# Patient Record
Sex: FEMALE | Race: WHITE | Employment: OTHER | URBAN - NONMETROPOLITAN AREA
[De-identification: names, ages, dates, MRNs, and addresses within clinical notes are randomized per-mention and may not be internally consistent; named-entity substitution may affect disease eponyms.]

---

## 2017-01-16 ENCOUNTER — OFFICE VISIT (OUTPATIENT)
Dept: INTERNAL MEDICINE CLINIC | Age: 62
End: 2017-01-16

## 2017-01-16 VITALS
DIASTOLIC BLOOD PRESSURE: 68 MMHG | OXYGEN SATURATION: 97 % | TEMPERATURE: 98.6 F | SYSTOLIC BLOOD PRESSURE: 148 MMHG | BODY MASS INDEX: 44.93 KG/M2 | WEIGHT: 279.6 LBS | HEART RATE: 68 BPM | HEIGHT: 66 IN | RESPIRATION RATE: 17 BRPM

## 2017-01-16 DIAGNOSIS — K76.0 FATTY LIVER: ICD-10-CM

## 2017-01-16 DIAGNOSIS — E78.2 MIXED HYPERLIPIDEMIA: ICD-10-CM

## 2017-01-16 DIAGNOSIS — L85.3 DRY SKIN: ICD-10-CM

## 2017-01-16 DIAGNOSIS — N39.46 URINARY INCONTINENCE, MIXED: ICD-10-CM

## 2017-01-16 DIAGNOSIS — I27.20 PULMONARY HYPERTENSION (HCC): ICD-10-CM

## 2017-01-16 DIAGNOSIS — E11.29 MICROALBUMINURIA DUE TO TYPE 2 DIABETES MELLITUS (HCC): ICD-10-CM

## 2017-01-16 DIAGNOSIS — J04.0 LARYNGITIS, ACUTE: ICD-10-CM

## 2017-01-16 DIAGNOSIS — E66.01 MORBID OBESITY DUE TO EXCESS CALORIES (HCC): ICD-10-CM

## 2017-01-16 DIAGNOSIS — I10 ESSENTIAL HYPERTENSION: ICD-10-CM

## 2017-01-16 DIAGNOSIS — J44.9 MODERATE COPD (CHRONIC OBSTRUCTIVE PULMONARY DISEASE) (HCC): ICD-10-CM

## 2017-01-16 DIAGNOSIS — Z72.0 TOBACCO ABUSE DISORDER: ICD-10-CM

## 2017-01-16 DIAGNOSIS — G47.33 OBSTRUCTIVE SLEEP APNEA: ICD-10-CM

## 2017-01-16 DIAGNOSIS — R80.9 MICROALBUMINURIA DUE TO TYPE 2 DIABETES MELLITUS (HCC): ICD-10-CM

## 2017-01-16 DIAGNOSIS — J96.11 CHRONIC RESPIRATORY FAILURE WITH HYPOXIA (HCC): ICD-10-CM

## 2017-01-16 DIAGNOSIS — F51.01 PRIMARY INSOMNIA: ICD-10-CM

## 2017-01-16 DIAGNOSIS — E11.9 TYPE 2 DIABETES MELLITUS WITHOUT COMPLICATION, WITHOUT LONG-TERM CURRENT USE OF INSULIN (HCC): Primary | ICD-10-CM

## 2017-01-16 LAB — HBA1C MFR BLD: 7 %

## 2017-01-16 PROCEDURE — 83036 HEMOGLOBIN GLYCOSYLATED A1C: CPT | Performed by: INTERNAL MEDICINE

## 2017-01-16 PROCEDURE — 99214 OFFICE O/P EST MOD 30 MIN: CPT | Performed by: INTERNAL MEDICINE

## 2017-01-16 RX ORDER — ALBUTEROL SULFATE 90 UG/1
2 AEROSOL, METERED RESPIRATORY (INHALATION) EVERY 6 HOURS PRN
COMMUNITY
End: 2018-05-21 | Stop reason: SDUPTHER

## 2017-01-16 RX ORDER — AZITHROMYCIN 250 MG/1
TABLET, FILM COATED ORAL
Qty: 1 PACKET | Refills: 0 | Status: SHIPPED | OUTPATIENT
Start: 2017-01-16 | End: 2017-04-24 | Stop reason: ALTCHOICE

## 2017-01-16 ASSESSMENT — ENCOUNTER SYMPTOMS
VOMITING: 0
WHEEZING: 0
GASTROINTESTINAL NEGATIVE: 1
EYES NEGATIVE: 1
SHORTNESS OF BREATH: 1
SORE THROAT: 0
COUGH: 1
HEMOPTYSIS: 0
NAUSEA: 0

## 2017-03-08 ENCOUNTER — HOSPITAL ENCOUNTER (OUTPATIENT)
Dept: LAB | Age: 62
Discharge: HOME | End: 2017-03-08
Attending: INTERNAL MEDICINE | Admitting: INTERNAL MEDICINE

## 2017-03-08 LAB
ALBUMIN SERPL-MCNC: 4.2 GM/DL (ref 3.4–5)
ALP BLD-CCNC: 48 IU/L (ref 40–129)
ALT SERPL-CCNC: 40 U/L (ref 10–40)
ANION GAP SERPL CALCULATED.3IONS-SCNC: 12 MMOL/L (ref 4–16)
AST SERPL-CCNC: 24 IU/L (ref 15–37)
BILIRUB SERPL-MCNC: 0.3 MG/DL (ref 0–1)
BUN BLDV-MCNC: 28 MG/DL (ref 6–23)
CALCIUM SERPL-MCNC: 9.5 MG/DL (ref 8.3–10.6)
CHLORIDE BLD-SCNC: 100 MMOL/L (ref 99–110)
CHOLESTEROL: 166 MG/DL
CO2: 27 MMOL/L (ref 21–32)
CREAT SERPL-MCNC: 1 MG/DL (ref 0.6–1.1)
GFR AFRICAN AMERICAN: >60 ML/MIN/1.73M2
GFR NON-AFRICAN AMERICAN: 57 ML/MIN/1.73M2
GLUCOSE BLD-MCNC: 96 MG/DL (ref 70–140)
HDLC SERPL-MCNC: 42 MG/DL
LDL CHOLESTEROL DIRECT: 95 MG/DL
POTASSIUM SERPL-SCNC: 4.8 MMOL/L (ref 3.5–5.1)
SODIUM BLD-SCNC: 139 MMOL/L (ref 135–145)
TOTAL PROTEIN: 7.5 GM/DL (ref 6.4–8.2)
TRIGL SERPL-MCNC: 265 MG/DL
TSH HIGH SENSITIVITY: 2.04 UIU/ML (ref 0.27–4.2)

## 2017-03-27 RX ORDER — INDAPAMIDE 2.5 MG/1
TABLET, FILM COATED ORAL
Qty: 90 TABLET | Refills: 1 | Status: SHIPPED | OUTPATIENT
Start: 2017-03-27 | End: 2017-09-23 | Stop reason: SDUPTHER

## 2017-03-27 RX ORDER — ATENOLOL 25 MG/1
TABLET ORAL
Qty: 180 TABLET | Refills: 1 | Status: SHIPPED | OUTPATIENT
Start: 2017-03-27 | End: 2017-09-23 | Stop reason: SDUPTHER

## 2017-03-27 RX ORDER — SOLIFENACIN SUCCINATE 10 MG/1
TABLET, FILM COATED ORAL
Qty: 90 TABLET | Refills: 1 | Status: SHIPPED | OUTPATIENT
Start: 2017-03-27 | End: 2017-04-24 | Stop reason: ALTCHOICE

## 2017-03-27 RX ORDER — TRAZODONE HYDROCHLORIDE 100 MG/1
TABLET ORAL
Qty: 90 TABLET | Refills: 1 | Status: SHIPPED | OUTPATIENT
Start: 2017-03-27 | End: 2017-09-23 | Stop reason: SDUPTHER

## 2017-03-27 RX ORDER — LISINOPRIL 20 MG/1
TABLET ORAL
Qty: 90 TABLET | Refills: 1 | Status: SHIPPED | OUTPATIENT
Start: 2017-03-27 | End: 2017-09-23 | Stop reason: SDUPTHER

## 2017-03-27 RX ORDER — GLIPIZIDE 10 MG/1
TABLET, FILM COATED, EXTENDED RELEASE ORAL
Qty: 180 TABLET | Refills: 1 | Status: SHIPPED | OUTPATIENT
Start: 2017-03-27 | End: 2017-09-23 | Stop reason: SDUPTHER

## 2017-04-10 ENCOUNTER — HOSPITAL ENCOUNTER (OUTPATIENT)
Dept: CARDIAC REHAB | Age: 62
Discharge: HOME | End: 2017-04-10
Attending: INTERNAL MEDICINE | Admitting: INTERNAL MEDICINE

## 2017-04-13 PROBLEM — J44.9 COPD, SEVERE (HCC): Chronic | Status: ACTIVE | Noted: 2017-04-10

## 2017-04-24 ENCOUNTER — OFFICE VISIT (OUTPATIENT)
Dept: INTERNAL MEDICINE CLINIC | Age: 62
End: 2017-04-24

## 2017-04-24 VITALS
SYSTOLIC BLOOD PRESSURE: 165 MMHG | HEIGHT: 66 IN | DIASTOLIC BLOOD PRESSURE: 68 MMHG | WEIGHT: 277.2 LBS | HEART RATE: 72 BPM | OXYGEN SATURATION: 97 % | TEMPERATURE: 98.1 F | BODY MASS INDEX: 44.55 KG/M2

## 2017-04-24 DIAGNOSIS — E11.9 TYPE 2 DIABETES MELLITUS WITHOUT COMPLICATION, WITHOUT LONG-TERM CURRENT USE OF INSULIN (HCC): ICD-10-CM

## 2017-04-24 DIAGNOSIS — N39.46 URINARY INCONTINENCE, MIXED: ICD-10-CM

## 2017-04-24 DIAGNOSIS — G47.33 OBSTRUCTIVE SLEEP APNEA: ICD-10-CM

## 2017-04-24 DIAGNOSIS — Z72.0 TOBACCO ABUSE DISORDER: ICD-10-CM

## 2017-04-24 DIAGNOSIS — F51.01 PRIMARY INSOMNIA: ICD-10-CM

## 2017-04-24 DIAGNOSIS — J44.9 COPD, SEVERE (HCC): Primary | Chronic | ICD-10-CM

## 2017-04-24 DIAGNOSIS — Z12.31 VISIT FOR SCREENING MAMMOGRAM: ICD-10-CM

## 2017-04-24 DIAGNOSIS — E66.01 MORBID OBESITY DUE TO EXCESS CALORIES (HCC): ICD-10-CM

## 2017-04-24 DIAGNOSIS — J96.11 CHRONIC RESPIRATORY FAILURE WITH HYPOXIA (HCC): ICD-10-CM

## 2017-04-24 DIAGNOSIS — I27.20 PULMONARY HYPERTENSION (HCC): ICD-10-CM

## 2017-04-24 DIAGNOSIS — I10 ESSENTIAL HYPERTENSION: ICD-10-CM

## 2017-04-24 DIAGNOSIS — E78.2 MIXED HYPERLIPIDEMIA: ICD-10-CM

## 2017-04-24 DIAGNOSIS — K75.81 STEATOHEPATITIS: ICD-10-CM

## 2017-04-24 PROCEDURE — 99214 OFFICE O/P EST MOD 30 MIN: CPT | Performed by: INTERNAL MEDICINE

## 2017-04-24 RX ORDER — CIPROFLOXACIN 500 MG/1
500 TABLET, FILM COATED ORAL 2 TIMES DAILY
Qty: 10 TABLET | Refills: 0 | Status: SHIPPED | OUTPATIENT
Start: 2017-04-24 | End: 2017-05-01 | Stop reason: ALTCHOICE

## 2017-04-24 RX ORDER — CIPROFLOXACIN 500 MG/1
500 TABLET, FILM COATED ORAL 2 TIMES DAILY
Qty: 10 TABLET | Refills: 0 | Status: SHIPPED | OUTPATIENT
Start: 2017-04-24 | End: 2017-04-24 | Stop reason: CLARIF

## 2017-04-24 ASSESSMENT — ENCOUNTER SYMPTOMS
GASTROINTESTINAL NEGATIVE: 1
SPUTUM PRODUCTION: 1
COUGH: 1
EYES NEGATIVE: 1
STRIDOR: 0

## 2017-05-02 ENCOUNTER — HOSPITAL ENCOUNTER (OUTPATIENT)
Dept: MAMMOGRAPHY | Age: 62
Discharge: HOME | End: 2017-05-02
Attending: INTERNAL MEDICINE | Admitting: INTERNAL MEDICINE

## 2017-05-02 DIAGNOSIS — J44.9 OBSTRUCTIVE CHRONIC BRONCHITIS WITHOUT EXACERBATION (HCC): ICD-10-CM

## 2017-05-02 DIAGNOSIS — Z12.31 SCREENING MAMMOGRAM, ENCOUNTER FOR: ICD-10-CM

## 2017-06-09 RX ORDER — PRAVASTATIN SODIUM 20 MG
TABLET ORAL
Qty: 90 TABLET | Refills: 1 | Status: SHIPPED | OUTPATIENT
Start: 2017-06-09 | End: 2017-12-06 | Stop reason: SDUPTHER

## 2017-07-12 LAB — DIABETIC RETINOPATHY: NORMAL

## 2017-07-31 ENCOUNTER — OFFICE VISIT (OUTPATIENT)
Dept: INTERNAL MEDICINE CLINIC | Age: 62
End: 2017-07-31

## 2017-07-31 VITALS
DIASTOLIC BLOOD PRESSURE: 64 MMHG | WEIGHT: 279.6 LBS | HEART RATE: 72 BPM | OXYGEN SATURATION: 94 % | SYSTOLIC BLOOD PRESSURE: 136 MMHG | HEIGHT: 66 IN | BODY MASS INDEX: 44.93 KG/M2 | RESPIRATION RATE: 16 BRPM | TEMPERATURE: 98 F

## 2017-07-31 DIAGNOSIS — K75.81 STEATOHEPATITIS: ICD-10-CM

## 2017-07-31 DIAGNOSIS — F51.01 PRIMARY INSOMNIA: ICD-10-CM

## 2017-07-31 DIAGNOSIS — J44.9 MODERATE COPD (CHRONIC OBSTRUCTIVE PULMONARY DISEASE) (HCC): ICD-10-CM

## 2017-07-31 DIAGNOSIS — I27.20 PULMONARY HYPERTENSION (HCC): ICD-10-CM

## 2017-07-31 DIAGNOSIS — E78.2 MIXED HYPERLIPIDEMIA: ICD-10-CM

## 2017-07-31 DIAGNOSIS — E66.01 MORBID OBESITY DUE TO EXCESS CALORIES (HCC): ICD-10-CM

## 2017-07-31 DIAGNOSIS — K76.89 LIVER DYSFUNCTION: ICD-10-CM

## 2017-07-31 DIAGNOSIS — I10 ESSENTIAL HYPERTENSION: ICD-10-CM

## 2017-07-31 DIAGNOSIS — Z72.0 TOBACCO ABUSE DISORDER: ICD-10-CM

## 2017-07-31 DIAGNOSIS — E11.9 TYPE 2 DIABETES MELLITUS WITHOUT COMPLICATION, WITHOUT LONG-TERM CURRENT USE OF INSULIN (HCC): Primary | ICD-10-CM

## 2017-07-31 DIAGNOSIS — G47.33 OBSTRUCTIVE SLEEP APNEA: ICD-10-CM

## 2017-07-31 DIAGNOSIS — J44.9 COPD, SEVERE (HCC): Chronic | ICD-10-CM

## 2017-07-31 LAB — HBA1C MFR BLD: 7.1 %

## 2017-07-31 PROCEDURE — 83036 HEMOGLOBIN GLYCOSYLATED A1C: CPT | Performed by: INTERNAL MEDICINE

## 2017-07-31 PROCEDURE — 99214 OFFICE O/P EST MOD 30 MIN: CPT | Performed by: INTERNAL MEDICINE

## 2017-07-31 ASSESSMENT — ENCOUNTER SYMPTOMS
GASTROINTESTINAL NEGATIVE: 1
SHORTNESS OF BREATH: 1
ORTHOPNEA: 0
COUGH: 0
EYES NEGATIVE: 1
HEMOPTYSIS: 0

## 2017-08-29 RX ORDER — CLONIDINE HYDROCHLORIDE 0.1 MG/1
TABLET ORAL
Qty: 180 TABLET | Refills: 1 | Status: SHIPPED | OUTPATIENT
Start: 2017-08-29 | End: 2018-02-28 | Stop reason: SDUPTHER

## 2017-09-25 RX ORDER — TRAZODONE HYDROCHLORIDE 100 MG/1
TABLET ORAL
Qty: 90 TABLET | Refills: 1 | Status: SHIPPED | OUTPATIENT
Start: 2017-09-25 | End: 2018-03-24 | Stop reason: SDUPTHER

## 2017-09-25 RX ORDER — INDAPAMIDE 2.5 MG/1
TABLET, FILM COATED ORAL
Qty: 90 TABLET | Refills: 1 | Status: SHIPPED | OUTPATIENT
Start: 2017-09-25 | End: 2018-03-24 | Stop reason: SDUPTHER

## 2017-09-25 RX ORDER — GLIPIZIDE 10 MG/1
TABLET, FILM COATED, EXTENDED RELEASE ORAL
Qty: 180 TABLET | Refills: 1 | Status: SHIPPED | OUTPATIENT
Start: 2017-09-25 | End: 2018-03-24 | Stop reason: SDUPTHER

## 2017-09-25 RX ORDER — SOLIFENACIN SUCCINATE 10 MG/1
TABLET, FILM COATED ORAL
Qty: 90 TABLET | Refills: 1 | Status: SHIPPED | OUTPATIENT
Start: 2017-09-25 | End: 2018-03-24 | Stop reason: SDUPTHER

## 2017-09-25 RX ORDER — ATENOLOL 25 MG/1
TABLET ORAL
Qty: 180 TABLET | Refills: 1 | Status: SHIPPED | OUTPATIENT
Start: 2017-09-25 | End: 2018-03-24 | Stop reason: SDUPTHER

## 2017-09-25 RX ORDER — LISINOPRIL 20 MG/1
TABLET ORAL
Qty: 90 TABLET | Refills: 1 | Status: SHIPPED | OUTPATIENT
Start: 2017-09-25 | End: 2018-02-01 | Stop reason: ALTCHOICE

## 2017-10-19 ENCOUNTER — HOSPITAL ENCOUNTER (OUTPATIENT)
Dept: LAB | Age: 62
Discharge: HOME | End: 2017-10-19
Attending: INTERNAL MEDICINE | Admitting: INTERNAL MEDICINE

## 2017-10-19 LAB
ALBUMIN SERPL-MCNC: 4.3 GM/DL (ref 3.4–5)
ALP BLD-CCNC: 55 IU/L (ref 40–129)
ALT SERPL-CCNC: 28 U/L (ref 10–40)
ANION GAP SERPL CALCULATED.3IONS-SCNC: 15 MMOL/L (ref 4–16)
AST SERPL-CCNC: 22 IU/L (ref 15–37)
BILIRUB SERPL-MCNC: 0.2 MG/DL (ref 0–1)
BUN BLDV-MCNC: 19 MG/DL (ref 6–23)
CALCIUM SERPL-MCNC: 9.4 MG/DL (ref 8.3–10.6)
CHLORIDE BLD-SCNC: 99 MMOL/L (ref 99–110)
CHOLESTEROL, FASTING: 178 MG/DL
CO2: 26 MMOL/L (ref 21–32)
CREAT SERPL-MCNC: 0.9 MG/DL (ref 0.6–1.1)
CREATININE URINE: 84.8 MG/DL (ref 28–217)
GFR AFRICAN AMERICAN: >60 ML/MIN/1.73M2
GFR NON-AFRICAN AMERICAN: >60 ML/MIN/1.73M2
GLUCOSE FASTING: 194 MG/DL (ref 70–99)
HDLC SERPL-MCNC: 36 MG/DL
LDL CHOLESTEROL DIRECT: 93 MG/DL
MICROALBUMIN/CREAT 24H UR: 7.1 MG/DL
MICROALBUMIN/CREAT UR-RTO: 83.7 MG/G CREAT (ref 0–30)
POTASSIUM SERPL-SCNC: 4.5 MMOL/L (ref 3.5–5.1)
SODIUM BLD-SCNC: 140 MMOL/L (ref 135–145)
TOTAL PROTEIN: 7.2 GM/DL (ref 6.4–8.2)
TRIGLYCERIDE, FASTING: 398 MG/DL

## 2017-11-01 ENCOUNTER — OFFICE VISIT (OUTPATIENT)
Dept: INTERNAL MEDICINE CLINIC | Age: 62
End: 2017-11-01

## 2017-11-01 VITALS
WEIGHT: 276 LBS | DIASTOLIC BLOOD PRESSURE: 66 MMHG | TEMPERATURE: 98.1 F | BODY MASS INDEX: 44.36 KG/M2 | OXYGEN SATURATION: 94 % | HEIGHT: 66 IN | RESPIRATION RATE: 17 BRPM | HEART RATE: 84 BPM | SYSTOLIC BLOOD PRESSURE: 138 MMHG

## 2017-11-01 DIAGNOSIS — E78.2 MIXED HYPERLIPIDEMIA: ICD-10-CM

## 2017-11-01 DIAGNOSIS — Z72.0 TOBACCO ABUSE DISORDER: ICD-10-CM

## 2017-11-01 DIAGNOSIS — J20.8 ACUTE BRONCHITIS DUE TO OTHER SPECIFIED ORGANISMS: ICD-10-CM

## 2017-11-01 DIAGNOSIS — K75.81 STEATOHEPATITIS: ICD-10-CM

## 2017-11-01 DIAGNOSIS — I27.20 PULMONARY HYPERTENSION (HCC): ICD-10-CM

## 2017-11-01 DIAGNOSIS — I10 ESSENTIAL HYPERTENSION: ICD-10-CM

## 2017-11-01 DIAGNOSIS — E11.9 TYPE 2 DIABETES MELLITUS WITHOUT COMPLICATION, WITHOUT LONG-TERM CURRENT USE OF INSULIN (HCC): Primary | ICD-10-CM

## 2017-11-01 DIAGNOSIS — E66.01 MORBID OBESITY DUE TO EXCESS CALORIES (HCC): ICD-10-CM

## 2017-11-01 DIAGNOSIS — J44.9 COPD, SEVERE (HCC): Chronic | ICD-10-CM

## 2017-11-01 DIAGNOSIS — G47.33 OBSTRUCTIVE SLEEP APNEA: ICD-10-CM

## 2017-11-01 DIAGNOSIS — J96.11 CHRONIC RESPIRATORY FAILURE WITH HYPOXIA (HCC): ICD-10-CM

## 2017-11-01 LAB — HBA1C MFR BLD: 6.9 %

## 2017-11-01 PROCEDURE — G8482 FLU IMMUNIZE ORDER/ADMIN: HCPCS | Performed by: INTERNAL MEDICINE

## 2017-11-01 PROCEDURE — 4004F PT TOBACCO SCREEN RCVD TLK: CPT | Performed by: INTERNAL MEDICINE

## 2017-11-01 PROCEDURE — G8417 CALC BMI ABV UP PARAM F/U: HCPCS | Performed by: INTERNAL MEDICINE

## 2017-11-01 PROCEDURE — 3023F SPIROM DOC REV: CPT | Performed by: INTERNAL MEDICINE

## 2017-11-01 PROCEDURE — 3017F COLORECTAL CA SCREEN DOC REV: CPT | Performed by: INTERNAL MEDICINE

## 2017-11-01 PROCEDURE — 3044F HG A1C LEVEL LT 7.0%: CPT | Performed by: INTERNAL MEDICINE

## 2017-11-01 PROCEDURE — 99214 OFFICE O/P EST MOD 30 MIN: CPT | Performed by: INTERNAL MEDICINE

## 2017-11-01 PROCEDURE — 3014F SCREEN MAMMO DOC REV: CPT | Performed by: INTERNAL MEDICINE

## 2017-11-01 PROCEDURE — 83036 HEMOGLOBIN GLYCOSYLATED A1C: CPT | Performed by: INTERNAL MEDICINE

## 2017-11-01 PROCEDURE — G8427 DOCREV CUR MEDS BY ELIG CLIN: HCPCS | Performed by: INTERNAL MEDICINE

## 2017-11-01 PROCEDURE — G8926 SPIRO NO PERF OR DOC: HCPCS | Performed by: INTERNAL MEDICINE

## 2017-11-01 RX ORDER — DOXYCYCLINE HYCLATE 100 MG/1
100 CAPSULE ORAL 2 TIMES DAILY
Qty: 14 CAPSULE | Refills: 0 | Status: SHIPPED | OUTPATIENT
Start: 2017-11-01 | End: 2017-11-08

## 2017-11-01 ASSESSMENT — PATIENT HEALTH QUESTIONNAIRE - PHQ9
SUM OF ALL RESPONSES TO PHQ QUESTIONS 1-9: 0
SUM OF ALL RESPONSES TO PHQ9 QUESTIONS 1 & 2: 0
1. LITTLE INTEREST OR PLEASURE IN DOING THINGS: 0
2. FEELING DOWN, DEPRESSED OR HOPELESS: 0

## 2017-11-01 ASSESSMENT — ENCOUNTER SYMPTOMS
HEMOPTYSIS: 0
COUGH: 1
SPUTUM PRODUCTION: 1
EYES NEGATIVE: 1
GASTROINTESTINAL NEGATIVE: 1
SHORTNESS OF BREATH: 1

## 2017-11-01 NOTE — ASSESSMENT & PLAN NOTE
PFT 4/10/17 severe COPD  Pt sees Dr Yuko De La Rosa  She is on O2 24/7 : 2L/m  Pt is on symbicort, and albuterol inhalers  Cannot affort malcom Bobbyiden  Unable to work because of that.    Has applied for permanent disability

## 2017-11-01 NOTE — ASSESSMENT & PLAN NOTE
Patient is a smoker. Counseled to quit smoking. Various options given. Help number 1-800 QUIT NOW  given to patient.

## 2017-11-01 NOTE — PROGRESS NOTES
oriented, pleasant, in no apparent distress. Patient is on oxygen  HEENT:  Conjunctiva pink, no scleral icterus. ENT clear. NECK:  Supple. No jugular venous distention noted. No masses felt,  CARDIOVASCULAR:  Normal S1 and S2    PULMONARY:  No respiratory distress. No wheezes or rales. ABDOMEN:  Soft and non-tender,no masses  or organomegaly. EXTREMITIES:  No cyanosis, clubbing, or significant edema. SKIN: Skin is warm and dry. NEUROLOGICAL:  Cranial nerves II through XII are grossly intact. FEET : skin is dry: no ulcers. No calluses. Pulse 1 + mono-swathi test is normal.    IMPRESSION:    Encounter Diagnoses   Name Primary?  Type 2 diabetes mellitus without complication, without long-term current use of insulin (HCC) Yes    Tobacco abuse disorder     Steatohepatitis     Pulmonary hypertension     Obstructive sleep apnea     Morbid obesity due to excess calories (HCC)     Mixed hyperlipidemia     Essential hypertension     COPD, severe (HCC)     Chronic respiratory failure with hypoxia (HCC)     Acute bronchitis due to other specified organisms        ASSESSMENT/PLAN:      Type 2 diabetes mellitus without complication (HCC)  GML1X 6.9  Pt is on glipizide and metformin  Continue same      Tobacco abuse disorder  Patient is a smoker. Counseled to quit smoking. Various options given. Help number 1-800 QUIT NOW  given to patient. Steatohepatitis  Liver, core biopsies: Steatohepatitis, acute and chronic,  grade 2, stage 2. LFTs stable    Pulmonary hypertension (HCC)  Sees Dr Smith Generous    Obstructive sleep apnea  Using CPAP and that helps    Morbid obesity due to excess calories ([address]  Discussed to lose and wt and follow diet    Mixed hyperlipidemia  Lipid good except Tri  Follow diet and pravastatin    Essential hypertension  Hypertension in control. Follow low salt diet. Continue current treatment. Continue lozol, prinivil,tenormin and clonidine.     COPD, severe ([address]  PFT 4/10/17 severe COPD  Pt sees Dr Yuko De La Rosa  She is on O2 24/7 : 2L/m  Pt is on symbicort, and albuterol inhalers  Cannot affort Ryann Rashid  Unable to work because of that. Has applied for permanent disability    Chronic respiratory failure with hypoxia ([facility]  As above    Acute bronchitis due to other specified organisms  Will give doxycycline and sample of [facility] maintenance  Patient had an eye exam about 6 months ago at [facility]  Will give FIT test kit to check it at home  She had Pneumovax 23 in 2014  She had tetanus 2015  Return to office in 3 months. Orders Placed This Encounter   Procedures    POCT glycosylated hemoglobin (Hb A1C)         Mediations reviewed with the patient. Continue current medications. Appropriate prescriptions are addressed. After visit pitery provided. Follow up as directed sooner if needed. Questions answered and patient verbalizes understanding. Call for any problems, questions, or concerns.        No Known Allergies  Current Outpatient Prescriptions   Medication Sig Dispense Refill    traZODone (DESYREL) 100 MG tablet TAKE 1 TABLET NIGHTLY 90 tablet 1    VESICARE 10 MG tablet TAKE 1 TABLET DAILY 90 tablet 1    glipiZIDE (GLUCOTROL XL) 10 MG extended release tablet TAKE 1 TABLET TWICE A  tablet 1    lisinopril (PRINIVIL;ZESTRIL) 20 MG tablet TAKE 1 TABLET DAILY 90 tablet 1    indapamide (LOZOL) 2.5 MG tablet TAKE 1 TABLET DAILY 90 tablet 1    atenolol (TENORMIN) 25 MG tablet TAKE 1 TABLET TWICE A  tablet 1    cloNIDine (CATAPRES) 0.1 MG tablet TAKE 1 TABLET TWICE A  tablet 1    metFORMIN (GLUCOPHAGE) 1000 MG tablet TAKE 1 TABLET TWICE A DAY WITH MEALS 180 tablet 1    SYMBICORT 160-4.5 MCG/ACT AERO USE 2 INHALATIONS TWICE A DAY 30.6 g 3    pravastatin (PRAVACHOL) 20 MG tablet TAKE 1 TABLET DAILY 90 tablet 1    aclidinium (TUDORZA PRESSAIR) 400 MCG/ACT AEPB inhaler Inhale 1 puff into the lungs 2 times daily 1 each 5    albuterol sulfate  (90 BASE) MCG/ACT inhaler Inhale 2 puffs into the lungs every 6 hours as needed for Wheezing      OXYGEN Inhale 2 L/min into the lungs continuous.  MULTIPLE VITAMIN PO Take 1 tablet by mouth daily.  albuterol sulfate HFA (PROAIR HFA) 108 (90 BASE) MCG/ACT inhaler Inhale 2 puffs into the lungs every 6 hours as needed for Wheezing 3 Inhaler 3     No current facility-administered medications for this visit. Past Medical History:   Diagnosis Date    Chronic respiratory failure with hypoxia ([facility] 10/19/2015    COPD (chronic obstructive pulmonary disease) ([facility] 10/11/2013    follow with Dr Adrienne Collet Diabetes mellitus ([facility] 10/11/2013    \"been diabetic for 7 yrs\"    Heart murmur     Hyperlipidemia 10/11/2013    Hypertension 10/11/2013    Insomnia 10/11/2013    Liver dysfunction 12/21/2015    Liver, core biopsies: Steatohepatitis, acute and chronic, grade 2, stage 2.     Nasal congestion 1/20/2016    \"no cough and no fever associated with it\"    Obesity 10/11/2013    On home oxygen therapy     2l/nc 24 hrs per day    Sleep apnea 10/11/2013    had sleep apnea 2013- has cpap and does use it    Steatohepatitis 3/21/2016    Liver, core biopsies: Steatohepatitis, acute and chronic, grade 2, stage 2.      Systolic murmur 6/1/1528    Tobacco abuse disorder 10/11/2013    Urinary incontinence, mixed 10/11/2013     Past Surgical History:   Procedure Laterality Date    BREAST SURGERY      right breast bx 2012    CHOLECYSTECTOMY  2010    \"done with hyster    HYSTERECTOMY  2010    \"took both ovaries\"    KNEE SURGERY Left 2000    TONSILLECTOMY  as a kid     Social History   Substance Use Topics    Smoking status: Current Every Day Smoker     Packs/day: 0.50     Years: 30.00     Types: Cigarettes    Smokeless tobacco: Never Used    Alcohol use No       LAB REVIEW:  CBC:   Lab Results   Component Value Date    WBC 11.3 01/21/2016    HGB 12.5 01/21/2016    HCT 38.0 01/21/2016     01/21/2016     Lipids: Lab Results   Component Value Date    CHOL 166 03/08/2017    TRIG 265 (H) 03/08/2017    HDL 36 (L) 10/19/2017    LDLDIRECT 93 10/19/2017    TRIGLYCFAST 398 (H) 10/19/2017    CHOLESTFAST 178 10/19/2017     Renal:   Lab Results   Component Value Date    BUN 19 10/19/2017    CREATININE 0.9 10/19/2017     10/19/2017    K 4.5 10/19/2017    ALT 28 10/19/2017    AST 22 10/19/2017    GLUCOSE 96 03/08/2017     PT/INR:   Lab Results   Component Value Date    INR 0.95 01/21/2016     A1C:   Lab Results   Component Value Date    LABA1C 6.9 11/01/2017           Shantelle Borja MD, 11/1/2017 , 8:35 AM

## 2017-11-03 ENCOUNTER — NURSE ONLY (OUTPATIENT)
Dept: INTERNAL MEDICINE CLINIC | Age: 62
End: 2017-11-03

## 2017-11-03 DIAGNOSIS — Z12.12 SCREENING FOR RECTAL CANCER: Primary | ICD-10-CM

## 2017-11-03 LAB
CONTROL: NORMAL
HEMOCCULT STL QL: NEGATIVE

## 2017-11-03 PROCEDURE — 82274 ASSAY TEST FOR BLOOD FECAL: CPT | Performed by: INTERNAL MEDICINE

## 2017-12-06 RX ORDER — PRAVASTATIN SODIUM 20 MG
TABLET ORAL
Qty: 90 TABLET | Refills: 1 | Status: SHIPPED | OUTPATIENT
Start: 2017-12-06 | End: 2018-05-21 | Stop reason: SDUPTHER

## 2018-02-01 ENCOUNTER — OFFICE VISIT (OUTPATIENT)
Dept: INTERNAL MEDICINE CLINIC | Age: 63
End: 2018-02-01

## 2018-02-01 VITALS
SYSTOLIC BLOOD PRESSURE: 132 MMHG | DIASTOLIC BLOOD PRESSURE: 82 MMHG | BODY MASS INDEX: 45.45 KG/M2 | RESPIRATION RATE: 18 BRPM | OXYGEN SATURATION: 96 % | TEMPERATURE: 98 F | WEIGHT: 281.6 LBS | HEART RATE: 88 BPM

## 2018-02-01 DIAGNOSIS — G47.33 OBSTRUCTIVE SLEEP APNEA: ICD-10-CM

## 2018-02-01 DIAGNOSIS — J20.9 ACUTE BRONCHITIS, UNSPECIFIED ORGANISM: ICD-10-CM

## 2018-02-01 DIAGNOSIS — E11.9 TYPE 2 DIABETES MELLITUS WITHOUT COMPLICATION, WITHOUT LONG-TERM CURRENT USE OF INSULIN (HCC): Primary | ICD-10-CM

## 2018-02-01 DIAGNOSIS — J44.9 COPD, SEVERE (HCC): Chronic | ICD-10-CM

## 2018-02-01 DIAGNOSIS — J96.11 CHRONIC RESPIRATORY FAILURE WITH HYPOXIA (HCC): ICD-10-CM

## 2018-02-01 DIAGNOSIS — K75.81 STEATOHEPATITIS: ICD-10-CM

## 2018-02-01 DIAGNOSIS — E11.29 MICROALBUMINURIA DUE TO TYPE 2 DIABETES MELLITUS (HCC): ICD-10-CM

## 2018-02-01 DIAGNOSIS — Z72.0 TOBACCO ABUSE DISORDER: ICD-10-CM

## 2018-02-01 DIAGNOSIS — I10 ESSENTIAL HYPERTENSION: ICD-10-CM

## 2018-02-01 DIAGNOSIS — I27.20 PULMONARY HYPERTENSION (HCC): ICD-10-CM

## 2018-02-01 DIAGNOSIS — E78.2 MIXED HYPERLIPIDEMIA: ICD-10-CM

## 2018-02-01 DIAGNOSIS — E66.01 MORBID OBESITY DUE TO EXCESS CALORIES (HCC): ICD-10-CM

## 2018-02-01 DIAGNOSIS — N39.46 URINARY INCONTINENCE, MIXED: ICD-10-CM

## 2018-02-01 DIAGNOSIS — R80.9 MICROALBUMINURIA DUE TO TYPE 2 DIABETES MELLITUS (HCC): ICD-10-CM

## 2018-02-01 LAB — HBA1C MFR BLD: 6.1 %

## 2018-02-01 PROCEDURE — 83036 HEMOGLOBIN GLYCOSYLATED A1C: CPT | Performed by: INTERNAL MEDICINE

## 2018-02-01 PROCEDURE — 99214 OFFICE O/P EST MOD 30 MIN: CPT | Performed by: INTERNAL MEDICINE

## 2018-02-01 RX ORDER — CEFDINIR 300 MG/1
300 CAPSULE ORAL 2 TIMES DAILY
Qty: 20 CAPSULE | Refills: 0 | Status: SHIPPED | OUTPATIENT
Start: 2018-02-01 | End: 2018-02-11

## 2018-02-01 RX ORDER — LOSARTAN POTASSIUM 50 MG/1
50 TABLET ORAL DAILY
Qty: 90 TABLET | Refills: 1 | Status: SHIPPED | OUTPATIENT
Start: 2018-02-01 | End: 2018-05-21 | Stop reason: SDUPTHER

## 2018-02-01 ASSESSMENT — ENCOUNTER SYMPTOMS
ORTHOPNEA: 0
GASTROINTESTINAL NEGATIVE: 1
HEMOPTYSIS: 0
SPUTUM PRODUCTION: 1
EYES NEGATIVE: 1
SORE THROAT: 0
SHORTNESS OF BREATH: 0
COUGH: 1

## 2018-02-01 NOTE — PROGRESS NOTES
respiratory distress. No wheezes or rales. ABDOMEN:  Soft and non-tender,no masses  or organomegaly. EXTREMITIES:  No cyanosis, clubbing, or significant edema. SKIN: Skin is warm and dry. NEUROLOGICAL:  Cranial nerves II through XII are grossly intact. IMPRESSION:    Encounter Diagnoses   Name Primary?  Type 2 diabetes mellitus without complication, without long-term current use of insulin ([facility]) Yes    Acute bronchitis, unspecified organism     COPD, severe ([facility]     Essential hypertension     Urinary incontinence, mixed     Tobacco abuse disorder     Obstructive sleep apnea     Pulmonary hypertension     Chronic respiratory failure with hypoxia ([facility])     Microalbuminuria due to type 2 diabetes mellitus ([facility]     Steatohepatitis     Mixed hyperlipidemia     Morbid obesity due to excess calories ([facility])        ASSESSMENT/PLAN:    1. Type 2 diabetes mellitus without complication, without long-term current use of insulin ([facility]  Patient has diabetes mellitus that is well controlled now. In follow diet and continue metformin and glipizide. If any hypoglycemia patient should report. - POCT glycosylated hemoglobin (Hb A1C)    2. Acute bronchitis, unspecified organism  Patient has acute bronchitis for the cough has been going on for the last month or so. Will treat with Omnicef. Patient has some cough medication at home. Continue all inhalers  Because of off-and-on cough Will discontinue lisinopril and start patient on losartan. Will get a chest x-ray also. Advised patient to quit smoking and various options given. 3. COPD, severe ([facility]  Patient has severe COPD and is using inhalers. Patient states she cannot afford ImageProtect but is taking Symbicort and albuterol. She is on oxygen. 4. Essential hypertension  Hypertension is in control. Patient is on lisinopril that will be discontinued and was started on losartan 50 mg daily. Continue indapamide, atenolol and clonidine.     5. TONSILLECTOMY  as a kid     Social History   Substance Use Topics    Smoking status: Current Every Day Smoker     Packs/day: 0.50     Years: 30.00     Types: Cigarettes    Smokeless tobacco: Never Used    Alcohol use No       LAB REVIEW:  CBC:   Lab Results   Component Value Date    WBC 11.3 01/21/2016    HGB 12.5 01/21/2016    HCT 38.0 01/21/2016     01/21/2016     Lipids:   Lab Results   Component Value Date    CHOL 166 03/08/2017    TRIG 265 (H) 03/08/2017    HDL 36 (L) 10/19/2017    LDLDIRECT 93 10/19/2017    TRIGLYCFAST 398 (H) 10/19/2017    CHOLESTFAST 178 10/19/2017     Renal:   Lab Results   Component Value Date    BUN 19 10/19/2017    CREATININE 0.9 10/19/2017     10/19/2017    K 4.5 10/19/2017    ALT 28 10/19/2017    AST 22 10/19/2017    GLUCOSE 96 03/08/2017     PT/INR:   Lab Results   Component Value Date    INR 0.95 01/21/2016     A1C:   Lab Results   Component Value Date    LABA1C 6.1 02/01/2018           Ronda Alatorre MD, 2/1/2018 , 8:32 AM

## 2018-02-02 ENCOUNTER — HOSPITAL ENCOUNTER (OUTPATIENT)
Dept: LAB | Age: 63
Discharge: HOME | End: 2018-02-02
Attending: INTERNAL MEDICINE | Admitting: INTERNAL MEDICINE

## 2018-02-02 ENCOUNTER — TELEPHONE (OUTPATIENT)
Dept: INTERNAL MEDICINE CLINIC | Age: 63
End: 2018-02-02

## 2018-02-02 DIAGNOSIS — J20.9 ACUTE BRONCHITIS, UNSPECIFIED ORGANISM: ICD-10-CM

## 2018-02-02 LAB
ALBUMIN SERPL-MCNC: 4.1 GM/DL (ref 3.4–5)
ALP BLD-CCNC: 52 IU/L (ref 40–129)
ALT SERPL-CCNC: 24 U/L (ref 10–40)
ANION GAP SERPL CALCULATED.3IONS-SCNC: 15 MMOL/L (ref 4–16)
AST SERPL-CCNC: 21 IU/L (ref 15–37)
BASOPHILS ABSOLUTE: 0.1 K/CU MM
BASOPHILS RELATIVE PERCENT: 0.5 % (ref 0–1)
BILIRUB SERPL-MCNC: 0.2 MG/DL (ref 0–1)
BUN BLDV-MCNC: 26 MG/DL (ref 6–23)
CALCIUM SERPL-MCNC: 9.8 MG/DL (ref 8.3–10.6)
CHLORIDE BLD-SCNC: 99 MMOL/L (ref 99–110)
CHOLESTEROL, FASTING: 179 MG/DL
CO2: 26 MMOL/L (ref 21–32)
CREAT SERPL-MCNC: 1 MG/DL (ref 0.6–1.1)
DIFFERENTIAL TYPE: ABNORMAL
EOSINOPHILS ABSOLUTE: 0.1 K/CU MM
EOSINOPHILS RELATIVE PERCENT: 1.1 % (ref 0–3)
GFR AFRICAN AMERICAN: >60 ML/MIN/1.73M2
GFR NON-AFRICAN AMERICAN: 56 ML/MIN/1.73M2
GLUCOSE FASTING: 169 MG/DL (ref 70–99)
HCT VFR BLD CALC: 39.6 % (ref 37–47)
HDLC SERPL-MCNC: 35 MG/DL
HEMOGLOBIN: 12.6 GM/DL (ref 12.5–16)
IMMATURE NEUTROPHIL %: 0.4 % (ref 0–0.43)
LDL CHOLESTEROL DIRECT: 92 MG/DL
LYMPHOCYTES ABSOLUTE: 2.9 K/CU MM
LYMPHOCYTES RELATIVE PERCENT: 29.3 % (ref 24–44)
MCH RBC QN AUTO: 31 PG (ref 27–31)
MCHC RBC AUTO-ENTMCNC: 31.8 % (ref 32–36)
MCV RBC AUTO: 97.5 FL (ref 78–100)
MONOCYTES ABSOLUTE: 0.6 K/CU MM
MONOCYTES RELATIVE PERCENT: 6.3 % (ref 0–4)
PDW BLD-RTO: 13 % (ref 11.7–14.9)
PLATELET # BLD: 301 K/CU MM (ref 140–440)
PMV BLD AUTO: 8.9 FL (ref 7.5–11.1)
POTASSIUM SERPL-SCNC: 4.4 MMOL/L (ref 3.5–5.1)
RBC # BLD: 4.06 M/CU MM (ref 4.2–5.4)
SEGMENTED NEUTROPHILS ABSOLUTE COUNT: 6.2 K/CU MM
SEGMENTED NEUTROPHILS RELATIVE PERCENT: 62.4 % (ref 36–66)
SODIUM BLD-SCNC: 140 MMOL/L (ref 135–145)
TOTAL IMMATURE NEUTOROPHIL: 0.04 K/CU MM
TOTAL PROTEIN: 7.2 GM/DL (ref 6.4–8.2)
TRIGLYCERIDE, FASTING: 388 MG/DL
WBC # BLD: 10 K/CU MM (ref 4–10.5)

## 2018-02-28 RX ORDER — CLONIDINE HYDROCHLORIDE 0.1 MG/1
TABLET ORAL
Qty: 180 TABLET | Refills: 1 | Status: SHIPPED | OUTPATIENT
Start: 2018-02-28 | End: 2018-08-02 | Stop reason: SDUPTHER

## 2018-03-26 RX ORDER — TRAZODONE HYDROCHLORIDE 100 MG/1
TABLET ORAL
Qty: 90 TABLET | Refills: 1 | Status: SHIPPED | OUTPATIENT
Start: 2018-03-26 | End: 2018-08-02 | Stop reason: SDUPTHER

## 2018-03-26 RX ORDER — ATENOLOL 25 MG/1
TABLET ORAL
Qty: 180 TABLET | Refills: 1 | Status: SHIPPED | OUTPATIENT
Start: 2018-03-26 | End: 2018-08-02 | Stop reason: SDUPTHER

## 2018-03-26 RX ORDER — LISINOPRIL 20 MG/1
TABLET ORAL
Qty: 90 TABLET | Refills: 1 | Status: SHIPPED | OUTPATIENT
Start: 2018-03-26 | End: 2018-08-02 | Stop reason: CLARIF

## 2018-03-26 RX ORDER — GLIPIZIDE 10 MG/1
TABLET, FILM COATED, EXTENDED RELEASE ORAL
Qty: 180 TABLET | Refills: 1 | Status: SHIPPED | OUTPATIENT
Start: 2018-03-26 | End: 2018-08-02 | Stop reason: SDUPTHER

## 2018-03-26 RX ORDER — INDAPAMIDE 2.5 MG/1
TABLET, FILM COATED ORAL
Qty: 90 TABLET | Refills: 1 | Status: SHIPPED | OUTPATIENT
Start: 2018-03-26 | End: 2018-08-02 | Stop reason: SDUPTHER

## 2018-03-26 RX ORDER — SOLIFENACIN SUCCINATE 10 MG/1
TABLET, FILM COATED ORAL
Qty: 90 TABLET | Refills: 1 | Status: SHIPPED | OUTPATIENT
Start: 2018-03-26 | End: 2018-08-02 | Stop reason: SDUPTHER

## 2018-05-02 ENCOUNTER — OFFICE VISIT (OUTPATIENT)
Dept: INTERNAL MEDICINE CLINIC | Age: 63
End: 2018-05-02

## 2018-05-02 VITALS
HEART RATE: 90 BPM | BODY MASS INDEX: 46.19 KG/M2 | WEIGHT: 286.2 LBS | RESPIRATION RATE: 18 BRPM | OXYGEN SATURATION: 93 % | TEMPERATURE: 98 F | DIASTOLIC BLOOD PRESSURE: 68 MMHG | SYSTOLIC BLOOD PRESSURE: 130 MMHG

## 2018-05-02 DIAGNOSIS — J44.9 COPD, SEVERE (HCC): Chronic | ICD-10-CM

## 2018-05-02 DIAGNOSIS — E66.01 MORBID OBESITY DUE TO EXCESS CALORIES (HCC): ICD-10-CM

## 2018-05-02 DIAGNOSIS — K76.89 LIVER DYSFUNCTION: ICD-10-CM

## 2018-05-02 DIAGNOSIS — I10 ESSENTIAL HYPERTENSION: ICD-10-CM

## 2018-05-02 DIAGNOSIS — E78.2 MIXED HYPERLIPIDEMIA: ICD-10-CM

## 2018-05-02 DIAGNOSIS — N39.46 URINARY INCONTINENCE, MIXED: ICD-10-CM

## 2018-05-02 DIAGNOSIS — G47.33 OBSTRUCTIVE SLEEP APNEA: ICD-10-CM

## 2018-05-02 DIAGNOSIS — K75.81 STEATOHEPATITIS: ICD-10-CM

## 2018-05-02 DIAGNOSIS — Z72.0 TOBACCO ABUSE DISORDER: ICD-10-CM

## 2018-05-02 DIAGNOSIS — E11.9 TYPE 2 DIABETES MELLITUS WITHOUT COMPLICATION, WITHOUT LONG-TERM CURRENT USE OF INSULIN (HCC): ICD-10-CM

## 2018-05-02 DIAGNOSIS — J96.11 CHRONIC RESPIRATORY FAILURE WITH HYPOXIA (HCC): ICD-10-CM

## 2018-05-02 PROCEDURE — 99214 OFFICE O/P EST MOD 30 MIN: CPT | Performed by: INTERNAL MEDICINE

## 2018-05-02 RX ORDER — IPRATROPIUM BROMIDE AND ALBUTEROL SULFATE 2.5; .5 MG/3ML; MG/3ML
SOLUTION RESPIRATORY (INHALATION) 4 TIMES DAILY
COMMUNITY
Start: 2018-04-09 | End: 2018-06-29 | Stop reason: SDUPTHER

## 2018-05-21 RX ORDER — ALBUTEROL SULFATE 90 UG/1
2 AEROSOL, METERED RESPIRATORY (INHALATION) EVERY 6 HOURS PRN
Qty: 3 INHALER | Refills: 1 | Status: ON HOLD | OUTPATIENT
Start: 2018-05-21 | End: 2018-09-22 | Stop reason: HOSPADM

## 2018-05-21 RX ORDER — LOSARTAN POTASSIUM 50 MG/1
50 TABLET ORAL DAILY
Qty: 90 TABLET | Refills: 1 | Status: ON HOLD | OUTPATIENT
Start: 2018-05-21 | End: 2018-11-02 | Stop reason: SDUPTHER

## 2018-05-21 RX ORDER — PRAVASTATIN SODIUM 20 MG
TABLET ORAL
Qty: 90 TABLET | Refills: 1 | Status: ON HOLD | OUTPATIENT
Start: 2018-05-21 | End: 2018-11-02 | Stop reason: SDUPTHER

## 2018-05-30 ENCOUNTER — HOSPITAL ENCOUNTER (OUTPATIENT)
Dept: PULMONOLOGY | Age: 63
Discharge: HOME | End: 2018-05-30
Attending: PREVENTIVE MEDICINE | Admitting: PREVENTIVE MEDICINE

## 2018-07-06 ENCOUNTER — HOSPITAL ENCOUNTER (OUTPATIENT)
Dept: LAB | Age: 63
Discharge: HOME | End: 2018-07-06
Attending: INTERNAL MEDICINE | Admitting: INTERNAL MEDICINE

## 2018-07-06 LAB
ALBUMIN SERPL-MCNC: 4.2 GM/DL (ref 3.4–5)
ALP BLD-CCNC: 49 IU/L (ref 40–129)
ALT SERPL-CCNC: 17 U/L (ref 10–40)
ANION GAP SERPL CALCULATED.3IONS-SCNC: 15 MMOL/L (ref 4–16)
AST SERPL-CCNC: 18 IU/L (ref 15–37)
BILIRUB SERPL-MCNC: 0.2 MG/DL (ref 0–1)
BUN BLDV-MCNC: 17 MG/DL (ref 6–23)
CALCIUM SERPL-MCNC: 9.4 MG/DL (ref 8.3–10.6)
CHLORIDE BLD-SCNC: 96 MMOL/L (ref 99–110)
CHOLESTEROL, FASTING: 147 MG/DL
CO2: 29 MMOL/L (ref 21–32)
CREAT SERPL-MCNC: 0.9 MG/DL (ref 0.6–1.1)
GFR AFRICAN AMERICAN: >60 ML/MIN/1.73M2
GFR NON-AFRICAN AMERICAN: >60 ML/MIN/1.73M2
GLUCOSE FASTING: 172 MG/DL (ref 70–99)
HDLC SERPL-MCNC: 37 MG/DL
LDL CHOLESTEROL DIRECT: 78 MG/DL
POTASSIUM SERPL-SCNC: 4.5 MMOL/L (ref 3.5–5.1)
SODIUM BLD-SCNC: 140 MMOL/L (ref 135–145)
TOTAL PROTEIN: 7.4 GM/DL (ref 6.4–8.2)
TRIGLYCERIDE, FASTING: 321 MG/DL

## 2018-07-10 ENCOUNTER — OFFICE VISIT (OUTPATIENT)
Dept: INTERNAL MEDICINE CLINIC | Age: 63
End: 2018-07-10

## 2018-07-10 VITALS
DIASTOLIC BLOOD PRESSURE: 70 MMHG | SYSTOLIC BLOOD PRESSURE: 136 MMHG | BODY MASS INDEX: 45.16 KG/M2 | TEMPERATURE: 98.7 F | HEART RATE: 105 BPM | RESPIRATION RATE: 25 BRPM | OXYGEN SATURATION: 90 % | WEIGHT: 281 LBS | HEIGHT: 66 IN

## 2018-07-10 DIAGNOSIS — M79.89 PAIN AND SWELLING OF RIGHT LOWER LEG: Primary | ICD-10-CM

## 2018-07-10 DIAGNOSIS — M79.661 PAIN AND SWELLING OF RIGHT LOWER LEG: Primary | ICD-10-CM

## 2018-07-10 PROCEDURE — 99213 OFFICE O/P EST LOW 20 MIN: CPT | Performed by: INTERNAL MEDICINE

## 2018-07-10 NOTE — PROGRESS NOTES
Patient is in severe pain. We'll refer patient to ER for further workup. ER was notified    Mediations reviewed with the patient. Continue current medications. Appropriate prescriptions are addressed. After visit cammy provided. Follow up as directed sooner if needed. Questions answered and patient verbalizes understanding. Call for any problems, questions, or concerns. No Known Allergies  Current Outpatient Prescriptions   Medication Sig Dispense Refill    ipratropium-albuterol (DUONEB) 0.5-2.5 (3) MG/3ML SOLN nebulizer solution Take 3 mLs by nebulization 4 times daily DX Copd J44.9 1080 mL 3    albuterol sulfate  (90 Base) MCG/ACT inhaler Inhale 2 puffs into the lungs every 6 hours as needed for Wheezing 3 Inhaler 1    metFORMIN (GLUCOPHAGE) 1000 MG tablet TAKE 1 TABLET TWICE A DAY WITH MEALS 180 tablet 1    pravastatin (PRAVACHOL) 20 MG tablet TAKE 1 TABLET DAILY 90 tablet 1    losartan (COZAAR) 50 MG tablet Take 1 tablet by mouth daily 90 tablet 1    atenolol (TENORMIN) 25 MG tablet TAKE 1 TABLET TWICE A  tablet 1    VESICARE 10 MG tablet TAKE 1 TABLET DAILY 90 tablet 1    traZODone (DESYREL) 100 MG tablet TAKE 1 TABLET NIGHTLY 90 tablet 1    indapamide (LOZOL) 2.5 MG tablet TAKE 1 TABLET DAILY 90 tablet 1    glipiZIDE (GLUCOTROL XL) 10 MG extended release tablet TAKE 1 TABLET TWICE A  tablet 1    cloNIDine (CATAPRES) 0.1 MG tablet TAKE 1 TABLET TWICE A  tablet 1    SYMBICORT 160-4.5 MCG/ACT AERO USE 2 INHALATIONS TWICE A DAY 30.6 g 3    OXYGEN Inhale 2 L/min into the lungs continuous.  MULTIPLE VITAMIN PO Take 1 tablet by mouth daily.  lisinopril (PRINIVIL;ZESTRIL) 20 MG tablet TAKE 1 TABLET DAILY 90 tablet 1     No current facility-administered medications for this visit.       Past Medical History:   Diagnosis Date    Chronic respiratory failure with hypoxia ([facility] 10/19/2015    [facility] 10/11/2013 7/10/2018 , 3:35 PM

## 2018-08-02 ENCOUNTER — OFFICE VISIT (OUTPATIENT)
Dept: INTERNAL MEDICINE CLINIC | Age: 63
End: 2018-08-02

## 2018-08-02 VITALS
BODY MASS INDEX: 41.42 KG/M2 | DIASTOLIC BLOOD PRESSURE: 68 MMHG | SYSTOLIC BLOOD PRESSURE: 128 MMHG | RESPIRATION RATE: 20 BRPM | HEART RATE: 88 BPM | OXYGEN SATURATION: 96 % | TEMPERATURE: 98.7 F | WEIGHT: 256.6 LBS

## 2018-08-02 DIAGNOSIS — K76.89 LIVER DYSFUNCTION: ICD-10-CM

## 2018-08-02 DIAGNOSIS — M79.604 RIGHT LEG PAIN: Primary | ICD-10-CM

## 2018-08-02 DIAGNOSIS — E66.01 MORBID OBESITY DUE TO EXCESS CALORIES (HCC): ICD-10-CM

## 2018-08-02 DIAGNOSIS — J96.11 CHRONIC RESPIRATORY FAILURE WITH HYPOXIA (HCC): ICD-10-CM

## 2018-08-02 DIAGNOSIS — E78.2 MIXED HYPERLIPIDEMIA: ICD-10-CM

## 2018-08-02 DIAGNOSIS — J44.9 COPD, SEVERE (HCC): Chronic | ICD-10-CM

## 2018-08-02 DIAGNOSIS — N39.46 URINARY INCONTINENCE, MIXED: ICD-10-CM

## 2018-08-02 DIAGNOSIS — I10 ESSENTIAL HYPERTENSION: ICD-10-CM

## 2018-08-02 DIAGNOSIS — I27.20 PULMONARY HYPERTENSION (HCC): ICD-10-CM

## 2018-08-02 DIAGNOSIS — G47.33 OBSTRUCTIVE SLEEP APNEA: ICD-10-CM

## 2018-08-02 DIAGNOSIS — E11.9 TYPE 2 DIABETES MELLITUS WITHOUT COMPLICATION, WITHOUT LONG-TERM CURRENT USE OF INSULIN (HCC): ICD-10-CM

## 2018-08-02 DIAGNOSIS — Z72.0 TOBACCO ABUSE DISORDER: ICD-10-CM

## 2018-08-02 DIAGNOSIS — K75.81 STEATOHEPATITIS: ICD-10-CM

## 2018-08-02 LAB — HBA1C MFR BLD: 6.9 %

## 2018-08-02 PROCEDURE — 99214 OFFICE O/P EST MOD 30 MIN: CPT | Performed by: INTERNAL MEDICINE

## 2018-08-02 PROCEDURE — 83036 HEMOGLOBIN GLYCOSYLATED A1C: CPT | Performed by: INTERNAL MEDICINE

## 2018-08-02 RX ORDER — GLIPIZIDE 10 MG/1
TABLET, FILM COATED, EXTENDED RELEASE ORAL
Qty: 180 TABLET | Refills: 1 | Status: SHIPPED | OUTPATIENT
Start: 2018-08-02 | End: 2019-02-04 | Stop reason: SDUPTHER

## 2018-08-02 RX ORDER — ATENOLOL 25 MG/1
TABLET ORAL
Qty: 180 TABLET | Refills: 1 | Status: SHIPPED | OUTPATIENT
Start: 2018-08-02 | End: 2019-02-04 | Stop reason: SDUPTHER

## 2018-08-02 RX ORDER — SOLIFENACIN SUCCINATE 10 MG/1
TABLET, FILM COATED ORAL
Qty: 90 TABLET | Refills: 1 | Status: ON HOLD | OUTPATIENT
Start: 2018-08-02 | End: 2019-01-03 | Stop reason: ALTCHOICE

## 2018-08-02 RX ORDER — CLONIDINE HYDROCHLORIDE 0.1 MG/1
TABLET ORAL
Qty: 180 TABLET | Refills: 1 | Status: ON HOLD | OUTPATIENT
Start: 2018-08-02 | End: 2019-01-21 | Stop reason: HOSPADM

## 2018-08-02 RX ORDER — TRAZODONE HYDROCHLORIDE 100 MG/1
TABLET ORAL
Qty: 90 TABLET | Refills: 1 | Status: SHIPPED | OUTPATIENT
Start: 2018-08-02 | End: 2019-02-04 | Stop reason: SDUPTHER

## 2018-08-02 RX ORDER — INDAPAMIDE 2.5 MG/1
TABLET, FILM COATED ORAL
Qty: 90 TABLET | Refills: 1 | Status: ON HOLD | OUTPATIENT
Start: 2018-08-02 | End: 2018-11-08 | Stop reason: HOSPADM

## 2018-08-02 RX ORDER — CEPHALEXIN 500 MG/1
500 CAPSULE ORAL 3 TIMES DAILY
Qty: 30 CAPSULE | Refills: 0 | Status: SHIPPED | OUTPATIENT
Start: 2018-08-02 | End: 2018-09-10

## 2018-08-02 ASSESSMENT — ENCOUNTER SYMPTOMS
SPUTUM PRODUCTION: 0
EYES NEGATIVE: 1
COUGH: 1
HEMOPTYSIS: 0
GASTROINTESTINAL NEGATIVE: 1
SHORTNESS OF BREATH: 1

## 2018-08-02 NOTE — PROGRESS NOTES
Shady Josue  Patient's  is 1955  Seen in office on 2018      SUBJECTIVE:  Misty Beltran is a 65 y. o.year old female presents today   Chief Complaint   Patient presents with    Leg Pain     right calf, red, sore to touch    Diabetes     A1C=    COPD    Hyperlipidemia    Fever     x 10 days at home    Medication Refill     Patient is here for follow-up of right leg pain and for three-month checkup on diabetes COPD and hyperlipidemia. Patient had pain and swelling of the right leg and he was seen by the ER physician. Venous Doppler study showed no acute disease and no DVT. Patient was given Norco and was sent home to be followed by me. Patient did not return within 48 hours but came today for her routine check. Patient states she had fever for the last 10 days off and on and it went up to 103° few days ago. She denies any urinary symptoms. Has mild chronic cough. Has chronic shortness of breath and is on oxygen. No abdominal pain. No nausea, vomiting or diarrhea. No burning on micturition. Patient has diabetes mellitus and the blood sugars are stable at home. We'll check A1c  Patient has COPD and is using oxygen. She did complain to Dr. White Holiday that she was more short of breath and he added Incruse inhaler. Patient has hyperlipidemia and is taking medications. Triglycerides are still elevated to 321. LDL is 78 and cholesterol is 147  CMP showed blood glucose 172. Liver enzymes are all normal.  Taking medications regularly. No side effects noted. Review of Systems   Constitutional: Positive for fever. Negative for chills and weight loss. HENT: Negative. Eyes: Negative. Respiratory: Positive for cough and shortness of breath. Negative for hemoptysis and sputum production. Cardiovascular: Positive for leg swelling. Negative for chest pain and palpitations. Gastrointestinal: Negative. Genitourinary: Negative. Musculoskeletal: Negative. Negative for falls.    Skin:

## 2018-08-03 ENCOUNTER — HOSPITAL ENCOUNTER (OUTPATIENT)
Dept: ULTRASOUND IMAGING | Age: 63
Discharge: HOME | End: 2018-08-03
Attending: INTERNAL MEDICINE | Admitting: INTERNAL MEDICINE

## 2018-08-03 DIAGNOSIS — M79.604 PAIN OF RIGHT LEG: ICD-10-CM

## 2018-08-03 DIAGNOSIS — M79.604 RIGHT LEG PAIN: ICD-10-CM
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
## 2018-09-10 PROBLEM — J44.1 COPD EXACERBATION (HCC): Status: ACTIVE | Noted: 2018-09-10

## 2018-09-11 PROBLEM — E11.9 TYPE 2 DIABETES MELLITUS (HCC): Status: ACTIVE | Noted: 2018-09-10

## 2018-09-11 PROBLEM — E78.5 HYPERLIPIDEMIA: Status: ACTIVE | Noted: 2018-09-11

## 2018-09-11 PROBLEM — I10 HYPERTENSION: Status: ACTIVE | Noted: 2018-09-11

## 2018-09-11 PROBLEM — Z72.0 TOBACCO ABUSE: Status: ACTIVE | Noted: 2018-09-11

## 2018-09-11 PROBLEM — I50.9 CHF (CONGESTIVE HEART FAILURE) (HCC): Status: ACTIVE | Noted: 2018-09-11

## 2018-09-11 PROBLEM — E66.09 OBESITY DUE TO EXCESS CALORIES: Status: ACTIVE | Noted: 2018-09-11

## 2018-09-16 ENCOUNTER — HOSPITAL ENCOUNTER (INPATIENT)
Dept: INPATIENT UNIT | Age: 63
LOS: 6 days | Discharge: HOME | DRG: 948 | End: 2018-09-22
Attending: FAMILY MEDICINE | Admitting: FAMILY MEDICINE
Payer: COMMERCIAL

## 2018-09-16 DIAGNOSIS — R53.81 PHYSICAL DEBILITY: ICD-10-CM

## 2018-09-16 DIAGNOSIS — J96.11 CHRONIC RESPIRATORY FAILURE WITH HYPOXIA (HCC): Primary | ICD-10-CM

## 2018-09-16 DIAGNOSIS — J44.9 COPD, SEVERE (HCC): Chronic | ICD-10-CM
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
## 2018-09-16 LAB
GLUCOSE BLD-MCNC: 293 MG/DL (ref 70–99)
GLUCOSE BLD-MCNC: 312 MG/DL (ref 70–99)

## 2018-09-16 PROCEDURE — 94640 AIRWAY INHALATION TREATMENT: CPT

## 2018-09-16 PROCEDURE — 9990 CHARGE CONVERSION

## 2018-09-16 RX ORDER — ACETAMINOPHEN 325 MG/1
650 TABLET ORAL EVERY 4 HOURS PRN
Status: DISCONTINUED | OUTPATIENT
Start: 2018-09-16 | End: 2018-09-22 | Stop reason: HOSPADM

## 2018-09-16 RX ORDER — LOSARTAN POTASSIUM 50 MG/1
50 TABLET ORAL DAILY
Status: DISCONTINUED | OUTPATIENT
Start: 2018-09-17 | End: 2018-09-22 | Stop reason: HOSPADM

## 2018-09-16 RX ORDER — TRAZODONE HYDROCHLORIDE 50 MG/1
100 TABLET ORAL NIGHTLY
Status: DISCONTINUED | OUTPATIENT
Start: 2018-09-16 | End: 2018-09-22 | Stop reason: HOSPADM

## 2018-09-16 RX ORDER — IPRATROPIUM BROMIDE AND ALBUTEROL SULFATE 2.5; .5 MG/3ML; MG/3ML
1 SOLUTION RESPIRATORY (INHALATION) 4 TIMES DAILY
Status: DISCONTINUED | OUTPATIENT
Start: 2018-09-16 | End: 2018-09-22 | Stop reason: HOSPADM

## 2018-09-16 RX ORDER — FUROSEMIDE 40 MG/1
40 TABLET ORAL 2 TIMES DAILY
Status: DISCONTINUED | OUTPATIENT
Start: 2018-09-16 | End: 2018-09-22 | Stop reason: HOSPADM

## 2018-09-16 RX ORDER — GLIPIZIDE 10 MG/1
10 TABLET ORAL
Status: DISCONTINUED | OUTPATIENT
Start: 2018-09-16 | End: 2018-09-22 | Stop reason: HOSPADM

## 2018-09-16 RX ORDER — NICOTINE POLACRILEX 4 MG
15 LOZENGE BUCCAL PRN
Status: DISCONTINUED | OUTPATIENT
Start: 2018-09-16 | End: 2018-09-22 | Stop reason: HOSPADM

## 2018-09-16 RX ORDER — PRAVASTATIN SODIUM 10 MG
20 TABLET ORAL DAILY
Status: DISCONTINUED | OUTPATIENT
Start: 2018-09-17 | End: 2018-09-22 | Stop reason: HOSPADM

## 2018-09-16 RX ORDER — PREDNISONE 10 MG/1
10 TABLET ORAL DAILY
Status: DISCONTINUED | OUTPATIENT
Start: 2018-09-26 | End: 2018-09-22 | Stop reason: HOSPADM

## 2018-09-16 RX ORDER — NICOTINE 21 MG/24HR
1 PATCH, TRANSDERMAL 24 HOURS TRANSDERMAL DAILY
Status: DISCONTINUED | OUTPATIENT
Start: 2018-09-17 | End: 2018-09-22 | Stop reason: HOSPADM

## 2018-09-16 RX ORDER — METOLAZONE 2.5 MG/1
2.5 TABLET ORAL DAILY
Status: DISCONTINUED | OUTPATIENT
Start: 2018-09-17 | End: 2018-09-22 | Stop reason: HOSPADM

## 2018-09-16 RX ORDER — DEXTROSE MONOHYDRATE 50 MG/ML
100 INJECTION, SOLUTION INTRAVENOUS PRN
Status: DISCONTINUED | OUTPATIENT
Start: 2018-09-16 | End: 2018-09-22 | Stop reason: HOSPADM

## 2018-09-16 RX ORDER — PREDNISONE 20 MG/1
40 TABLET ORAL DAILY
Status: DISCONTINUED | OUTPATIENT
Start: 2018-09-16 | End: 2018-09-16

## 2018-09-16 RX ORDER — PREDNISONE 10 MG/1
10 TABLET ORAL DAILY
Status: DISCONTINUED | OUTPATIENT
Start: 2018-09-25 | End: 2018-09-16

## 2018-09-16 RX ORDER — DEXTROSE MONOHYDRATE 25 G/50ML
12.5 INJECTION, SOLUTION INTRAVENOUS PRN
Status: DISCONTINUED | OUTPATIENT
Start: 2018-09-16 | End: 2018-09-22 | Stop reason: HOSPADM

## 2018-09-16 RX ORDER — PREDNISONE 20 MG/1
20 TABLET ORAL DAILY
Status: DISCONTINUED | OUTPATIENT
Start: 2018-09-22 | End: 2018-09-16

## 2018-09-16 RX ORDER — PREDNISONE 20 MG/1
40 TABLET ORAL DAILY
Status: COMPLETED | OUTPATIENT
Start: 2018-09-17 | End: 2018-09-19

## 2018-09-16 RX ORDER — CLONIDINE HYDROCHLORIDE 0.1 MG/1
0.1 TABLET ORAL 2 TIMES DAILY
Status: DISCONTINUED | OUTPATIENT
Start: 2018-09-16 | End: 2018-09-22 | Stop reason: HOSPADM

## 2018-09-16 RX ORDER — TROSPIUM CHLORIDE 20 MG/1
20 TABLET, FILM COATED ORAL NIGHTLY
Status: DISCONTINUED | OUTPATIENT
Start: 2018-09-16 | End: 2018-09-22 | Stop reason: HOSPADM

## 2018-09-16 RX ORDER — ATENOLOL 25 MG/1
25 TABLET ORAL 2 TIMES DAILY
Status: DISCONTINUED | OUTPATIENT
Start: 2018-09-16 | End: 2018-09-22 | Stop reason: HOSPADM

## 2018-09-16 RX ORDER — PREDNISONE 20 MG/1
20 TABLET ORAL DAILY
Status: DISCONTINUED | OUTPATIENT
Start: 2018-09-23 | End: 2018-09-22 | Stop reason: HOSPADM

## 2018-09-16 RX ORDER — ALBUTEROL SULFATE 90 UG/1
2 AEROSOL, METERED RESPIRATORY (INHALATION) EVERY 6 HOURS PRN
Status: DISCONTINUED | OUTPATIENT
Start: 2018-09-16 | End: 2018-09-22 | Stop reason: HOSPADM

## 2018-09-16 RX ADMIN — ACETAMINOPHEN 650 MG: 325 TABLET ORAL at 21:27

## 2018-09-16 RX ADMIN — FUROSEMIDE 40 MG: 40 TABLET ORAL at 16:42

## 2018-09-16 RX ADMIN — IPRATROPIUM BROMIDE AND ALBUTEROL SULFATE 3 ML: .5; 3 SOLUTION RESPIRATORY (INHALATION) at 15:10

## 2018-09-16 RX ADMIN — TROSPIUM CHLORIDE 20 MG: 20 TABLET, FILM COATED ORAL at 21:28

## 2018-09-16 RX ADMIN — ATENOLOL 25 MG: 25 TABLET ORAL at 21:28

## 2018-09-16 RX ADMIN — INSULIN LISPRO 3 UNITS: 100 INJECTION, SOLUTION INTRAVENOUS; SUBCUTANEOUS at 16:42

## 2018-09-16 RX ADMIN — TRAZODONE HYDROCHLORIDE 100 MG: 50 TABLET ORAL at 21:28

## 2018-09-16 RX ADMIN — CLONIDINE HYDROCHLORIDE 0.1 MG: 0.1 TABLET ORAL at 21:28

## 2018-09-16 RX ADMIN — GLIPIZIDE 10 MG: 10 TABLET ORAL at 16:42

## 2018-09-16 RX ADMIN — IPRATROPIUM BROMIDE AND ALBUTEROL SULFATE 3 ML: .5; 3 SOLUTION RESPIRATORY (INHALATION) at 19:08

## 2018-09-16 ASSESSMENT — PAIN SCALES - GENERAL
PAINLEVEL_OUTOF10: 0
PAINLEVEL_OUTOF10: 2
PAINLEVEL_OUTOF10: 1
PAINLEVEL_OUTOF10: 0
PAINLEVEL_OUTOF10: 2

## 2018-09-16 ASSESSMENT — PAIN DESCRIPTION - DESCRIPTORS: DESCRIPTORS: ACHING

## 2018-09-16 ASSESSMENT — PAIN DESCRIPTION - PROGRESSION: CLINICAL_PROGRESSION: NOT CHANGED

## 2018-09-16 ASSESSMENT — PAIN DESCRIPTION - LOCATION: LOCATION: GENERALIZED

## 2018-09-16 ASSESSMENT — PAIN DESCRIPTION - FREQUENCY: FREQUENCY: CONTINUOUS

## 2018-09-16 ASSESSMENT — PAIN DESCRIPTION - ONSET: ONSET: ON-GOING

## 2018-09-16 NOTE — H&P
Admission medications    Medication Sig Start Date End Date Taking? Authorizing Provider   predniSONE (DELTASONE) 10 MG tablet Take 40mg (4 tablets) for 2 days, Then 30mg (3 tablets) for 2 days, Then 20mg (2 tablets) for 2 days, Then 10mg (1 tablet) for 2 days 9/16/18   Marilyn Escobedo MD   albuterol sulfate  (90 Base) MCG/ACT inhaler Inhale 2 puffs into the lungs every 4 hours as needed for Wheezing    Historical Provider, MD   atenolol (TENORMIN) 25 MG tablet TAKE 1 TABLET TWICE A DAY 8/2/18   Travis Lee MD   solifenacin (VESICARE) 10 MG tablet TAKE 1 TABLET DAILY 8/2/18   Travis Lee MD   traZODone (DESYREL) 100 MG tablet TAKE 1 TABLET NIGHTLY 8/2/18   Travis Lee MD   indapamide (LOZOL) 2.5 MG tablet TAKE 1 TABLET DAILY 8/2/18   Travis Lee MD   glipiZIDE (GLUCOTROL XL) 10 MG extended release tablet TAKE 1 TABLET TWICE A DAY 8/2/18   Travis Lee MD   cloNIDine (CATAPRES) 0.1 MG tablet TAKE 1 TABLET TWICE A DAY 8/2/18   Travis Lee MD   albuterol sulfate  (90 Base) MCG/ACT inhaler Inhale 2 puffs into the lungs every 6 hours as needed for Wheezing 5/21/18   Travis Lee MD   metFORMIN (GLUCOPHAGE) 1000 MG tablet TAKE 1 TABLET TWICE A DAY WITH MEALS 5/21/18   Travis Lee MD   pravastatin (PRAVACHOL) 20 MG tablet TAKE 1 TABLET DAILY 5/21/18   Travis Lee MD   losartan (COZAAR) 50 MG tablet Take 1 tablet by mouth daily 5/21/18   Travis Lee MD   SYMBICORT 160-4.5 MCG/ACT AERO USE 2 INHALATIONS TWICE A DAY 6/26/17   Alvarez Duarte MD   OXYGEN Inhale 2 L/min into the lungs continuous. Historical Provider, MD   MULTIPLE VITAMIN PO Take 1 tablet by mouth daily. Historical Provider, MD   solifenacin (VESICARE) 10 MG tablet Take 1 tablet by mouth daily. 10/11/13 2/17/14  Travis Lee MD       Allergies:    Tomato    Social History:    The patient currently lives at home. TOBACCO:   reports that she has been smoking Cigarettes. She has a 15.00 pack-year smoking history.  She has never used smokeless tobacco.  ETOH:   reports that she does not drink alcohol. Family History:  Positive as follows:    Family History   Problem Relation Age of Onset    Cancer Mother         lung ca , liver and in lymph nodes    Cancer Father         lung ca    Heart Disease Brother        REVIEW OF SYSTEMS:   Pertinent positives and negatives  as noted in the HPI and ROS. All other systems reviewed and negative. Review of Systems   Respiratory:        Breathing has improved       PHYSICAL EXAM:  There were no vitals taken for this visit. Physical Exam   Constitutional: No distress. HENT:   Head: Normocephalic and atraumatic. Eyes: Right eye exhibits no discharge. Left eye exhibits no discharge. Cardiovascular: Normal rate, regular rhythm, normal heart sounds and intact distal pulses. Exam reveals no gallop and no friction rub. No murmur heard. Pulmonary/Chest: Effort normal. No stridor. No respiratory distress. She has no wheezes. She has rales. Abdominal: Soft. She exhibits no distension. There is no tenderness. There is no rebound and no guarding. Musculoskeletal: She exhibits edema (1+). She exhibits no tenderness. Neurological: She is alert. No cranial nerve deficit. Skin: Skin is warm and dry. No rash noted. She is not diaphoretic. No erythema. No pallor. Psychiatric: She has a normal mood and affect. Her behavior is normal. Judgment and thought content normal.         Imaging:    Xr Chest Standard (2 Vw)    Result Date: 9/15/2018  EXAMINATION: TWO VIEWS OF THE CHEST 9/15/2018 10:11 am COMPARISON: September 10, 2018 HISTORY: ORDERING SYSTEM PROVIDED HISTORY: rales, shortness of breath TECHNOLOGIST PROVIDED HISTORY: Ordering Physician Provided Reason for Exam: sob Acuity: Chronic Type of Encounter: Ongoing Additional signs and symptoms: increase of sob with rales, hx of copd FINDINGS: Lungs are hyperinflated. Bilateral interstitial changes are nonspecific.  No consolidation, effusion or

## 2018-09-17 LAB
GLUCOSE BLD-MCNC: 131 MG/DL (ref 70–99)
GLUCOSE BLD-MCNC: 186 MG/DL (ref 70–99)
GLUCOSE BLD-MCNC: 322 MG/DL (ref 70–99)
GLUCOSE BLD-MCNC: 343 MG/DL (ref 70–99)

## 2018-09-17 PROCEDURE — 82962 GLUCOSE BLOOD TEST: CPT

## 2018-09-17 PROCEDURE — 9990 CHARGE CONVERSION

## 2018-09-17 PROCEDURE — 97530 THERAPEUTIC ACTIVITIES: CPT

## 2018-09-17 PROCEDURE — 97110 THERAPEUTIC EXERCISES: CPT

## 2018-09-17 PROCEDURE — 94640 AIRWAY INHALATION TREATMENT: CPT

## 2018-09-17 RX ADMIN — IPRATROPIUM BROMIDE AND ALBUTEROL SULFATE 3 ML: .5; 3 SOLUTION RESPIRATORY (INHALATION) at 19:28

## 2018-09-17 RX ADMIN — ACETAMINOPHEN 650 MG: 325 TABLET ORAL at 21:30

## 2018-09-17 RX ADMIN — FUROSEMIDE 40 MG: 40 TABLET ORAL at 17:50

## 2018-09-17 RX ADMIN — FUROSEMIDE 40 MG: 40 TABLET ORAL at 09:01

## 2018-09-17 RX ADMIN — GLIPIZIDE 10 MG: 10 TABLET ORAL at 09:01

## 2018-09-17 RX ADMIN — TRAZODONE HYDROCHLORIDE 100 MG: 50 TABLET ORAL at 21:30

## 2018-09-17 RX ADMIN — IPRATROPIUM BROMIDE AND ALBUTEROL SULFATE 3 ML: .5; 3 SOLUTION RESPIRATORY (INHALATION) at 10:10

## 2018-09-17 RX ADMIN — LOSARTAN POTASSIUM 50 MG: 50 TABLET ORAL at 09:01

## 2018-09-17 RX ADMIN — PREDNISONE 40 MG: 20 TABLET ORAL at 09:00

## 2018-09-17 RX ADMIN — IPRATROPIUM BROMIDE AND ALBUTEROL SULFATE 3 ML: .5; 3 SOLUTION RESPIRATORY (INHALATION) at 07:06

## 2018-09-17 RX ADMIN — IPRATROPIUM BROMIDE AND ALBUTEROL SULFATE 3 ML: .5; 3 SOLUTION RESPIRATORY (INHALATION) at 14:15

## 2018-09-17 RX ADMIN — TROSPIUM CHLORIDE 20 MG: 20 TABLET, FILM COATED ORAL at 21:30

## 2018-09-17 RX ADMIN — METOLAZONE 2.5 MG: 2.5 TABLET ORAL at 09:00

## 2018-09-17 RX ADMIN — CLONIDINE HYDROCHLORIDE 0.1 MG: 0.1 TABLET ORAL at 21:30

## 2018-09-17 RX ADMIN — INSULIN LISPRO 1 UNITS: 100 INJECTION, SOLUTION INTRAVENOUS; SUBCUTANEOUS at 13:25

## 2018-09-17 RX ADMIN — CLONIDINE HYDROCHLORIDE 0.1 MG: 0.1 TABLET ORAL at 09:01

## 2018-09-17 RX ADMIN — GLIPIZIDE 10 MG: 10 TABLET ORAL at 17:50

## 2018-09-17 RX ADMIN — PRAVASTATIN SODIUM 20 MG: 10 TABLET ORAL at 09:01

## 2018-09-17 RX ADMIN — INSULIN LISPRO 5 UNITS: 100 INJECTION, SOLUTION INTRAVENOUS; SUBCUTANEOUS at 17:51

## 2018-09-17 RX ADMIN — ATENOLOL 25 MG: 25 TABLET ORAL at 21:30

## 2018-09-17 RX ADMIN — ATENOLOL 25 MG: 25 TABLET ORAL at 09:01

## 2018-09-17 ASSESSMENT — PAIN SCALES - GENERAL
PAINLEVEL_OUTOF10: 2
PAINLEVEL_OUTOF10: 0
PAINLEVEL_OUTOF10: 0
PAINLEVEL_OUTOF10: 1

## 2018-09-17 ASSESSMENT — PAIN DESCRIPTION - ONSET: ONSET: ON-GOING

## 2018-09-17 ASSESSMENT — PAIN DESCRIPTION - PAIN TYPE: TYPE: CHRONIC PAIN

## 2018-09-17 ASSESSMENT — PAIN DESCRIPTION - LOCATION: LOCATION: GENERALIZED

## 2018-09-17 ASSESSMENT — PAIN DESCRIPTION - PROGRESSION: CLINICAL_PROGRESSION: NOT CHANGED

## 2018-09-17 ASSESSMENT — PAIN DESCRIPTION - FREQUENCY: FREQUENCY: CONTINUOUS

## 2018-09-17 ASSESSMENT — PAIN DESCRIPTION - DESCRIPTORS: DESCRIPTORS: ACHING

## 2018-09-17 NOTE — FLOWSHEET NOTE
Transfers Sit to stand: mod I  Stand to sit: mod I   Standing Tolerance Not performed   Amb/Locomotion: AD/Distance/Assist Pt ambulated in the gutierrez 110 feet with RW and mod I. Pt reported feeling like she was slightly dizzy and her eyes were getting fuzzy at the end of her walk. She began to cry secondary to frustration. Pt O2 sat was 95% and bp was 137/61. Pt ambulated in the gutierrez 90 feet with RW and mod I with no ill effects. Pt focused on exaggerating her L LE steps so that they would demonstrate good hip and knee flexion and heel strike. Steps (#)/Assist/Rails/AD Not performed   Ramp:AD/Assist Not performed   Curb:height AD/Assist Not performed   Uneven:type AD/Assist Not performed   W/C distance/Assist   Not performed   Strategies that improved performance: Pt did well with reminders of how much better she is doing and that it will take time. Pt required fewer rest breaks. Pt switched O2 tanks while pt on NuStep secondary to her tank getting low. Barriers to progress/participation: Pt continues to require rest breaks and cues for PLB. Alarm placed/where?   Fall Risk  [ ] left in bed  [x] left in chair [x] call light within reach  [ ] bed alarm on  [ ] personal alarm on [ ] Jazmin Rubinan  [ ] other staff present:    Discharge recommendations  Anticipated discharge date:  TBD  Destination: []home alone   [x]home alone with assist prn  []Continuous supervision  []SNF  [] Assisted living   Continued therapy: [x]C PT  []OUTPATIENT  PT   [x] Further PT  Equipment needs: see eval     Therapeutic activities/exercises completed this date: see below     Modality/intervention used:   [x] Therapeutic Exercise    [ ] Modalities:   [x] Therapeutic Activity    [ ] Ultrasound   [ ] Elec Stim   [ ] Gait Training     [ ] Cervical Traction  [ ] Lumbar Traction   [x] Neuromuscular Re-education   [ ] Cold/hotpack  [ ] Iontophoresis   [x] Instruction in HEP     [ ] Melvi Och   [ ] Manual Therapy   [ ] Aquatic Therapy Patient/Caregiver Education and Training: Pt educated to continue ambulating and exercising on her own. Pt reports she can really tell a difference in how it feels on her L side when exercising. Exercise/Equipment/Modalities this date   Seated marching: 1x10 L  LE with 2# wt   LAQ: 1x10 L LE with 3 second hold and 2# wt   NuStep x7' resistance 1. Seat and UE on 8. Treatment Plan for Next Session: Continue per pt tolerance. Perform LE exercises this afternoon.      Assessment / Impression                                                          Treatment/Activity Tolerance:   [x] Tolerated Treatment well:     [] Patient limited by fatigue/pain:       [] Patient limited by medical complications:    [] Adverse Reaction to Tx:   [] Significant change in status    Plan:   [x] Continue per plan of care [ ] Dariana Chester current plan   [ ] Plan of care initiated [ ] Hold pending MD visit [ ] Discharged    Billing:  Billed units: 2 therapeutic exercise, 1 therapeutic activity      Signed: Diann Miller DPT [id number]  9/17/2018, 10:48 AM

## 2018-09-18 LAB
GLUCOSE BLD-MCNC: 171 MG/DL (ref 70–99)
GLUCOSE BLD-MCNC: 271 MG/DL (ref 70–99)
GLUCOSE BLD-MCNC: 311 MG/DL (ref 70–99)
GLUCOSE BLD-MCNC: 98 MG/DL (ref 70–99)

## 2018-09-18 PROCEDURE — 9990 CHARGE CONVERSION

## 2018-09-18 PROCEDURE — 94640 AIRWAY INHALATION TREATMENT: CPT

## 2018-09-18 PROCEDURE — 97110 THERAPEUTIC EXERCISES: CPT

## 2018-09-18 PROCEDURE — 97530 THERAPEUTIC ACTIVITIES: CPT

## 2018-09-18 PROCEDURE — 82962 GLUCOSE BLOOD TEST: CPT

## 2018-09-18 RX ADMIN — IPRATROPIUM BROMIDE AND ALBUTEROL SULFATE 3 ML: .5; 3 SOLUTION RESPIRATORY (INHALATION) at 11:14

## 2018-09-18 RX ADMIN — PRAVASTATIN SODIUM 20 MG: 10 TABLET ORAL at 08:49

## 2018-09-18 RX ADMIN — CLONIDINE HYDROCHLORIDE 0.1 MG: 0.1 TABLET ORAL at 20:15

## 2018-09-18 RX ADMIN — ATENOLOL 25 MG: 25 TABLET ORAL at 20:15

## 2018-09-18 RX ADMIN — GLIPIZIDE 10 MG: 10 TABLET ORAL at 17:15

## 2018-09-18 RX ADMIN — INSULIN LISPRO 4 UNITS: 100 INJECTION, SOLUTION INTRAVENOUS; SUBCUTANEOUS at 17:53

## 2018-09-18 RX ADMIN — ATENOLOL 25 MG: 25 TABLET ORAL at 08:47

## 2018-09-18 RX ADMIN — ACETAMINOPHEN 650 MG: 325 TABLET ORAL at 08:49

## 2018-09-18 RX ADMIN — TROSPIUM CHLORIDE 20 MG: 20 TABLET, FILM COATED ORAL at 20:15

## 2018-09-18 RX ADMIN — IPRATROPIUM BROMIDE AND ALBUTEROL SULFATE 3 ML: .5; 3 SOLUTION RESPIRATORY (INHALATION) at 07:20

## 2018-09-18 RX ADMIN — GLIPIZIDE 10 MG: 10 TABLET ORAL at 08:48

## 2018-09-18 RX ADMIN — IPRATROPIUM BROMIDE AND ALBUTEROL SULFATE 3 ML: .5; 3 SOLUTION RESPIRATORY (INHALATION) at 14:16

## 2018-09-18 RX ADMIN — INSULIN LISPRO 1 UNITS: 100 INJECTION, SOLUTION INTRAVENOUS; SUBCUTANEOUS at 12:52

## 2018-09-18 RX ADMIN — TRAZODONE HYDROCHLORIDE 100 MG: 50 TABLET ORAL at 20:15

## 2018-09-18 RX ADMIN — IPRATROPIUM BROMIDE AND ALBUTEROL SULFATE 3 ML: .5; 3 SOLUTION RESPIRATORY (INHALATION) at 19:48

## 2018-09-18 RX ADMIN — FUROSEMIDE 40 MG: 40 TABLET ORAL at 08:48

## 2018-09-18 RX ADMIN — CLONIDINE HYDROCHLORIDE 0.1 MG: 0.1 TABLET ORAL at 08:48

## 2018-09-18 RX ADMIN — PREDNISONE 40 MG: 20 TABLET ORAL at 08:48

## 2018-09-18 RX ADMIN — LOSARTAN POTASSIUM 50 MG: 50 TABLET ORAL at 08:48

## 2018-09-18 RX ADMIN — FUROSEMIDE 40 MG: 40 TABLET ORAL at 17:15

## 2018-09-18 RX ADMIN — METOLAZONE 2.5 MG: 2.5 TABLET ORAL at 08:48

## 2018-09-18 ASSESSMENT — PAIN SCALES - GENERAL
PAINLEVEL_OUTOF10: 0
PAINLEVEL_OUTOF10: 7

## 2018-09-18 NOTE — PATIENT CARE CONFERENCE
SWING BED WEEKLY TEAM SHEET      Hector Graham   9/18/2018             WEEK# 1    PHYSICAL THERAPY       Ambulation: Distance: 110 feet      Device: RW    Assist:Mod I      Wt bearing:  FWB    W/C skills:  Propulsion: NA       W/C parts management:  NA   Stairs:  Amount/size:  NA     Assist:  NA      Device:   NA   Pain:  0/10   Transfers:   Sit to stand: mod I   Stand to sit:  Mod i               Strength/ROM: Pt is doing better, but continues to lack strength in L LE and demonstrated decreased endurance, especially in standing. Balance:     Static sitting    [] P  [] F-   [] F    [] F+    [x] G               Dynamic Sitting           [] P  [] F-   [] F    [x] F+    [] G                     Static standing            [] P  [] F-   [x] F    [] F+    [] G   [x]  With  [] Without device   Dynamic standing       [] P  [x] F-   [x] F    [] F+    [] G   [x]  With  [] Without device  (P= poor   F= fair   G= good)    Issues to be resolved before discharge improved bed mobility and standing tolerance. Goals of previous week  [] 1st team   [] met   [x] partially met    [] not met                                          Why the goals were not met  See above    Goals:   Short term goals  Time Frame for Short term goals: 1 week or until discharge:   Short term goal 1: Pt will demonstrate the ability to safely perform bed mobility mod I. - not met, continue. Short term goal 2: Pt will demonstrate the abiilty to safely transfer sit to stand and stand to sit from 3 different height surfaces with mod I and proper hand placement. - MET, discharge goal.   Short term goal 3: Pt will demonstrate the ability to safely ambulate 100 feet with a RW and mod I with no rest breaks. - MET, discharge goal.   Short term goal 4: Pt will demonstrate the ability to safely ambulate up and down 2 steps with CGA. - not met, continue.   Short term goal 5: Pt will demonstrate the ability to safely stand x10 consecutive minutes with no more than 1 hand for support and supervision. - not met, continue.       Updated Goals to be continued x1 week:   1 Continue Above  2) Pt will demonstrate the ability to safely ambulate 100 feet with a RW and mod I with no rest breaks and no reports of dizziness in 3/3 trials.   3) continue 4 Above  4) Continue 5 above       Physical Therapy Signature:  Brice Ramirez, PT  DPT [id number]

## 2018-09-18 NOTE — PLAN OF CARE
Problem: Falls - Risk of:  Goal: Will remain free from falls  Will remain free from falls   Outcome: Ongoing    Goal: Absence of physical injury  Absence of physical injury   Outcome: Ongoing      Problem:  Activity:  Goal: Ability to tolerate increased activity will improve  Ability to tolerate increased activity will improve   Outcome: Ongoing

## 2018-09-18 NOTE — FLOWSHEET NOTE
Physical Therapy DailyTreatment Note   Date: 2018 Room: [unfilled]   Name: Sanjana High : 1955   MRN: [id number]   Admission Date:2018        Rehabilitation Diagnosis: DIABILITY    Objective                                                                                    Goals:    Short term goals  Time Frame for Short term goals: 1 week or until discharge:   Short term goal 1: Pt will demonstrate the ability to safely perform bed mobility mod I. - not met, continue. Short term goal 2: Pt will demonstrate the abiilty to safely transfer sit to stand and stand to sit from 3 different height surfaces with mod I and proper hand placement. - MET, discharge goal.   Short term goal 3: Pt will demonstrate the ability to safely ambulate 100 feet with a RW and mod I with no rest breaks. - MET, discharge goal.   Short term goal 4: Pt will demonstrate the ability to safely ambulate up and down 2 steps with CGA. - not met, continue. Short term goal 5: Pt will demonstrate the ability to safely stand x10 consecutive minutes with no more than 1 hand for support and supervision. - not met, continue. Plan of Care                                                                              Times per week: 4+ days per week for a minimum of 15 minutes/day  Treatment to include      Date  2018   TIMES IN/OUT   1:20 - 2:15 pm    Restrictions/Precautions     fall risk, O2      Current Diet/Swallowing Issues DIET CARB CONTROL; Dietary Nutrition Supplements: Diabetic Oral Supplement   Communication with other providers: [x] Ok to see per nursing at morning huddle  [] Medical hold and reason  [] OT co treat   Subjective observations:  Pt presented in the doorway of her room with her sister, getting ready to ambulate. Pt reports she was getting antsy and she is pleased that she is not feeling as dizzy any more.    [x]   Gait belt used during tx session   Pain level/location: Pre-tx 0/10  Post-tx 0/10   Bed marching: 1x15  B  LE with 2# wt on L LE   LAQ: 1x12 L LE with 3 second hold and 2# wt   NuStep x10' resistance 2. Seat and UE on 9.     resisted hip abd: 1x15 red t-band   Resisted hip add: 1x12 ball squeeze     Treatment Plan for Next Session: Continue per pt tolerance, begin standing exercises next visit.      Assessment / Impression                                                          Treatment/Activity Tolerance:   [x] Tolerated Treatment well:     [] Patient limited by fatigue/pain:       [] Patient limited by medical complications:    [] Adverse Reaction to Tx:   [] Significant change in status    Plan:   [x] Continue per plan of care [ ] Katharine Wells current plan   [ ] Plan of care initiated [ ] Hold pending MD visit [ ] Discharged    Billing:  Billed units: 3 therapeutic exercise, 1 therapeutic activity      Signed: Jose Oliva DPT [id number]  9/18/2018, 1:53 PM

## 2018-09-19 PROBLEM — R53.81 PHYSICAL DEBILITY: Status: ACTIVE | Noted: 2018-09-19

## 2018-09-19 LAB
GLUCOSE BLD-MCNC: 101 MG/DL (ref 70–99)
GLUCOSE BLD-MCNC: 167 MG/DL (ref 70–99)
GLUCOSE BLD-MCNC: 240 MG/DL (ref 70–99)
GLUCOSE BLD-MCNC: 300 MG/DL (ref 70–99)

## 2018-09-19 PROCEDURE — 97112 NEUROMUSCULAR REEDUCATION: CPT

## 2018-09-19 PROCEDURE — 9990 CHARGE CONVERSION

## 2018-09-19 PROCEDURE — 94640 AIRWAY INHALATION TREATMENT: CPT

## 2018-09-19 PROCEDURE — 97530 THERAPEUTIC ACTIVITIES: CPT

## 2018-09-19 PROCEDURE — 97110 THERAPEUTIC EXERCISES: CPT

## 2018-09-19 PROCEDURE — 97116 GAIT TRAINING THERAPY: CPT

## 2018-09-19 PROCEDURE — 82962 GLUCOSE BLOOD TEST: CPT

## 2018-09-19 RX ORDER — TRAMADOL HYDROCHLORIDE 50 MG/1
50 TABLET ORAL EVERY 8 HOURS PRN
Status: DISCONTINUED | OUTPATIENT
Start: 2018-09-19 | End: 2018-09-22 | Stop reason: HOSPADM

## 2018-09-19 RX ADMIN — METOLAZONE 2.5 MG: 2.5 TABLET ORAL at 09:27

## 2018-09-19 RX ADMIN — CLONIDINE HYDROCHLORIDE 0.1 MG: 0.1 TABLET ORAL at 09:27

## 2018-09-19 RX ADMIN — INSULIN LISPRO 4 UNITS: 100 INJECTION, SOLUTION INTRAVENOUS; SUBCUTANEOUS at 17:48

## 2018-09-19 RX ADMIN — TRAMADOL HYDROCHLORIDE 50 MG: 50 TABLET ORAL at 23:11

## 2018-09-19 RX ADMIN — TRAZODONE HYDROCHLORIDE 100 MG: 50 TABLET ORAL at 20:22

## 2018-09-19 RX ADMIN — IPRATROPIUM BROMIDE AND ALBUTEROL SULFATE 3 ML: .5; 3 SOLUTION RESPIRATORY (INHALATION) at 19:17

## 2018-09-19 RX ADMIN — TROSPIUM CHLORIDE 20 MG: 20 TABLET, FILM COATED ORAL at 20:22

## 2018-09-19 RX ADMIN — ATENOLOL 25 MG: 25 TABLET ORAL at 09:27

## 2018-09-19 RX ADMIN — CLONIDINE HYDROCHLORIDE 0.1 MG: 0.1 TABLET ORAL at 20:22

## 2018-09-19 RX ADMIN — IPRATROPIUM BROMIDE AND ALBUTEROL SULFATE 3 ML: .5; 3 SOLUTION RESPIRATORY (INHALATION) at 10:55

## 2018-09-19 RX ADMIN — INSULIN LISPRO 1 UNITS: 100 INJECTION, SOLUTION INTRAVENOUS; SUBCUTANEOUS at 12:51

## 2018-09-19 RX ADMIN — TRAMADOL HYDROCHLORIDE 50 MG: 50 TABLET ORAL at 15:14

## 2018-09-19 RX ADMIN — GLIPIZIDE 10 MG: 10 TABLET ORAL at 17:49

## 2018-09-19 RX ADMIN — IPRATROPIUM BROMIDE AND ALBUTEROL SULFATE 3 ML: .5; 3 SOLUTION RESPIRATORY (INHALATION) at 15:35

## 2018-09-19 RX ADMIN — FUROSEMIDE 40 MG: 40 TABLET ORAL at 17:49

## 2018-09-19 RX ADMIN — IPRATROPIUM BROMIDE AND ALBUTEROL SULFATE 3 ML: .5; 3 SOLUTION RESPIRATORY (INHALATION) at 07:25

## 2018-09-19 RX ADMIN — FUROSEMIDE 40 MG: 40 TABLET ORAL at 09:27

## 2018-09-19 RX ADMIN — GLIPIZIDE 10 MG: 10 TABLET ORAL at 09:27

## 2018-09-19 RX ADMIN — ACETAMINOPHEN 650 MG: 325 TABLET ORAL at 09:35

## 2018-09-19 RX ADMIN — PRAVASTATIN SODIUM 20 MG: 10 TABLET ORAL at 09:27

## 2018-09-19 RX ADMIN — ATENOLOL 25 MG: 25 TABLET ORAL at 20:22

## 2018-09-19 RX ADMIN — ACETAMINOPHEN 650 MG: 325 TABLET ORAL at 13:26

## 2018-09-19 RX ADMIN — LOSARTAN POTASSIUM 50 MG: 50 TABLET ORAL at 09:27

## 2018-09-19 RX ADMIN — PREDNISONE 40 MG: 20 TABLET ORAL at 09:27

## 2018-09-19 ASSESSMENT — PAIN SCALES - GENERAL
PAINLEVEL_OUTOF10: 3
PAINLEVEL_OUTOF10: 7
PAINLEVEL_OUTOF10: 0
PAINLEVEL_OUTOF10: 8
PAINLEVEL_OUTOF10: 0
PAINLEVEL_OUTOF10: 0
PAINLEVEL_OUTOF10: 7
PAINLEVEL_OUTOF10: 0
PAINLEVEL_OUTOF10: 5
PAINLEVEL_OUTOF10: 0
PAINLEVEL_OUTOF10: 8
PAINLEVEL_OUTOF10: 8
PAINLEVEL_OUTOF10: 3

## 2018-09-19 ASSESSMENT — PAIN DESCRIPTION - LOCATION
LOCATION: RIB CAGE

## 2018-09-19 ASSESSMENT — PAIN DESCRIPTION - FREQUENCY
FREQUENCY: CONTINUOUS
FREQUENCY: CONTINUOUS
FREQUENCY: INTERMITTENT

## 2018-09-19 ASSESSMENT — PAIN DESCRIPTION - PAIN TYPE
TYPE: ACUTE PAIN

## 2018-09-19 ASSESSMENT — PAIN DESCRIPTION - ORIENTATION
ORIENTATION: LEFT

## 2018-09-19 ASSESSMENT — PAIN DESCRIPTION - DESCRIPTORS
DESCRIPTORS: ACHING

## 2018-09-19 NOTE — PLAN OF CARE
Problem: Nutrition  Goal: Optimal nutrition therapy  Outcome: Ongoing     SWING BED WEEKLY TEAM SHEET     WEEK#     1  NUTRITION   Diet:     CCD              TF:      none    TPN:   none  Appropriate/Adequate [X] yes [ ] no   Meal intakes: 26-75%    Weight: 245#   BMI:     39.6       Significant Change: yes  Recommendations:none at this time

## 2018-09-19 NOTE — FLOWSHEET NOTE
Physical Therapy Treatment Note  Name: Radha Gallardo MRN: [id number] :   1955   Date:  2018   Admission Date: 2018 Room:  [address] [place] [address]   Restrictions/Precautions:            Communication with other providers:  [id number] Catalina Major to see per nursing    Subjective:  Patient states:  I'm ready    Pain:   Pt denies pain     Objective:    Observation: Pt sitting up in recliner in seated position awake and agreeable to therapy    Treatment, including education/measures:  Pt amb ~460' with RW @ SBA with pt demonstrating good gait pattern, therapist managing O2, no LOB, no SOB, no c/o dizziness. Pt continued to stand after amb for ~10 mins with 1 UE support and no LOB. Pt performed sit<>stand from recliner @ SBA/S. Assessment / Impression:       Patient's tolerance of treatment:  Very good  Adverse Reaction: none  Significant change in status and impact:  none  Barriers to improvement: none    Plan for Next Session:    Cont with PT POC    Time in:  1740  Time out:  1820  Timed treatment minutes:  40  Total treatment time:  40    Previously filed items:             Electronically signed by:     Vick Levi PTA  2018, 6:13 PM

## 2018-09-19 NOTE — PATIENT CARE CONFERENCE
SWING BED WEEKLY TEAM SHEET     Mya Llanes   9/19/2018   WEEK # 1    Occupational Therapy    Feeding  [x] Independ [] SBA [] MIN assist  [] mod assist  [] max assist [] tot assist  Grooming [x] Independ [] SBA [] MIN assist  [] mod assist  [] max assist [] tot assist   Bathing upper body [x] Independ [] SBA [] MIN assist  [] mod assist  [] max assist              [] tot assist    Dressing upper body [x] Independ [] SBA [] MIN assist  [] mod assist  [] max assist              [] tot assist    Dressing lower body [] Independ [x] SBA [] MIN assist  [] mod assist  [] max assist              [] tot assist   Toileting [x] Independ [] SBA [] MIN assist  [] mod assist  [] max assist [] tot assist    Home Management:x    Cognitive/Perception: WFL    Upper extremity motor control: WFL    Other x  Goals of previous week:   [] 1st team   [] met   [x] partially met    [] not met             Why goals were not met has some decreased endurance     Issues to be resolved before discharge: MI transfers, I/MI ADLS, improved endurance      Occupational Therapy Signature: Calin Conway, OT

## 2018-09-20 LAB
ANION GAP SERPL CALCULATED.3IONS-SCNC: 14 MMOL/L (ref 4–16)
BUN BLDV-MCNC: 38 MG/DL (ref 6–23)
CALCIUM SERPL-MCNC: 9.6 MG/DL (ref 8.3–10.6)
CHLORIDE BLD-SCNC: 90 MMOL/L (ref 99–110)
CO2: 38 MMOL/L (ref 21–32)
CREAT SERPL-MCNC: 1 MG/DL (ref 0.6–1.1)
GFR AFRICAN AMERICAN: >60 ML/MIN/1.73M2
GFR NON-AFRICAN AMERICAN: 56 ML/MIN/1.73M2
GLUCOSE BLD-MCNC: 103 MG/DL (ref 70–99)
GLUCOSE BLD-MCNC: 252 MG/DL (ref 70–99)
GLUCOSE BLD-MCNC: 278 MG/DL (ref 70–99)
GLUCOSE BLD-MCNC: 96 MG/DL (ref 70–99)
GLUCOSE BLD-MCNC: 96 MG/DL (ref 70–99)
HCT VFR BLD CALC: 36.9 % (ref 37–47)
HEMOGLOBIN: 11.1 GM/DL (ref 12.5–16)
MCH RBC QN AUTO: 28.3 PG (ref 27–31)
MCHC RBC AUTO-ENTMCNC: 30.1 % (ref 32–36)
MCV RBC AUTO: 94.1 FL (ref 78–100)
PDW BLD-RTO: 14.8 % (ref 11.7–14.9)
PLATELET # BLD: 286 K/CU MM (ref 140–440)
PMV BLD AUTO: 8.8 FL (ref 7.5–11.1)
POTASSIUM SERPL-SCNC: 3.4 MMOL/L (ref 3.5–5.1)
RBC # BLD: 3.92 M/CU MM (ref 4.2–5.4)
SODIUM BLD-SCNC: 142 MMOL/L (ref 135–145)
WBC # BLD: 14.2 K/CU MM (ref 4–10.5)

## 2018-09-20 PROCEDURE — 80048 BASIC METABOLIC PNL TOTAL CA: CPT

## 2018-09-20 PROCEDURE — 97535 SELF CARE MNGMENT TRAINING: CPT

## 2018-09-20 PROCEDURE — 82962 GLUCOSE BLOOD TEST: CPT

## 2018-09-20 PROCEDURE — 94640 AIRWAY INHALATION TREATMENT: CPT

## 2018-09-20 PROCEDURE — 9990 CHARGE CONVERSION

## 2018-09-20 PROCEDURE — 97530 THERAPEUTIC ACTIVITIES: CPT

## 2018-09-20 PROCEDURE — 97110 THERAPEUTIC EXERCISES: CPT

## 2018-09-20 PROCEDURE — 36415 COLL VENOUS BLD VENIPUNCTURE: CPT

## 2018-09-20 PROCEDURE — 85027 COMPLETE CBC AUTOMATED: CPT

## 2018-09-20 RX ORDER — POTASSIUM CHLORIDE 20 MEQ/1
20 TABLET, EXTENDED RELEASE ORAL
Status: DISCONTINUED | OUTPATIENT
Start: 2018-09-21 | End: 2018-09-22 | Stop reason: HOSPADM

## 2018-09-20 RX ADMIN — FUROSEMIDE 40 MG: 40 TABLET ORAL at 17:20

## 2018-09-20 RX ADMIN — LINAGLIPTIN 5 MG: 5 TABLET, FILM COATED ORAL at 10:27

## 2018-09-20 RX ADMIN — TRAMADOL HYDROCHLORIDE 50 MG: 50 TABLET ORAL at 23:04

## 2018-09-20 RX ADMIN — CLONIDINE HYDROCHLORIDE 0.1 MG: 0.1 TABLET ORAL at 10:25

## 2018-09-20 RX ADMIN — METOLAZONE 2.5 MG: 2.5 TABLET ORAL at 10:28

## 2018-09-20 RX ADMIN — PRAVASTATIN SODIUM 20 MG: 10 TABLET ORAL at 10:39

## 2018-09-20 RX ADMIN — ATENOLOL 25 MG: 25 TABLET ORAL at 10:24

## 2018-09-20 RX ADMIN — FUROSEMIDE 40 MG: 40 TABLET ORAL at 10:26

## 2018-09-20 RX ADMIN — ATENOLOL 25 MG: 25 TABLET ORAL at 20:46

## 2018-09-20 RX ADMIN — GLIPIZIDE 10 MG: 10 TABLET ORAL at 17:20

## 2018-09-20 RX ADMIN — INSULIN LISPRO 10 UNITS: 100 INJECTION, SOLUTION INTRAVENOUS; SUBCUTANEOUS at 12:39

## 2018-09-20 RX ADMIN — LOSARTAN POTASSIUM 50 MG: 50 TABLET ORAL at 10:27

## 2018-09-20 RX ADMIN — IPRATROPIUM BROMIDE AND ALBUTEROL SULFATE 3 ML: .5; 3 SOLUTION RESPIRATORY (INHALATION) at 11:40

## 2018-09-20 RX ADMIN — TROSPIUM CHLORIDE 20 MG: 20 TABLET, FILM COATED ORAL at 20:46

## 2018-09-20 RX ADMIN — CLONIDINE HYDROCHLORIDE 0.1 MG: 0.1 TABLET ORAL at 20:46

## 2018-09-20 RX ADMIN — TRAZODONE HYDROCHLORIDE 100 MG: 50 TABLET ORAL at 23:04

## 2018-09-20 RX ADMIN — IPRATROPIUM BROMIDE AND ALBUTEROL SULFATE 3 ML: .5; 3 SOLUTION RESPIRATORY (INHALATION) at 19:28

## 2018-09-20 RX ADMIN — PREDNISONE 30 MG: 20 TABLET ORAL at 10:30

## 2018-09-20 RX ADMIN — INSULIN LISPRO 3 UNITS: 100 INJECTION, SOLUTION INTRAVENOUS; SUBCUTANEOUS at 17:19

## 2018-09-20 RX ADMIN — GLIPIZIDE 10 MG: 10 TABLET ORAL at 08:10

## 2018-09-20 RX ADMIN — IPRATROPIUM BROMIDE AND ALBUTEROL SULFATE 3 ML: .5; 3 SOLUTION RESPIRATORY (INHALATION) at 07:25

## 2018-09-20 RX ADMIN — IPRATROPIUM BROMIDE AND ALBUTEROL SULFATE 3 ML: .5; 3 SOLUTION RESPIRATORY (INHALATION) at 16:05

## 2018-09-20 RX ADMIN — INSULIN LISPRO 10 UNITS: 100 INJECTION, SOLUTION INTRAVENOUS; SUBCUTANEOUS at 17:18

## 2018-09-20 ASSESSMENT — PAIN DESCRIPTION - ONSET: ONSET: GRADUAL

## 2018-09-20 ASSESSMENT — PAIN DESCRIPTION - DESCRIPTORS
DESCRIPTORS: ACHING

## 2018-09-20 ASSESSMENT — PAIN SCALES - GENERAL
PAINLEVEL_OUTOF10: 0
PAINLEVEL_OUTOF10: 0
PAINLEVEL_OUTOF10: 6
PAINLEVEL_OUTOF10: 0
PAINLEVEL_OUTOF10: 5
PAINLEVEL_OUTOF10: 0
PAINLEVEL_OUTOF10: 0
PAINLEVEL_OUTOF10: 4
PAINLEVEL_OUTOF10: 4
PAINLEVEL_OUTOF10: 8
PAINLEVEL_OUTOF10: 0

## 2018-09-20 ASSESSMENT — PAIN DESCRIPTION - PAIN TYPE
TYPE: ACUTE PAIN

## 2018-09-20 ASSESSMENT — PAIN DESCRIPTION - LOCATION
LOCATION: OTHER (COMMENT)
LOCATION: RIB CAGE
LOCATION: OTHER (COMMENT)
LOCATION: OTHER (COMMENT)

## 2018-09-20 ASSESSMENT — PAIN DESCRIPTION - ORIENTATION
ORIENTATION: LEFT

## 2018-09-20 ASSESSMENT — PAIN DESCRIPTION - FREQUENCY: FREQUENCY: INTERMITTENT

## 2018-09-20 NOTE — FLOWSHEET NOTE
Rolling Walker     []   Standard Elfrieda Abreu []   Wheelchair        []   [employer]       []   4-Wheeled Elfrieda Abreu         []   Cardiac Elfrieda Abreu       []   Other:        Additional Therapeutic activities/exercises completed this date:     [x]   ADL Training   [x]   Balance/Postural training     [x]   Bed/Transfer Training    [x]   Endurance Training   []   Neuromuscular Re-ed   [x]   Nu-step:  Time: 15 min       Level: 4           []   Rebounder:    []  Seated     []  Standing        []   Supine Ther Ex (reps/sets):     []   Seated Ther Ex (reps/sets):     []   Standing Ther Ex (reps/sets):     []   Other:      Comments: No c/o SOB throughout session. Patient/Caregiver Education and Training:   []   [employer] Equipment Use  [x]   Bed Mobility/Transfer Technique/Safety  [x]   Energy Conservation Tips  []   Family training  [x]   Postural Awareness  [x]   Safety During Functional Activities  []   Reinforced Patient's Precautions   []   Progress was updated and reviewed in Rehabtracker with patient and/or family this date. Treatment Plan for Next Session: ADLs.         Treatment/Activity Tolerance:   [x] Tolerated treatment with no adverse effects    [] Patient limited by fatigue  [] Patient limited by pain   [] Patient limited by medical complications:    [] Adverse reaction to Tx:   [] Significant change in status    Safety:       []  bed alarm set    []  chair alarm set    []  Pt refused alarms                []  Telesitter activated      [x]  Gait belt used during tx session      []other:       Number of Minutes/Billable Intervention  Therapeutic Exercise 25   ADL Self-care 15   Neuro Re-Ed    Therapeutic Activity    Group    Other:    TOTAL 40         Objective                                                                                    Goals:  (Update in navigator)   :   :        Plan of Care                                                                              Times per week: 5 days per week for a minimum of

## 2018-09-20 NOTE — PROGRESS NOTES
Next clinical review due 09/20/18, fax to [place] at [facility] [phone].
Nutrition Assessment    Type and Reason for Visit: Initial (Swing Bed )    Nutrition Recommendations:   *continue with diet and ONS as ordered  Malnutrition Assessment:  · Malnutrition Status: At risk for malnutrition  · Context: Chronic illness    Nutrition Diagnosis:   · Problem: Increased energy expenditure, Unintended weight loss, Overweight/Obese, in context of chronic illness  · Etiology: related to Impaired respiratory function-inability to consume food     Signs and symptoms:  as evidenced by Weight loss greater than or equal to 7.5% in 3 months    Nutrition Assessment:  · Subjective Assessment: Prior decrease in oral intakes due to End Stage COPD with difficulty with eating and breathing. Weight loss noted of appox.  25# in less than 3 months  · Nutrition-Focused Physical Findings: David Nutrition Score 4, Pain Score 8  · Wound Type: None  · Current Nutrition Therapies:  · Oral Diet Orders: Carb Control 4 Carbs/Meal   · Oral Diet intake: 26-50%, 51-75%  · Oral Nutrition Supplement (ONS) Orders: Diabetic Oral Supplement  · ONS intake: Unable to assess  · Anthropometric Measures:  · Ht: 5' 6\" (167.6 cm)   · Current Body Wt: 245 lb (111.1 kg)  · Admission Body Wt: 253 lb (114.8 kg)  · Usual Body Wt: 278 lb (126.1 kg)  · % Weight Change: 11.8% (33#),  2 months  · Ideal Body Wt: 130 lb (59 kg), % Ideal Body 188%  · BMI Classification: BMI 35.0 - 39.9 Obese Class II (39.6)  · Comparative Standards (Estimated Nutrition Needs):  · Estimated Daily Total Kcal: 9594-2474  · Estimated Daily Protein (g): 59-71    Estimated Intake vs Estimated Needs: Intake Meets Needs    Nutrition Risk Level: Low    Nutrition Interventions:   Continue current diet, Continue current ONS  Continued Inpatient Monitoring, Education Not Indicated    Nutrition Evaluation:   · Evaluation: Goals set   · Goals: Oral intakes will meet at least 75% of her estimated nutritional needs during her LOS    · Monitoring: Meal Intake, Supplement
Occupational Therapy  Physical Rehabilitation: OCCUPATIONAL THERAPY     [x] daily progress note       [] discharge       Patient Name:  Frances Scott   :  1955 MRN: [id number]  Room:  [address] [place] [address] Date of Admission: 2018  Rehabilitation Diagnosis:   DIABILITY       Date 2018       Day of ARU Week:  3   Time IN/OUT 6440-5527   Individual Tx Minutes 30   Group Tx Minutes 0   Co-Treat Minutes 0   Concurrent Tx Minutes 0   TOTAL Tx Time Mins 30   Variance Time 0   Variance Time []   Refusal due to:     []   Medical hold/reason:    []   Illness   []   Off Unit for test/procedure  []   Extra time needed to complete task  []   Therapeutic need  []   Other (specify):   Restrictions           Communication with other providers: [x]   OK to see per nursing:     []   Spoke with team member regarding:      Subjective observations and cognitive status: Pt alert and oriented.    Pain level/location:    5/10       Location: L ribs   Discharge recommendations  Anticipated discharge date:  TBD  Destination: []home alone   []home alone w assist prn   [] home w/ family    [] Continuous supervision       []SNF    [] Assisted living     [] Other:   Continued therapy: []HHC OT  []OUTPATIENT  OT   [] No Further OT  Equipment needs: TBD       Bed Mobility:           [x]   Pt received out of bed       Transfers:    Sit--> Stand:  SBA  Stand --> Sit:   SBA  Stand-Pivot:   SBA  Other:    Assistive device required for transfer:   RW      Functional Mobility:    Assistance:  SBA x 2  Attempts Device:   [x]   Rolling Walker     []   Standard Walker []   Wheelchair        []   [address] [facility]       []   4-Wheeled Domitila          []   Cardiac Walker       []   Other:          Additional Therapeutic activities/exercises completed this date:     []   ADL Training   [x]   Balance/Postural training     [x]   Bed/Transfer Training   [x]   Endurance Training   []   Neuromuscular Re-ed   []   Nu-step:  Time:        Level:         #Steps:       []
Occupational Therapy  Physical Rehabilitation: OCCUPATIONAL THERAPY     [x] daily progress note       [] discharge       Patient Name:  Melissa Mario   :  1955 MRN: [id number]  Room:  [address] [place] [address] Date of Admission: 2018  Rehabilitation Diagnosis:   DIABILITY       Date 2018       Day of ARU Week:  4   Time IN/OUT 1140/1215  1400/1415   50    Group Tx Minutes 0   Co-Treat Minutes 0   Concurrent Tx Minutes 0   TOTAL Tx Time Mins 50   Variance Time 0   Variance Time []   Refusal due to:     []   Medical hold/reason:    []   Illness   []   Off Unit for test/procedure  []   Extra time needed to complete task  []   Therapeutic need  []   Other (specify):   Restrictions           Communication with other providers: [x]   OK to see per nursing:     []   Spoke with team member regarding:      Subjective observations and cognitive status: Pt alert and oriented. Pain level/location:    5/10       Location: L ribs   Discharge recommendations  Anticipated discharge date:  TBD  Destination: []home alone   []home alone w assist prn   [] home w/ family    [] Continuous supervision       []SNF    [] Assisted living     [] Other:   Continued therapy: []HHC OT  []OUTPATIENT  OT   [] No Further OT  Equipment needs: TBD       Bed Mobility:           [x]   Pt received out of bed       Transfers:    Sit--> Stand:  SBA/MI  Stand --> Sit:   SBA/MI  Stand-Pivot:   SBA  Other:    Assistive device required for transfer:   RW      Functional Mobility:    Assistance:  SBA x 2  Attempts Device:   [x]   Rolling Walker     []   Standard Walker []   Wheelchair        []   [employer]       []   4-Wheeled Warren Javon         []   Cardiac Walker       []   Other:       Toilet transfer:  SBA/MI; hygiene I    Additional Therapeutic activities/exercises completed this date:     []   ADL Training   [x]   Balance/Postural training     [x]   Bed/Transfer Training   [x]   Endurance Training   []   Neuromuscular Re-ed   [x]   Nu-step:  Time: 10 min
Patient ambulated in gutierrez with front wheeled walker and oxygen. Patient ambulated to inpatient waiting room and back to room. Patient stated no increased pain, but complained of increased shortness of breath during ambulation.
SWING BED WEEKLY TEAM SHEET     WEEK#     1  NUTRITION   Diet:       CCD            TF:        no  TPN:   no  Appropriate/Adequate [X] yes [ ] no   Meal intakes: 26-75%    Weight: 245#   BMI:     39.6       Significant Change: no  Recommendations:none at this time
blood transfusion     Hyperlipidemia 10/11/2013    Hypertension 10/11/2013    Insomnia 10/11/2013    Liver dysfunction 2015    Liver, core biopsies: Steatohepatitis, acute and chronic, grade 2, stage 2.     Nasal congestion 2016    \"no cough and no fever associated with it\"    Obesity 10/11/2013    On home oxygen therapy     2l/nc 24 hrs per day    Sleep apnea 10/11/2013    had sleep apnea - has cpap and does use it    Steatohepatitis 3/21/2016    Liver, core biopsies: Steatohepatitis, acute and chronic, grade 2, stage 2.  Systolic murmur 8322    Tobacco abuse disorder 10/11/2013    Urinary incontinence, mixed 10/11/2013         Vitals    TEMPERATURE:  Current - Temp: 96.8 °F (36 °C);  Max - Temp  Av.4 °F (33 °C)  Min: 75.2 °F (24 °C)  Max: 96.8 °F (36 °C)  RESPIRATIONS RANGE: Resp  Av  Min: 16  Max: 16  PULSE RANGE: Pulse  Av.3  Min: 63  Max: 71  BLOOD PRESSURE RANGE:  Systolic (02RBE), OKH:582 , Min:142 , ZHK:870   ; Diastolic (54PZY), TJ, Min:65, Max:77    PULSE OXIMETRY RANGE: SpO2  Av.3 %  Min: 92 %  Max: 99 %  24HR INTAKE/OUTPUT:    Intake/Output Summary (Last 24 hours) at 18 1011  Last data filed at 18 0901   Gross per 24 hour   Intake              960 ml   Output             2340 ml   Net            -1380 ml       OBJECTIVE:    General appearance: Conscious, current, cooperative in no acute distress  Head: atraumatic & normocephalic  Oral cavity: moist mucous membranes with normal dentition  Neck: supple with no lymphadenopathy, JVD down, trachea central  CVS: normal S1S2 with no additional heart sounds, no lower extremity edema  Respi: good air entry in both lung fields with no other adventious breath sounds  Abdo: soft & non tender, with +ve bowel sounds, no guarding ,rigidity or rebound tenderness  CNS: no focal weakness or sensory deficits peripherally, DTR's equal bilaterally, CN2-12 normal  Skin: no rash, purpurea or eruptions  MS: no
minutes/day plus group as appropriate for 60 minutes.   Treatment to include      Electronically signed by   Chaya Lundberg,  9/17/2018, 6:19 PM

## 2018-09-21 LAB
ANION GAP SERPL CALCULATED.3IONS-SCNC: 13 MMOL/L (ref 4–16)
BUN BLDV-MCNC: 44 MG/DL (ref 6–23)
CALCIUM SERPL-MCNC: 9 MG/DL (ref 8.3–10.6)
CHLORIDE BLD-SCNC: 90 MMOL/L (ref 99–110)
CO2: 34 MMOL/L (ref 21–32)
CREAT SERPL-MCNC: 1.1 MG/DL (ref 0.6–1.1)
GFR AFRICAN AMERICAN: >60 ML/MIN/1.73M2
GFR NON-AFRICAN AMERICAN: 50 ML/MIN/1.73M2
GLUCOSE BLD-MCNC: 117 MG/DL (ref 70–99)
GLUCOSE BLD-MCNC: 156 MG/DL (ref 70–99)
GLUCOSE BLD-MCNC: 207 MG/DL (ref 70–99)
GLUCOSE BLD-MCNC: 269 MG/DL (ref 70–99)
GLUCOSE BLD-MCNC: 95 MG/DL (ref 70–99)
POTASSIUM SERPL-SCNC: 3.6 MMOL/L (ref 3.5–5.1)
SODIUM BLD-SCNC: 137 MMOL/L (ref 135–145)

## 2018-09-21 PROCEDURE — 2500000003 HC RX 250 WO HCPCS

## 2018-09-21 PROCEDURE — 88302 TISSUE EXAM BY PATHOLOGIST: CPT

## 2018-09-21 PROCEDURE — 82962 GLUCOSE BLOOD TEST: CPT

## 2018-09-21 PROCEDURE — 6360000002 HC RX W HCPCS

## 2018-09-21 PROCEDURE — 94640 AIRWAY INHALATION TREATMENT: CPT

## 2018-09-21 PROCEDURE — 36415 COLL VENOUS BLD VENIPUNCTURE: CPT

## 2018-09-21 PROCEDURE — 97110 THERAPEUTIC EXERCISES: CPT

## 2018-09-21 PROCEDURE — 80048 BASIC METABOLIC PNL TOTAL CA: CPT

## 2018-09-21 PROCEDURE — 97530 THERAPEUTIC ACTIVITIES: CPT

## 2018-09-21 PROCEDURE — 9990 CHARGE CONVERSION

## 2018-09-21 RX ADMIN — LINAGLIPTIN 5 MG: 5 TABLET, FILM COATED ORAL at 09:46

## 2018-09-21 RX ADMIN — PRAVASTATIN SODIUM 20 MG: 10 TABLET ORAL at 09:46

## 2018-09-21 RX ADMIN — INSULIN LISPRO 10 UNITS: 100 INJECTION, SOLUTION INTRAVENOUS; SUBCUTANEOUS at 17:31

## 2018-09-21 RX ADMIN — ATENOLOL 25 MG: 25 TABLET ORAL at 21:26

## 2018-09-21 RX ADMIN — ATENOLOL 25 MG: 25 TABLET ORAL at 09:52

## 2018-09-21 RX ADMIN — METOLAZONE 2.5 MG: 2.5 TABLET ORAL at 09:47

## 2018-09-21 RX ADMIN — INSULIN LISPRO 10 UNITS: 100 INJECTION, SOLUTION INTRAVENOUS; SUBCUTANEOUS at 08:35

## 2018-09-21 RX ADMIN — IPRATROPIUM BROMIDE AND ALBUTEROL SULFATE 3 ML: .5; 3 SOLUTION RESPIRATORY (INHALATION) at 22:24

## 2018-09-21 RX ADMIN — CLONIDINE HYDROCHLORIDE 0.1 MG: 0.1 TABLET ORAL at 21:26

## 2018-09-21 RX ADMIN — TROSPIUM CHLORIDE 20 MG: 20 TABLET, FILM COATED ORAL at 21:25

## 2018-09-21 RX ADMIN — FUROSEMIDE 40 MG: 40 TABLET ORAL at 09:47

## 2018-09-21 RX ADMIN — POTASSIUM CHLORIDE 20 MEQ: 20 TABLET, EXTENDED RELEASE ORAL at 09:47

## 2018-09-21 RX ADMIN — INSULIN LISPRO 3 UNITS: 100 INJECTION, SOLUTION INTRAVENOUS; SUBCUTANEOUS at 17:30

## 2018-09-21 RX ADMIN — TRAZODONE HYDROCHLORIDE 100 MG: 50 TABLET ORAL at 21:26

## 2018-09-21 RX ADMIN — IPRATROPIUM BROMIDE AND ALBUTEROL SULFATE 3 ML: .5; 3 SOLUTION RESPIRATORY (INHALATION) at 17:05

## 2018-09-21 RX ADMIN — INSULIN LISPRO 10 UNITS: 100 INJECTION, SOLUTION INTRAVENOUS; SUBCUTANEOUS at 12:11

## 2018-09-21 RX ADMIN — GLIPIZIDE 10 MG: 10 TABLET ORAL at 08:33

## 2018-09-21 RX ADMIN — ACETAMINOPHEN 650 MG: 325 TABLET ORAL at 09:52

## 2018-09-21 RX ADMIN — IPRATROPIUM BROMIDE AND ALBUTEROL SULFATE 3 ML: .5; 3 SOLUTION RESPIRATORY (INHALATION) at 07:15

## 2018-09-21 RX ADMIN — IPRATROPIUM BROMIDE AND ALBUTEROL SULFATE 3 ML: .5; 3 SOLUTION RESPIRATORY (INHALATION) at 10:55

## 2018-09-21 RX ADMIN — LOSARTAN POTASSIUM 50 MG: 50 TABLET ORAL at 09:47

## 2018-09-21 RX ADMIN — CLONIDINE HYDROCHLORIDE 0.1 MG: 0.1 TABLET ORAL at 09:52

## 2018-09-21 RX ADMIN — FUROSEMIDE 40 MG: 40 TABLET ORAL at 17:28

## 2018-09-21 RX ADMIN — PREDNISONE 30 MG: 20 TABLET ORAL at 09:51

## 2018-09-21 RX ADMIN — GLIPIZIDE 10 MG: 10 TABLET ORAL at 17:27

## 2018-09-21 ASSESSMENT — PAIN SCALES - GENERAL
PAINLEVEL_OUTOF10: 0
PAINLEVEL_OUTOF10: 5
PAINLEVEL_OUTOF10: 0
PAINLEVEL_OUTOF10: 4
PAINLEVEL_OUTOF10: 0

## 2018-09-21 ASSESSMENT — PAIN DESCRIPTION - FREQUENCY
FREQUENCY: INTERMITTENT
FREQUENCY: INTERMITTENT

## 2018-09-21 ASSESSMENT — PAIN DESCRIPTION - PROGRESSION: CLINICAL_PROGRESSION: GRADUALLY WORSENING

## 2018-09-21 ASSESSMENT — PAIN DESCRIPTION - ORIENTATION
ORIENTATION: LEFT
ORIENTATION: LEFT

## 2018-09-21 ASSESSMENT — PAIN DESCRIPTION - DESCRIPTORS: DESCRIPTORS: ACHING

## 2018-09-21 ASSESSMENT — PAIN DESCRIPTION - PAIN TYPE
TYPE: ACUTE PAIN
TYPE: ACUTE PAIN

## 2018-09-21 ASSESSMENT — PAIN DESCRIPTION - LOCATION
LOCATION: OTHER (COMMENT)
LOCATION: RIB CAGE

## 2018-09-21 ASSESSMENT — PAIN DESCRIPTION - ONSET: ONSET: GRADUAL

## 2018-09-21 NOTE — PLAN OF CARE
Problem: Falls - Risk of:  Goal: Will remain free from falls  Will remain free from falls   Outcome: Ongoing    Goal: Absence of physical injury  Absence of physical injury   Outcome: Ongoing      Problem:  Activity:  Goal: Ability to tolerate increased activity will improve  Ability to tolerate increased activity will improve   Outcome: Ongoing      Problem: Nutrition  Goal: Understanding of nutritional guidelines  Outcome: Ongoing

## 2018-09-21 NOTE — CARE COORDINATION
CM met with patient for discharge planning, patient sitting in recliner. CM explained that her insurance has informed us that they have denied her stay in the swing bed program past Saturday 9/22/18. Patient states that she is fine with going home Saturday. Patient states she feels she has gotten much stronger and feels comfortable returning home. Patient at one point had decided to stay with her son after discharge but now feel she has improved to the point she feels comfortable returning to her home. Patient lives in 1 story home with 1 step into the home from the outside, grab bars in the bathroom (with ADA height commode and shower chair for her shower), and has a walker. Patient states that she has a son that lives just 2 miles from her and a brother that just lives a block away. Patient does not feel that she needs [facility] at this time due to strong family support. Patient states that she will contact her family today and she will arrange her own transportation home tomorrow. CM available if needs arise.

## 2018-09-21 NOTE — FLOWSHEET NOTE
medical complications:    [] Adverse Reaction to Tx:   [] Significant change in status    Plan:   [x] Continue per plan of care [ ] Dariana Chester current plan   [ ] Plan of care initiated [ ] Hold pending MD visit [ ] Discharged    Billing:  Billed units: 1 therapeutic exercise, 2 therapeutic activity      Signed: Diann Miller DPT [id number]  9/21/2018, 6:02 PM

## 2018-09-21 NOTE — FLOWSHEET NOTE
Physical Rehabilitation: OCCUPATIONAL THERAPY     [x] daily progress note       [] discharge       Patient Name:  Emilie Ludwig   :  1955 MRN: [id number]  Room:  [address] [place] [address] Date of Admission: 2018  Rehabilitation Diagnosis:   Physical debility [R53.81]       Date 2018       Day of ARU Week:  6   Time IN//1000   Individual Tx Minutes 30   Group Tx Minutes    Co-Treat Minutes    Concurrent Tx Minutes    TOTAL Tx Time Mins 30   Variance Time    Variance Time []   Refusal due to:     []   Medical hold/reason:    []   Illness   []   Off Unit for test/procedure  []   Extra time needed to complete task  []   Therapeutic need  []   Other (specify):   Restrictions  Pt on 2L O2. Communication with other providers: [x]   OK to see per nursing:     []   Spoke with team member regarding:      Subjective observations and cognitive status: Pt presented in bedside chair agreeable to therapy at this time. Pain level/location:  4  /10       Location: L abdomen, RN notified. Discharge recommendations  Anticipated discharge date:  TBD  Destination: []home alone   []home alone w assist prn   [] home w/ family    [] Continuous supervision       []SNF    [] Assisted living     [] Other:   Continued therapy: []HHC OT  []OUTPATIENT  OT   [] No Further OT  Equipment needs: TBD     Toileting: x      Toilet Transfers:  x  Device Used:    [x]   Standard Toilet         []   Grab Bars           []  Bedside Commode       []   Elevated Toilet          []   Other:        Bed Mobility:           [x]   Pt received out of bed       Transfers:    Sit--> Stand:  Supervision/MI  Stand --> Sit:   Supervision/MI  Assistive device required for transfer:   Stands up to RW.       Functional Mobility:     Assistance:  Pt ambulated to gutierrez doorway and back to therapy room MI  Device:   [x]   Rolling Walker     []   Standard Mamta Art []   Wheelchair        []   [place] Kiss       []   4-Wheeled Mamta Art         []   Cardiac Mamta Art plus group as appropriate for 60 minutes.   Treatment to include      Electronically signed by   MARIO Dominguez  9/21/2018, 10:47 AM

## 2018-09-22 VITALS
SYSTOLIC BLOOD PRESSURE: 137 MMHG | HEIGHT: 66 IN | HEART RATE: 63 BPM | OXYGEN SATURATION: 96 % | DIASTOLIC BLOOD PRESSURE: 64 MMHG | RESPIRATION RATE: 18 BRPM | TEMPERATURE: 97.1 F | BODY MASS INDEX: 39.39 KG/M2 | WEIGHT: 245.1 LBS

## 2018-09-22 LAB
GLUCOSE BLD-MCNC: 100 MG/DL (ref 70–99)
GLUCOSE BLD-MCNC: 190 MG/DL (ref 70–99)

## 2018-09-22 PROCEDURE — 6370000000 HC RX 637 (ALT 250 FOR IP): Performed by: HOSPITALIST

## 2018-09-22 PROCEDURE — 94640 AIRWAY INHALATION TREATMENT: CPT

## 2018-09-22 PROCEDURE — 6370000000 HC RX 637 (ALT 250 FOR IP): Performed by: FAMILY MEDICINE

## 2018-09-22 PROCEDURE — 2700000000 HC OXYGEN THERAPY PER DAY

## 2018-09-22 PROCEDURE — 97110 THERAPEUTIC EXERCISES: CPT

## 2018-09-22 RX ORDER — PREDNISONE 10 MG/1
10 TABLET ORAL DAILY
Qty: 3 TABLET | Refills: 0 | Status: SHIPPED | OUTPATIENT
Start: 2018-09-26 | End: 2018-09-26 | Stop reason: ALTCHOICE

## 2018-09-22 RX ORDER — IPRATROPIUM BROMIDE AND ALBUTEROL SULFATE 2.5; .5 MG/3ML; MG/3ML
1 SOLUTION RESPIRATORY (INHALATION) 4 TIMES DAILY
Qty: 360 ML | Refills: 0 | Status: SHIPPED | OUTPATIENT
Start: 2018-09-22 | End: 2019-06-29 | Stop reason: SDUPTHER

## 2018-09-22 RX ORDER — POTASSIUM CHLORIDE 20 MEQ/1
20 TABLET, EXTENDED RELEASE ORAL DAILY
Qty: 3 TABLET | Refills: 0 | Status: SHIPPED | OUTPATIENT
Start: 2018-09-22 | End: 2018-09-26 | Stop reason: DRUGHIGH

## 2018-09-22 RX ORDER — FUROSEMIDE 40 MG/1
40 TABLET ORAL 2 TIMES DAILY
Qty: 60 TABLET | Refills: 0 | Status: SHIPPED | OUTPATIENT
Start: 2018-09-22 | End: 2018-09-26 | Stop reason: DRUGHIGH

## 2018-09-22 RX ORDER — TRAMADOL HYDROCHLORIDE 50 MG/1
50 TABLET ORAL EVERY 8 HOURS PRN
Qty: 12 TABLET | Refills: 0 | Status: SHIPPED | OUTPATIENT
Start: 2018-09-22 | End: 2018-09-26 | Stop reason: ALTCHOICE

## 2018-09-22 RX ORDER — PREDNISONE 20 MG/1
20 TABLET ORAL DAILY
Qty: 3 TABLET | Refills: 0 | Status: SHIPPED | OUTPATIENT
Start: 2018-09-23 | End: 2018-09-26 | Stop reason: ALTCHOICE

## 2018-09-22 RX ADMIN — PRAVASTATIN SODIUM 20 MG: 10 TABLET ORAL at 08:28

## 2018-09-22 RX ADMIN — CLONIDINE HYDROCHLORIDE 0.1 MG: 0.1 TABLET ORAL at 08:28

## 2018-09-22 RX ADMIN — METOLAZONE 2.5 MG: 2.5 TABLET ORAL at 07:56

## 2018-09-22 RX ADMIN — LOSARTAN POTASSIUM 50 MG: 50 TABLET ORAL at 08:29

## 2018-09-22 RX ADMIN — ATENOLOL 25 MG: 25 TABLET ORAL at 08:28

## 2018-09-22 RX ADMIN — POTASSIUM CHLORIDE 20 MEQ: 20 TABLET, EXTENDED RELEASE ORAL at 08:28

## 2018-09-22 RX ADMIN — GLIPIZIDE 10 MG: 10 TABLET ORAL at 07:56

## 2018-09-22 RX ADMIN — IPRATROPIUM BROMIDE AND ALBUTEROL SULFATE 3 ML: .5; 3 SOLUTION RESPIRATORY (INHALATION) at 07:41

## 2018-09-22 RX ADMIN — FUROSEMIDE 40 MG: 40 TABLET ORAL at 08:29

## 2018-09-22 RX ADMIN — LINAGLIPTIN 5 MG: 5 TABLET, FILM COATED ORAL at 08:29

## 2018-09-22 RX ADMIN — INSULIN LISPRO 10 UNITS: 100 INJECTION, SOLUTION INTRAVENOUS; SUBCUTANEOUS at 08:27

## 2018-09-22 RX ADMIN — INSULIN LISPRO 1 UNITS: 100 INJECTION, SOLUTION INTRAVENOUS; SUBCUTANEOUS at 13:11

## 2018-09-22 RX ADMIN — PREDNISONE 30 MG: 20 TABLET ORAL at 08:28

## 2018-09-22 RX ADMIN — IPRATROPIUM BROMIDE AND ALBUTEROL SULFATE 3 ML: .5; 3 SOLUTION RESPIRATORY (INHALATION) at 11:31

## 2018-09-22 RX ADMIN — INSULIN LISPRO 10 UNITS: 100 INJECTION, SOLUTION INTRAVENOUS; SUBCUTANEOUS at 13:10

## 2018-09-22 ASSESSMENT — PAIN SCALES - GENERAL: PAINLEVEL_OUTOF10: 0

## 2018-09-22 NOTE — DISCHARGE SUMMARY
[facility] 1955 [id number]  PCP:  Mickie Wong MD    Admit date: 9/16/2018  Admitting Physician: Donald Gimenez MD    Discharge date: 9/22/2018 Discharge Physician: Donald Gimenez MD      Reason for admission: No chief complaint on file. Present on Admission:   Physical debility   Type 2 diabetes mellitus without complication ([facility] [address]   Essential hypertension   Pulmonary hypertension (HCC)   Chronic respiratory failure with hypoxia ([facility] [address] [facility]   Morbid obesity due to excess calories [facility])       Discharge Diagnoses Include:  1. Morbid obesity  2. End-stage COPD with chronic respiratory failure [facility] pulmonary hypertension  3. CHF  4. Hypertension   5. Type 2 diabetes    Hospital Course[de-identified] Patient was admitted to the hospital with an episode of shortness of breath and fall and was found to be in CHF and COPD exacerbation, patient was treated for the same and record from the same but was a found to be in significant amount of physical debility because of which she was transitioned to swing bed status, during her swing bed status. During his stay as a swing bed status patient worked with physical therapy and did well and at the time of discharge was ambulating well with the help of a walker, patient's medications were changed and she was sent home with a prescription for the same     /64   Pulse 63   Temp 97.1 °F (36.2 °C) (Temporal)   Resp 16   Ht 5' 6\" (1.676 m)   Wt 245 lb 1.6 oz (111.2 kg)   SpO2 96%   Breastfeeding?  No   BMI 39.56 kg/m²         Head: atraumatic & normocephalic  Ears: TM's bialterall normal with normal canals  Eyes: without pallor or icterus  Oral cavity: moist mucous membranes with normal dentition  Neck: supple with no lymphadenopathy, JVD down, trachea central  CVS: normal S1S2 with no additional heart sounds, +1 pitting edema noted in bilateral lower eczema disease  Respi: good air entry in both lung fields with no other adventious breath

## 2018-09-22 NOTE — FLOWSHEET NOTE
Physical Therapy DailyTreatment Note   Date: 2018 Room: [unfilled]   Name: Ryann Jett : 1955   MRN: [id number]   Admission Date:2018        Rehabilitation Diagnosis: Physical debility [R53.81]    Objective                                                                                    Goals:    Short term goals  Time Frame for Short term goals: 1 week or until discharge:   Short term goal 1: Pt will demonstrate the ability to safely perform bed mobility mod I. - not met, continue. Short term goal 2: Pt will demonstrate the ability to safely ambulate 100 feet with a RW and mod I with no rest breaks and no reports of dizziness in 3/3 trials. - MET, discharge goal.   Short term goal 3:  Pt will demonstrate the ability to safely ambulate up and down 2 steps with CGA. - MET, discharge goal  Short term goal 4: Pt will demonstrate the ability to safely stand x10 consecutive minutes with no more than 1 hand for support and supervision. - MET discharge goal.      Plan of Care                                                                              Times per week: 4+ days per week for a minimum of 15 minutes/day  Treatment to include      Date  2018   TIMES IN/OUT   10:25 -11:05 am   Restrictions/Precautions      fall risk, O2      Current Diet/Swallowing Issues Dietary Nutrition Supplements: Diabetic Oral Supplement  DIET CARB CONTROL; Communication with other providers: [x] Ok to see per nursing at morning huddle  [] Medical hold and reason  [] OT co treat   Subjective observations:  Pt presented in the chair and reports she is ready for therapy. She reports she is a little nervous about returning home today. [x]   Gait belt used during tx session   Pain level/location: Pre-tx  0/10   Post-tx 10/10   Bed Mobility   Not performed     Transfers Sit to stand: mod I  Stand to sit: mod I   Standing Tolerance Pt stood in the // bars while performing the exercises below.     Amb/Locomotion: AD/Distance/Assist Pt ambulated 150 feet with RW and mod I     Pt ambulated 30 feet with RW and mod I   Steps (#)/Assist/Rails/AD Not performed   Ramp:AD/Assist Pt ambulated into and out of the // bars with supervision. Curb:height AD/Assist Not performed   Uneven:type AD/Assist Not performed   W/C distance/Assist   Not performed   Strategies that improved performance: Pt did very well and expressed understanding. Barriers to progress/participation: none   Alarm placed/where? Fall Risk  [ ] left in bed  [x] left in chair [x] call light within reach  [ ] bed alarm on  [ ] personal alarm on [ ] [de-identified]  [ ] other staff present:    Discharge recommendations  Anticipated discharge date:  9/22/18  Destination: []home alone   [x]home alone with assist prn  []Continuous supervision  []SNF  [] Assisted living   Continued therapy: [x]HHC PT  [x]OUTPATIENT  PT   [x] Further PT  Equipment needs: see eval     Therapeutic activities/exercises completed this date: see below. PT required 2 short rest breaks today. Modality/intervention used:   [x] Therapeutic Exercise    [ ] Modalities:   [x] Therapeutic Activity    [ ] Ultrasound   [ ] Elec Stim   [ ] Gait Training     [ ] Cervical Traction  [ ] Lumbar Traction   [x] Neuromuscular Re-education   [ ] Cold/hotpack  [ ] Iontophoresis   [x] Instruction in HEP     [ ] Vasopneumatic   [ ] Manual Therapy   [ ] Aquatic Therapy     Patient/Caregiver Education and Training: Pt and PT discussed HEP, and how to progress the exercises as well as frequency in the future (beyond one month). Pt expressed understanding and asked appropriate questions. A handout was provided.      Exercise/Equipment/Modalities this date   Sit to stands: 1x10 mod I using B UE.   HS curls: 1x10 B LE with 3 second hold 2 HHA in // bars   Standing marches: 1x10 B LE with 2 HHA in // bars   Hip extension: 1x10 B LE with 2 HHA in // bars   Hip adduction: 1x10 B LE with 2 HHA in // bars   Hip abduction: 1x10 B LE

## 2018-09-24 ENCOUNTER — TELEPHONE (OUTPATIENT)
Dept: INTERNAL MEDICINE CLINIC | Age: 63
End: 2018-09-24

## 2018-09-26 ENCOUNTER — OFFICE VISIT (OUTPATIENT)
Dept: INTERNAL MEDICINE CLINIC | Age: 63
End: 2018-09-26
Payer: COMMERCIAL

## 2018-09-26 VITALS
TEMPERATURE: 98 F | RESPIRATION RATE: 10 BRPM | DIASTOLIC BLOOD PRESSURE: 52 MMHG | OXYGEN SATURATION: 98 % | BODY MASS INDEX: 39.71 KG/M2 | WEIGHT: 246 LBS | SYSTOLIC BLOOD PRESSURE: 132 MMHG | HEART RATE: 82 BPM

## 2018-09-26 DIAGNOSIS — K75.81 STEATOHEPATITIS: ICD-10-CM

## 2018-09-26 DIAGNOSIS — D64.9 ANEMIA, UNSPECIFIED TYPE: ICD-10-CM

## 2018-09-26 DIAGNOSIS — N39.46 URINARY INCONTINENCE, MIXED: ICD-10-CM

## 2018-09-26 DIAGNOSIS — J44.9 MODERATE COPD (CHRONIC OBSTRUCTIVE PULMONARY DISEASE) (HCC): ICD-10-CM

## 2018-09-26 DIAGNOSIS — I10 ESSENTIAL HYPERTENSION: ICD-10-CM

## 2018-09-26 DIAGNOSIS — J44.9 COPD, SEVERE (HCC): Chronic | ICD-10-CM

## 2018-09-26 DIAGNOSIS — G47.33 OBSTRUCTIVE SLEEP APNEA: ICD-10-CM

## 2018-09-26 DIAGNOSIS — J96.11 CHRONIC RESPIRATORY FAILURE WITH HYPOXIA (HCC): ICD-10-CM
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
## 2018-09-26 DIAGNOSIS — Z72.0 TOBACCO ABUSE DISORDER: ICD-10-CM

## 2018-09-26 DIAGNOSIS — R60.0 PEDAL EDEMA: ICD-10-CM

## 2018-09-26 DIAGNOSIS — E11.9 TYPE 2 DIABETES MELLITUS WITHOUT COMPLICATION, WITHOUT LONG-TERM CURRENT USE OF INSULIN (HCC): ICD-10-CM

## 2018-09-26 DIAGNOSIS — R53.1 LEFT-SIDED WEAKNESS: Primary | ICD-10-CM
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
## 2018-09-26 PROCEDURE — 1111F DSCHRG MED/CURRENT MED MERGE: CPT | Performed by: INTERNAL MEDICINE

## 2018-09-26 PROCEDURE — 99495 TRANSJ CARE MGMT MOD F2F 14D: CPT | Performed by: INTERNAL MEDICINE

## 2018-09-26 RX ORDER — BUDESONIDE AND FORMOTEROL FUMARATE DIHYDRATE 160; 4.5 UG/1; UG/1
2 AEROSOL RESPIRATORY (INHALATION) 2 TIMES DAILY
Qty: 30.6 G | Refills: 3 | Status: ON HOLD
Start: 2018-09-26 | End: 2019-01-03 | Stop reason: CLARIF

## 2018-09-26 RX ORDER — FUROSEMIDE 40 MG/1
40 TABLET ORAL DAILY
Qty: 90 TABLET | Refills: 0 | Status: SHIPPED | OUTPATIENT
Start: 2018-09-26 | End: 2018-10-08 | Stop reason: SDUPTHER

## 2018-09-26 RX ORDER — POTASSIUM CHLORIDE 750 MG/1
10 TABLET, FILM COATED, EXTENDED RELEASE ORAL DAILY
Qty: 30 TABLET | Refills: 1 | Status: SHIPPED | OUTPATIENT
Start: 2018-09-26 | End: 2018-10-08 | Stop reason: SDUPTHER

## 2018-09-26 ASSESSMENT — PATIENT HEALTH QUESTIONNAIRE - PHQ9
1. LITTLE INTEREST OR PLEASURE IN DOING THINGS: 2
2. FEELING DOWN, DEPRESSED OR HOPELESS: 3
SUM OF ALL RESPONSES TO PHQ QUESTIONS 1-9: 5
SUM OF ALL RESPONSES TO PHQ QUESTIONS 1-9: 5
SUM OF ALL RESPONSES TO PHQ9 QUESTIONS 1 & 2: 5

## 2018-09-26 NOTE — PROGRESS NOTES
glipiZIDE (GLUCOTROL XL) 10 MG extended release tablet  TAKE 1 TABLET TWICE A DAY             indapamide (LOZOL) 2.5 MG tablet  TAKE 1 TABLET DAILY             ipratropium-albuterol (DUONEB) 0.5-2.5 (3) MG/3ML SOLN nebulizer solution  Take 3 mLs by nebulization 4 times daily             linagliptin (TRADJENTA) 5 MG tablet  Take 1 tablet by mouth daily             losartan (COZAAR) 50 MG tablet  Take 1 tablet by mouth daily             metFORMIN (GLUCOPHAGE) 1000 MG tablet  TAKE 1 TABLET TWICE A DAY WITH MEALS             MULTIPLE VITAMIN PO  Take 1 tablet by mouth daily. OXYGEN  Inhale 2 L/min into the lungs continuous. potassium chloride (KLOR-CON M) 20 MEQ extended release tablet  Take 1 tablet by mouth daily for 3 days             pravastatin (PRAVACHOL) 20 MG tablet  TAKE 1 TABLET DAILY             predniSONE (DELTASONE) 10 MG tablet  Take 1 tablet by mouth daily for 3 days             predniSONE (DELTASONE) 20 MG tablet  Take 1 tablet by mouth daily for 3 days             solifenacin (VESICARE) 10 MG tablet  TAKE 1 TABLET DAILY             traMADol (ULTRAM) 50 MG tablet  Take 1 tablet by mouth every 8 hours as needed for Pain for up to 4 days. .             traZODone (DESYREL) 100 MG tablet  TAKE 1 TABLET NIGHTLY                   Medications marked \"taking\" at this time  Outpatient Prescriptions Marked as Taking for the 9/26/18 encounter (Office Visit) with Mickie Wong MD   Medication Sig Dispense Refill    traMADol (ULTRAM) 50 MG tablet Take 1 tablet by mouth every 8 hours as needed for Pain for up to 4 days. . 12 tablet 0    ipratropium-albuterol (DUONEB) 0.5-2.5 (3) MG/3ML SOLN nebulizer solution Take 3 mLs by nebulization 4 times daily 360 mL 0    linagliptin (TRADJENTA) 5 MG tablet Take 1 tablet by mouth daily 30 tablet 0    predniSONE (DELTASONE) 20 MG tablet Take 1 tablet by mouth daily for 3 days 3 tablet 0    predniSONE (DELTASONE) 10 MG tablet Take 1 tablet by mouth mixed  Patient urinary incontinence stable    9. Obstructive sleep apnea  Patient to take the CPAP    10. Steatohepatitis  Liver functions is a stable    11. Fall in home, sequela  Patient to follow diet home and is doing good. 12. Anemia, unspecified type  Will check CBC again. Hemoccult was given ×3  - CBC Auto Differential; Future  - Comprehensive Metabolic Panel; Future    13. Moderate COPD (chronic obstructive pulmonary disease) (HCC)  As above  - budesonide-formoterol (SYMBICORT) 160-4.5 MCG/ACT AERO;  Inhale 2 puffs into the lungs 2 times daily  Dispense: 30.6 g; Refill: 3        Medical Decision Making: moderate complexity

## 2018-09-27 RX ORDER — HYDROXYZINE PAMOATE 25 MG/1
25 CAPSULE ORAL 3 TIMES DAILY PRN
COMMUNITY
End: 2018-09-27 | Stop reason: SDUPTHER

## 2018-09-27 RX ORDER — HYDROXYZINE PAMOATE 25 MG/1
CAPSULE ORAL
Qty: 1 CAPSULE | Refills: 0 | Status: SHIPPED | OUTPATIENT
Start: 2018-09-27 | End: 2018-10-02 | Stop reason: ALTCHOICE

## 2018-09-27 NOTE — TELEPHONE ENCOUNTER
Patient is claustrophobic and is asking for some medication to help her relax for her MRI. Patient aware that one pill will be sent to [facility].

## 2018-09-28 ENCOUNTER — HOSPITAL ENCOUNTER (OUTPATIENT)
Age: 63
Discharge: HOME | End: 2018-09-28
Payer: COMMERCIAL

## 2018-09-28 DIAGNOSIS — D64.9 ANEMIA, UNSPECIFIED TYPE: ICD-10-CM

## 2018-09-28 LAB
ALBUMIN SERPL-MCNC: 3.9 GM/DL (ref 3.4–5)
ALP BLD-CCNC: 54 IU/L (ref 40–129)
ALT SERPL-CCNC: 20 U/L (ref 10–40)
ANION GAP SERPL CALCULATED.3IONS-SCNC: 10 MMOL/L (ref 4–16)
AST SERPL-CCNC: 14 IU/L (ref 15–37)
BASOPHILS ABSOLUTE: 0 K/CU MM
BASOPHILS RELATIVE PERCENT: 0.3 % (ref 0–1)
BILIRUB SERPL-MCNC: 0.2 MG/DL (ref 0–1)
BUN BLDV-MCNC: 42 MG/DL (ref 6–23)
CALCIUM SERPL-MCNC: 9.7 MG/DL (ref 8.3–10.6)
CHLORIDE BLD-SCNC: 96 MMOL/L (ref 99–110)
CO2: 36 MMOL/L (ref 21–32)
CREAT SERPL-MCNC: 1 MG/DL (ref 0.6–1.1)
DIFFERENTIAL TYPE: ABNORMAL
EOSINOPHILS ABSOLUTE: 0.2 K/CU MM
EOSINOPHILS RELATIVE PERCENT: 1.7 % (ref 0–3)
GFR AFRICAN AMERICAN: >60 ML/MIN/1.73M2
GFR NON-AFRICAN AMERICAN: 56 ML/MIN/1.73M2
GLUCOSE BLD-MCNC: 97 MG/DL (ref 70–99)
HCT VFR BLD CALC: 33.5 % (ref 37–47)
HEMOGLOBIN: 9.9 GM/DL (ref 12.5–16)
IMMATURE NEUTROPHIL %: 0.3 % (ref 0–0.43)
LYMPHOCYTES ABSOLUTE: 2.2 K/CU MM
LYMPHOCYTES RELATIVE PERCENT: 22.1 % (ref 24–44)
MCH RBC QN AUTO: 28.2 PG (ref 27–31)
MCHC RBC AUTO-ENTMCNC: 29.6 % (ref 32–36)
MCV RBC AUTO: 95.4 FL (ref 78–100)
MONOCYTES ABSOLUTE: 0.6 K/CU MM
MONOCYTES RELATIVE PERCENT: 5.9 % (ref 0–4)
PDW BLD-RTO: 14.6 % (ref 11.7–14.9)
PLATELET # BLD: 220 K/CU MM (ref 140–440)
PMV BLD AUTO: 8.8 FL (ref 7.5–11.1)
POTASSIUM SERPL-SCNC: 3.8 MMOL/L (ref 3.5–5.1)
RBC # BLD: 3.51 M/CU MM (ref 4.2–5.4)
SEGMENTED NEUTROPHILS ABSOLUTE COUNT: 6.9 K/CU MM
SEGMENTED NEUTROPHILS RELATIVE PERCENT: 69.7 % (ref 36–66)
SODIUM BLD-SCNC: 142 MMOL/L (ref 135–145)
TOTAL IMMATURE NEUTOROPHIL: 0.03 K/CU MM
TOTAL PROTEIN: 6.9 GM/DL (ref 6.4–8.2)
WBC # BLD: 9.9 K/CU MM (ref 4–10.5)

## 2018-09-28 PROCEDURE — 36415 COLL VENOUS BLD VENIPUNCTURE: CPT

## 2018-09-28 PROCEDURE — 85025 COMPLETE CBC W/AUTO DIFF WBC: CPT

## 2018-09-28 PROCEDURE — 80053 COMPREHEN METABOLIC PANEL: CPT

## 2018-10-01 ENCOUNTER — HOSPITAL ENCOUNTER (OUTPATIENT)
Dept: MRI IMAGING | Age: 63
Discharge: HOME | End: 2018-10-01
Payer: COMMERCIAL

## 2018-10-01 DIAGNOSIS — R53.1 LEFT-SIDED WEAKNESS: ICD-10-CM

## 2018-10-01 PROCEDURE — 70551 MRI BRAIN STEM W/O DYE: CPT

## 2018-10-02 ENCOUNTER — OFFICE VISIT (OUTPATIENT)
Dept: INTERNAL MEDICINE CLINIC | Age: 63
End: 2018-10-02
Payer: COMMERCIAL

## 2018-10-02 VITALS
HEART RATE: 88 BPM | TEMPERATURE: 98.2 F | OXYGEN SATURATION: 98 % | RESPIRATION RATE: 16 BRPM | DIASTOLIC BLOOD PRESSURE: 62 MMHG | SYSTOLIC BLOOD PRESSURE: 136 MMHG

## 2018-10-02 DIAGNOSIS — I63.9 CEREBROVASCULAR ACCIDENT (CVA), UNSPECIFIED MECHANISM (HCC): Primary | ICD-10-CM

## 2018-10-02 DIAGNOSIS — J44.9 COPD, SEVERE (HCC): Chronic | ICD-10-CM

## 2018-10-02 DIAGNOSIS — D64.9 ANEMIA, UNSPECIFIED TYPE: ICD-10-CM

## 2018-10-02 DIAGNOSIS — Z12.11 SCREENING FOR COLON CANCER: ICD-10-CM

## 2018-10-02 LAB
HEMOCCULT STL QL: NORMAL

## 2018-10-02 PROCEDURE — 99213 OFFICE O/P EST LOW 20 MIN: CPT | Performed by: INTERNAL MEDICINE

## 2018-10-02 PROCEDURE — 82270 OCCULT BLOOD FECES: CPT | Performed by: INTERNAL MEDICINE

## 2018-10-03 ENCOUNTER — HOSPITAL ENCOUNTER (OUTPATIENT)
Dept: ULTRASOUND IMAGING | Age: 63
Discharge: HOME | End: 2018-10-03
Payer: COMMERCIAL

## 2018-10-03 DIAGNOSIS — I63.9 CEREBROVASCULAR ACCIDENT (CVA), UNSPECIFIED MECHANISM (HCC): ICD-10-CM

## 2018-10-03 PROBLEM — I65.23 BILATERAL CAROTID ARTERY STENOSIS: Status: ACTIVE | Noted: 2018-10-03

## 2018-10-03 PROCEDURE — 93880 EXTRACRANIAL BILAT STUDY: CPT

## 2018-10-04 DIAGNOSIS — I65.23 BILATERAL CAROTID ARTERY STENOSIS: Primary | ICD-10-CM

## 2018-10-08 ENCOUNTER — HOSPITAL ENCOUNTER (OUTPATIENT)
Dept: CARDIAC REHAB | Age: 63
Discharge: HOME | End: 2018-10-08
Payer: COMMERCIAL

## 2018-10-08 DIAGNOSIS — I63.9 CEREBROVASCULAR ACCIDENT (CVA), UNSPECIFIED MECHANISM (HCC): ICD-10-CM

## 2018-10-08 PROCEDURE — 93225 XTRNL ECG REC<48 HRS REC: CPT

## 2018-10-08 PROCEDURE — 93226 XTRNL ECG REC<48 HR SCAN A/R: CPT

## 2018-10-08 RX ORDER — POTASSIUM CHLORIDE 750 MG/1
10 TABLET, FILM COATED, EXTENDED RELEASE ORAL DAILY
Qty: 90 TABLET | Refills: 1 | Status: ON HOLD | OUTPATIENT
Start: 2018-10-08 | End: 2018-11-19 | Stop reason: HOSPADM

## 2018-10-08 RX ORDER — FUROSEMIDE 40 MG/1
40 TABLET ORAL DAILY
Qty: 90 TABLET | Refills: 1 | Status: ON HOLD | OUTPATIENT
Start: 2018-10-08 | End: 2018-11-08 | Stop reason: HOSPADM

## 2018-10-10 ENCOUNTER — TELEPHONE (OUTPATIENT)
Dept: INTERNAL MEDICINE CLINIC | Age: 63
End: 2018-10-10

## 2018-10-10 PROBLEM — I63.9 CEREBROVASCULAR ACCIDENT (CVA) (HCC): Status: ACTIVE | Noted: 2018-10-10

## 2018-10-15 ENCOUNTER — TELEPHONE (OUTPATIENT)
Dept: INTERNAL MEDICINE CLINIC | Age: 63
End: 2018-10-15

## 2018-10-15 ENCOUNTER — OFFICE VISIT (OUTPATIENT)
Dept: INTERNAL MEDICINE CLINIC | Age: 63
End: 2018-10-15
Payer: COMMERCIAL

## 2018-10-15 ENCOUNTER — APPOINTMENT (OUTPATIENT)
Dept: GENERAL RADIOLOGY | Age: 63
DRG: 291 | End: 2018-10-15
Payer: COMMERCIAL

## 2018-10-15 ENCOUNTER — HOSPITAL ENCOUNTER (INPATIENT)
Age: 63
LOS: 3 days | Discharge: OTHER INSTITUTION | DRG: 291 | End: 2018-10-18
Attending: EMERGENCY MEDICINE | Admitting: HOSPITALIST
Payer: COMMERCIAL

## 2018-10-15 VITALS
OXYGEN SATURATION: 91 % | TEMPERATURE: 100.8 F | RESPIRATION RATE: 17 BRPM | SYSTOLIC BLOOD PRESSURE: 110 MMHG | HEART RATE: 95 BPM | WEIGHT: 255.8 LBS | DIASTOLIC BLOOD PRESSURE: 58 MMHG | BODY MASS INDEX: 41.29 KG/M2

## 2018-10-15 DIAGNOSIS — D64.9 ANEMIA, UNSPECIFIED TYPE: ICD-10-CM

## 2018-10-15 DIAGNOSIS — J96.11 CHRONIC RESPIRATORY FAILURE WITH HYPOXIA (HCC): ICD-10-CM

## 2018-10-15 DIAGNOSIS — E11.9 TYPE 2 DIABETES MELLITUS WITHOUT COMPLICATION, WITHOUT LONG-TERM CURRENT USE OF INSULIN (HCC): ICD-10-CM

## 2018-10-15 DIAGNOSIS — J44.9 COPD, SEVERE (HCC): Chronic | ICD-10-CM

## 2018-10-15 DIAGNOSIS — Z79.4 TYPE 2 DIABETES MELLITUS WITH COMPLICATION, WITH LONG-TERM CURRENT USE OF INSULIN (HCC): ICD-10-CM

## 2018-10-15 DIAGNOSIS — J20.9 ACUTE BRONCHITIS, UNSPECIFIED ORGANISM: Primary | ICD-10-CM

## 2018-10-15 DIAGNOSIS — J44.1 COPD EXACERBATION (HCC): Primary | ICD-10-CM

## 2018-10-15 DIAGNOSIS — E11.8 TYPE 2 DIABETES MELLITUS WITH COMPLICATION, WITH LONG-TERM CURRENT USE OF INSULIN (HCC): ICD-10-CM

## 2018-10-15 PROBLEM — Z72.0 TOBACCO ABUSE: Status: ACTIVE | Noted: 2018-10-15

## 2018-10-15 PROBLEM — E78.5 HYPERLIPIDEMIA: Status: ACTIVE | Noted: 2018-10-15

## 2018-10-15 PROBLEM — I10 HYPERTENSION: Status: ACTIVE | Noted: 2018-10-15

## 2018-10-15 LAB
ALBUMIN SERPL-MCNC: 3.5 GM/DL (ref 3.4–5)
ALP BLD-CCNC: 54 IU/L (ref 40–129)
ALT SERPL-CCNC: 15 U/L (ref 10–40)
ANION GAP SERPL CALCULATED.3IONS-SCNC: 18 MMOL/L (ref 4–16)
AST SERPL-CCNC: 13 IU/L (ref 15–37)
BASE EXCESS MIXED: 0.5 (ref 0–2.3)
BASE EXCESS: ABNORMAL (ref 0–2.4)
BASOPHILS ABSOLUTE: 0 K/CU MM
BASOPHILS RELATIVE PERCENT: 0.1 % (ref 0–1)
BILIRUB SERPL-MCNC: 0.3 MG/DL (ref 0–1)
BUN BLDV-MCNC: 36 MG/DL (ref 6–23)
CALCIUM SERPL-MCNC: 9 MG/DL (ref 8.3–10.6)
CHLORIDE BLD-SCNC: 99 MMOL/L (ref 99–110)
CO2 CONTENT: 26.3 MMOL/L (ref 19–24)
CO2: 22 MMOL/L (ref 21–32)
CREAT SERPL-MCNC: 1.1 MG/DL (ref 0.6–1.1)
DIFFERENTIAL TYPE: ABNORMAL
EOSINOPHILS ABSOLUTE: 0 K/CU MM
EOSINOPHILS RELATIVE PERCENT: 0.5 % (ref 0–3)
GFR AFRICAN AMERICAN: >60 ML/MIN/1.73M2
GFR NON-AFRICAN AMERICAN: 50 ML/MIN/1.73M2
GLUCOSE BLD-MCNC: 160 MG/DL (ref 70–99)
GLUCOSE BLD-MCNC: 86 MG/DL (ref 70–99)
HCO3 VENOUS: 25.1 MMOL/L (ref 19–25)
HCT VFR BLD CALC: 22.3 % (ref 37–47)
HEMOGLOBIN: ABNORMAL GM/DL (ref 12.5–16)
IMMATURE NEUTROPHIL %: 0.7 % (ref 0–0.43)
LACTIC ACID, SEPSIS: 1.3 MMOL/L (ref 0.5–1.9)
LYMPHOCYTES ABSOLUTE: 1.7 K/CU MM
LYMPHOCYTES RELATIVE PERCENT: 20.1 % (ref 24–44)
MCH RBC QN AUTO: 28.3 PG (ref 27–31)
MCHC RBC AUTO-ENTMCNC: 30 % (ref 32–36)
MCV RBC AUTO: 94.1 FL (ref 78–100)
MONOCYTES ABSOLUTE: 0.3 K/CU MM
MONOCYTES RELATIVE PERCENT: 3.7 % (ref 0–4)
O2 SAT, VEN: 63.1 % (ref 50–70)
PCO2, VEN: 39 MMHG (ref 38–52)
PDW BLD-RTO: 15.5 % (ref 11.7–14.9)
PH VENOUS: 7.42 (ref 7.32–7.42)
PLATELET # BLD: 280 K/CU MM (ref 140–440)
PMV BLD AUTO: 9 FL (ref 7.5–11.1)
PO2, VEN: 32.2 MMHG (ref 28–48)
POTASSIUM SERPL-SCNC: 3.8 MMOL/L (ref 3.5–5.1)
RAPID INFLUENZA  B AGN: NEGATIVE
RAPID INFLUENZA A AGN: NEGATIVE
RBC # BLD: 2.37 M/CU MM (ref 4.2–5.4)
SEGMENTED NEUTROPHILS ABSOLUTE COUNT: 6.4 K/CU MM
SEGMENTED NEUTROPHILS RELATIVE PERCENT: 74.9 % (ref 36–66)
SODIUM BLD-SCNC: 139 MMOL/L (ref 135–145)
SOURCE, BLOOD GAS: ABNORMAL
TOTAL IMMATURE NEUTOROPHIL: 0.06 K/CU MM
TOTAL PROTEIN: 7 GM/DL (ref 6.4–8.2)
WBC # BLD: 8.6 K/CU MM (ref 4–10.5)

## 2018-10-15 PROCEDURE — 82805 BLOOD GASES W/O2 SATURATION: CPT

## 2018-10-15 PROCEDURE — P9016 RBC LEUKOCYTES REDUCED: HCPCS

## 2018-10-15 PROCEDURE — 2700000000 HC OXYGEN THERAPY PER DAY

## 2018-10-15 PROCEDURE — 87040 BLOOD CULTURE FOR BACTERIA: CPT

## 2018-10-15 PROCEDURE — 87798 DETECT AGENT NOS DNA AMP: CPT

## 2018-10-15 PROCEDURE — 82962 GLUCOSE BLOOD TEST: CPT

## 2018-10-15 PROCEDURE — 83605 ASSAY OF LACTIC ACID: CPT

## 2018-10-15 PROCEDURE — 2140000000 HC CCU INTERMEDIATE R&B

## 2018-10-15 PROCEDURE — 36415 COLL VENOUS BLD VENIPUNCTURE: CPT

## 2018-10-15 PROCEDURE — 6370000000 HC RX 637 (ALT 250 FOR IP): Performed by: NURSE PRACTITIONER

## 2018-10-15 PROCEDURE — 71046 X-RAY EXAM CHEST 2 VIEWS: CPT

## 2018-10-15 PROCEDURE — 2580000003 HC RX 258: Performed by: EMERGENCY MEDICINE

## 2018-10-15 PROCEDURE — 6360000002 HC RX W HCPCS: Performed by: EMERGENCY MEDICINE

## 2018-10-15 PROCEDURE — 6370000000 HC RX 637 (ALT 250 FOR IP): Performed by: EMERGENCY MEDICINE

## 2018-10-15 PROCEDURE — 93005 ELECTROCARDIOGRAM TRACING: CPT | Performed by: EMERGENCY MEDICINE

## 2018-10-15 PROCEDURE — 85025 COMPLETE CBC W/AUTO DIFF WBC: CPT

## 2018-10-15 PROCEDURE — 86922 COMPATIBILITY TEST ANTIGLOB: CPT

## 2018-10-15 PROCEDURE — 99213 OFFICE O/P EST LOW 20 MIN: CPT | Performed by: INTERNAL MEDICINE

## 2018-10-15 PROCEDURE — 94640 AIRWAY INHALATION TREATMENT: CPT

## 2018-10-15 PROCEDURE — 86901 BLOOD TYPING SEROLOGIC RH(D): CPT

## 2018-10-15 PROCEDURE — 87186 SC STD MICRODIL/AGAR DIL: CPT

## 2018-10-15 PROCEDURE — G8987 SELF CARE CURRENT STATUS: HCPCS

## 2018-10-15 PROCEDURE — 87081 CULTURE SCREEN ONLY: CPT

## 2018-10-15 PROCEDURE — 96375 TX/PRO/DX INJ NEW DRUG ADDON: CPT

## 2018-10-15 PROCEDURE — 80053 COMPREHEN METABOLIC PANEL: CPT

## 2018-10-15 PROCEDURE — 99291 CRITICAL CARE FIRST HOUR: CPT

## 2018-10-15 PROCEDURE — 96365 THER/PROPH/DIAG IV INF INIT: CPT

## 2018-10-15 PROCEDURE — 96368 THER/DIAG CONCURRENT INF: CPT

## 2018-10-15 PROCEDURE — 86900 BLOOD TYPING SEROLOGIC ABO: CPT

## 2018-10-15 PROCEDURE — 96366 THER/PROPH/DIAG IV INF ADDON: CPT

## 2018-10-15 PROCEDURE — 2580000003 HC RX 258: Performed by: NURSE PRACTITIONER

## 2018-10-15 PROCEDURE — 87804 INFLUENZA ASSAY W/OPTIC: CPT

## 2018-10-15 PROCEDURE — 86850 RBC ANTIBODY SCREEN: CPT

## 2018-10-15 RX ORDER — POTASSIUM CHLORIDE 750 MG/1
10 TABLET, FILM COATED, EXTENDED RELEASE ORAL DAILY
Status: DISCONTINUED | OUTPATIENT
Start: 2018-10-16 | End: 2018-10-16 | Stop reason: SDUPTHER

## 2018-10-15 RX ORDER — FUROSEMIDE 40 MG/1
40 TABLET ORAL DAILY
Status: DISCONTINUED | OUTPATIENT
Start: 2018-10-15 | End: 2018-10-16

## 2018-10-15 RX ORDER — ALBUTEROL SULFATE 90 UG/1
2 AEROSOL, METERED RESPIRATORY (INHALATION) EVERY 4 HOURS PRN
Status: DISCONTINUED | OUTPATIENT
Start: 2018-10-15 | End: 2018-10-18 | Stop reason: HOSPADM

## 2018-10-15 RX ORDER — 0.9 % SODIUM CHLORIDE 0.9 %
250 INTRAVENOUS SOLUTION INTRAVENOUS ONCE
Status: COMPLETED | OUTPATIENT
Start: 2018-10-15 | End: 2018-10-16

## 2018-10-15 RX ORDER — NICOTINE 21 MG/24HR
1 PATCH, TRANSDERMAL 24 HOURS TRANSDERMAL DAILY
Status: DISCONTINUED | OUTPATIENT
Start: 2018-10-15 | End: 2018-10-18 | Stop reason: HOSPADM

## 2018-10-15 RX ORDER — NICOTINE POLACRILEX 4 MG
15 LOZENGE BUCCAL PRN
Status: DISCONTINUED | OUTPATIENT
Start: 2018-10-15 | End: 2018-10-18 | Stop reason: HOSPADM

## 2018-10-15 RX ORDER — TROSPIUM CHLORIDE 20 MG/1
20 TABLET, FILM COATED ORAL NIGHTLY
Status: DISCONTINUED | OUTPATIENT
Start: 2018-10-15 | End: 2018-10-18 | Stop reason: HOSPADM

## 2018-10-15 RX ORDER — ALBUTEROL SULFATE 2.5 MG/3ML
2.5 SOLUTION RESPIRATORY (INHALATION)
Status: DISCONTINUED | OUTPATIENT
Start: 2018-10-15 | End: 2018-10-18 | Stop reason: HOSPADM

## 2018-10-15 RX ORDER — SODIUM CHLORIDE 9 MG/ML
INJECTION, SOLUTION INTRAVENOUS CONTINUOUS
Status: DISCONTINUED | OUTPATIENT
Start: 2018-10-15 | End: 2018-10-17

## 2018-10-15 RX ORDER — TRAZODONE HYDROCHLORIDE 50 MG/1
100 TABLET ORAL NIGHTLY PRN
Status: DISCONTINUED | OUTPATIENT
Start: 2018-10-15 | End: 2018-10-18 | Stop reason: HOSPADM

## 2018-10-15 RX ORDER — ATENOLOL 25 MG/1
25 TABLET ORAL 2 TIMES DAILY
Status: DISCONTINUED | OUTPATIENT
Start: 2018-10-15 | End: 2018-10-18 | Stop reason: HOSPADM

## 2018-10-15 RX ORDER — 0.9 % SODIUM CHLORIDE 0.9 %
1000 INTRAVENOUS SOLUTION INTRAVENOUS ONCE
Status: COMPLETED | OUTPATIENT
Start: 2018-10-15 | End: 2018-10-15

## 2018-10-15 RX ORDER — PRAVASTATIN SODIUM 10 MG
20 TABLET ORAL DAILY
Status: DISCONTINUED | OUTPATIENT
Start: 2018-10-15 | End: 2018-10-18 | Stop reason: HOSPADM

## 2018-10-15 RX ORDER — GLIPIZIDE 10 MG/1
10 TABLET ORAL
Status: DISCONTINUED | OUTPATIENT
Start: 2018-10-16 | End: 2018-10-16

## 2018-10-15 RX ORDER — IPRATROPIUM BROMIDE AND ALBUTEROL SULFATE 2.5; .5 MG/3ML; MG/3ML
1 SOLUTION RESPIRATORY (INHALATION) 4 TIMES DAILY
Status: DISCONTINUED | OUTPATIENT
Start: 2018-10-15 | End: 2018-10-18 | Stop reason: HOSPADM

## 2018-10-15 RX ORDER — ASCORBIC ACID 500 MG
500 TABLET ORAL DAILY
Status: ON HOLD | COMMUNITY
End: 2019-01-21 | Stop reason: HOSPADM

## 2018-10-15 RX ORDER — DEXTROSE MONOHYDRATE 50 MG/ML
100 INJECTION, SOLUTION INTRAVENOUS PRN
Status: DISCONTINUED | OUTPATIENT
Start: 2018-10-15 | End: 2018-10-18 | Stop reason: HOSPADM

## 2018-10-15 RX ORDER — HYDROCHLOROTHIAZIDE 25 MG/1
50 TABLET ORAL DAILY
Status: DISCONTINUED | OUTPATIENT
Start: 2018-10-15 | End: 2018-10-18 | Stop reason: HOSPADM

## 2018-10-15 RX ORDER — ASCORBIC ACID 500 MG
500 TABLET ORAL DAILY
Status: DISCONTINUED | OUTPATIENT
Start: 2018-10-16 | End: 2018-10-18 | Stop reason: HOSPADM

## 2018-10-15 RX ORDER — DEXTROSE MONOHYDRATE 25 G/50ML
12.5 INJECTION, SOLUTION INTRAVENOUS PRN
Status: DISCONTINUED | OUTPATIENT
Start: 2018-10-15 | End: 2018-10-18 | Stop reason: HOSPADM

## 2018-10-15 RX ORDER — IPRATROPIUM BROMIDE AND ALBUTEROL SULFATE 2.5; .5 MG/3ML; MG/3ML
1 SOLUTION RESPIRATORY (INHALATION) ONCE
Status: COMPLETED | OUTPATIENT
Start: 2018-10-15 | End: 2018-10-15

## 2018-10-15 RX ORDER — SODIUM CHLORIDE 0.9 % (FLUSH) 0.9 %
10 SYRINGE (ML) INJECTION PRN
Status: DISCONTINUED | OUTPATIENT
Start: 2018-10-15 | End: 2018-10-18 | Stop reason: HOSPADM

## 2018-10-15 RX ORDER — ONDANSETRON 2 MG/ML
4 INJECTION INTRAMUSCULAR; INTRAVENOUS EVERY 6 HOURS PRN
Status: DISCONTINUED | OUTPATIENT
Start: 2018-10-15 | End: 2018-10-18 | Stop reason: HOSPADM

## 2018-10-15 RX ORDER — ACETAMINOPHEN 500 MG
1000 TABLET ORAL ONCE
Status: COMPLETED | OUTPATIENT
Start: 2018-10-15 | End: 2018-10-15

## 2018-10-15 RX ORDER — LEVOFLOXACIN 5 MG/ML
500 INJECTION, SOLUTION INTRAVENOUS EVERY 24 HOURS
Status: DISCONTINUED | OUTPATIENT
Start: 2018-10-15 | End: 2018-10-16

## 2018-10-15 RX ORDER — SODIUM CHLORIDE 0.9 % (FLUSH) 0.9 %
10 SYRINGE (ML) INJECTION EVERY 12 HOURS SCHEDULED
Status: DISCONTINUED | OUTPATIENT
Start: 2018-10-15 | End: 2018-10-16

## 2018-10-15 RX ORDER — METHYLPREDNISOLONE SODIUM SUCCINATE 125 MG/2ML
80 INJECTION, POWDER, LYOPHILIZED, FOR SOLUTION INTRAMUSCULAR; INTRAVENOUS DAILY
Status: DISCONTINUED | OUTPATIENT
Start: 2018-10-16 | End: 2018-10-17

## 2018-10-15 RX ORDER — SODIUM CHLORIDE 0.9 % (FLUSH) 0.9 %
10 SYRINGE (ML) INJECTION EVERY 12 HOURS SCHEDULED
Status: DISCONTINUED | OUTPATIENT
Start: 2018-10-15 | End: 2018-10-18 | Stop reason: HOSPADM

## 2018-10-15 RX ORDER — SODIUM CHLORIDE 0.9 % (FLUSH) 0.9 %
10 SYRINGE (ML) INJECTION PRN
Status: DISCONTINUED | OUTPATIENT
Start: 2018-10-15 | End: 2018-10-16

## 2018-10-15 RX ORDER — CLONIDINE HYDROCHLORIDE 0.1 MG/1
0.1 TABLET ORAL 2 TIMES DAILY
Status: DISCONTINUED | OUTPATIENT
Start: 2018-10-15 | End: 2018-10-18 | Stop reason: HOSPADM

## 2018-10-15 RX ORDER — LOSARTAN POTASSIUM 50 MG/1
50 TABLET ORAL DAILY
Status: DISCONTINUED | OUTPATIENT
Start: 2018-10-16 | End: 2018-10-18 | Stop reason: HOSPADM

## 2018-10-15 RX ORDER — ACETAMINOPHEN 500 MG
1000 TABLET ORAL EVERY 6 HOURS PRN
COMMUNITY

## 2018-10-15 RX ORDER — ACETAMINOPHEN 500 MG
1000 TABLET ORAL EVERY 6 HOURS PRN
Status: DISCONTINUED | OUTPATIENT
Start: 2018-10-15 | End: 2018-10-18 | Stop reason: HOSPADM

## 2018-10-15 RX ORDER — METHYLPREDNISOLONE SODIUM SUCCINATE 125 MG/2ML
125 INJECTION, POWDER, LYOPHILIZED, FOR SOLUTION INTRAMUSCULAR; INTRAVENOUS ONCE
Status: COMPLETED | OUTPATIENT
Start: 2018-10-15 | End: 2018-10-15

## 2018-10-15 RX ADMIN — SODIUM CHLORIDE, PRESERVATIVE FREE 10 ML: 5 INJECTION INTRAVENOUS at 22:12

## 2018-10-15 RX ADMIN — SODIUM CHLORIDE, PRESERVATIVE FREE 10 ML: 5 INJECTION INTRAVENOUS at 22:14

## 2018-10-15 RX ADMIN — CEFEPIME HYDROCHLORIDE 2 G: 2 INJECTION, POWDER, FOR SOLUTION INTRAVENOUS at 17:45

## 2018-10-15 RX ADMIN — SODIUM CHLORIDE: 9 INJECTION, SOLUTION INTRAVENOUS at 19:53

## 2018-10-15 RX ADMIN — MOMETASONE FUROATE AND FORMOTEROL FUMARATE DIHYDRATE 2 PUFF: 200; 5 AEROSOL RESPIRATORY (INHALATION) at 22:08

## 2018-10-15 RX ADMIN — TRAZODONE HYDROCHLORIDE 100 MG: 50 TABLET ORAL at 22:08

## 2018-10-15 RX ADMIN — ACETAMINOPHEN 1000 MG: 500 TABLET, FILM COATED ORAL at 17:47

## 2018-10-15 RX ADMIN — SODIUM CHLORIDE 1000 ML: 9 INJECTION, SOLUTION INTRAVENOUS at 17:46

## 2018-10-15 RX ADMIN — IPRATROPIUM BROMIDE AND ALBUTEROL SULFATE 1 AMPULE: .5; 3 SOLUTION RESPIRATORY (INHALATION) at 16:58

## 2018-10-15 RX ADMIN — IPRATROPIUM BROMIDE AND ALBUTEROL SULFATE 1 AMPULE: .5; 3 SOLUTION RESPIRATORY (INHALATION) at 20:47

## 2018-10-15 RX ADMIN — PRAVASTATIN SODIUM 20 MG: 10 TABLET ORAL at 22:08

## 2018-10-15 RX ADMIN — ATENOLOL 25 MG: 25 TABLET ORAL at 22:08

## 2018-10-15 RX ADMIN — TROSPIUM CHLORIDE 20 MG: 20 TABLET ORAL at 22:08

## 2018-10-15 RX ADMIN — CLONIDINE HYDROCHLORIDE 0.1 MG: 0.1 TABLET ORAL at 22:08

## 2018-10-15 RX ADMIN — METHYLPREDNISOLONE SODIUM SUCCINATE 125 MG: 125 INJECTION, POWDER, FOR SOLUTION INTRAMUSCULAR; INTRAVENOUS at 17:46

## 2018-10-15 RX ADMIN — IPRATROPIUM BROMIDE AND ALBUTEROL SULFATE 3 ML: .5; 3 SOLUTION RESPIRATORY (INHALATION) at 20:45

## 2018-10-15 RX ADMIN — SODIUM CHLORIDE, PRESERVATIVE FREE 10 ML: 5 INJECTION INTRAVENOUS at 17:48

## 2018-10-15 RX ADMIN — LEVOFLOXACIN 500 MG: 500 INJECTION, SOLUTION INTRAVENOUS at 17:47

## 2018-10-15 ASSESSMENT — ENCOUNTER SYMPTOMS
SHORTNESS OF BREATH: 1
WHEEZING: 1
COUGH: 1

## 2018-10-15 ASSESSMENT — PAIN SCALES - GENERAL
PAINLEVEL_OUTOF10: 0
PAINLEVEL_OUTOF10: 1

## 2018-10-15 NOTE — PROGRESS NOTES
TWICE A  tablet 1    solifenacin (VESICARE) 10 MG tablet TAKE 1 TABLET DAILY 90 tablet 1    traZODone (DESYREL) 100 MG tablet TAKE 1 TABLET NIGHTLY 90 tablet 1    indapamide (LOZOL) 2.5 MG tablet TAKE 1 TABLET DAILY 90 tablet 1    glipiZIDE (GLUCOTROL XL) 10 MG extended release tablet TAKE 1 TABLET TWICE A  tablet 1    cloNIDine (CATAPRES) 0.1 MG tablet TAKE 1 TABLET TWICE A  tablet 1    metFORMIN (GLUCOPHAGE) 1000 MG tablet TAKE 1 TABLET TWICE A DAY WITH MEALS 180 tablet 1    pravastatin (PRAVACHOL) 20 MG tablet TAKE 1 TABLET DAILY 90 tablet 1    losartan (COZAAR) 50 MG tablet Take 1 tablet by mouth daily 90 tablet 1    OXYGEN Inhale 2 L/min into the lungs continuous.  MULTIPLE VITAMIN PO Take 1 tablet by mouth daily. No current facility-administered medications for this visit. Past Medical History:   Diagnosis Date    Chronic respiratory failure with hypoxia ([facility] 10/19/2015    COPD (chronic obstructive pulmonary disease) ([facility] 10/11/2013    follow with Dr Brooke Bell    Diabetes mellitus ([facility] 10/11/2013    \"been diabetic for 7 yrs\"    Heart murmur     History of blood transfusion     Hyperlipidemia 10/11/2013    Hypertension 10/11/2013    Insomnia 10/11/2013    Liver dysfunction 12/21/2015    Liver, core biopsies: Steatohepatitis, acute and chronic, grade 2, stage 2.     Nasal congestion 1/20/2016    \"no cough and no fever associated with it\"    Obesity 10/11/2013    On home oxygen therapy     2l/nc 24 hrs per day    Sleep apnea 10/11/2013    had sleep apnea 2013- has cpap and does use it    Steatohepatitis 3/21/2016    Liver, core biopsies: Steatohepatitis, acute and chronic, grade 2, stage 2.      Systolic murmur 0/1/1576    Tobacco abuse disorder 10/11/2013    Urinary incontinence, mixed 10/11/2013     Past Surgical History:   Procedure Laterality Date    BREAST SURGERY      right breast bx 2012    CHOLECYSTECTOMY  2010    \"done with hyster   [facility]

## 2018-10-15 NOTE — H&P
Rfl:     atenolol (TENORMIN) 25 MG tablet, TAKE 1 TABLET TWICE A DAY, Disp: 180 tablet, Rfl: 1    solifenacin (VESICARE) 10 MG tablet, TAKE 1 TABLET DAILY, Disp: 90 tablet, Rfl: 1    traZODone (DESYREL) 100 MG tablet, TAKE 1 TABLET NIGHTLY, Disp: 90 tablet, Rfl: 1    indapamide (LOZOL) 2.5 MG tablet, TAKE 1 TABLET DAILY, Disp: 90 tablet, Rfl: 1    glipiZIDE (GLUCOTROL XL) 10 MG extended release tablet, TAKE 1 TABLET TWICE A DAY, Disp: 180 tablet, Rfl: 1    cloNIDine (CATAPRES) 0.1 MG tablet, TAKE 1 TABLET TWICE A DAY, Disp: 180 tablet, Rfl: 1    metFORMIN (GLUCOPHAGE) 1000 MG tablet, TAKE 1 TABLET TWICE A DAY WITH MEALS, Disp: 180 tablet, Rfl: 1    pravastatin (PRAVACHOL) 20 MG tablet, TAKE 1 TABLET DAILY, Disp: 90 tablet, Rfl: 1    losartan (COZAAR) 50 MG tablet, Take 1 tablet by mouth daily, Disp: 90 tablet, Rfl: 1    OXYGEN, Inhale 2 L/min into the lungs continuous. , Disp: , Rfl:     MULTIPLE VITAMIN PO, Take 1 tablet by mouth daily. , Disp: , Rfl:     History of present illness     Chief Complaint: Shortness of Breath (onset approx 3 days. Fever. 101-102. Sent from Dr. Ta Simno office. )      Rey Villalta is a 65 y.o.  female  With medical history that include diabetes, COPD on home oxygen, hypertension and hyperlipidemia. She  presents with .shortness of breath, cough and fever. She admits to receive an influenza vaccine during her last hospitalization. Denies any pain pressure tightness in her chest.  Her exercise tolerance has been poor . She is on chronic inhaled corticosteroids. No recent oral steroids      Review of Systems        GENERAL: Endorsed  fever, chills, denies  night sweats, or changes in weight. EYES:  Denies recent visual changes. ENT:  Denies ear pain, hearing loss or tinnitus  RESP:  Positive for  cough, dyspnea, and  wheezing. CV:  Denies any chest pain with exertion or at rest, palpitations, syncope, or edema.   GI:  Denies any dysphagia, nausea, vomiting, abdominal

## 2018-10-16 LAB
ADENOVIRUS DETECTION BY PCR: NOT DETECTED
ANION GAP SERPL CALCULATED.3IONS-SCNC: 21 MMOL/L (ref 4–16)
BASOPHILS ABSOLUTE: 0 K/CU MM
BASOPHILS RELATIVE PERCENT: 0.1 % (ref 0–1)
BORDETELLA PERTUSSIS PCR: NOT DETECTED
BUN BLDV-MCNC: 40 MG/DL (ref 6–23)
CALCIUM SERPL-MCNC: 8.6 MG/DL (ref 8.3–10.6)
CHLAMYDOPHILA PNEUMONIA PCR: NOT DETECTED
CHLORIDE BLD-SCNC: 99 MMOL/L (ref 99–110)
CO2: 18 MMOL/L (ref 21–32)
CORONAVIRUS 229E PCR: NOT DETECTED
CORONAVIRUS HKU1 PCR: NOT DETECTED
CORONAVIRUS NL63 PCR: NOT DETECTED
CORONAVIRUS OC43 PCR: NOT DETECTED
CREAT SERPL-MCNC: 1 MG/DL (ref 0.6–1.1)
DIFFERENTIAL TYPE: ABNORMAL
EOSINOPHILS ABSOLUTE: 0 K/CU MM
EOSINOPHILS RELATIVE PERCENT: 0 % (ref 0–3)
GFR AFRICAN AMERICAN: >60 ML/MIN/1.73M2
GFR NON-AFRICAN AMERICAN: 56 ML/MIN/1.73M2
GLUCOSE BLD-MCNC: 227 MG/DL (ref 70–99)
GLUCOSE BLD-MCNC: 241 MG/DL (ref 70–99)
GLUCOSE BLD-MCNC: 257 MG/DL (ref 70–99)
GLUCOSE BLD-MCNC: 257 MG/DL (ref 70–99)
GLUCOSE BLD-MCNC: 278 MG/DL (ref 70–99)
HCT VFR BLD CALC: 26.5 % (ref 37–47)
HCT VFR BLD CALC: 27.9 % (ref 37–47)
HEMOGLOBIN: 8.5 GM/DL (ref 12.5–16)
HEMOGLOBIN: 8.5 GM/DL (ref 12.5–16)
HUMAN METAPNEUMOVIRUS PCR: NOT DETECTED
IMMATURE NEUTROPHIL %: 0.8 % (ref 0–0.43)
INFLUENZA A BY PCR: NOT DETECTED
INFLUENZA A H1 (2009) PCR: NOT DETECTED
INFLUENZA A H1 PANDEMIC PCR: NOT DETECTED
INFLUENZA A H3 PCR: NOT DETECTED
INFLUENZA B BY PCR: NOT DETECTED
LYMPHOCYTES ABSOLUTE: 0.8 K/CU MM
LYMPHOCYTES RELATIVE PERCENT: 8.1 % (ref 24–44)
MCH RBC QN AUTO: 28.2 PG (ref 27–31)
MCHC RBC AUTO-ENTMCNC: 30.5 % (ref 32–36)
MCV RBC AUTO: 92.7 FL (ref 78–100)
MONOCYTES ABSOLUTE: 0.1 K/CU MM
MONOCYTES RELATIVE PERCENT: 0.7 % (ref 0–4)
MYCOPLASMA PNEUMONIAE PCR: NOT DETECTED
PARAINFLUENZA 1 PCR: NOT DETECTED
PARAINFLUENZA 2 PCR: NOT DETECTED
PARAINFLUENZA 3 PCR: NOT DETECTED
PARAINFLUENZA 4 PCR: NOT DETECTED
PDW BLD-RTO: 15 % (ref 11.7–14.9)
PLATELET # BLD: 232 K/CU MM (ref 140–440)
PMV BLD AUTO: 8.8 FL (ref 7.5–11.1)
POTASSIUM SERPL-SCNC: 4.5 MMOL/L (ref 3.5–5.1)
PRO-BNP: 2756 PG/ML
RBC # BLD: 3.01 M/CU MM (ref 4.2–5.4)
RHINOVIRUS ENTEROVIRUS PCR: NOT DETECTED
RSV PCR: NOT DETECTED
SEGMENTED NEUTROPHILS ABSOLUTE COUNT: 8.3 K/CU MM
SEGMENTED NEUTROPHILS RELATIVE PERCENT: 90.3 % (ref 36–66)
SODIUM BLD-SCNC: 138 MMOL/L (ref 135–145)
TOTAL IMMATURE NEUTOROPHIL: 0.07 K/CU MM
TROPONIN T: <0.01 NG/ML
TROPONIN T: <0.01 NG/ML
WBC # BLD: 9.2 K/CU MM (ref 4–10.5)

## 2018-10-16 PROCEDURE — 87205 SMEAR GRAM STAIN: CPT

## 2018-10-16 PROCEDURE — 85014 HEMATOCRIT: CPT

## 2018-10-16 PROCEDURE — 2140000000 HC CCU INTERMEDIATE R&B

## 2018-10-16 PROCEDURE — 6360000002 HC RX W HCPCS: Performed by: NURSE PRACTITIONER

## 2018-10-16 PROCEDURE — 82962 GLUCOSE BLOOD TEST: CPT

## 2018-10-16 PROCEDURE — 87070 CULTURE OTHR SPECIMN AEROBIC: CPT

## 2018-10-16 PROCEDURE — 2580000003 HC RX 258: Performed by: EMERGENCY MEDICINE

## 2018-10-16 PROCEDURE — 85018 HEMOGLOBIN: CPT

## 2018-10-16 PROCEDURE — 2580000003 HC RX 258: Performed by: NURSE PRACTITIONER

## 2018-10-16 PROCEDURE — 85025 COMPLETE CBC W/AUTO DIFF WBC: CPT

## 2018-10-16 PROCEDURE — 2700000000 HC OXYGEN THERAPY PER DAY

## 2018-10-16 PROCEDURE — 94640 AIRWAY INHALATION TREATMENT: CPT

## 2018-10-16 PROCEDURE — 6370000000 HC RX 637 (ALT 250 FOR IP): Performed by: HOSPITALIST

## 2018-10-16 PROCEDURE — 6370000000 HC RX 637 (ALT 250 FOR IP): Performed by: NURSE PRACTITIONER

## 2018-10-16 PROCEDURE — 36430 TRANSFUSION BLD/BLD COMPNT: CPT

## 2018-10-16 PROCEDURE — 6360000002 HC RX W HCPCS: Performed by: HOSPITALIST

## 2018-10-16 PROCEDURE — 83880 ASSAY OF NATRIURETIC PEPTIDE: CPT

## 2018-10-16 PROCEDURE — 84484 ASSAY OF TROPONIN QUANT: CPT

## 2018-10-16 PROCEDURE — 87798 DETECT AGENT NOS DNA AMP: CPT

## 2018-10-16 PROCEDURE — 80048 BASIC METABOLIC PNL TOTAL CA: CPT

## 2018-10-16 PROCEDURE — 36415 COLL VENOUS BLD VENIPUNCTURE: CPT

## 2018-10-16 PROCEDURE — 93010 ELECTROCARDIOGRAM REPORT: CPT | Performed by: INTERNAL MEDICINE

## 2018-10-16 PROCEDURE — 6360000002 HC RX W HCPCS: Performed by: EMERGENCY MEDICINE

## 2018-10-16 RX ORDER — GLIPIZIDE 10 MG/1
10 TABLET ORAL
Status: DISCONTINUED | OUTPATIENT
Start: 2018-10-16 | End: 2018-10-18 | Stop reason: HOSPADM

## 2018-10-16 RX ORDER — FUROSEMIDE 10 MG/ML
40 INJECTION INTRAMUSCULAR; INTRAVENOUS ONCE
Status: COMPLETED | OUTPATIENT
Start: 2018-10-16 | End: 2018-10-16

## 2018-10-16 RX ORDER — FUROSEMIDE 10 MG/ML
40 INJECTION INTRAMUSCULAR; INTRAVENOUS 2 TIMES DAILY
Status: DISCONTINUED | OUTPATIENT
Start: 2018-10-16 | End: 2018-10-18

## 2018-10-16 RX ORDER — POTASSIUM CHLORIDE 750 MG/1
10 TABLET, EXTENDED RELEASE ORAL DAILY
Status: DISCONTINUED | OUTPATIENT
Start: 2018-10-16 | End: 2018-10-18 | Stop reason: HOSPADM

## 2018-10-16 RX ORDER — VANCOMYCIN HYDROCHLORIDE 1 G/200ML
1000 INJECTION, SOLUTION INTRAVENOUS ONCE
Status: COMPLETED | OUTPATIENT
Start: 2018-10-16 | End: 2018-10-17

## 2018-10-16 RX ORDER — VANCOMYCIN HYDROCHLORIDE 1 G/200ML
1000 INJECTION, SOLUTION INTRAVENOUS EVERY 12 HOURS
Status: DISCONTINUED | OUTPATIENT
Start: 2018-10-17 | End: 2018-10-18

## 2018-10-16 RX ADMIN — SODIUM CHLORIDE: 9 INJECTION, SOLUTION INTRAVENOUS at 13:33

## 2018-10-16 RX ADMIN — GLIPIZIDE 10 MG: 10 TABLET ORAL at 18:15

## 2018-10-16 RX ADMIN — INSULIN LISPRO 4 UNITS: 100 INJECTION, SOLUTION INTRAVENOUS; SUBCUTANEOUS at 18:19

## 2018-10-16 RX ADMIN — CEFTRIAXONE SODIUM 1 G: 1 INJECTION, POWDER, FOR SOLUTION INTRAMUSCULAR; INTRAVENOUS at 06:35

## 2018-10-16 RX ADMIN — TROSPIUM CHLORIDE 20 MG: 20 TABLET ORAL at 21:21

## 2018-10-16 RX ADMIN — MOMETASONE FUROATE AND FORMOTEROL FUMARATE DIHYDRATE 2 PUFF: 200; 5 AEROSOL RESPIRATORY (INHALATION) at 09:24

## 2018-10-16 RX ADMIN — ATENOLOL 25 MG: 25 TABLET ORAL at 09:27

## 2018-10-16 RX ADMIN — SODIUM CHLORIDE, PRESERVATIVE FREE 10 ML: 5 INJECTION INTRAVENOUS at 09:27

## 2018-10-16 RX ADMIN — FUROSEMIDE 40 MG: 10 INJECTION, SOLUTION INTRAVENOUS at 09:24

## 2018-10-16 RX ADMIN — METHYLPREDNISOLONE SODIUM SUCCINATE 80 MG: 125 INJECTION, POWDER, FOR SOLUTION INTRAMUSCULAR; INTRAVENOUS at 09:24

## 2018-10-16 RX ADMIN — HYDROCHLOROTHIAZIDE 50 MG: 25 TABLET ORAL at 09:26

## 2018-10-16 RX ADMIN — IPRATROPIUM BROMIDE AND ALBUTEROL SULFATE 3 ML: .5; 3 SOLUTION RESPIRATORY (INHALATION) at 20:20

## 2018-10-16 RX ADMIN — METFORMIN HYDROCHLORIDE 1000 MG: 500 TABLET, FILM COATED ORAL at 18:15

## 2018-10-16 RX ADMIN — IPRATROPIUM BROMIDE AND ALBUTEROL SULFATE 3 ML: .5; 3 SOLUTION RESPIRATORY (INHALATION) at 14:30

## 2018-10-16 RX ADMIN — IPRATROPIUM BROMIDE AND ALBUTEROL SULFATE 3 ML: .5; 3 SOLUTION RESPIRATORY (INHALATION) at 07:09

## 2018-10-16 RX ADMIN — METFORMIN HYDROCHLORIDE 1000 MG: 500 TABLET, FILM COATED ORAL at 09:26

## 2018-10-16 RX ADMIN — VANCOMYCIN HYDROCHLORIDE 1000 MG: 1 INJECTION, SOLUTION INTRAVENOUS at 23:25

## 2018-10-16 RX ADMIN — POTASSIUM CHLORIDE 10 MEQ: 10 TABLET, EXTENDED RELEASE ORAL at 09:26

## 2018-10-16 RX ADMIN — GLIPIZIDE 10 MG: 10 TABLET ORAL at 09:27

## 2018-10-16 RX ADMIN — MOMETASONE FUROATE AND FORMOTEROL FUMARATE DIHYDRATE 2 PUFF: 200; 5 AEROSOL RESPIRATORY (INHALATION) at 21:22

## 2018-10-16 RX ADMIN — VANCOMYCIN HYDROCHLORIDE 750 MG: 750 INJECTION, SOLUTION INTRAVENOUS at 21:21

## 2018-10-16 RX ADMIN — ATENOLOL 25 MG: 25 TABLET ORAL at 21:21

## 2018-10-16 RX ADMIN — LEVOFLOXACIN 500 MG: 500 INJECTION, SOLUTION INTRAVENOUS at 18:15

## 2018-10-16 RX ADMIN — INSULIN LISPRO 4 UNITS: 100 INJECTION, SOLUTION INTRAVENOUS; SUBCUTANEOUS at 09:25

## 2018-10-16 RX ADMIN — OXYCODONE HYDROCHLORIDE AND ACETAMINOPHEN 500 MG: 500 TABLET ORAL at 09:27

## 2018-10-16 RX ADMIN — TRAZODONE HYDROCHLORIDE 100 MG: 50 TABLET ORAL at 23:26

## 2018-10-16 RX ADMIN — SODIUM CHLORIDE 250 ML: 9 INJECTION, SOLUTION INTRAVENOUS at 03:00

## 2018-10-16 RX ADMIN — SODIUM CHLORIDE, PRESERVATIVE FREE 10 ML: 5 INJECTION INTRAVENOUS at 03:27

## 2018-10-16 RX ADMIN — LOSARTAN POTASSIUM 50 MG: 50 TABLET ORAL at 09:27

## 2018-10-16 RX ADMIN — PRAVASTATIN SODIUM 20 MG: 10 TABLET ORAL at 21:21

## 2018-10-16 RX ADMIN — FUROSEMIDE 40 MG: 10 INJECTION, SOLUTION INTRAVENOUS at 18:15

## 2018-10-16 RX ADMIN — IPRATROPIUM BROMIDE AND ALBUTEROL SULFATE 3 ML: .5; 3 SOLUTION RESPIRATORY (INHALATION) at 10:45

## 2018-10-16 RX ADMIN — SODIUM CHLORIDE, PRESERVATIVE FREE 10 ML: 5 INJECTION INTRAVENOUS at 21:27

## 2018-10-16 RX ADMIN — FUROSEMIDE 40 MG: 10 INJECTION, SOLUTION INTRAVENOUS at 03:26

## 2018-10-16 RX ADMIN — CLONIDINE HYDROCHLORIDE 0.1 MG: 0.1 TABLET ORAL at 21:21

## 2018-10-16 RX ADMIN — INSULIN LISPRO 6 UNITS: 100 INJECTION, SOLUTION INTRAVENOUS; SUBCUTANEOUS at 13:33

## 2018-10-16 RX ADMIN — CLONIDINE HYDROCHLORIDE 0.1 MG: 0.1 TABLET ORAL at 09:27

## 2018-10-16 RX ADMIN — INSULIN LISPRO 3 UNITS: 100 INJECTION, SOLUTION INTRAVENOUS; SUBCUTANEOUS at 21:28

## 2018-10-16 RX ADMIN — LINAGLIPTIN 5 MG: 5 TABLET, FILM COATED ORAL at 09:27

## 2018-10-16 ASSESSMENT — PAIN SCALES - GENERAL
PAINLEVEL_OUTOF10: 0

## 2018-10-16 NOTE — CARE COORDINATION
CM met with patient to initiate discharge planning. Patient's daughter Kenna Duenas was at bedside. In catching up from last admittance, patient has become more compliant and proactive in her health care and is trying to make necessary changes. Patient is from home alone, has Rx coverage, and a PCP. Patient was independent in ADLs and denies need for [address] at discharge. Patient currently uses a CPAP, walker, cane, shower chair, nebulizer, and home O2. No additional DME needed at this time. No needs identified by patient or daughter. CM will follow.

## 2018-10-17 ENCOUNTER — APPOINTMENT (OUTPATIENT)
Dept: GENERAL RADIOLOGY | Age: 63
DRG: 291 | End: 2018-10-17
Payer: COMMERCIAL

## 2018-10-17 PROBLEM — J18.9 PNEUMONIA: Status: ACTIVE | Noted: 2018-10-17

## 2018-10-17 LAB
ANION GAP SERPL CALCULATED.3IONS-SCNC: 14 MMOL/L (ref 4–16)
BASOPHILS ABSOLUTE: 0 K/CU MM
BASOPHILS RELATIVE PERCENT: 0.1 % (ref 0–1)
BUN BLDV-MCNC: 45 MG/DL (ref 6–23)
CALCIUM SERPL-MCNC: 8.4 MG/DL (ref 8.3–10.6)
CHLORIDE BLD-SCNC: 101 MMOL/L (ref 99–110)
CO2: 25 MMOL/L (ref 21–32)
CREAT SERPL-MCNC: 1.1 MG/DL (ref 0.6–1.1)
CULTURE: NORMAL
DIFFERENTIAL TYPE: ABNORMAL
EOSINOPHILS ABSOLUTE: 0 K/CU MM
EOSINOPHILS RELATIVE PERCENT: 0 % (ref 0–3)
GFR AFRICAN AMERICAN: >60 ML/MIN/1.73M2
GFR NON-AFRICAN AMERICAN: 50 ML/MIN/1.73M2
GLUCOSE BLD-MCNC: 108 MG/DL (ref 70–99)
GLUCOSE BLD-MCNC: 120 MG/DL (ref 70–99)
GLUCOSE BLD-MCNC: 129 MG/DL (ref 70–99)
GLUCOSE BLD-MCNC: 147 MG/DL (ref 70–99)
GLUCOSE BLD-MCNC: 173 MG/DL (ref 70–99)
HCT VFR BLD CALC: 25.2 % (ref 37–47)
HCT VFR BLD CALC: 26 % (ref 37–47)
HCT VFR BLD CALC: 26.4 % (ref 37–47)
HCT VFR BLD CALC: 27.4 % (ref 37–47)
HEMOGLOBIN: 7.9 GM/DL (ref 12.5–16)
HEMOGLOBIN: 8 GM/DL (ref 12.5–16)
HEMOGLOBIN: 8 GM/DL (ref 12.5–16)
HEMOGLOBIN: 8.4 GM/DL (ref 12.5–16)
IMMATURE NEUTROPHIL %: 0.9 % (ref 0–0.43)
LYMPHOCYTES ABSOLUTE: 1.5 K/CU MM
LYMPHOCYTES RELATIVE PERCENT: 13.1 % (ref 24–44)
Lab: NORMAL
MCH RBC QN AUTO: 29 PG (ref 27–31)
MCHC RBC AUTO-ENTMCNC: 31.3 % (ref 32–36)
MCV RBC AUTO: 92.6 FL (ref 78–100)
MONOCYTES ABSOLUTE: 0.4 K/CU MM
MONOCYTES RELATIVE PERCENT: 3.5 % (ref 0–4)
PDW BLD-RTO: 15.4 % (ref 11.7–14.9)
PLATELET # BLD: 241 K/CU MM (ref 140–440)
PMV BLD AUTO: 8.9 FL (ref 7.5–11.1)
POTASSIUM SERPL-SCNC: 4.1 MMOL/L (ref 3.5–5.1)
PRO-BNP: 3381 PG/ML
RBC # BLD: 2.72 M/CU MM (ref 4.2–5.4)
REPORT STATUS: NORMAL
SEGMENTED NEUTROPHILS ABSOLUTE COUNT: 9.7 K/CU MM
SEGMENTED NEUTROPHILS RELATIVE PERCENT: 82.4 % (ref 36–66)
SODIUM BLD-SCNC: 140 MMOL/L (ref 135–145)
SPECIMEN: NORMAL
TOTAL IMMATURE NEUTOROPHIL: 0.11 K/CU MM
WBC # BLD: 11.7 K/CU MM (ref 4–10.5)

## 2018-10-17 PROCEDURE — 36415 COLL VENOUS BLD VENIPUNCTURE: CPT

## 2018-10-17 PROCEDURE — 2140000000 HC CCU INTERMEDIATE R&B

## 2018-10-17 PROCEDURE — 85014 HEMATOCRIT: CPT

## 2018-10-17 PROCEDURE — 85018 HEMOGLOBIN: CPT

## 2018-10-17 PROCEDURE — 80048 BASIC METABOLIC PNL TOTAL CA: CPT

## 2018-10-17 PROCEDURE — 6370000000 HC RX 637 (ALT 250 FOR IP): Performed by: NURSE PRACTITIONER

## 2018-10-17 PROCEDURE — 85025 COMPLETE CBC W/AUTO DIFF WBC: CPT

## 2018-10-17 PROCEDURE — 6370000000 HC RX 637 (ALT 250 FOR IP): Performed by: HOSPITALIST

## 2018-10-17 PROCEDURE — 6360000002 HC RX W HCPCS: Performed by: HOSPITALIST

## 2018-10-17 PROCEDURE — 83880 ASSAY OF NATRIURETIC PEPTIDE: CPT

## 2018-10-17 PROCEDURE — 2700000000 HC OXYGEN THERAPY PER DAY

## 2018-10-17 PROCEDURE — 6360000002 HC RX W HCPCS: Performed by: NURSE PRACTITIONER

## 2018-10-17 PROCEDURE — 2580000003 HC RX 258: Performed by: NURSE PRACTITIONER

## 2018-10-17 PROCEDURE — 71046 X-RAY EXAM CHEST 2 VIEWS: CPT

## 2018-10-17 PROCEDURE — 94640 AIRWAY INHALATION TREATMENT: CPT

## 2018-10-17 PROCEDURE — 82962 GLUCOSE BLOOD TEST: CPT

## 2018-10-17 RX ADMIN — FUROSEMIDE 40 MG: 10 INJECTION, SOLUTION INTRAVENOUS at 17:25

## 2018-10-17 RX ADMIN — INSULIN LISPRO 2 UNITS: 100 INJECTION, SOLUTION INTRAVENOUS; SUBCUTANEOUS at 09:06

## 2018-10-17 RX ADMIN — CLONIDINE HYDROCHLORIDE 0.1 MG: 0.1 TABLET ORAL at 20:52

## 2018-10-17 RX ADMIN — VANCOMYCIN HYDROCHLORIDE 1000 MG: 1 INJECTION, SOLUTION INTRAVENOUS at 22:07

## 2018-10-17 RX ADMIN — ATENOLOL 25 MG: 25 TABLET ORAL at 09:05

## 2018-10-17 RX ADMIN — METFORMIN HYDROCHLORIDE 1000 MG: 500 TABLET, FILM COATED ORAL at 09:05

## 2018-10-17 RX ADMIN — CLONIDINE HYDROCHLORIDE 0.1 MG: 0.1 TABLET ORAL at 09:05

## 2018-10-17 RX ADMIN — POTASSIUM CHLORIDE 10 MEQ: 10 TABLET, EXTENDED RELEASE ORAL at 09:05

## 2018-10-17 RX ADMIN — IPRATROPIUM BROMIDE AND ALBUTEROL SULFATE 3 ML: .5; 3 SOLUTION RESPIRATORY (INHALATION) at 07:10

## 2018-10-17 RX ADMIN — TROSPIUM CHLORIDE 20 MG: 20 TABLET ORAL at 20:52

## 2018-10-17 RX ADMIN — METFORMIN HYDROCHLORIDE 1000 MG: 500 TABLET, FILM COATED ORAL at 17:25

## 2018-10-17 RX ADMIN — SODIUM CHLORIDE, PRESERVATIVE FREE 10 ML: 5 INJECTION INTRAVENOUS at 22:07

## 2018-10-17 RX ADMIN — CEFTRIAXONE SODIUM 1 G: 1 INJECTION, POWDER, FOR SOLUTION INTRAMUSCULAR; INTRAVENOUS at 06:10

## 2018-10-17 RX ADMIN — TRAZODONE HYDROCHLORIDE 100 MG: 50 TABLET ORAL at 23:13

## 2018-10-17 RX ADMIN — FUROSEMIDE 40 MG: 10 INJECTION, SOLUTION INTRAVENOUS at 09:06

## 2018-10-17 RX ADMIN — IPRATROPIUM BROMIDE AND ALBUTEROL SULFATE 3 ML: .5; 3 SOLUTION RESPIRATORY (INHALATION) at 19:21

## 2018-10-17 RX ADMIN — LOSARTAN POTASSIUM 50 MG: 50 TABLET ORAL at 09:05

## 2018-10-17 RX ADMIN — MOMETASONE FUROATE AND FORMOTEROL FUMARATE DIHYDRATE 2 PUFF: 200; 5 AEROSOL RESPIRATORY (INHALATION) at 20:51

## 2018-10-17 RX ADMIN — PRAVASTATIN SODIUM 20 MG: 10 TABLET ORAL at 20:51

## 2018-10-17 RX ADMIN — VANCOMYCIN HYDROCHLORIDE 1000 MG: 1 INJECTION, SOLUTION INTRAVENOUS at 12:48

## 2018-10-17 RX ADMIN — OXYCODONE HYDROCHLORIDE AND ACETAMINOPHEN 500 MG: 500 TABLET ORAL at 09:05

## 2018-10-17 RX ADMIN — LINAGLIPTIN 5 MG: 5 TABLET, FILM COATED ORAL at 09:05

## 2018-10-17 RX ADMIN — VANCOMYCIN HYDROCHLORIDE 750 MG: 750 INJECTION, SOLUTION INTRAVENOUS at 11:30

## 2018-10-17 RX ADMIN — VANCOMYCIN HYDROCHLORIDE 750 MG: 750 INJECTION, SOLUTION INTRAVENOUS at 23:13

## 2018-10-17 RX ADMIN — GLIPIZIDE 10 MG: 10 TABLET ORAL at 17:25

## 2018-10-17 RX ADMIN — GLIPIZIDE 10 MG: 10 TABLET ORAL at 09:05

## 2018-10-17 RX ADMIN — ATENOLOL 25 MG: 25 TABLET ORAL at 20:52

## 2018-10-17 RX ADMIN — IPRATROPIUM BROMIDE AND ALBUTEROL SULFATE 3 ML: .5; 3 SOLUTION RESPIRATORY (INHALATION) at 10:24

## 2018-10-17 RX ADMIN — MOMETASONE FUROATE AND FORMOTEROL FUMARATE DIHYDRATE 2 PUFF: 200; 5 AEROSOL RESPIRATORY (INHALATION) at 09:06

## 2018-10-17 RX ADMIN — HYDROCHLOROTHIAZIDE 50 MG: 25 TABLET ORAL at 09:05

## 2018-10-17 RX ADMIN — IPRATROPIUM BROMIDE AND ALBUTEROL SULFATE 3 ML: .5; 3 SOLUTION RESPIRATORY (INHALATION) at 14:38

## 2018-10-17 RX ADMIN — SODIUM CHLORIDE, PRESERVATIVE FREE 10 ML: 5 INJECTION INTRAVENOUS at 20:56

## 2018-10-17 ASSESSMENT — PAIN SCALES - GENERAL
PAINLEVEL_OUTOF10: 0
PAINLEVEL_OUTOF10: 0

## 2018-10-18 ENCOUNTER — HOSPITAL ENCOUNTER (INPATIENT)
Age: 63
LOS: 22 days | Discharge: NURSING, REHAB OR LONG-TERM CARE | DRG: 853 | End: 2018-11-09
Attending: HOSPITALIST | Admitting: HOSPITALIST
Payer: COMMERCIAL

## 2018-10-18 ENCOUNTER — TELEPHONE (OUTPATIENT)
Dept: INTERNAL MEDICINE CLINIC | Age: 63
End: 2018-10-18

## 2018-10-18 VITALS
HEIGHT: 66 IN | BODY MASS INDEX: 42.86 KG/M2 | WEIGHT: 266.7 LBS | SYSTOLIC BLOOD PRESSURE: 139 MMHG | DIASTOLIC BLOOD PRESSURE: 63 MMHG | RESPIRATION RATE: 16 BRPM | HEART RATE: 86 BPM | TEMPERATURE: 97.6 F | OXYGEN SATURATION: 98 %

## 2018-10-18 DIAGNOSIS — I33.0 SUBACUTE BACTERIAL ENDOCARDITIS: ICD-10-CM

## 2018-10-18 DIAGNOSIS — R53.81 PHYSICAL DEBILITY: Primary | ICD-10-CM

## 2018-10-18 PROBLEM — R78.81 BACTEREMIA: Status: ACTIVE | Noted: 2018-10-18

## 2018-10-18 PROBLEM — B95.5 STREPTOCOCCAL BACTEREMIA: Status: ACTIVE | Noted: 2018-10-18

## 2018-10-18 PROBLEM — R78.81 STREPTOCOCCAL BACTEREMIA: Status: ACTIVE | Noted: 2018-10-18

## 2018-10-18 LAB
ANION GAP SERPL CALCULATED.3IONS-SCNC: 18 MMOL/L (ref 4–16)
BASOPHILS ABSOLUTE: 0 K/CU MM
BASOPHILS RELATIVE PERCENT: 0.2 % (ref 0–1)
BUN BLDV-MCNC: 54 MG/DL (ref 6–23)
CALCIUM SERPL-MCNC: 8.6 MG/DL (ref 8.3–10.6)
CHLORIDE BLD-SCNC: 103 MMOL/L (ref 99–110)
CO2: 20 MMOL/L (ref 21–32)
CREAT SERPL-MCNC: 1.5 MG/DL (ref 0.6–1.1)
CULTURE: NORMAL
DIFFERENTIAL TYPE: ABNORMAL
EOSINOPHILS ABSOLUTE: 0.1 K/CU MM
EOSINOPHILS RELATIVE PERCENT: 0.8 % (ref 0–3)
FOLATE: 18.5 NG/ML (ref 3.1–17.5)
GFR AFRICAN AMERICAN: 43 ML/MIN/1.73M2
GFR NON-AFRICAN AMERICAN: 35 ML/MIN/1.73M2
GLUCOSE BLD-MCNC: 122 MG/DL (ref 70–99)
GLUCOSE BLD-MCNC: 149 MG/DL (ref 70–99)
GLUCOSE BLD-MCNC: 281 MG/DL (ref 70–99)
GLUCOSE BLD-MCNC: 92 MG/DL (ref 70–99)
HCT VFR BLD CALC: 26.1 % (ref 37–47)
HCT VFR BLD CALC: 26.3 % (ref 37–47)
HEMOGLOBIN: 7.6 GM/DL (ref 12.5–16)
HEMOGLOBIN: 8 GM/DL (ref 12.5–16)
IMMATURE NEUTROPHIL %: 0.8 % (ref 0–0.43)
LYMPHOCYTES ABSOLUTE: 2.4 K/CU MM
LYMPHOCYTES RELATIVE PERCENT: 22.9 % (ref 24–44)
Lab: NORMAL
Lab: NORMAL
MAGNESIUM: 1.7 MG/DL (ref 1.8–2.4)
MCH RBC QN AUTO: 27.8 PG (ref 27–31)
MCHC RBC AUTO-ENTMCNC: 29.1 % (ref 32–36)
MCV RBC AUTO: 95.6 FL (ref 78–100)
MONOCYTES ABSOLUTE: 0.5 K/CU MM
MONOCYTES RELATIVE PERCENT: 4.6 % (ref 0–4)
ORGANISM: NORMAL
PDW BLD-RTO: 16 % (ref 11.7–14.9)
PLATELET # BLD: 234 K/CU MM (ref 140–440)
PMV BLD AUTO: 9 FL (ref 7.5–11.1)
POTASSIUM SERPL-SCNC: 4.1 MMOL/L (ref 3.5–5.1)
RBC # BLD: 2.73 M/CU MM (ref 4.2–5.4)
REPORT STATUS: NORMAL
REPORT STATUS: NORMAL
SEGMENTED NEUTROPHILS ABSOLUTE COUNT: 7.4 K/CU MM
SEGMENTED NEUTROPHILS RELATIVE PERCENT: 70.7 % (ref 36–66)
SODIUM BLD-SCNC: 141 MMOL/L (ref 135–145)
SPECIMEN: NORMAL
SPECIMEN: NORMAL
T4 FREE: 1.49 NG/DL (ref 0.9–1.8)
TOTAL IMMATURE NEUTOROPHIL: 0.08 K/CU MM
TSH HIGH SENSITIVITY: 0.84 UIU/ML (ref 0.27–4.2)
VITAMIN B-12: 465 PG/ML (ref 211–911)
WBC # BLD: 10.4 K/CU MM (ref 4–10.5)

## 2018-10-18 PROCEDURE — 2700000000 HC OXYGEN THERAPY PER DAY

## 2018-10-18 PROCEDURE — 6360000002 HC RX W HCPCS: Performed by: NURSE PRACTITIONER

## 2018-10-18 PROCEDURE — 97530 THERAPEUTIC ACTIVITIES: CPT

## 2018-10-18 PROCEDURE — 2580000003 HC RX 258: Performed by: INTERNAL MEDICINE

## 2018-10-18 PROCEDURE — 84443 ASSAY THYROID STIM HORMONE: CPT

## 2018-10-18 PROCEDURE — 6370000000 HC RX 637 (ALT 250 FOR IP): Performed by: HOSPITALIST

## 2018-10-18 PROCEDURE — 2580000003 HC RX 258: Performed by: HOSPITALIST

## 2018-10-18 PROCEDURE — 97166 OT EVAL MOD COMPLEX 45 MIN: CPT

## 2018-10-18 PROCEDURE — 36415 COLL VENOUS BLD VENIPUNCTURE: CPT

## 2018-10-18 PROCEDURE — 87040 BLOOD CULTURE FOR BACTERIA: CPT

## 2018-10-18 PROCEDURE — 82962 GLUCOSE BLOOD TEST: CPT

## 2018-10-18 PROCEDURE — 85018 HEMOGLOBIN: CPT

## 2018-10-18 PROCEDURE — 6370000000 HC RX 637 (ALT 250 FOR IP): Performed by: NURSE PRACTITIONER

## 2018-10-18 PROCEDURE — 94640 AIRWAY INHALATION TREATMENT: CPT

## 2018-10-18 PROCEDURE — G8988 SELF CARE GOAL STATUS: HCPCS

## 2018-10-18 PROCEDURE — 82607 VITAMIN B-12: CPT

## 2018-10-18 PROCEDURE — 80048 BASIC METABOLIC PNL TOTAL CA: CPT

## 2018-10-18 PROCEDURE — 82746 ASSAY OF FOLIC ACID SERUM: CPT

## 2018-10-18 PROCEDURE — G8987 SELF CARE CURRENT STATUS: HCPCS

## 2018-10-18 PROCEDURE — 6370000000 HC RX 637 (ALT 250 FOR IP): Performed by: FAMILY MEDICINE

## 2018-10-18 PROCEDURE — 2060000000 HC ICU INTERMEDIATE R&B

## 2018-10-18 PROCEDURE — 84439 ASSAY OF FREE THYROXINE: CPT

## 2018-10-18 PROCEDURE — 85025 COMPLETE CBC W/AUTO DIFF WBC: CPT

## 2018-10-18 PROCEDURE — G8979 MOBILITY GOAL STATUS: HCPCS

## 2018-10-18 PROCEDURE — 97162 PT EVAL MOD COMPLEX 30 MIN: CPT

## 2018-10-18 PROCEDURE — G8978 MOBILITY CURRENT STATUS: HCPCS

## 2018-10-18 PROCEDURE — 2580000003 HC RX 258: Performed by: NURSE PRACTITIONER

## 2018-10-18 PROCEDURE — 85014 HEMATOCRIT: CPT

## 2018-10-18 PROCEDURE — 83735 ASSAY OF MAGNESIUM: CPT

## 2018-10-18 RX ORDER — LOSARTAN POTASSIUM 50 MG/1
50 TABLET ORAL DAILY
Status: DISCONTINUED | OUTPATIENT
Start: 2018-10-19 | End: 2018-10-18 | Stop reason: SDUPTHER

## 2018-10-18 RX ORDER — AZITHROMYCIN 250 MG/1
250 TABLET, FILM COATED ORAL DAILY
Status: CANCELLED | OUTPATIENT
Start: 2018-10-19 | End: 2018-10-23

## 2018-10-18 RX ORDER — DEXTROSE MONOHYDRATE 50 MG/ML
100 INJECTION, SOLUTION INTRAVENOUS PRN
Status: CANCELLED | OUTPATIENT
Start: 2018-10-18

## 2018-10-18 RX ORDER — ONDANSETRON 2 MG/ML
4 INJECTION INTRAMUSCULAR; INTRAVENOUS EVERY 6 HOURS PRN
Status: CANCELLED | OUTPATIENT
Start: 2018-10-18

## 2018-10-18 RX ORDER — LOSARTAN POTASSIUM 50 MG/1
50 TABLET ORAL DAILY
Status: DISCONTINUED | OUTPATIENT
Start: 2018-10-19 | End: 2018-10-18

## 2018-10-18 RX ORDER — FLUTICASONE PROPIONATE 50 MCG
1 SPRAY, SUSPENSION (ML) NASAL DAILY
Status: CANCELLED | OUTPATIENT
Start: 2018-10-19

## 2018-10-18 RX ORDER — SODIUM CHLORIDE 0.9 % (FLUSH) 0.9 %
10 SYRINGE (ML) INJECTION EVERY 12 HOURS SCHEDULED
Status: CANCELLED | OUTPATIENT
Start: 2018-10-18

## 2018-10-18 RX ORDER — PRAVASTATIN SODIUM 10 MG
20 TABLET ORAL DAILY
Status: DISCONTINUED | OUTPATIENT
Start: 2018-10-18 | End: 2018-10-18 | Stop reason: SDUPTHER

## 2018-10-18 RX ORDER — SODIUM CHLORIDE 0.9 % (FLUSH) 0.9 %
10 SYRINGE (ML) INJECTION EVERY 12 HOURS SCHEDULED
Status: DISCONTINUED | OUTPATIENT
Start: 2018-10-18 | End: 2018-11-09 | Stop reason: HOSPADM

## 2018-10-18 RX ORDER — ATENOLOL 25 MG/1
25 TABLET ORAL 2 TIMES DAILY
Status: CANCELLED | OUTPATIENT
Start: 2018-10-18

## 2018-10-18 RX ORDER — FUROSEMIDE 40 MG/1
40 TABLET ORAL 2 TIMES DAILY
Status: CANCELLED | OUTPATIENT
Start: 2018-10-18

## 2018-10-18 RX ORDER — SODIUM CHLORIDE 0.9 % (FLUSH) 0.9 %
10 SYRINGE (ML) INJECTION PRN
Status: DISCONTINUED | OUTPATIENT
Start: 2018-10-18 | End: 2018-11-09 | Stop reason: HOSPADM

## 2018-10-18 RX ORDER — IPRATROPIUM BROMIDE AND ALBUTEROL SULFATE 2.5; .5 MG/3ML; MG/3ML
1 SOLUTION RESPIRATORY (INHALATION) 4 TIMES DAILY
Status: DISCONTINUED | OUTPATIENT
Start: 2018-10-18 | End: 2018-11-09 | Stop reason: HOSPADM

## 2018-10-18 RX ORDER — DEXTROSE MONOHYDRATE 50 MG/ML
100 INJECTION, SOLUTION INTRAVENOUS PRN
Status: DISCONTINUED | OUTPATIENT
Start: 2018-10-18 | End: 2018-11-09 | Stop reason: HOSPADM

## 2018-10-18 RX ORDER — FLUTICASONE PROPIONATE 50 MCG
1 SPRAY, SUSPENSION (ML) NASAL DAILY
Status: DISCONTINUED | OUTPATIENT
Start: 2018-10-18 | End: 2018-10-18 | Stop reason: HOSPADM

## 2018-10-18 RX ORDER — TROSPIUM CHLORIDE 20 MG/1
20 TABLET, FILM COATED ORAL NIGHTLY
Status: CANCELLED | OUTPATIENT
Start: 2018-10-18

## 2018-10-18 RX ORDER — AZITHROMYCIN 250 MG/1
250 TABLET, FILM COATED ORAL DAILY
Status: DISCONTINUED | OUTPATIENT
Start: 2018-10-18 | End: 2018-10-18 | Stop reason: HOSPADM

## 2018-10-18 RX ORDER — ALBUTEROL SULFATE 90 UG/1
2 AEROSOL, METERED RESPIRATORY (INHALATION) EVERY 4 HOURS PRN
Status: DISCONTINUED | OUTPATIENT
Start: 2018-10-18 | End: 2018-10-18 | Stop reason: SDUPTHER

## 2018-10-18 RX ORDER — FAMOTIDINE 20 MG/1
20 TABLET, FILM COATED ORAL 2 TIMES DAILY
Status: CANCELLED | OUTPATIENT
Start: 2018-10-18

## 2018-10-18 RX ORDER — PREDNISONE 20 MG/1
20 TABLET ORAL DAILY
Status: DISCONTINUED | OUTPATIENT
Start: 2018-10-19 | End: 2018-10-18

## 2018-10-18 RX ORDER — PREDNISONE 20 MG/1
20 TABLET ORAL DAILY
Status: DISCONTINUED | OUTPATIENT
Start: 2018-10-18 | End: 2018-10-18 | Stop reason: HOSPADM

## 2018-10-18 RX ORDER — CLONIDINE HYDROCHLORIDE 0.1 MG/1
0.1 TABLET ORAL 2 TIMES DAILY
Status: CANCELLED | OUTPATIENT
Start: 2018-10-18

## 2018-10-18 RX ORDER — ALBUTEROL SULFATE 90 UG/1
2 AEROSOL, METERED RESPIRATORY (INHALATION) EVERY 4 HOURS PRN
Status: DISCONTINUED | OUTPATIENT
Start: 2018-10-18 | End: 2018-11-09 | Stop reason: HOSPADM

## 2018-10-18 RX ORDER — PREDNISONE 20 MG/1
20 TABLET ORAL DAILY
Status: CANCELLED | OUTPATIENT
Start: 2018-10-19 | End: 2018-10-23

## 2018-10-18 RX ORDER — TRAZODONE HYDROCHLORIDE 50 MG/1
100 TABLET ORAL NIGHTLY PRN
Status: DISCONTINUED | OUTPATIENT
Start: 2018-10-18 | End: 2018-11-09 | Stop reason: HOSPADM

## 2018-10-18 RX ORDER — CLONIDINE HYDROCHLORIDE 0.1 MG/1
0.1 TABLET ORAL 2 TIMES DAILY
Status: DISCONTINUED | OUTPATIENT
Start: 2018-10-18 | End: 2018-10-21

## 2018-10-18 RX ORDER — SODIUM CHLORIDE 0.9 % (FLUSH) 0.9 %
10 SYRINGE (ML) INJECTION EVERY 12 HOURS SCHEDULED
Status: DISCONTINUED | OUTPATIENT
Start: 2018-10-18 | End: 2018-10-18

## 2018-10-18 RX ORDER — SODIUM CHLORIDE 0.9 % (FLUSH) 0.9 %
10 SYRINGE (ML) INJECTION PRN
Status: DISCONTINUED | OUTPATIENT
Start: 2018-10-18 | End: 2018-10-18

## 2018-10-18 RX ORDER — ACETAMINOPHEN 500 MG
1000 TABLET ORAL EVERY 6 HOURS PRN
Status: DISCONTINUED | OUTPATIENT
Start: 2018-10-18 | End: 2018-10-18 | Stop reason: SDUPTHER

## 2018-10-18 RX ORDER — TROSPIUM CHLORIDE 20 MG/1
20 TABLET, FILM COATED ORAL NIGHTLY
Status: DISCONTINUED | OUTPATIENT
Start: 2018-10-18 | End: 2018-11-09 | Stop reason: HOSPADM

## 2018-10-18 RX ORDER — FUROSEMIDE 40 MG/1
40 TABLET ORAL DAILY
Status: DISCONTINUED | OUTPATIENT
Start: 2018-10-18 | End: 2018-10-18 | Stop reason: SDUPTHER

## 2018-10-18 RX ORDER — SODIUM CHLORIDE 0.9 % (FLUSH) 0.9 %
10 SYRINGE (ML) INJECTION PRN
Status: CANCELLED | OUTPATIENT
Start: 2018-10-18

## 2018-10-18 RX ORDER — AZITHROMYCIN 250 MG/1
250 TABLET, FILM COATED ORAL DAILY
Status: DISCONTINUED | OUTPATIENT
Start: 2018-10-19 | End: 2018-10-18

## 2018-10-18 RX ORDER — ALBUTEROL SULFATE 90 UG/1
2 AEROSOL, METERED RESPIRATORY (INHALATION) EVERY 4 HOURS PRN
Status: CANCELLED | OUTPATIENT
Start: 2018-10-18

## 2018-10-18 RX ORDER — IPRATROPIUM BROMIDE AND ALBUTEROL SULFATE 2.5; .5 MG/3ML; MG/3ML
1 SOLUTION RESPIRATORY (INHALATION) 4 TIMES DAILY
Status: CANCELLED | OUTPATIENT
Start: 2018-10-18

## 2018-10-18 RX ORDER — HYDROCHLOROTHIAZIDE 25 MG/1
50 TABLET ORAL DAILY
Status: CANCELLED | OUTPATIENT
Start: 2018-10-19

## 2018-10-18 RX ORDER — TRAZODONE HYDROCHLORIDE 50 MG/1
100 TABLET ORAL NIGHTLY PRN
Status: CANCELLED | OUTPATIENT
Start: 2018-10-18

## 2018-10-18 RX ORDER — SODIUM CHLORIDE 9 MG/ML
INJECTION, SOLUTION INTRAVENOUS CONTINUOUS
Status: DISCONTINUED | OUTPATIENT
Start: 2018-10-18 | End: 2018-10-19

## 2018-10-18 RX ORDER — PRAVASTATIN SODIUM 20 MG
20 TABLET ORAL NIGHTLY
Status: DISCONTINUED | OUTPATIENT
Start: 2018-10-18 | End: 2018-11-09 | Stop reason: HOSPADM

## 2018-10-18 RX ORDER — ASCORBIC ACID 500 MG
500 TABLET ORAL DAILY
Status: DISCONTINUED | OUTPATIENT
Start: 2018-10-19 | End: 2018-11-09 | Stop reason: HOSPADM

## 2018-10-18 RX ORDER — ATENOLOL 50 MG/1
50 TABLET ORAL DAILY
Status: DISCONTINUED | OUTPATIENT
Start: 2018-10-18 | End: 2018-10-18 | Stop reason: SDUPTHER

## 2018-10-18 RX ORDER — FUROSEMIDE 40 MG/1
40 TABLET ORAL 2 TIMES DAILY
Status: DISCONTINUED | OUTPATIENT
Start: 2018-10-18 | End: 2018-10-18

## 2018-10-18 RX ORDER — ALBUTEROL SULFATE 2.5 MG/3ML
2.5 SOLUTION RESPIRATORY (INHALATION)
Status: DISCONTINUED | OUTPATIENT
Start: 2018-10-18 | End: 2018-11-09 | Stop reason: HOSPADM

## 2018-10-18 RX ORDER — ALBUTEROL SULFATE 2.5 MG/3ML
2.5 SOLUTION RESPIRATORY (INHALATION)
Status: CANCELLED | OUTPATIENT
Start: 2018-10-18

## 2018-10-18 RX ORDER — HYDROCHLOROTHIAZIDE 25 MG/1
50 TABLET ORAL DAILY
Status: DISCONTINUED | OUTPATIENT
Start: 2018-10-19 | End: 2018-10-18

## 2018-10-18 RX ORDER — ASCORBIC ACID 500 MG
500 TABLET ORAL DAILY
Status: CANCELLED | OUTPATIENT
Start: 2018-10-19

## 2018-10-18 RX ORDER — ONDANSETRON 2 MG/ML
4 INJECTION INTRAMUSCULAR; INTRAVENOUS EVERY 6 HOURS PRN
Status: DISCONTINUED | OUTPATIENT
Start: 2018-10-18 | End: 2018-10-18

## 2018-10-18 RX ORDER — NICOTINE 21 MG/24HR
1 PATCH, TRANSDERMAL 24 HOURS TRANSDERMAL DAILY
Status: CANCELLED | OUTPATIENT
Start: 2018-10-19

## 2018-10-18 RX ORDER — LOSARTAN POTASSIUM 50 MG/1
50 TABLET ORAL DAILY
Status: CANCELLED | OUTPATIENT
Start: 2018-10-19

## 2018-10-18 RX ORDER — FAMOTIDINE 20 MG/1
20 TABLET, FILM COATED ORAL 2 TIMES DAILY
Status: DISCONTINUED | OUTPATIENT
Start: 2018-10-18 | End: 2018-10-18 | Stop reason: HOSPADM

## 2018-10-18 RX ORDER — FAMOTIDINE 20 MG/1
20 TABLET, FILM COATED ORAL 2 TIMES DAILY
Status: DISCONTINUED | OUTPATIENT
Start: 2018-10-18 | End: 2018-10-31

## 2018-10-18 RX ORDER — NICOTINE POLACRILEX 4 MG
15 LOZENGE BUCCAL PRN
Status: CANCELLED | OUTPATIENT
Start: 2018-10-18

## 2018-10-18 RX ORDER — PRAVASTATIN SODIUM 10 MG
20 TABLET ORAL DAILY
Status: CANCELLED | OUTPATIENT
Start: 2018-10-18

## 2018-10-18 RX ORDER — TRAZODONE HYDROCHLORIDE 50 MG/1
100 TABLET ORAL NIGHTLY PRN
Status: DISCONTINUED | OUTPATIENT
Start: 2018-10-18 | End: 2018-10-18 | Stop reason: SDUPTHER

## 2018-10-18 RX ORDER — GLIPIZIDE 10 MG/1
10 TABLET ORAL
Status: DISCONTINUED | OUTPATIENT
Start: 2018-10-19 | End: 2018-11-02

## 2018-10-18 RX ORDER — POTASSIUM CHLORIDE 750 MG/1
10 TABLET, FILM COATED, EXTENDED RELEASE ORAL DAILY
Status: DISCONTINUED | OUTPATIENT
Start: 2018-10-19 | End: 2018-10-21

## 2018-10-18 RX ORDER — FUROSEMIDE 40 MG/1
40 TABLET ORAL 2 TIMES DAILY
Status: DISCONTINUED | OUTPATIENT
Start: 2018-10-18 | End: 2018-10-18 | Stop reason: HOSPADM

## 2018-10-18 RX ORDER — SODIUM CHLORIDE 0.9 % (FLUSH) 0.9 %
10 SYRINGE (ML) INJECTION PRN
Status: DISCONTINUED | OUTPATIENT
Start: 2018-10-18 | End: 2018-10-18 | Stop reason: SDUPTHER

## 2018-10-18 RX ORDER — NICOTINE POLACRILEX 4 MG
15 LOZENGE BUCCAL PRN
Status: DISCONTINUED | OUTPATIENT
Start: 2018-10-18 | End: 2018-11-09 | Stop reason: HOSPADM

## 2018-10-18 RX ORDER — ATENOLOL 25 MG/1
25 TABLET ORAL 2 TIMES DAILY
Status: DISCONTINUED | OUTPATIENT
Start: 2018-10-18 | End: 2018-11-09 | Stop reason: HOSPADM

## 2018-10-18 RX ORDER — FLUTICASONE PROPIONATE 50 MCG
1 SPRAY, SUSPENSION (ML) NASAL DAILY
Status: DISCONTINUED | OUTPATIENT
Start: 2018-10-19 | End: 2018-11-09 | Stop reason: HOSPADM

## 2018-10-18 RX ORDER — CLONIDINE HYDROCHLORIDE 0.1 MG/1
0.1 TABLET ORAL 2 TIMES DAILY
Status: DISCONTINUED | OUTPATIENT
Start: 2018-10-18 | End: 2018-10-18 | Stop reason: SDUPTHER

## 2018-10-18 RX ORDER — POTASSIUM CHLORIDE 750 MG/1
10 TABLET, EXTENDED RELEASE ORAL DAILY
Status: CANCELLED | OUTPATIENT
Start: 2018-10-19

## 2018-10-18 RX ORDER — SODIUM CHLORIDE 9 MG/ML
INJECTION, SOLUTION INTRAVENOUS CONTINUOUS
Status: DISCONTINUED | OUTPATIENT
Start: 2018-10-18 | End: 2018-10-18

## 2018-10-18 RX ORDER — TROSPIUM CHLORIDE 20 MG/1
20 TABLET, FILM COATED ORAL
Status: DISCONTINUED | OUTPATIENT
Start: 2018-10-18 | End: 2018-10-18 | Stop reason: SDUPTHER

## 2018-10-18 RX ORDER — SODIUM CHLORIDE 0.9 % (FLUSH) 0.9 %
10 SYRINGE (ML) INJECTION EVERY 12 HOURS SCHEDULED
Status: DISCONTINUED | OUTPATIENT
Start: 2018-10-18 | End: 2018-10-18 | Stop reason: SDUPTHER

## 2018-10-18 RX ORDER — ACETAMINOPHEN 500 MG
1000 TABLET ORAL EVERY 6 HOURS PRN
Status: DISCONTINUED | OUTPATIENT
Start: 2018-10-18 | End: 2018-11-09 | Stop reason: HOSPADM

## 2018-10-18 RX ORDER — ONDANSETRON 2 MG/ML
4 INJECTION INTRAMUSCULAR; INTRAVENOUS EVERY 6 HOURS PRN
Status: DISCONTINUED | OUTPATIENT
Start: 2018-10-18 | End: 2018-11-09 | Stop reason: HOSPADM

## 2018-10-18 RX ORDER — NICOTINE 21 MG/24HR
1 PATCH, TRANSDERMAL 24 HOURS TRANSDERMAL DAILY
Status: DISCONTINUED | OUTPATIENT
Start: 2018-10-19 | End: 2018-11-09 | Stop reason: HOSPADM

## 2018-10-18 RX ORDER — GLIPIZIDE 10 MG/1
10 TABLET ORAL
Status: CANCELLED | OUTPATIENT
Start: 2018-10-18

## 2018-10-18 RX ORDER — ONDANSETRON 2 MG/ML
4 INJECTION INTRAMUSCULAR; INTRAVENOUS EVERY 6 HOURS PRN
Status: DISCONTINUED | OUTPATIENT
Start: 2018-10-18 | End: 2018-10-18 | Stop reason: SDUPTHER

## 2018-10-18 RX ORDER — ACETAMINOPHEN 500 MG
1000 TABLET ORAL EVERY 6 HOURS PRN
Status: CANCELLED | OUTPATIENT
Start: 2018-10-18

## 2018-10-18 RX ORDER — DEXTROSE MONOHYDRATE 25 G/50ML
12.5 INJECTION, SOLUTION INTRAVENOUS PRN
Status: CANCELLED | OUTPATIENT
Start: 2018-10-18

## 2018-10-18 RX ORDER — DEXTROSE MONOHYDRATE 25 G/50ML
12.5 INJECTION, SOLUTION INTRAVENOUS PRN
Status: DISCONTINUED | OUTPATIENT
Start: 2018-10-18 | End: 2018-11-09 | Stop reason: HOSPADM

## 2018-10-18 RX ORDER — ACETAMINOPHEN 325 MG/1
650 TABLET ORAL EVERY 4 HOURS PRN
Status: DISCONTINUED | OUTPATIENT
Start: 2018-10-18 | End: 2018-10-18

## 2018-10-18 RX ADMIN — PRAVASTATIN SODIUM 20 MG: 20 TABLET ORAL at 20:33

## 2018-10-18 RX ADMIN — SODIUM CHLORIDE, PRESERVATIVE FREE 10 ML: 5 INJECTION INTRAVENOUS at 08:31

## 2018-10-18 RX ADMIN — CLONIDINE HYDROCHLORIDE 0.1 MG: 0.1 TABLET ORAL at 08:29

## 2018-10-18 RX ADMIN — IPRATROPIUM BROMIDE AND ALBUTEROL SULFATE 3 ML: .5; 3 SOLUTION RESPIRATORY (INHALATION) at 20:37

## 2018-10-18 RX ADMIN — LOSARTAN POTASSIUM 50 MG: 50 TABLET ORAL at 08:29

## 2018-10-18 RX ADMIN — GLIPIZIDE 10 MG: 10 TABLET ORAL at 08:29

## 2018-10-18 RX ADMIN — HYDROCHLOROTHIAZIDE 50 MG: 25 TABLET ORAL at 08:30

## 2018-10-18 RX ADMIN — INSULIN LISPRO 3 UNITS: 100 INJECTION, SOLUTION INTRAVENOUS; SUBCUTANEOUS at 20:34

## 2018-10-18 RX ADMIN — IPRATROPIUM BROMIDE AND ALBUTEROL SULFATE 3 ML: .5; 3 SOLUTION RESPIRATORY (INHALATION) at 07:10

## 2018-10-18 RX ADMIN — FAMOTIDINE 20 MG: 20 TABLET ORAL at 10:41

## 2018-10-18 RX ADMIN — TROSPIUM CHLORIDE 20 MG: 20 TABLET ORAL at 20:33

## 2018-10-18 RX ADMIN — CEFTRIAXONE SODIUM 1 G: 1 INJECTION, POWDER, FOR SOLUTION INTRAMUSCULAR; INTRAVENOUS at 05:39

## 2018-10-18 RX ADMIN — MOMETASONE FUROATE AND FORMOTEROL FUMARATE DIHYDRATE 2 PUFF: 200; 5 AEROSOL RESPIRATORY (INHALATION) at 08:30

## 2018-10-18 RX ADMIN — LINAGLIPTIN 5 MG: 5 TABLET, FILM COATED ORAL at 08:30

## 2018-10-18 RX ADMIN — INSULIN LISPRO 2 UNITS: 100 INJECTION, SOLUTION INTRAVENOUS; SUBCUTANEOUS at 12:09

## 2018-10-18 RX ADMIN — CLONIDINE HYDROCHLORIDE 0.1 MG: 0.1 TABLET ORAL at 22:02

## 2018-10-18 RX ADMIN — SODIUM CHLORIDE, PRESERVATIVE FREE 10 ML: 5 INJECTION INTRAVENOUS at 20:33

## 2018-10-18 RX ADMIN — IPRATROPIUM BROMIDE AND ALBUTEROL SULFATE 3 ML: .5; 3 SOLUTION RESPIRATORY (INHALATION) at 10:31

## 2018-10-18 RX ADMIN — SODIUM CHLORIDE: 9 INJECTION, SOLUTION INTRAVENOUS at 22:50

## 2018-10-18 RX ADMIN — Medication 2 PUFF: at 20:37

## 2018-10-18 RX ADMIN — TRAZODONE HYDROCHLORIDE 100 MG: 50 TABLET ORAL at 22:04

## 2018-10-18 RX ADMIN — FAMOTIDINE 20 MG: 20 TABLET ORAL at 20:33

## 2018-10-18 RX ADMIN — IPRATROPIUM BROMIDE AND ALBUTEROL SULFATE 3 ML: .5; 3 SOLUTION RESPIRATORY (INHALATION) at 15:35

## 2018-10-18 RX ADMIN — ATENOLOL 25 MG: 25 TABLET ORAL at 22:01

## 2018-10-18 RX ADMIN — ATENOLOL 25 MG: 25 TABLET ORAL at 08:30

## 2018-10-18 RX ADMIN — FUROSEMIDE 40 MG: 40 TABLET ORAL at 20:07

## 2018-10-18 RX ADMIN — FLUTICASONE PROPIONATE 1 SPRAY: 50 SPRAY, METERED NASAL at 10:41

## 2018-10-18 RX ADMIN — PREDNISONE 20 MG: 20 TABLET ORAL at 10:41

## 2018-10-18 RX ADMIN — AZITHROMYCIN MONOHYDRATE 250 MG: 250 TABLET ORAL at 08:30

## 2018-10-18 RX ADMIN — FUROSEMIDE 40 MG: 40 TABLET ORAL at 08:30

## 2018-10-18 RX ADMIN — OXYCODONE HYDROCHLORIDE AND ACETAMINOPHEN 500 MG: 500 TABLET ORAL at 08:30

## 2018-10-18 RX ADMIN — POTASSIUM CHLORIDE 10 MEQ: 10 TABLET, EXTENDED RELEASE ORAL at 08:30

## 2018-10-18 ASSESSMENT — PAIN SCALES - GENERAL
PAINLEVEL_OUTOF10: 0

## 2018-10-18 NOTE — H&P
STORM on CKD  Cr up to 1.5 from 1.0 yesterday. Hold cozaar for now. Recheck tomorrow. #. Anemia, normocytic  S/p transfusion on 10/15. No apparent bleeding source. Check iron panel and hemolysis. #. DVT prophylaxis - heparin sc  #. PPI for ulcer prophylaxis. The above assessments and plans were explained to the patient and family in lay language, who indicated understanding.       PHYSICIAN CERTIFICATION  I certify that Stanford Russ is expected to be hospitalized for greater than 2 midnights based on the above assessment and plan:      MD Christiano Lainez Hospitalist at [facility]  Office Phone: [phone]

## 2018-10-18 NOTE — TELEPHONE ENCOUNTER
Called patient's daughter Pamela Moreno at [phone]. Patient is admitted to the hospital for fever and the blood cultures are positive. She says patient will be transferred to [facility]. She was concerned about anemia and EGD and colonoscopy  I discussed with Dr. Stas Arciniega of compromised respiratory status of the patient for anesthesia. And to discuss with Dr. Ariana Cruz. Patient has an appointment with Dr. Ariana Cruz after the discharge to talk about that. EGD and colonoscopy that was scheduled for 10/25/2018 will be canceled  As patient is going to be admitted to [facility] last patient daughter to let the doctor now to consult Dr. Ariana Cruz and Dr. Stas Arciniega.

## 2018-10-19 ENCOUNTER — APPOINTMENT (OUTPATIENT)
Dept: ULTRASOUND IMAGING | Age: 63
DRG: 853 | End: 2018-10-19
Attending: HOSPITALIST
Payer: COMMERCIAL

## 2018-10-19 ENCOUNTER — ANESTHESIA EVENT (OUTPATIENT)
Dept: INPATIENT UNIT | Age: 63
DRG: 853 | End: 2018-10-19
Payer: COMMERCIAL

## 2018-10-19 ENCOUNTER — APPOINTMENT (OUTPATIENT)
Dept: CT IMAGING | Age: 63
DRG: 853 | End: 2018-10-19
Attending: HOSPITALIST
Payer: COMMERCIAL

## 2018-10-19 ENCOUNTER — ANESTHESIA (OUTPATIENT)
Dept: INPATIENT UNIT | Age: 63
DRG: 853 | End: 2018-10-19
Payer: COMMERCIAL

## 2018-10-19 VITALS
RESPIRATION RATE: 28 BRPM | OXYGEN SATURATION: 96 % | DIASTOLIC BLOOD PRESSURE: 55 MMHG | SYSTOLIC BLOOD PRESSURE: 129 MMHG | TEMPERATURE: 99.5 F

## 2018-10-19 LAB
ALBUMIN SERPL-MCNC: 3.4 GM/DL (ref 3.4–5)
ALP BLD-CCNC: 52 IU/L (ref 40–128)
ALT SERPL-CCNC: 19 U/L (ref 10–40)
ANION GAP SERPL CALCULATED.3IONS-SCNC: 13 MMOL/L (ref 4–16)
AST SERPL-CCNC: 14 IU/L (ref 15–37)
BACTERIA: NEGATIVE /HPF
BASOPHILS ABSOLUTE: 0 K/CU MM
BASOPHILS RELATIVE PERCENT: 0.1 % (ref 0–1)
BILIRUB SERPL-MCNC: 0.2 MG/DL (ref 0–1)
BILIRUBIN URINE: NEGATIVE MG/DL
BLOOD, URINE: NEGATIVE
BUN BLDV-MCNC: 46 MG/DL (ref 6–23)
CALCIUM SERPL-MCNC: 8.5 MG/DL (ref 8.3–10.6)
CHLORIDE BLD-SCNC: 104 MMOL/L (ref 99–110)
CLARITY: CLEAR
CO2: 25 MMOL/L (ref 21–32)
COLOR: ABNORMAL
CREAT SERPL-MCNC: 1.3 MG/DL (ref 0.6–1.1)
CREATININE URINE: 50.6 MG/DL (ref 28–217)
CULTURE: NORMAL
CULTURE: NORMAL
DIFFERENTIAL TYPE: ABNORMAL
EOSINOPHILS ABSOLUTE: 0 K/CU MM
EOSINOPHILS RELATIVE PERCENT: 0.5 % (ref 0–3)
GFR AFRICAN AMERICAN: 50 ML/MIN/1.73M2
GFR NON-AFRICAN AMERICAN: 42 ML/MIN/1.73M2
GLUCOSE BLD-MCNC: 108 MG/DL (ref 70–99)
GLUCOSE BLD-MCNC: 132 MG/DL (ref 70–99)
GLUCOSE BLD-MCNC: 177 MG/DL (ref 70–99)
GLUCOSE BLD-MCNC: 178 MG/DL (ref 70–99)
GLUCOSE, URINE: NEGATIVE MG/DL
GRAM SMEAR: NORMAL
HCT VFR BLD CALC: 26.4 % (ref 37–47)
HEMOGLOBIN: 8.3 GM/DL (ref 12.5–16)
IMMATURE NEUTROPHIL %: 0.9 % (ref 0–0.43)
KETONES, URINE: NEGATIVE MG/DL
LEUKOCYTE ESTERASE, URINE: NEGATIVE
LV EF: 53 %
LVEF MODALITY: NORMAL
LYMPHOCYTES ABSOLUTE: 1.4 K/CU MM
LYMPHOCYTES RELATIVE PERCENT: 16.2 % (ref 24–44)
Lab: NORMAL
MCH RBC QN AUTO: 30 PG (ref 27–31)
MCHC RBC AUTO-ENTMCNC: 31.4 % (ref 32–36)
MCV RBC AUTO: 95.3 FL (ref 78–100)
MONOCYTES ABSOLUTE: 0.5 K/CU MM
MONOCYTES RELATIVE PERCENT: 5.6 % (ref 0–4)
MUCUS: ABNORMAL HPF
NITRITE URINE, QUANTITATIVE: NEGATIVE
NUCLEATED RBC %: 0 %
PDW BLD-RTO: 15.3 % (ref 11.7–14.9)
PH, URINE: 5 (ref 5–8)
PLATELET # BLD: 237 K/CU MM (ref 140–440)
PMV BLD AUTO: 9.1 FL (ref 7.5–11.1)
POTASSIUM SERPL-SCNC: 4.4 MMOL/L (ref 3.5–5.1)
PROT/CREAT RATIO, UR: 0.1
PROTEIN UA: NEGATIVE MG/DL
RBC # BLD: 2.77 M/CU MM (ref 4.2–5.4)
RBC URINE: <1 /HPF (ref 0–6)
REPORT STATUS: NORMAL
SEGMENTED NEUTROPHILS ABSOLUTE COUNT: 6.7 K/CU MM
SEGMENTED NEUTROPHILS RELATIVE PERCENT: 76.7 % (ref 36–66)
SODIUM BLD-SCNC: 142 MMOL/L (ref 135–145)
SODIUM URINE: 98 MMOL/L (ref 35–167)
SPECIFIC GRAVITY UA: 1.01 (ref 1–1.03)
SPECIMEN: NORMAL
SQUAMOUS EPITHELIAL: <1 /HPF
TOTAL IMMATURE NEUTOROPHIL: 0.08 K/CU MM
TOTAL NUCLEATED RBC: 0 K/CU MM
TOTAL PROTEIN: 5.8 GM/DL (ref 6.4–8.2)
TRICHOMONAS: ABNORMAL /HPF
URINE TOTAL PROTEIN: 5.1 MG/DL
UROBILINOGEN, URINE: NORMAL MG/DL (ref 0.2–1)
WBC # BLD: 8.8 K/CU MM (ref 4–10.5)
WBC UA: <1 /HPF (ref 0–5)

## 2018-10-19 PROCEDURE — 3700000001 HC ADD 15 MINUTES (ANESTHESIA)

## 2018-10-19 PROCEDURE — 6360000002 HC RX W HCPCS: Performed by: HOSPITALIST

## 2018-10-19 PROCEDURE — 74177 CT ABD & PELVIS W/CONTRAST: CPT

## 2018-10-19 PROCEDURE — 6370000000 HC RX 637 (ALT 250 FOR IP): Performed by: HOSPITALIST

## 2018-10-19 PROCEDURE — 7100000001 HC PACU RECOVERY - ADDTL 15 MIN

## 2018-10-19 PROCEDURE — 2000000000 HC ICU R&B

## 2018-10-19 PROCEDURE — 2580000003 HC RX 258: Performed by: INTERNAL MEDICINE

## 2018-10-19 PROCEDURE — 84156 ASSAY OF PROTEIN URINE: CPT

## 2018-10-19 PROCEDURE — 6360000002 HC RX W HCPCS: Performed by: INTERNAL MEDICINE

## 2018-10-19 PROCEDURE — 99254 IP/OBS CNSLTJ NEW/EST MOD 60: CPT | Performed by: INTERNAL MEDICINE

## 2018-10-19 PROCEDURE — 93325 DOPPLER ECHO COLOR FLOW MAPG: CPT | Performed by: INTERNAL MEDICINE

## 2018-10-19 PROCEDURE — 6360000002 HC RX W HCPCS

## 2018-10-19 PROCEDURE — 80053 COMPREHEN METABOLIC PANEL: CPT

## 2018-10-19 PROCEDURE — 94640 AIRWAY INHALATION TREATMENT: CPT

## 2018-10-19 PROCEDURE — 94761 N-INVAS EAR/PLS OXIMETRY MLT: CPT

## 2018-10-19 PROCEDURE — 6370000000 HC RX 637 (ALT 250 FOR IP): Performed by: FAMILY MEDICINE

## 2018-10-19 PROCEDURE — 2580000003 HC RX 258: Performed by: HOSPITALIST

## 2018-10-19 PROCEDURE — 81001 URINALYSIS AUTO W/SCOPE: CPT

## 2018-10-19 PROCEDURE — 82570 ASSAY OF URINE CREATININE: CPT

## 2018-10-19 PROCEDURE — 76775 US EXAM ABDO BACK WALL LIM: CPT

## 2018-10-19 PROCEDURE — 93312 ECHO TRANSESOPHAGEAL: CPT | Performed by: INTERNAL MEDICINE

## 2018-10-19 PROCEDURE — 99253 IP/OBS CNSLTJ NEW/EST LOW 45: CPT | Performed by: INTERNAL MEDICINE

## 2018-10-19 PROCEDURE — 2500000003 HC RX 250 WO HCPCS: Performed by: NURSE ANESTHETIST, CERTIFIED REGISTERED

## 2018-10-19 PROCEDURE — 85025 COMPLETE CBC W/AUTO DIFF WBC: CPT

## 2018-10-19 PROCEDURE — 2580000003 HC RX 258: Performed by: NURSE ANESTHETIST, CERTIFIED REGISTERED

## 2018-10-19 PROCEDURE — 3700000000 HC ANESTHESIA ATTENDED CARE

## 2018-10-19 PROCEDURE — 2700000000 HC OXYGEN THERAPY PER DAY

## 2018-10-19 PROCEDURE — 93308 TTE F-UP OR LMTD: CPT

## 2018-10-19 PROCEDURE — 7100000000 HC PACU RECOVERY - FIRST 15 MIN

## 2018-10-19 PROCEDURE — 82962 GLUCOSE BLOOD TEST: CPT

## 2018-10-19 PROCEDURE — 6360000004 HC RX CONTRAST MEDICATION: Performed by: INTERNAL MEDICINE

## 2018-10-19 PROCEDURE — 84300 ASSAY OF URINE SODIUM: CPT

## 2018-10-19 PROCEDURE — 93312 ECHO TRANSESOPHAGEAL: CPT

## 2018-10-19 PROCEDURE — 76937 US GUIDE VASCULAR ACCESS: CPT

## 2018-10-19 PROCEDURE — 36415 COLL VENOUS BLD VENIPUNCTURE: CPT

## 2018-10-19 PROCEDURE — 6360000002 HC RX W HCPCS: Performed by: NURSE ANESTHETIST, CERTIFIED REGISTERED

## 2018-10-19 PROCEDURE — 94660 CPAP INITIATION&MGMT: CPT

## 2018-10-19 RX ORDER — PROPOFOL 10 MG/ML
INJECTION, EMULSION INTRAVENOUS PRN
Status: DISCONTINUED | OUTPATIENT
Start: 2018-10-19 | End: 2018-10-19 | Stop reason: SDUPTHER

## 2018-10-19 RX ORDER — LIDOCAINE HYDROCHLORIDE 20 MG/ML
INJECTION, SOLUTION EPIDURAL; INFILTRATION; INTRACAUDAL; PERINEURAL PRN
Status: DISCONTINUED | OUTPATIENT
Start: 2018-10-19 | End: 2018-10-19 | Stop reason: SDUPTHER

## 2018-10-19 RX ORDER — SODIUM CHLORIDE 0.9 % (FLUSH) 0.9 %
10 SYRINGE (ML) INJECTION 2 TIMES DAILY
Status: DISCONTINUED | OUTPATIENT
Start: 2018-10-19 | End: 2018-10-20 | Stop reason: SDUPTHER

## 2018-10-19 RX ORDER — MAGNESIUM SULFATE IN WATER 40 MG/ML
2 INJECTION, SOLUTION INTRAVENOUS ONCE
Status: COMPLETED | OUTPATIENT
Start: 2018-10-19 | End: 2018-10-19

## 2018-10-19 RX ORDER — SODIUM CHLORIDE 9 MG/ML
INJECTION, SOLUTION INTRAVENOUS CONTINUOUS PRN
Status: DISCONTINUED | OUTPATIENT
Start: 2018-10-19 | End: 2018-10-19 | Stop reason: SDUPTHER

## 2018-10-19 RX ORDER — HEPARIN SODIUM 5000 [USP'U]/ML
5000 INJECTION, SOLUTION INTRAVENOUS; SUBCUTANEOUS 2 TIMES DAILY
Status: DISCONTINUED | OUTPATIENT
Start: 2018-10-19 | End: 2018-10-28

## 2018-10-19 RX ADMIN — SODIUM CHLORIDE, PRESERVATIVE FREE 10 ML: 5 INJECTION INTRAVENOUS at 15:04

## 2018-10-19 RX ADMIN — DEXTROSE MONOHYDRATE 18 MILLION UNITS: 50 INJECTION, SOLUTION INTRAVENOUS at 17:10

## 2018-10-19 RX ADMIN — FLUTICASONE PROPIONATE 1 SPRAY: 50 SPRAY, METERED NASAL at 15:03

## 2018-10-19 RX ADMIN — PROPOFOL 60 MG: 10 INJECTION, EMULSION INTRAVENOUS at 10:55

## 2018-10-19 RX ADMIN — PROPOFOL 40 MG: 10 INJECTION, EMULSION INTRAVENOUS at 10:53

## 2018-10-19 RX ADMIN — FAMOTIDINE 20 MG: 20 TABLET ORAL at 22:12

## 2018-10-19 RX ADMIN — TRAZODONE HYDROCHLORIDE 100 MG: 50 TABLET ORAL at 22:12

## 2018-10-19 RX ADMIN — CEFTRIAXONE SODIUM 2 G: 2 INJECTION, POWDER, FOR SOLUTION INTRAMUSCULAR; INTRAVENOUS at 06:06

## 2018-10-19 RX ADMIN — OXYCODONE HYDROCHLORIDE AND ACETAMINOPHEN 500 MG: 500 TABLET ORAL at 14:59

## 2018-10-19 RX ADMIN — ATENOLOL 25 MG: 25 TABLET ORAL at 22:12

## 2018-10-19 RX ADMIN — IPRATROPIUM BROMIDE AND ALBUTEROL SULFATE 3 ML: .5; 3 SOLUTION RESPIRATORY (INHALATION) at 14:03

## 2018-10-19 RX ADMIN — IPRATROPIUM BROMIDE AND ALBUTEROL SULFATE 3 ML: .5; 3 SOLUTION RESPIRATORY (INHALATION) at 08:12

## 2018-10-19 RX ADMIN — Medication 2 PUFF: at 21:00

## 2018-10-19 RX ADMIN — HEPARIN SODIUM 5000 UNITS: 5000 INJECTION INTRAVENOUS; SUBCUTANEOUS at 22:16

## 2018-10-19 RX ADMIN — INSULIN LISPRO 2 UNITS: 100 INJECTION, SOLUTION INTRAVENOUS; SUBCUTANEOUS at 17:11

## 2018-10-19 RX ADMIN — PRAVASTATIN SODIUM 20 MG: 20 TABLET ORAL at 22:13

## 2018-10-19 RX ADMIN — FAMOTIDINE 20 MG: 20 TABLET ORAL at 14:59

## 2018-10-19 RX ADMIN — GLIPIZIDE 10 MG: 10 TABLET ORAL at 15:00

## 2018-10-19 RX ADMIN — SODIUM CHLORIDE, PRESERVATIVE FREE 10 ML: 5 INJECTION INTRAVENOUS at 15:43

## 2018-10-19 RX ADMIN — ATENOLOL 25 MG: 25 TABLET ORAL at 15:11

## 2018-10-19 RX ADMIN — IOPAMIDOL 80 ML: 755 INJECTION, SOLUTION INTRAVENOUS at 15:42

## 2018-10-19 RX ADMIN — MAGNESIUM SULFATE HEPTAHYDRATE 2 G: 40 INJECTION, SOLUTION INTRAVENOUS at 15:04

## 2018-10-19 RX ADMIN — LIDOCAINE HYDROCHLORIDE 100 MG: 20 INJECTION, SOLUTION EPIDURAL; INFILTRATION; INTRACAUDAL; PERINEURAL at 10:53

## 2018-10-19 RX ADMIN — SODIUM CHLORIDE: 9 INJECTION, SOLUTION INTRAVENOUS at 10:51

## 2018-10-19 RX ADMIN — HEPARIN SODIUM 5000 UNITS: 5000 INJECTION INTRAVENOUS; SUBCUTANEOUS at 15:04

## 2018-10-19 RX ADMIN — IPRATROPIUM BROMIDE AND ALBUTEROL SULFATE 3 ML: .5; 3 SOLUTION RESPIRATORY (INHALATION) at 21:14

## 2018-10-19 RX ADMIN — SODIUM CHLORIDE, PRESERVATIVE FREE 10 ML: 5 INJECTION INTRAVENOUS at 20:06

## 2018-10-19 RX ADMIN — LINAGLIPTIN 5 MG: 5 TABLET, FILM COATED ORAL at 15:00

## 2018-10-19 RX ADMIN — TROSPIUM CHLORIDE 20 MG: 20 TABLET ORAL at 22:12

## 2018-10-19 RX ADMIN — Medication 2 PUFF: at 08:18

## 2018-10-19 ASSESSMENT — PAIN SCALES - GENERAL
PAINLEVEL_OUTOF10: 0

## 2018-10-19 NOTE — PROGRESS NOTES
Patient slept well last night. No events over night. Continues on [facility]. Up to bathroom x3 with stand by assist. Patient has been NPO since midnight. Fluids running. Daughter at bedside at this time. Patient dangling at side of bed. SCD's in place. Call button within reach. Continues to refuse bed alarm. Patient is alert, oriented and uses call light for assistance.

## 2018-10-19 NOTE — CONSULTS
inhaler 2 puff BID   cloNIDine (CATAPRES) tablet 0.1 mg BID   linagliptin (TRADJENTA) tablet 5 mg Daily   pravastatin (PRAVACHOL) tablet 20 mg Nightly   traZODone (DESYREL) tablet 100 mg Nightly PRN   sodium chloride flush 0.9 % injection 10 mL 2 times per day   sodium chloride flush 0.9 % injection 10 mL PRN   magnesium hydroxide (MILK OF MAGNESIA) 400 MG/5ML suspension 30 mL Daily PRN   ondansetron (ZOFRAN) injection 4 mg Q6H PRN   albuterol (PROVENTIL) nebulizer solution 2.5 mg Q2H PRN   atenolol (TENORMIN) tablet 25 mg BID   dextrose 5 % solution PRN   dextrose 50 % solution 12.5 g PRN   famotidine (PEPCID) tablet 20 mg BID   fluticasone (FLONASE) 50 MCG/ACT nasal spray 1 spray Daily   glipiZIDE (GLUCOTROL) tablet 10 mg BID AC   glucagon (rDNA) injection 1 mg PRN   glucose (GLUTOSE) 40 % oral gel 15 g PRN   insulin lispro (HUMALOG) injection vial 0-12 Units TID WC   insulin lispro (HUMALOG) injection vial 0-6 Units Nightly   ipratropium-albuterol (DUONEB) nebulizer solution 3 mL 4x daily   nicotine (NICODERM CQ) 14 MG/24HR 1 patch Daily   potassium chloride (KLOR-CON) extended release tablet 10 mEq Daily   trospium (SANCTURA) tablet 20 mg Nightly   vitamin C (ASCORBIC ACID) tablet 500 mg Daily   cefTRIAXone (ROCEPHIN) 2 g in dextrose 5 % 50 mL IVPB Q24H   0.9 % sodium chloride infusion Continuous     Current Facility-Administered Medications   Medication Dose Route Frequency Provider Last Rate Last Dose    acetaminophen (TYLENOL) tablet 1,000 mg  1,000 mg Oral Q6H PRN Bennie Toth MD        albuterol sulfate  (90 Base) MCG/ACT inhaler 2 puff  2 puff Inhalation Q4H PRN Bennie Toth MD        mometasone-formoterol (DULERA) 200-5 MCG/ACT inhaler 2 puff  2 puff Inhalation BID Bennie Toth MD   2 puff at 10/18/18 2037    cloNIDine (CATAPRES) tablet 0.1 mg  0.1 mg Oral BID Bennie Toth MD   0.1 mg at 10/18/18 2202    linagliptin (TRADJENTA) tablet 5 mg  5 mg Oral Daily Kirkwood Bonine, MD Lindajo Bence questions.        Barbara Casarez MD, 10/19/2018 7:23 AM

## 2018-10-19 NOTE — CONSULTS
received a JYOTI that demonstrated aortic valve and mitral valve vegetation. There was also thickening of the pulmonic valve. The patient volunteers a history of fever over the last 3 months. States since she had been treated for cellulitis of her right leg. There was some difficulties controlling the fever. She also suffered a right sided CVA with left-sided weakness in September 2018. She was actually evaluated by the cardiothoracic surgeons for prior to actually stenosis, but was deemed a poor surgical candidate. She states that she has had a long-standing history of constipation moving bowel once a week. She endorses a weight loss of 40 pounds during the time she was in the hospital, but has not lost weight since then. ?  Infectious diseases service was consulted to evaluate the pt, and recommend further investigative and therapeutic measures. ROS: Other systems reviewed Including eyes, ENT, respiratory, cardiovascular, GI, , dermatologic, neurologic, psych, hem/lymphatic, musculoskeletal and endocrine were negative other than what is mentioned above.      Patient Active Problem List    Diagnosis Date Noted    Bacteremia 10/18/2018    Streptococcal bacteremia 10/18/2018    Pneumonia 10/17/2018    COPD exacerbation ([address] 10/15/2018    Type 2 diabetes mellitus ([address] 10/15/2018    Hypertension 10/15/2018    Tobacco abuse 10/15/2018    Hyperlipidemia 10/15/2018    COPD (chronic obstructive pulmonary disease) ([address] 10/15/2018    Cerebrovascular accident (CVA) ([address] 10/10/2018    Bilateral carotid artery stenosis 10/03/2018    Physical debility 09/19/2018    Hypertension 09/11/2018    Hyperlipidemia 09/11/2018    Tobacco abuse 09/11/2018    Obesity due to excess calories 09/11/2018    CHF (congestive heart failure) ([address] 09/11/2018    COPD exacerbation ([address] 09/10/2018    Fall at home 09/10/2018    Type 2 diabetes mellitus ([address] 09/10/2018    COPD, severe ([address] 04/10/2017   

## 2018-10-19 NOTE — CONSULTS
Inpatient consult to Nephrology  Consult performed by: Tiffany Ames  Consult ordered by: Nelida Croft  Assessment/Recommendations: Pt seen ,examined,interviewed and chart reviewed.  Please see the dictated consult for details     Imp :   STORM/CKD stage 3 - unknown UOP_ US pending- available data   suggest sec to sepsis/ CRS type 1  But if she has endocarditis- has risk for IC mediated GN  2. sepsis with strep bovis- getting JYOTI  To R/O endocarditis  And  potential source - alannah colon-   ADHF with pLVEF and underlying pul HTN  Anemia- partly from sepsis/ acute illness   DM/ COPD o2 dependent   recebnt CVA with ASCVD   fally liver/ obesity    Plan:  Stop IVF   Po food/ fluid after JYOTI  Start with UA and urinary indices  Use kidney friendly  med's   source of infection and TX   Monitor renal Fx   Anemia w/u etc       Thanks for the consult    #[id number]

## 2018-10-19 NOTE — ANESTHESIA POSTPROCEDURE EVALUATION
Department of Anesthesiology  Postprocedure Note    Patient: Valdemar Perrin  MRN: [id number]  YOB: 1955  Date of evaluation: 10/19/2018  Time:  11:32 AM     Procedure Summary     Date:  10/19/18 Room / Location:      Anesthesia Start:  4041 Anesthesia Stop:  9262    Procedure:  JYOTI under anesthesia Diagnosis:      Scheduled Providers:   Responsible Provider:  JOSÉ MANUEL Santana CRNA    Anesthesia Type:  MAC ASA Status:  3          Anesthesia Type: No value filed. Nii Phase I:  9    Nii Phase II:  9    Last vitals: Reviewed and per EMR flowsheets.        Anesthesia Post Evaluation    Patient location during evaluation: bedside  Patient participation: complete - patient participated  Level of consciousness: awake and alert  Pain score: 0  Airway patency: patent  Nausea & Vomiting: no vomiting and no nausea  Complications: no  Cardiovascular status: blood pressure returned to baseline  Respiratory status: acceptable and nasal cannula (pt RR in high 20's r/t illness)  Hydration status: stable

## 2018-10-19 NOTE — ANESTHESIA PRE PROCEDURE
Department of Anesthesiology  Preprocedure Note       Name:  Jennifer Estimable   Age:  65 y.o.  :  1955                                          MRN:  [id number]         Date:  10/19/2018      Surgeon: * No surgeons listed *    Procedure: JYOTI under anesthesia    Medications prior to admission:   Prior to Admission medications    Medication Sig Start Date End Date Taking?  Authorizing Provider   vitamin C (ASCORBIC ACID) 500 MG tablet Take 500 mg by mouth daily    Historical Provider, MD   CPAP Machine MISC by Does not apply route    Historical Provider, MD   acetaminophen (TYLENOL) 500 MG tablet Take 1,000 mg by mouth every 6 hours as needed for Pain    Historical Provider, MD   linagliptin (TRADJENTA) 5 MG tablet Take 1 tablet by mouth daily 10/8/18   Dmitriy Johnson MD   potassium chloride (K-TAB) 10 MEQ extended release tablet Take 1 tablet by mouth daily 10/8/18   Dmitriy Johnson MD   furosemide (LASIX) 40 MG tablet Take 1 tablet by mouth daily 10/8/18   Dmitriy Johnson MD   budesonide-formoterol (SYMBICORT) 160-4.5 MCG/ACT AERO Inhale 2 puffs into the lungs 2 times daily 18   Dmitriy Johnson MD   ipratropium-albuterol (DUONEB) 0.5-2.5 (3) MG/3ML SOLN nebulizer solution Take 3 mLs by nebulization 4 times daily 18   Clifford Meyer MD   albuterol sulfate  (90 Base) MCG/ACT inhaler Inhale 2 puffs into the lungs every 4 hours as needed for Wheezing    Historical Provider, MD   atenolol (TENORMIN) 25 MG tablet TAKE 1 TABLET TWICE A DAY 18   Dmitriy Johnson MD   solifenacin (VESICARE) 10 MG tablet TAKE 1 TABLET DAILY 18   Dmitriy Johnson MD   traZODone (DESYREL) 100 MG tablet TAKE 1 TABLET NIGHTLY 18   Dmitriy Johnson MD   indapamide (LOZOL) 2.5 MG tablet TAKE 1 TABLET DAILY 18   Dmitriy Johnson MD   glipiZIDE (GLUCOTROL XL) 10 MG extended release tablet TAKE 1 TABLET TWICE A DAY 18   Dmitriy Johnson MD   cloNIDine (CATAPRES) 0.1 MG tablet TAKE 1 TABLET TWICE A DAY 18   Dmitriy Johnson MD

## 2018-10-20 LAB
ANION GAP SERPL CALCULATED.3IONS-SCNC: 12 MMOL/L (ref 4–16)
BASOPHILS ABSOLUTE: 0 K/CU MM
BASOPHILS RELATIVE PERCENT: 0.2 % (ref 0–1)
BUN BLDV-MCNC: 35 MG/DL (ref 6–23)
CALCIUM SERPL-MCNC: 8.6 MG/DL (ref 8.3–10.6)
CHLORIDE BLD-SCNC: 103 MMOL/L (ref 99–110)
CO2: 27 MMOL/L (ref 21–32)
CREAT SERPL-MCNC: 1.2 MG/DL (ref 0.6–1.1)
DIFFERENTIAL TYPE: ABNORMAL
EOSINOPHILS ABSOLUTE: 0.1 K/CU MM
EOSINOPHILS RELATIVE PERCENT: 1 % (ref 0–3)
GFR AFRICAN AMERICAN: 55 ML/MIN/1.73M2
GFR NON-AFRICAN AMERICAN: 46 ML/MIN/1.73M2
GLUCOSE BLD-MCNC: 102 MG/DL (ref 70–99)
GLUCOSE BLD-MCNC: 122 MG/DL (ref 70–99)
GLUCOSE BLD-MCNC: 144 MG/DL (ref 70–99)
GLUCOSE BLD-MCNC: 152 MG/DL (ref 70–99)
GLUCOSE BLD-MCNC: 214 MG/DL (ref 70–99)
HCT VFR BLD CALC: 27.6 % (ref 37–47)
HEMOGLOBIN: 8.5 GM/DL (ref 12.5–16)
HEPATITIS C ANTIBODY: NON REACTIVE
IMMATURE NEUTROPHIL %: 0.8 % (ref 0–0.43)
LYMPHOCYTES ABSOLUTE: 1.5 K/CU MM
LYMPHOCYTES RELATIVE PERCENT: 14.3 % (ref 24–44)
MCH RBC QN AUTO: 29.8 PG (ref 27–31)
MCHC RBC AUTO-ENTMCNC: 30.8 % (ref 32–36)
MCV RBC AUTO: 96.8 FL (ref 78–100)
MONOCYTES ABSOLUTE: 0.4 K/CU MM
MONOCYTES RELATIVE PERCENT: 4.2 % (ref 0–4)
NUCLEATED RBC %: 0 %
PDW BLD-RTO: 15.6 % (ref 11.7–14.9)
PHOSPHORUS: 3.5 MG/DL (ref 2.5–4.9)
PLATELET # BLD: 271 K/CU MM (ref 140–440)
PMV BLD AUTO: 9.3 FL (ref 7.5–11.1)
POTASSIUM SERPL-SCNC: 4.5 MMOL/L (ref 3.5–5.1)
RBC # BLD: 2.85 M/CU MM (ref 4.2–5.4)
SEGMENTED NEUTROPHILS ABSOLUTE COUNT: 8.4 K/CU MM
SEGMENTED NEUTROPHILS RELATIVE PERCENT: 79.5 % (ref 36–66)
SODIUM BLD-SCNC: 142 MMOL/L (ref 135–145)
TOTAL IMMATURE NEUTOROPHIL: 0.08 K/CU MM
TOTAL NUCLEATED RBC: 0 K/CU MM
WBC # BLD: 10.6 K/CU MM (ref 4–10.5)

## 2018-10-20 PROCEDURE — 80048 BASIC METABOLIC PNL TOTAL CA: CPT

## 2018-10-20 PROCEDURE — 6370000000 HC RX 637 (ALT 250 FOR IP): Performed by: FAMILY MEDICINE

## 2018-10-20 PROCEDURE — 6360000002 HC RX W HCPCS: Performed by: HOSPITALIST

## 2018-10-20 PROCEDURE — 1200000000 HC SEMI PRIVATE

## 2018-10-20 PROCEDURE — 2580000003 HC RX 258: Performed by: INTERNAL MEDICINE

## 2018-10-20 PROCEDURE — 6370000000 HC RX 637 (ALT 250 FOR IP): Performed by: INTERNAL MEDICINE

## 2018-10-20 PROCEDURE — 99232 SBSQ HOSP IP/OBS MODERATE 35: CPT | Performed by: INTERNAL MEDICINE

## 2018-10-20 PROCEDURE — 84100 ASSAY OF PHOSPHORUS: CPT

## 2018-10-20 PROCEDURE — 2700000000 HC OXYGEN THERAPY PER DAY

## 2018-10-20 PROCEDURE — 6360000002 HC RX W HCPCS: Performed by: INTERNAL MEDICINE

## 2018-10-20 PROCEDURE — 82962 GLUCOSE BLOOD TEST: CPT

## 2018-10-20 PROCEDURE — 2580000003 HC RX 258: Performed by: HOSPITALIST

## 2018-10-20 PROCEDURE — 6370000000 HC RX 637 (ALT 250 FOR IP): Performed by: HOSPITALIST

## 2018-10-20 PROCEDURE — 86803 HEPATITIS C AB TEST: CPT

## 2018-10-20 PROCEDURE — 94761 N-INVAS EAR/PLS OXIMETRY MLT: CPT

## 2018-10-20 PROCEDURE — 94640 AIRWAY INHALATION TREATMENT: CPT

## 2018-10-20 PROCEDURE — 85025 COMPLETE CBC W/AUTO DIFF WBC: CPT

## 2018-10-20 PROCEDURE — 87389 HIV-1 AG W/HIV-1&-2 AB AG IA: CPT

## 2018-10-20 RX ORDER — SODIUM CHLORIDE 0.9 % (FLUSH) 0.9 %
10 SYRINGE (ML) INJECTION EVERY 12 HOURS SCHEDULED
Status: DISCONTINUED | OUTPATIENT
Start: 2018-10-20 | End: 2018-10-20 | Stop reason: SDUPTHER

## 2018-10-20 RX ORDER — LIDOCAINE HYDROCHLORIDE 10 MG/ML
5 INJECTION, SOLUTION EPIDURAL; INFILTRATION; INTRACAUDAL; PERINEURAL ONCE
Status: COMPLETED | OUTPATIENT
Start: 2018-10-20 | End: 2018-10-21

## 2018-10-20 RX ORDER — SODIUM CHLORIDE 0.9 % (FLUSH) 0.9 %
10 SYRINGE (ML) INJECTION PRN
Status: DISCONTINUED | OUTPATIENT
Start: 2018-10-20 | End: 2018-10-20 | Stop reason: SDUPTHER

## 2018-10-20 RX ORDER — FUROSEMIDE 10 MG/ML
20 INJECTION INTRAMUSCULAR; INTRAVENOUS 2 TIMES DAILY
Status: COMPLETED | OUTPATIENT
Start: 2018-10-20 | End: 2018-10-20

## 2018-10-20 RX ADMIN — FUROSEMIDE 20 MG: 10 INJECTION, SOLUTION INTRAVENOUS at 13:36

## 2018-10-20 RX ADMIN — HEPARIN SODIUM 5000 UNITS: 5000 INJECTION INTRAVENOUS; SUBCUTANEOUS at 08:55

## 2018-10-20 RX ADMIN — SODIUM CHLORIDE, PRESERVATIVE FREE 10 ML: 5 INJECTION INTRAVENOUS at 20:21

## 2018-10-20 RX ADMIN — IPRATROPIUM BROMIDE AND ALBUTEROL SULFATE 3 ML: .5; 3 SOLUTION RESPIRATORY (INHALATION) at 22:14

## 2018-10-20 RX ADMIN — ATENOLOL 25 MG: 25 TABLET ORAL at 08:55

## 2018-10-20 RX ADMIN — GLIPIZIDE 10 MG: 10 TABLET ORAL at 15:18

## 2018-10-20 RX ADMIN — PRAVASTATIN SODIUM 20 MG: 20 TABLET ORAL at 20:55

## 2018-10-20 RX ADMIN — POTASSIUM CHLORIDE 10 MEQ: 750 TABLET, FILM COATED, EXTENDED RELEASE ORAL at 08:56

## 2018-10-20 RX ADMIN — IPRATROPIUM BROMIDE AND ALBUTEROL SULFATE 3 ML: .5; 3 SOLUTION RESPIRATORY (INHALATION) at 12:36

## 2018-10-20 RX ADMIN — FAMOTIDINE 20 MG: 20 TABLET ORAL at 08:56

## 2018-10-20 RX ADMIN — OXYCODONE HYDROCHLORIDE AND ACETAMINOPHEN 500 MG: 500 TABLET ORAL at 08:56

## 2018-10-20 RX ADMIN — INSULIN LISPRO 2 UNITS: 100 INJECTION, SOLUTION INTRAVENOUS; SUBCUTANEOUS at 17:54

## 2018-10-20 RX ADMIN — SODIUM CHLORIDE, PRESERVATIVE FREE 10 ML: 5 INJECTION INTRAVENOUS at 08:56

## 2018-10-20 RX ADMIN — GLIPIZIDE 10 MG: 10 TABLET ORAL at 08:56

## 2018-10-20 RX ADMIN — IPRATROPIUM BROMIDE AND ALBUTEROL SULFATE 3 ML: .5; 3 SOLUTION RESPIRATORY (INHALATION) at 08:01

## 2018-10-20 RX ADMIN — IPRATROPIUM BROMIDE AND ALBUTEROL SULFATE 3 ML: .5; 3 SOLUTION RESPIRATORY (INHALATION) at 17:07

## 2018-10-20 RX ADMIN — FLUTICASONE PROPIONATE 1 SPRAY: 50 SPRAY, METERED NASAL at 13:34

## 2018-10-20 RX ADMIN — CLONIDINE HYDROCHLORIDE 0.1 MG: 0.1 TABLET ORAL at 20:20

## 2018-10-20 RX ADMIN — Medication 2 PUFF: at 08:01

## 2018-10-20 RX ADMIN — FUROSEMIDE 20 MG: 10 INJECTION, SOLUTION INTRAVENOUS at 17:49

## 2018-10-20 RX ADMIN — CLONIDINE HYDROCHLORIDE 0.1 MG: 0.1 TABLET ORAL at 08:56

## 2018-10-20 RX ADMIN — LINAGLIPTIN 5 MG: 5 TABLET, FILM COATED ORAL at 08:55

## 2018-10-20 RX ADMIN — DEXTROSE MONOHYDRATE: 50 INJECTION, SOLUTION INTRAVENOUS at 15:17

## 2018-10-20 RX ADMIN — Medication 2 PUFF: at 22:18

## 2018-10-20 RX ADMIN — FAMOTIDINE 20 MG: 20 TABLET ORAL at 20:20

## 2018-10-20 RX ADMIN — INSULIN LISPRO 1 UNITS: 100 INJECTION, SOLUTION INTRAVENOUS; SUBCUTANEOUS at 20:21

## 2018-10-20 RX ADMIN — HEPARIN SODIUM 5000 UNITS: 5000 INJECTION INTRAVENOUS; SUBCUTANEOUS at 20:20

## 2018-10-20 RX ADMIN — TROSPIUM CHLORIDE 20 MG: 20 TABLET ORAL at 20:20

## 2018-10-20 RX ADMIN — TRAZODONE HYDROCHLORIDE 100 MG: 50 TABLET ORAL at 22:21

## 2018-10-20 RX ADMIN — ATENOLOL 25 MG: 25 TABLET ORAL at 20:20

## 2018-10-20 RX ADMIN — INSULIN LISPRO 4 UNITS: 100 INJECTION, SOLUTION INTRAVENOUS; SUBCUTANEOUS at 08:54

## 2018-10-20 ASSESSMENT — PAIN SCALES - GENERAL
PAINLEVEL_OUTOF10: 0

## 2018-10-20 NOTE — PROGRESS NOTES
I noticed on my MAR that I had a stop time for her PCN antibiotic at 1259, and then to hang another bag at 1300. .. but nothing was running through her IV when I came onto shift. Looked more closely at the [facility], and it was suppose to be a continuous infusion @ 4.2 cc/hr. The pump had not shut down completely, and I looked at the channel that had the PCN, and it was programmed for 100 cc/hr for a 50 cc bag. It was scanned @ 1710 on 10/19/18 hung by Fuad Moody RN. Was going to contact the ID , but he is not on call and doesn't have anyone on call for him over the weekend. I then called Dr. Roberto Almanza, and let him know of the situation and to see if wanted the PCN to be given at 1300 since the dose was given so fast yesterday. He said to contact the pharmacy to see what their recommendations are. Talked with Cheryle, both pharmacist and they said they would look into it more closely and then page dr. Roberto Almanza and get new orders. Will send the ID doctor a perfect serve of the situation and he will look at it on Monday.

## 2018-10-20 NOTE — PROGRESS NOTES
3. 5   BUN  54*  46*  35*   CREATININE  1.5*  1.3*  1.2*     LIVER PROFILE:   Recent Labs      10/19/18   0320   AST  14*   ALT  19   BILITOT  0.2   ALKPHOS  52     PT/INR: No results for input(s): PROTIME, INR in the last 72 hours. APTT: No results for input(s): APTT in the last 72 hours. BNP:  No results for input(s): BNP in the last 72 hours.   TROPONIN: @TROPONINI:3@  Labs, consult, tests reviewed    Assessment/ PLAN:    As above                  Nicho Vaughn MD 10/20/2018 10:30 AM

## 2018-10-21 LAB
ANION GAP SERPL CALCULATED.3IONS-SCNC: 13 MMOL/L (ref 4–16)
BASOPHILS ABSOLUTE: 0 K/CU MM
BASOPHILS RELATIVE PERCENT: 0.3 % (ref 0–1)
BUN BLDV-MCNC: 29 MG/DL (ref 6–23)
CALCIUM SERPL-MCNC: 9 MG/DL (ref 8.3–10.6)
CHLORIDE BLD-SCNC: 98 MMOL/L (ref 99–110)
CO2: 26 MMOL/L (ref 21–32)
CREAT SERPL-MCNC: 1.2 MG/DL (ref 0.6–1.1)
DIFFERENTIAL TYPE: ABNORMAL
EOSINOPHILS ABSOLUTE: 0.1 K/CU MM
EOSINOPHILS RELATIVE PERCENT: 0.5 % (ref 0–3)
GFR AFRICAN AMERICAN: 55 ML/MIN/1.73M2
GFR NON-AFRICAN AMERICAN: 46 ML/MIN/1.73M2
GLUCOSE BLD-MCNC: 121 MG/DL (ref 70–99)
GLUCOSE BLD-MCNC: 129 MG/DL (ref 70–99)
GLUCOSE BLD-MCNC: 150 MG/DL (ref 70–99)
GLUCOSE BLD-MCNC: 172 MG/DL (ref 70–99)
GLUCOSE BLD-MCNC: 172 MG/DL (ref 70–99)
GLUCOSE BLD-MCNC: 192 MG/DL (ref 70–99)
HCT VFR BLD CALC: 29 % (ref 37–47)
HEMOGLOBIN: 9.1 GM/DL (ref 12.5–16)
HIV SCREEN: NON REACTIVE
IMMATURE NEUTROPHIL %: 0.9 % (ref 0–0.43)
LYMPHOCYTES ABSOLUTE: 1.7 K/CU MM
LYMPHOCYTES RELATIVE PERCENT: 13.3 % (ref 24–44)
MCH RBC QN AUTO: 29.7 PG (ref 27–31)
MCHC RBC AUTO-ENTMCNC: 31.4 % (ref 32–36)
MCV RBC AUTO: 94.8 FL (ref 78–100)
MONOCYTES ABSOLUTE: 0.5 K/CU MM
MONOCYTES RELATIVE PERCENT: 4.1 % (ref 0–4)
NUCLEATED RBC %: 0 %
PDW BLD-RTO: 15.7 % (ref 11.7–14.9)
PLATELET # BLD: 285 K/CU MM (ref 140–440)
PMV BLD AUTO: 8.8 FL (ref 7.5–11.1)
POTASSIUM SERPL-SCNC: 4.3 MMOL/L (ref 3.5–5.1)
RBC # BLD: 3.06 M/CU MM (ref 4.2–5.4)
SEGMENTED NEUTROPHILS ABSOLUTE COUNT: 10.3 K/CU MM
SEGMENTED NEUTROPHILS RELATIVE PERCENT: 80.9 % (ref 36–66)
SODIUM BLD-SCNC: 137 MMOL/L (ref 135–145)
TOTAL IMMATURE NEUTOROPHIL: 0.11 K/CU MM
TOTAL NUCLEATED RBC: 0 K/CU MM
WBC # BLD: 12.8 K/CU MM (ref 4–10.5)

## 2018-10-21 PROCEDURE — 82962 GLUCOSE BLOOD TEST: CPT

## 2018-10-21 PROCEDURE — C1751 CATH, INF, PER/CENT/MIDLINE: HCPCS

## 2018-10-21 PROCEDURE — 76937 US GUIDE VASCULAR ACCESS: CPT

## 2018-10-21 PROCEDURE — 2700000000 HC OXYGEN THERAPY PER DAY

## 2018-10-21 PROCEDURE — 94761 N-INVAS EAR/PLS OXIMETRY MLT: CPT

## 2018-10-21 PROCEDURE — 99232 SBSQ HOSP IP/OBS MODERATE 35: CPT | Performed by: INTERNAL MEDICINE

## 2018-10-21 PROCEDURE — 02HV33Z INSERTION OF INFUSION DEVICE INTO SUPERIOR VENA CAVA, PERCUTANEOUS APPROACH: ICD-10-PCS | Performed by: HOSPITALIST

## 2018-10-21 PROCEDURE — 94640 AIRWAY INHALATION TREATMENT: CPT

## 2018-10-21 PROCEDURE — 6370000000 HC RX 637 (ALT 250 FOR IP): Performed by: HOSPITALIST

## 2018-10-21 PROCEDURE — 2500000003 HC RX 250 WO HCPCS: Performed by: INTERNAL MEDICINE

## 2018-10-21 PROCEDURE — 6370000000 HC RX 637 (ALT 250 FOR IP): Performed by: FAMILY MEDICINE

## 2018-10-21 PROCEDURE — 6370000000 HC RX 637 (ALT 250 FOR IP): Performed by: INTERNAL MEDICINE

## 2018-10-21 PROCEDURE — 36415 COLL VENOUS BLD VENIPUNCTURE: CPT

## 2018-10-21 PROCEDURE — 6360000002 HC RX W HCPCS: Performed by: INTERNAL MEDICINE

## 2018-10-21 PROCEDURE — 80048 BASIC METABOLIC PNL TOTAL CA: CPT

## 2018-10-21 PROCEDURE — 6360000002 HC RX W HCPCS: Performed by: HOSPITALIST

## 2018-10-21 PROCEDURE — 1200000000 HC SEMI PRIVATE

## 2018-10-21 PROCEDURE — 36569 INSJ PICC 5 YR+ W/O IMAGING: CPT

## 2018-10-21 PROCEDURE — 85025 COMPLETE CBC W/AUTO DIFF WBC: CPT

## 2018-10-21 PROCEDURE — 2580000003 HC RX 258: Performed by: INTERNAL MEDICINE

## 2018-10-21 PROCEDURE — 2580000003 HC RX 258: Performed by: HOSPITALIST

## 2018-10-21 RX ORDER — TORSEMIDE 20 MG/1
20 TABLET ORAL 2 TIMES DAILY
Status: DISCONTINUED | OUTPATIENT
Start: 2018-10-21 | End: 2018-10-23

## 2018-10-21 RX ADMIN — GLIPIZIDE 10 MG: 10 TABLET ORAL at 17:01

## 2018-10-21 RX ADMIN — TORSEMIDE 20 MG: 20 TABLET ORAL at 20:34

## 2018-10-21 RX ADMIN — TROSPIUM CHLORIDE 20 MG: 20 TABLET ORAL at 20:34

## 2018-10-21 RX ADMIN — FAMOTIDINE 20 MG: 20 TABLET ORAL at 09:10

## 2018-10-21 RX ADMIN — OXYCODONE HYDROCHLORIDE AND ACETAMINOPHEN 500 MG: 500 TABLET ORAL at 09:10

## 2018-10-21 RX ADMIN — INSULIN LISPRO 2 UNITS: 100 INJECTION, SOLUTION INTRAVENOUS; SUBCUTANEOUS at 09:14

## 2018-10-21 RX ADMIN — ATENOLOL 25 MG: 25 TABLET ORAL at 09:10

## 2018-10-21 RX ADMIN — IPRATROPIUM BROMIDE AND ALBUTEROL SULFATE 3 ML: .5; 3 SOLUTION RESPIRATORY (INHALATION) at 16:13

## 2018-10-21 RX ADMIN — Medication 2 PUFF: at 20:03

## 2018-10-21 RX ADMIN — SODIUM CHLORIDE, PRESERVATIVE FREE 10 ML: 5 INJECTION INTRAVENOUS at 09:13

## 2018-10-21 RX ADMIN — TRAZODONE HYDROCHLORIDE 100 MG: 50 TABLET ORAL at 22:12

## 2018-10-21 RX ADMIN — PRAVASTATIN SODIUM 20 MG: 20 TABLET ORAL at 21:32

## 2018-10-21 RX ADMIN — LINAGLIPTIN 5 MG: 5 TABLET, FILM COATED ORAL at 09:10

## 2018-10-21 RX ADMIN — FLUTICASONE PROPIONATE 1 SPRAY: 50 SPRAY, METERED NASAL at 09:11

## 2018-10-21 RX ADMIN — CLONIDINE HYDROCHLORIDE 0.1 MG: 0.1 TABLET ORAL at 09:10

## 2018-10-21 RX ADMIN — ATENOLOL 25 MG: 25 TABLET ORAL at 20:34

## 2018-10-21 RX ADMIN — INSULIN LISPRO 2 UNITS: 100 INJECTION, SOLUTION INTRAVENOUS; SUBCUTANEOUS at 13:42

## 2018-10-21 RX ADMIN — DEXTROSE MONOHYDRATE: 50 INJECTION, SOLUTION INTRAVENOUS at 12:29

## 2018-10-21 RX ADMIN — Medication 2 PUFF: at 08:06

## 2018-10-21 RX ADMIN — LIDOCAINE HYDROCHLORIDE 5 ML: 10 INJECTION, SOLUTION EPIDURAL; INFILTRATION; INTRACAUDAL; PERINEURAL at 08:29

## 2018-10-21 RX ADMIN — IPRATROPIUM BROMIDE AND ALBUTEROL SULFATE 3 ML: .5; 3 SOLUTION RESPIRATORY (INHALATION) at 08:04

## 2018-10-21 RX ADMIN — HEPARIN SODIUM 5000 UNITS: 5000 INJECTION INTRAVENOUS; SUBCUTANEOUS at 20:35

## 2018-10-21 RX ADMIN — HEPARIN SODIUM 5000 UNITS: 5000 INJECTION INTRAVENOUS; SUBCUTANEOUS at 09:16

## 2018-10-21 RX ADMIN — FAMOTIDINE 20 MG: 20 TABLET ORAL at 20:34

## 2018-10-21 RX ADMIN — GLIPIZIDE 10 MG: 10 TABLET ORAL at 09:10

## 2018-10-21 RX ADMIN — IPRATROPIUM BROMIDE AND ALBUTEROL SULFATE 3 ML: .5; 3 SOLUTION RESPIRATORY (INHALATION) at 19:55

## 2018-10-21 RX ADMIN — SODIUM CHLORIDE, PRESERVATIVE FREE 10 ML: 5 INJECTION INTRAVENOUS at 20:35

## 2018-10-21 RX ADMIN — IPRATROPIUM BROMIDE AND ALBUTEROL SULFATE 3 ML: .5; 3 SOLUTION RESPIRATORY (INHALATION) at 11:46

## 2018-10-21 RX ADMIN — POTASSIUM CHLORIDE 10 MEQ: 750 TABLET, FILM COATED, EXTENDED RELEASE ORAL at 09:10

## 2018-10-21 ASSESSMENT — PAIN SCALES - GENERAL
PAINLEVEL_OUTOF10: 0

## 2018-10-21 NOTE — PROGRESS NOTES
Nephrology Progress Note  10/21/2018 3:15 PM        Subjective:   Admit Date: 10/18/2018  PCP: Maria Guadalupe Benjamin MD    Interval History: slow improvement     Diet: some    ROS:  edema , mainly CHASE, wt stable - does not feel had higher UOP with loop     Data:     Current med's:    heparin (porcine)  5,000 Units Subcutaneous BID    mometasone-formoterol  2 puff Inhalation BID    cloNIDine  0.1 mg Oral BID    linagliptin  5 mg Oral Daily    pravastatin  20 mg Oral Nightly    sodium chloride flush  10 mL Intravenous 2 times per day    atenolol  25 mg Oral BID    famotidine  20 mg Oral BID    fluticasone  1 spray Each Nare Daily    glipiZIDE  10 mg Oral BID AC    insulin lispro  0-12 Units Subcutaneous TID WC    insulin lispro  0-6 Units Subcutaneous Nightly    ipratropium-albuterol  1 vial Nebulization 4x daily    nicotine  1 patch Transdermal Daily    potassium chloride  10 mEq Oral Daily    trospium  20 mg Oral Nightly    vitamin C  500 mg Oral Daily      IV infusion builder 22.5 mL/hr at 10/21/18 1229    dextrose           I/O last 3 completed shifts:   In: 787.6 [P.O.:480; I.V.:307.6]  Out: -     CBC:   Recent Labs      10/19/18   0320  10/20/18   0600  10/21/18   0925   WBC  8.8  10.6*  12.8*   HGB  8.3*  8.5*  9.1*   PLT  237  271  285          Recent Labs      10/19/18   0320  10/20/18   0600  10/21/18   0925   NA  142  142  137   K  4.4  4.5  4.3   CL  104  103  98*   CO2  25  27  26   BUN  46*  35*  29*   CREATININE  1.3*  1.2*  1.2*   GLUCOSE  132*  102*  192*       Lab Results   Component Value Date    CALCIUM 9.0 10/21/2018    PHOS 3.5 10/20/2018       Objective:     Vitals: BP (!) 120/56   Pulse 94   Temp 98.6 °F (37 °C) (Oral)   Resp 18   Ht 5' 6\" (1.676 m)   Wt 261 lb 1.6 oz (118.4 kg)   SpO2 93%   BMI 42.14 kg/m²     General appearance:  No distress  HEENT:  =pallor  Neck:  supple  Lungs:  + adv Bs  Heart:  RRR with DEJON  Abdomen: soft  Extremities:  ++ edema LE       Problem List : Impression :     1. STORM/CKD stage 3- stable has risk alannah with valvular dz/ infection / abs etc   2. Fluid overload ,predomianalt RHF   3. MV and AV endocarditis with regurg     Recommendation/Plan  :     1. torsemide 20 bid for now see how she does  2. Low salt  3. Daily weigh  4. D/C KCL  5. In the future wean off clonidine and try cardio- lilian protective med's- alannah arb/acei etc unless contraindicated   6.  Labs in am      Rocky Gonzalezt MD

## 2018-10-21 NOTE — CONSULTS
Consult completed. Procedure/rationale explained to pt including benefits vs potential risks/complications; questions answered & consent obtained. #4fr PowerPICC SOLO initiated to RUE Cephalic vein without difficulty/complications using sterile, UltraSound-guided technique. Sterile dressing with BioPatch, StatLock securing device, SwabCap, & Limb Precautions band applied. Education provided r/t home care. Pt verbalized back appropriate understanding and denies other c/o or needs. Primary RN notified.

## 2018-10-21 NOTE — PROGRESS NOTES
PATIENT NAME: Ayo Watkins    TODAY'S DATE: 10/21/18    SUBJECTIVE:    Pt is feeling slightly better. She still has bilateral lower extremity edema. OBJECTIVE:   VITALS:    Vitals:    10/21/18 0505   BP: (!) 104/49   Pulse: 80   Resp: 17   Temp: 98 °F (36.7 °C)   SpO2: 94%     INTAKE/OUTPUT:    Date 10/21/18 0000 - 10/21/18 2359   Shift 5237-7188 1376-8329 4025-2785 24 Hour Total   I  N  T  A  K  E   I.V.  (mL/kg/hr) 247.6  (0.3)   247.6    Shift Total  (mL/kg) 247.6  (2.1)   247.6  (2.1)   O  U  T  P  U  T   Shift Total  (mL/kg)       Weight (kg) 118.4 118.4 118.4 118.4        EXAM:  Blood pressure (!) 104/49, pulse 80, temperature 98 °F (36.7 °C), temperature source Oral, resp. rate 17, height 5' 6\" (1.676 m), weight 261 lb 1.6 oz (118.4 kg), SpO2 94 %, not currently breastfeeding. General appearance: No apparent distress, appears stated age and cooperative. Skin: unremarkable  HEENT Normocephalic, atraumatic without obvious deformity. Neck: Supple, Trachea midline   Lungs: Good respiratory effort.  Clear to auscultation, bilaterally  Heart: Regular rate/ rhythm   Abdomen: Soft, non-tender or non-distended   Extremities: 2+ bilateral edema warm well perfused  Neurologic: Alert, grossly intact  Mental status: normal affect      Data:  CBC:   Recent Labs      10/19/18   0320  10/20/18   0600  10/21/18   0925   WBC  8.8  10.6*  12.8*   HGB  8.3*  8.5*  9.1*   HCT  26.4*  27.6*  29.0*   PLT  237  271  285     BMP:  Recent Labs      10/19/18   0320  10/20/18   0600  10/21/18   0925   NA  142  142  137   K  4.4  4.5  4.3   CL  104  103  98*   CO2  25  27  26   BUN  46*  35*  29*   CREATININE  1.3*  1.2*  1.2*   GLUCOSE  132*  102*  192*     Hepatic: Recent Labs      10/19/18   0320   AST  14*   ALT  19   BILITOT  0.2   ALKPHOS  52     Mag:      Recent Labs      10/18/18   2050   MG  1.7*      Phos:     Recent Labs      10/20/18   0600   PHOS  3.5      INR: No results for input(s): INR in the last 72

## 2018-10-22 LAB
ANION GAP SERPL CALCULATED.3IONS-SCNC: 12 MMOL/L (ref 4–16)
BASOPHILS ABSOLUTE: 0 K/CU MM
BASOPHILS RELATIVE PERCENT: 0.2 % (ref 0–1)
BUN BLDV-MCNC: 29 MG/DL (ref 6–23)
CALCIUM SERPL-MCNC: 8.6 MG/DL (ref 8.3–10.6)
CHLORIDE BLD-SCNC: 103 MMOL/L (ref 99–110)
CO2: 27 MMOL/L (ref 21–32)
CREAT SERPL-MCNC: 1.2 MG/DL (ref 0.6–1.1)
DIFFERENTIAL TYPE: ABNORMAL
EKG ATRIAL RATE: 100 BPM
EKG DIAGNOSIS: NORMAL
EKG P AXIS: 40 DEGREES
EKG P-R INTERVAL: 136 MS
EKG Q-T INTERVAL: 364 MS
EKG QRS DURATION: 86 MS
EKG QTC CALCULATION (BAZETT): 469 MS
EKG R AXIS: 22 DEGREES
EKG T AXIS: 73 DEGREES
EKG VENTRICULAR RATE: 100 BPM
EOSINOPHILS ABSOLUTE: 0.1 K/CU MM
EOSINOPHILS RELATIVE PERCENT: 0.6 % (ref 0–3)
GFR AFRICAN AMERICAN: 55 ML/MIN/1.73M2
GFR NON-AFRICAN AMERICAN: 46 ML/MIN/1.73M2
GLUCOSE BLD-MCNC: 123 MG/DL (ref 70–99)
GLUCOSE BLD-MCNC: 125 MG/DL (ref 70–99)
GLUCOSE BLD-MCNC: 126 MG/DL (ref 70–99)
GLUCOSE BLD-MCNC: 133 MG/DL (ref 70–99)
GLUCOSE BLD-MCNC: 179 MG/DL (ref 70–99)
GLUCOSE BLD-MCNC: 180 MG/DL (ref 70–99)
HCT VFR BLD CALC: 26 % (ref 37–47)
HEMOGLOBIN: 7.7 GM/DL (ref 12.5–16)
IMMATURE NEUTROPHIL %: 0.9 % (ref 0–0.43)
LYMPHOCYTES ABSOLUTE: 1.6 K/CU MM
LYMPHOCYTES RELATIVE PERCENT: 15.9 % (ref 24–44)
MCH RBC QN AUTO: 28.8 PG (ref 27–31)
MCHC RBC AUTO-ENTMCNC: 29.6 % (ref 32–36)
MCV RBC AUTO: 97.4 FL (ref 78–100)
MONOCYTES ABSOLUTE: 0.5 K/CU MM
MONOCYTES RELATIVE PERCENT: 4.7 % (ref 0–4)
NUCLEATED RBC %: 0 %
PDW BLD-RTO: 15.6 % (ref 11.7–14.9)
PLATELET # BLD: 227 K/CU MM (ref 140–440)
PMV BLD AUTO: 9.1 FL (ref 7.5–11.1)
POTASSIUM SERPL-SCNC: 4 MMOL/L (ref 3.5–5.1)
RBC # BLD: 2.67 M/CU MM (ref 4.2–5.4)
SEGMENTED NEUTROPHILS ABSOLUTE COUNT: 7.9 K/CU MM
SEGMENTED NEUTROPHILS RELATIVE PERCENT: 77.7 % (ref 36–66)
SODIUM BLD-SCNC: 142 MMOL/L (ref 135–145)
TOTAL IMMATURE NEUTOROPHIL: 0.09 K/CU MM
TOTAL NUCLEATED RBC: 0 K/CU MM
WBC # BLD: 10.1 K/CU MM (ref 4–10.5)

## 2018-10-22 PROCEDURE — G8979 MOBILITY GOAL STATUS: HCPCS

## 2018-10-22 PROCEDURE — 99232 SBSQ HOSP IP/OBS MODERATE 35: CPT | Performed by: INTERNAL MEDICINE

## 2018-10-22 PROCEDURE — 6370000000 HC RX 637 (ALT 250 FOR IP): Performed by: INTERNAL MEDICINE

## 2018-10-22 PROCEDURE — 1200000000 HC SEMI PRIVATE

## 2018-10-22 PROCEDURE — 2580000003 HC RX 258: Performed by: INTERNAL MEDICINE

## 2018-10-22 PROCEDURE — 97166 OT EVAL MOD COMPLEX 45 MIN: CPT

## 2018-10-22 PROCEDURE — G8988 SELF CARE GOAL STATUS: HCPCS

## 2018-10-22 PROCEDURE — 6370000000 HC RX 637 (ALT 250 FOR IP): Performed by: HOSPITALIST

## 2018-10-22 PROCEDURE — 6360000002 HC RX W HCPCS: Performed by: HOSPITALIST

## 2018-10-22 PROCEDURE — 94640 AIRWAY INHALATION TREATMENT: CPT

## 2018-10-22 PROCEDURE — 6360000002 HC RX W HCPCS: Performed by: INTERNAL MEDICINE

## 2018-10-22 PROCEDURE — 97535 SELF CARE MNGMENT TRAINING: CPT

## 2018-10-22 PROCEDURE — 2700000000 HC OXYGEN THERAPY PER DAY

## 2018-10-22 PROCEDURE — 82962 GLUCOSE BLOOD TEST: CPT

## 2018-10-22 PROCEDURE — 80048 BASIC METABOLIC PNL TOTAL CA: CPT

## 2018-10-22 PROCEDURE — 94761 N-INVAS EAR/PLS OXIMETRY MLT: CPT

## 2018-10-22 PROCEDURE — 97162 PT EVAL MOD COMPLEX 30 MIN: CPT

## 2018-10-22 PROCEDURE — 97116 GAIT TRAINING THERAPY: CPT

## 2018-10-22 PROCEDURE — 6370000000 HC RX 637 (ALT 250 FOR IP): Performed by: FAMILY MEDICINE

## 2018-10-22 PROCEDURE — G8978 MOBILITY CURRENT STATUS: HCPCS

## 2018-10-22 PROCEDURE — G8987 SELF CARE CURRENT STATUS: HCPCS

## 2018-10-22 PROCEDURE — 85025 COMPLETE CBC W/AUTO DIFF WBC: CPT

## 2018-10-22 PROCEDURE — 2580000003 HC RX 258: Performed by: HOSPITALIST

## 2018-10-22 PROCEDURE — 97530 THERAPEUTIC ACTIVITIES: CPT

## 2018-10-22 RX ADMIN — GLIPIZIDE 10 MG: 10 TABLET ORAL at 08:16

## 2018-10-22 RX ADMIN — IPRATROPIUM BROMIDE AND ALBUTEROL SULFATE 3 ML: .5; 3 SOLUTION RESPIRATORY (INHALATION) at 15:26

## 2018-10-22 RX ADMIN — FAMOTIDINE 20 MG: 20 TABLET ORAL at 08:16

## 2018-10-22 RX ADMIN — FAMOTIDINE 20 MG: 20 TABLET ORAL at 20:23

## 2018-10-22 RX ADMIN — HEPARIN SODIUM 5000 UNITS: 5000 INJECTION INTRAVENOUS; SUBCUTANEOUS at 20:23

## 2018-10-22 RX ADMIN — GLIPIZIDE 10 MG: 10 TABLET ORAL at 17:30

## 2018-10-22 RX ADMIN — Medication 2 PUFF: at 20:13

## 2018-10-22 RX ADMIN — PRAVASTATIN SODIUM 20 MG: 20 TABLET ORAL at 20:22

## 2018-10-22 RX ADMIN — INSULIN LISPRO 2 UNITS: 100 INJECTION, SOLUTION INTRAVENOUS; SUBCUTANEOUS at 08:17

## 2018-10-22 RX ADMIN — IPRATROPIUM BROMIDE AND ALBUTEROL SULFATE 3 ML: .5; 3 SOLUTION RESPIRATORY (INHALATION) at 11:30

## 2018-10-22 RX ADMIN — IPRATROPIUM BROMIDE AND ALBUTEROL SULFATE 3 ML: .5; 3 SOLUTION RESPIRATORY (INHALATION) at 07:46

## 2018-10-22 RX ADMIN — OXYCODONE HYDROCHLORIDE AND ACETAMINOPHEN 500 MG: 500 TABLET ORAL at 08:16

## 2018-10-22 RX ADMIN — DEXTROSE MONOHYDRATE: 50 INJECTION, SOLUTION INTRAVENOUS at 13:56

## 2018-10-22 RX ADMIN — LINAGLIPTIN 5 MG: 5 TABLET, FILM COATED ORAL at 08:17

## 2018-10-22 RX ADMIN — HEPARIN SODIUM 5000 UNITS: 5000 INJECTION INTRAVENOUS; SUBCUTANEOUS at 08:17

## 2018-10-22 RX ADMIN — SODIUM CHLORIDE, PRESERVATIVE FREE 10 ML: 5 INJECTION INTRAVENOUS at 08:17

## 2018-10-22 RX ADMIN — ATENOLOL 25 MG: 25 TABLET ORAL at 20:23

## 2018-10-22 RX ADMIN — TORSEMIDE 20 MG: 20 TABLET ORAL at 20:23

## 2018-10-22 RX ADMIN — IPRATROPIUM BROMIDE AND ALBUTEROL SULFATE 3 ML: .5; 3 SOLUTION RESPIRATORY (INHALATION) at 20:09

## 2018-10-22 RX ADMIN — ATENOLOL 25 MG: 25 TABLET ORAL at 08:17

## 2018-10-22 RX ADMIN — FLUTICASONE PROPIONATE 1 SPRAY: 50 SPRAY, METERED NASAL at 08:22

## 2018-10-22 RX ADMIN — TRAZODONE HYDROCHLORIDE 100 MG: 50 TABLET ORAL at 22:14

## 2018-10-22 RX ADMIN — TROSPIUM CHLORIDE 20 MG: 20 TABLET ORAL at 20:23

## 2018-10-22 RX ADMIN — Medication 2 PUFF: at 07:46

## 2018-10-22 RX ADMIN — TORSEMIDE 20 MG: 20 TABLET ORAL at 08:16

## 2018-10-22 RX ADMIN — INSULIN LISPRO 1 UNITS: 100 INJECTION, SOLUTION INTRAVENOUS; SUBCUTANEOUS at 20:23

## 2018-10-22 ASSESSMENT — PAIN SCALES - GENERAL
PAINLEVEL_OUTOF10: 0

## 2018-10-22 NOTE — PROGRESS NOTES
Nephrology Progress Note  10/22/2018 8:36 AM        Subjective:   Admit Date: 10/18/2018  PCP: Lauren Angel MD    Interval History: no event     Diet: some    ROS:  SANTOYO, unable to finish a sentence and uses accessory muscles    Data:     Current med's:    torsemide  20 mg Oral BID    heparin (porcine)  5,000 Units Subcutaneous BID    mometasone-formoterol  2 puff Inhalation BID    linagliptin  5 mg Oral Daily    pravastatin  20 mg Oral Nightly    sodium chloride flush  10 mL Intravenous 2 times per day    atenolol  25 mg Oral BID    famotidine  20 mg Oral BID    fluticasone  1 spray Each Nare Daily    glipiZIDE  10 mg Oral BID AC    insulin lispro  0-12 Units Subcutaneous TID WC    insulin lispro  0-6 Units Subcutaneous Nightly    ipratropium-albuterol  1 vial Nebulization 4x daily    nicotine  1 patch Transdermal Daily    trospium  20 mg Oral Nightly    vitamin C  500 mg Oral Daily      IV infusion builder 22.5 mL/hr at 10/21/18 1229    dextrose           I/O last 3 completed shifts:   In: 1002.3 [P.O.:480; I.V.:522.3]  Out: 225 [Urine:225]    CBC:   Recent Labs      10/20/18   0600  10/21/18   0925  10/22/18   0440   WBC  10.6*  12.8*  10.1   HGB  8.5*  9.1*  7.7*   PLT  271  285  227          Recent Labs      10/20/18   0600  10/21/18   0925  10/22/18   0440   NA  142  137  142   K  4.5  4.3  4.0   CL  103  98*  103   CO2  27  26  27   BUN  35*  29*  29*   CREATININE  1.2*  1.2*  1.2*   GLUCOSE  102*  192*  133*       Lab Results   Component Value Date    CALCIUM 8.6 10/22/2018    PHOS 3.5 10/20/2018       Objective:     Vitals: BP (!) 142/59   Pulse 104   Temp 98.2 °F (36.8 °C) (Oral)   Resp 18   Ht 5' 6\" (1.676 m)   Wt 261 lb 1.6 oz (118.4 kg)   SpO2 91%   BMI 42.14 kg/m²     General appearance:  No acute distress  HEENT:  +pallor  Neck:  supple  Lungs:  + adv BS  Heart:  Seem  Irregular with DEJON   Abdomen: soft/obese  Extremities:  ++ edema LE       Problem List :         Impression : 1. Yoly/CKD stage 3- stable   2. Fluid overload- she has likely cor pulmonale/ and biventricular dys Fx complicated by valvular dz   3. Endocarditis -   4. Recommendation/Plan  :     1. Keep po loop \  2. Watch daily- wt and low salt  3. Good daily physical exam  and sx   4. I left message for Dr. Yonathan Block for potential C-scope  5. Follow clinically  6.  She will remain in risk for YOLY      Sobeida Bennett MD

## 2018-10-22 NOTE — PROGRESS NOTES
Value Ref Range    POC Glucose 121 (H) 70 - 99 MG/DL   POCT Glucose    Collection Time: 10/21/18  8:33 PM   Result Value Ref Range    POC Glucose 129 (H) 70 - 99 MG/DL   POCT Glucose    Collection Time: 10/22/18  2:48 AM   Result Value Ref Range    POC Glucose 123 (H) 70 - 99 MG/DL   Basic Metabolic Panel w/ Reflex to MG    Collection Time: 10/22/18  4:40 AM   Result Value Ref Range    Sodium 142 135 - 145 MMOL/L    Potassium 4.0 3.5 - 5.1 MMOL/L    Chloride 103 99 - 110 mMol/L    CO2 27 21 - 32 MMOL/L    Anion Gap 12 4 - 16    BUN 29 (H) 6 - 23 MG/DL    CREATININE 1.2 (H) 0.6 - 1.1 MG/DL    Glucose 133 (H) 70 - 99 MG/DL    Calcium 8.6 8.3 - 10.6 MG/DL    GFR Non- 46 (L) >60 mL/min/1.73m2    GFR  55 (L) >60 mL/min/1.73m2   CBC auto differential    Collection Time: 10/22/18  4:40 AM   Result Value Ref Range    WBC 10.1 4.0 - 10.5 K/CU MM    RBC 2.67 (L) 4.2 - 5.4 M/CU MM    Hemoglobin 7.7 (L) 12.5 - 16.0 GM/DL    Hematocrit 26.0 (L) 37 - 47 %    MCV 97.4 78 - 100 FL    MCH 28.8 27 - 31 PG    MCHC 29.6 (L) 32.0 - 36.0 %    RDW 15.6 (H) 11.7 - 14.9 %    Platelets 199 164 - 124 K/CU MM    MPV 9.1 7.5 - 11.1 FL    Differential Type AUTOMATED DIFFERENTIAL     Segs Relative 77.7 (H) 36 - 66 %    Lymphocytes % 15.9 (L) 24 - 44 %    Monocytes % 4.7 (H) 0 - 4 %    Eosinophils % 0.6 0 - 3 %    Basophils % 0.2 0 - 1 %    Segs Absolute 7.9 K/CU MM    Lymphocytes # 1.6 K/CU MM    Monocytes # 0.5 K/CU MM    Eosinophils # 0.1 K/CU MM    Basophils # 0.0 K/CU MM    Nucleated RBC % 0.0 %    Total Nucleated RBC 0.0 K/CU MM    Total Immature Neutrophil 0.09 K/CU MM    Immature Neutrophil % 0.9 (H) 0 - 0.43 %   POCT Glucose    Collection Time: 10/22/18  8:02 AM   Result Value Ref Range    POC Glucose 179 (H) 70 - 99 MG/DL   POCT Glucose    Collection Time: 10/22/18 12:57 PM   Result Value Ref Range    POC Glucose 126 (H) 70 - 99 MG/DL     CULTURE results: Invalid input(s): BLOOD CULTURE,  URINE CULTURE,

## 2018-10-22 NOTE — CONSULTS
oriented and  pleasant to talk with. VITAL SIGNS:  Stable. HEENT:  Shows conjunctivae to be pale. NECK:  Supple. CHEST:  Shows diminished breath sounds. HEART:  S1 and S2 are normal.  ABDOMEN:  Soft, obese, nontender. Liver and spleen are not palpable. Bowel sounds are present. RECTAL:  Deferred. CNS:  Shows the patient to be awake, alert, and oriented. There are  no focal sensorimotor signs. MUSCULOSKELETAL SYSTEM:  Shows evidence of degenerative joint disease  changes. LABORATORY DATA:  As above mentioned. IMPRESSION:  3  A 61-year-old female with multiple comorbidities, admitted with  shortness of breath with chronic respiratory failure and was diagnosed  to have aortic/mitral valve endocarditis with strep bovis infection  and needs colonoscopy to rule out colorectal neoplasm. 2.  History of anemia, rule out GI bleeding. RECOMMENDATIONS:  1. Agree with present management. 2.  We will proceed with EGD and colonoscopy for workup of the  patient's anemia and also to rule out colorectal neoplasm. 3.  Case and plan has been discussed in detail with the patient and  was also discussed with Nephrology consultant, Dr. Josh Melchor earlier today  as well.         Luis Alberto Connell MD    D: 10/22/2018 10:50:52       T: 10/22/2018 13:13:23     [id number]  Job#: [id number]     Doc#: [id number]    CC:  Eddie Hayes MD

## 2018-10-22 NOTE — CARE COORDINATION
Received referral for ARU. Reviewed patients clinicals and PT/OT notes. Patient is too high level for ARU. Notified Mariposa Marquez CM of above information.  Thank you for the referral.

## 2018-10-22 NOTE — PROGRESS NOTES
hours. Radiology Review:        ASSESSMENT AND PLAN:  Mitral valve and aortic valve endocarditis    * No surgery date entered *  Patient Active Problem List   Diagnosis    Type 2 diabetes mellitus without complication ([address]    Essential hypertension    Urinary incontinence, mixed    Tobacco abuse disorder    Obstructive sleep apnea    Pulmonary hypertension (HCC)    Systolic murmur    Chronic respiratory failure with hypoxia (HCC)    Microalbuminuria due to type 2 diabetes mellitus ([address]    Liver dysfunction    Steatohepatitis    Mixed hyperlipidemia    Morbid obesity due to excess calories ([address]    COPD, severe ([address]    COPD exacerbation ([address]    Fall at home    Type 2 diabetes mellitus ([address]    Hypertension    Hyperlipidemia    Tobacco abuse    Obesity due to excess calories    CHF (congestive heart failure) (HCC)    Physical debility    Bilateral carotid artery stenosis    Cerebrovascular accident (CVA) ([address]    COPD exacerbation (HCC)    Type 2 diabetes mellitus ([address]    Hypertension    Tobacco abuse    Hyperlipidemia    COPD (chronic obstructive pulmonary disease) (HCC)    Pneumonia    Bacteremia    Streptococcal bacteremia    Subacute bacterial endocarditis       I have discussed the plan with the patient and her daughter. Hemoglobin drop today with no signs of GI bleed. I agree with colonoscopy while patient is in the hospital to help define the source of her endocarditis and possible GI bleed. Continue antibiotic therapy for now. Avoid surgical intervention at this point due to high risk and need for sterilization of blood. We'll continue to follow.     Electronically signed by Ge Cartwright MD on 10/22/2018 at 12:37 PM

## 2018-10-23 LAB
ALBUMIN SERPL-MCNC: 3.3 GM/DL (ref 3.4–5)
ALP BLD-CCNC: 62 IU/L (ref 40–128)
ALT SERPL-CCNC: 20 U/L (ref 10–40)
AMYLASE: 38 U/L (ref 25–115)
ANION GAP SERPL CALCULATED.3IONS-SCNC: 12 MMOL/L (ref 4–16)
APTT: 32.6 SECONDS (ref 21.2–33)
AST SERPL-CCNC: 10 IU/L (ref 15–37)
BASOPHILS ABSOLUTE: 0 K/CU MM
BASOPHILS RELATIVE PERCENT: 0.3 % (ref 0–1)
BILIRUB SERPL-MCNC: 0.3 MG/DL (ref 0–1)
BUN BLDV-MCNC: 27 MG/DL (ref 6–23)
CALCIUM SERPL-MCNC: 8.2 MG/DL (ref 8.3–10.6)
CHLORIDE BLD-SCNC: 100 MMOL/L (ref 99–110)
CO2: 28 MMOL/L (ref 21–32)
CREAT SERPL-MCNC: 1.3 MG/DL (ref 0.6–1.1)
CULTURE: NORMAL
CULTURE: NORMAL
DIFFERENTIAL TYPE: ABNORMAL
EOSINOPHILS ABSOLUTE: 0.1 K/CU MM
EOSINOPHILS RELATIVE PERCENT: 0.6 % (ref 0–3)
GFR AFRICAN AMERICAN: 50 ML/MIN/1.73M2
GFR NON-AFRICAN AMERICAN: 42 ML/MIN/1.73M2
GLUCOSE BLD-MCNC: 123 MG/DL (ref 70–99)
GLUCOSE BLD-MCNC: 147 MG/DL (ref 70–99)
GLUCOSE BLD-MCNC: 186 MG/DL (ref 70–99)
GLUCOSE BLD-MCNC: 80 MG/DL (ref 70–99)
GLUCOSE BLD-MCNC: 83 MG/DL (ref 70–99)
HCT VFR BLD CALC: 25.4 % (ref 37–47)
HEMOGLOBIN: 7.8 GM/DL (ref 12.5–16)
IMMATURE NEUTROPHIL %: 0.8 % (ref 0–0.43)
INR BLD: 1.2 INDEX
LIPASE: 39 IU/L (ref 13–60)
LYMPHOCYTES ABSOLUTE: 1.8 K/CU MM
LYMPHOCYTES RELATIVE PERCENT: 17.9 % (ref 24–44)
Lab: NORMAL
Lab: NORMAL
MCH RBC QN AUTO: 29.4 PG (ref 27–31)
MCHC RBC AUTO-ENTMCNC: 30.7 % (ref 32–36)
MCV RBC AUTO: 95.8 FL (ref 78–100)
MONOCYTES ABSOLUTE: 0.6 K/CU MM
MONOCYTES RELATIVE PERCENT: 6.3 % (ref 0–4)
NUCLEATED RBC %: 0 %
PDW BLD-RTO: 15.8 % (ref 11.7–14.9)
PLATELET # BLD: 237 K/CU MM (ref 140–440)
PMV BLD AUTO: 9.1 FL (ref 7.5–11.1)
POTASSIUM SERPL-SCNC: 3.8 MMOL/L (ref 3.5–5.1)
PROTHROMBIN TIME: 13.7 SECONDS (ref 9.12–12.5)
RBC # BLD: 2.65 M/CU MM (ref 4.2–5.4)
REPORT STATUS: NORMAL
REPORT STATUS: NORMAL
SEGMENTED NEUTROPHILS ABSOLUTE COUNT: 7.3 K/CU MM
SEGMENTED NEUTROPHILS RELATIVE PERCENT: 74.1 % (ref 36–66)
SODIUM BLD-SCNC: 140 MMOL/L (ref 135–145)
SPECIMEN: NORMAL
SPECIMEN: NORMAL
TOTAL IMMATURE NEUTOROPHIL: 0.08 K/CU MM
TOTAL NUCLEATED RBC: 0 K/CU MM
TOTAL PROTEIN: 5.6 GM/DL (ref 6.4–8.2)
WBC # BLD: 9.9 K/CU MM (ref 4–10.5)

## 2018-10-23 PROCEDURE — 94640 AIRWAY INHALATION TREATMENT: CPT

## 2018-10-23 PROCEDURE — 2580000003 HC RX 258: Performed by: INTERNAL MEDICINE

## 2018-10-23 PROCEDURE — 80053 COMPREHEN METABOLIC PANEL: CPT

## 2018-10-23 PROCEDURE — 6360000002 HC RX W HCPCS: Performed by: HOSPITALIST

## 2018-10-23 PROCEDURE — 94761 N-INVAS EAR/PLS OXIMETRY MLT: CPT

## 2018-10-23 PROCEDURE — 85610 PROTHROMBIN TIME: CPT

## 2018-10-23 PROCEDURE — 99232 SBSQ HOSP IP/OBS MODERATE 35: CPT | Performed by: INTERNAL MEDICINE

## 2018-10-23 PROCEDURE — 6370000000 HC RX 637 (ALT 250 FOR IP): Performed by: SPECIALIST

## 2018-10-23 PROCEDURE — 85730 THROMBOPLASTIN TIME PARTIAL: CPT

## 2018-10-23 PROCEDURE — 82150 ASSAY OF AMYLASE: CPT

## 2018-10-23 PROCEDURE — 6360000002 HC RX W HCPCS: Performed by: INTERNAL MEDICINE

## 2018-10-23 PROCEDURE — 6370000000 HC RX 637 (ALT 250 FOR IP): Performed by: FAMILY MEDICINE

## 2018-10-23 PROCEDURE — 2580000003 HC RX 258: Performed by: HOSPITALIST

## 2018-10-23 PROCEDURE — 83690 ASSAY OF LIPASE: CPT

## 2018-10-23 PROCEDURE — 1200000000 HC SEMI PRIVATE

## 2018-10-23 PROCEDURE — 6370000000 HC RX 637 (ALT 250 FOR IP): Performed by: INTERNAL MEDICINE

## 2018-10-23 PROCEDURE — 6370000000 HC RX 637 (ALT 250 FOR IP): Performed by: HOSPITALIST

## 2018-10-23 PROCEDURE — 36592 COLLECT BLOOD FROM PICC: CPT

## 2018-10-23 PROCEDURE — 85025 COMPLETE CBC W/AUTO DIFF WBC: CPT

## 2018-10-23 PROCEDURE — 82962 GLUCOSE BLOOD TEST: CPT

## 2018-10-23 PROCEDURE — 2700000000 HC OXYGEN THERAPY PER DAY

## 2018-10-23 RX ORDER — TORSEMIDE 20 MG/1
20 TABLET ORAL 2 TIMES DAILY
Status: COMPLETED | OUTPATIENT
Start: 2018-10-23 | End: 2018-10-23

## 2018-10-23 RX ADMIN — FAMOTIDINE 20 MG: 20 TABLET ORAL at 23:32

## 2018-10-23 RX ADMIN — INSULIN LISPRO 2 UNITS: 100 INJECTION, SOLUTION INTRAVENOUS; SUBCUTANEOUS at 10:16

## 2018-10-23 RX ADMIN — ATENOLOL 25 MG: 25 TABLET ORAL at 10:14

## 2018-10-23 RX ADMIN — TRAZODONE HYDROCHLORIDE 100 MG: 50 TABLET ORAL at 23:31

## 2018-10-23 RX ADMIN — IPRATROPIUM BROMIDE AND ALBUTEROL SULFATE 3 ML: .5; 3 SOLUTION RESPIRATORY (INHALATION) at 19:50

## 2018-10-23 RX ADMIN — FLUTICASONE PROPIONATE 1 SPRAY: 50 SPRAY, METERED NASAL at 11:07

## 2018-10-23 RX ADMIN — GLIPIZIDE 10 MG: 10 TABLET ORAL at 17:30

## 2018-10-23 RX ADMIN — IPRATROPIUM BROMIDE AND ALBUTEROL SULFATE 3 ML: .5; 3 SOLUTION RESPIRATORY (INHALATION) at 11:36

## 2018-10-23 RX ADMIN — OXYCODONE HYDROCHLORIDE AND ACETAMINOPHEN 500 MG: 500 TABLET ORAL at 10:14

## 2018-10-23 RX ADMIN — ATENOLOL 25 MG: 25 TABLET ORAL at 23:31

## 2018-10-23 RX ADMIN — POLYETHYLENE GLYCOL 3350, SODIUM SULFATE ANHYDROUS, SODIUM BICARBONATE, SODIUM CHLORIDE, POTASSIUM CHLORIDE 4000 ML: 236; 22.74; 6.74; 5.86; 2.97 POWDER, FOR SOLUTION ORAL at 17:30

## 2018-10-23 RX ADMIN — DEXTROSE MONOHYDRATE: 50 INJECTION, SOLUTION INTRAVENOUS at 14:53

## 2018-10-23 RX ADMIN — HEPARIN SODIUM 5000 UNITS: 5000 INJECTION INTRAVENOUS; SUBCUTANEOUS at 23:30

## 2018-10-23 RX ADMIN — HEPARIN SODIUM 5000 UNITS: 5000 INJECTION INTRAVENOUS; SUBCUTANEOUS at 10:17

## 2018-10-23 RX ADMIN — PRAVASTATIN SODIUM 20 MG: 20 TABLET ORAL at 23:31

## 2018-10-23 RX ADMIN — TORSEMIDE 20 MG: 20 TABLET ORAL at 10:15

## 2018-10-23 RX ADMIN — Medication 2 PUFF: at 19:58

## 2018-10-23 RX ADMIN — TORSEMIDE 20 MG: 20 TABLET ORAL at 23:31

## 2018-10-23 RX ADMIN — IPRATROPIUM BROMIDE AND ALBUTEROL SULFATE 3 ML: .5; 3 SOLUTION RESPIRATORY (INHALATION) at 07:42

## 2018-10-23 RX ADMIN — GLIPIZIDE 10 MG: 10 TABLET ORAL at 10:14

## 2018-10-23 RX ADMIN — TROSPIUM CHLORIDE 20 MG: 20 TABLET ORAL at 23:31

## 2018-10-23 RX ADMIN — SODIUM CHLORIDE, PRESERVATIVE FREE 10 ML: 5 INJECTION INTRAVENOUS at 10:14

## 2018-10-23 RX ADMIN — LINAGLIPTIN 5 MG: 5 TABLET, FILM COATED ORAL at 10:15

## 2018-10-23 RX ADMIN — IPRATROPIUM BROMIDE AND ALBUTEROL SULFATE 3 ML: .5; 3 SOLUTION RESPIRATORY (INHALATION) at 15:27

## 2018-10-23 RX ADMIN — Medication 2 PUFF: at 07:42

## 2018-10-23 RX ADMIN — FAMOTIDINE 20 MG: 20 TABLET ORAL at 10:14

## 2018-10-23 ASSESSMENT — PAIN DESCRIPTION - PROGRESSION
CLINICAL_PROGRESSION: GRADUALLY WORSENING

## 2018-10-23 NOTE — CARE COORDINATION
Noted pt is approved for Swing Bed at [facility]. Dr. Fred Avila asked that benefits be checked for continuous pump for IV Penicillin for after her stay at [facility]. Message sent to [address] Liaison to check benefits.  Electronically signed by Sallie Ferreira RN on 10/23/2018 at 10:16 AM

## 2018-10-23 NOTE — PROGRESS NOTES
Recent Results (from the past 24 hour(s))   POCT Glucose    Collection Time: 10/22/18 12:57 PM   Result Value Ref Range    POC Glucose 126 (H) 70 - 99 MG/DL   POCT Glucose    Collection Time: 10/22/18  5:13 PM   Result Value Ref Range    POC Glucose 125 (H) 70 - 99 MG/DL   POCT Glucose    Collection Time: 10/22/18  8:21 PM   Result Value Ref Range    POC Glucose 180 (H) 70 - 99 MG/DL   CBC auto differential    Collection Time: 10/23/18  4:00 AM   Result Value Ref Range    WBC 9.9 4.0 - 10.5 K/CU MM    RBC 2.65 (L) 4.2 - 5.4 M/CU MM    Hemoglobin 7.8 (L) 12.5 - 16.0 GM/DL    Hematocrit 25.4 (L) 37 - 47 %    MCV 95.8 78 - 100 FL    MCH 29.4 27 - 31 PG    MCHC 30.7 (L) 32.0 - 36.0 %    RDW 15.8 (H) 11.7 - 14.9 %    Platelets 709 198 - 689 K/CU MM    MPV 9.1 7.5 - 11.1 FL    Differential Type AUTOMATED DIFFERENTIAL     Segs Relative 74.1 (H) 36 - 66 %    Lymphocytes % 17.9 (L) 24 - 44 %    Monocytes % 6.3 (H) 0 - 4 %    Eosinophils % 0.6 0 - 3 %    Basophils % 0.3 0 - 1 %    Segs Absolute 7.3 K/CU MM    Lymphocytes # 1.8 K/CU MM    Monocytes # 0.6 K/CU MM    Eosinophils # 0.1 K/CU MM    Basophils # 0.0 K/CU MM    Nucleated RBC % 0.0 %    Total Nucleated RBC 0.0 K/CU MM    Total Immature Neutrophil 0.08 K/CU MM    Immature Neutrophil % 0.8 (H) 0 - 0.43 %   Comprehensive Metabolic Panel    Collection Time: 10/23/18  4:00 AM   Result Value Ref Range    Sodium 140 135 - 145 MMOL/L    Potassium 3.8 3.5 - 5.1 MMOL/L    Chloride 100 99 - 110 mMol/L    CO2 28 21 - 32 MMOL/L    BUN 27 (H) 6 - 23 MG/DL    CREATININE 1.3 (H) 0.6 - 1.1 MG/DL    Glucose 147 (H) 70 - 99 MG/DL    Calcium 8.2 (L) 8.3 - 10.6 MG/DL    Alb 3.3 (L) 3.4 - 5.0 GM/DL    Total Protein 5.6 (L) 6.4 - 8.2 GM/DL    Total Bilirubin 0.3 0.0 - 1.0 MG/DL    ALT 20 10 - 40 U/L    AST 10 (L) 15 - 37 IU/L    Alkaline Phosphatase 62 40 - 128 IU/L    GFR Non- 42 (L) >60 mL/min/1.73m2    GFR  50 (L) >60 mL/min/1.73m2    Anion Gap 12 4 - 16

## 2018-10-23 NOTE — CARE COORDINATION
Sammy called with auth# [id number] 10-22- 10-26. M for CM at [facility] to set up transfer and discharge.

## 2018-10-23 NOTE — PROGRESS NOTES
sodium chloride flush 10 mL PRN   magnesium hydroxide 30 mL Daily PRN   ondansetron 4 mg Q6H PRN   albuterol 2.5 mg Q2H PRN   dextrose 100 mL/hr PRN   dextrose 12.5 g PRN   glucagon (rDNA) 1 mg PRN   glucose 15 g PRN         Electronically signed by Will Alvarez MD on 10/23/2018 at 4:36 PM

## 2018-10-24 ENCOUNTER — ANESTHESIA EVENT (OUTPATIENT)
Dept: OPERATING ROOM | Age: 63
DRG: 853 | End: 2018-10-24
Payer: COMMERCIAL

## 2018-10-24 ENCOUNTER — ANESTHESIA (OUTPATIENT)
Dept: ENDOSCOPY | Age: 63
DRG: 853 | End: 2018-10-24
Payer: COMMERCIAL

## 2018-10-24 ENCOUNTER — ANESTHESIA EVENT (OUTPATIENT)
Dept: ENDOSCOPY | Age: 63
DRG: 853 | End: 2018-10-24
Payer: COMMERCIAL

## 2018-10-24 VITALS — OXYGEN SATURATION: 92 % | SYSTOLIC BLOOD PRESSURE: 103 MMHG | DIASTOLIC BLOOD PRESSURE: 52 MMHG

## 2018-10-24 LAB
ANION GAP SERPL CALCULATED.3IONS-SCNC: 11 MMOL/L (ref 4–16)
ANION GAP SERPL CALCULATED.3IONS-SCNC: 14 MMOL/L (ref 4–16)
BASOPHILS ABSOLUTE: 0.1 K/CU MM
BASOPHILS ABSOLUTE: 0.1 K/CU MM
BASOPHILS RELATIVE PERCENT: 0.4 % (ref 0–1)
BASOPHILS RELATIVE PERCENT: 0.5 % (ref 0–1)
BUN BLDV-MCNC: 17 MG/DL (ref 6–23)
BUN BLDV-MCNC: 18 MG/DL (ref 6–23)
CALCIUM SERPL-MCNC: 7.9 MG/DL (ref 8.3–10.6)
CALCIUM SERPL-MCNC: 8.1 MG/DL (ref 8.3–10.6)
CHLORIDE BLD-SCNC: 98 MMOL/L (ref 99–110)
CHLORIDE BLD-SCNC: 98 MMOL/L (ref 99–110)
CO2: 28 MMOL/L (ref 21–32)
CO2: 30 MMOL/L (ref 21–32)
CREAT SERPL-MCNC: 1.1 MG/DL (ref 0.6–1.1)
CREAT SERPL-MCNC: 1.2 MG/DL (ref 0.6–1.1)
DIFFERENTIAL TYPE: ABNORMAL
DIFFERENTIAL TYPE: ABNORMAL
EOSINOPHILS ABSOLUTE: 0.1 K/CU MM
EOSINOPHILS ABSOLUTE: 0.1 K/CU MM
EOSINOPHILS RELATIVE PERCENT: 0.8 % (ref 0–3)
EOSINOPHILS RELATIVE PERCENT: 0.8 % (ref 0–3)
GFR AFRICAN AMERICAN: 55 ML/MIN/1.73M2
GFR AFRICAN AMERICAN: >60 ML/MIN/1.73M2
GFR NON-AFRICAN AMERICAN: 46 ML/MIN/1.73M2
GFR NON-AFRICAN AMERICAN: 50 ML/MIN/1.73M2
GLUCOSE BLD-MCNC: 116 MG/DL (ref 70–99)
GLUCOSE BLD-MCNC: 118 MG/DL (ref 70–99)
GLUCOSE BLD-MCNC: 125 MG/DL (ref 70–99)
GLUCOSE BLD-MCNC: 126 MG/DL (ref 70–99)
GLUCOSE BLD-MCNC: 128 MG/DL (ref 70–99)
GLUCOSE BLD-MCNC: 145 MG/DL (ref 70–99)
GLUCOSE BLD-MCNC: 99 MG/DL (ref 70–99)
HCT VFR BLD CALC: 24.4 % (ref 37–47)
HCT VFR BLD CALC: 25.8 % (ref 37–47)
HEMOGLOBIN: 7.7 GM/DL (ref 12.5–16)
HEMOGLOBIN: 8.1 GM/DL (ref 12.5–16)
IMMATURE NEUTROPHIL %: 0.8 % (ref 0–0.43)
IMMATURE NEUTROPHIL %: 0.9 % (ref 0–0.43)
LYMPHOCYTES ABSOLUTE: 1.9 K/CU MM
LYMPHOCYTES ABSOLUTE: 2.2 K/CU MM
LYMPHOCYTES RELATIVE PERCENT: 19.5 % (ref 24–44)
LYMPHOCYTES RELATIVE PERCENT: 19.6 % (ref 24–44)
MAGNESIUM: 1 MG/DL (ref 1.8–2.4)
MCH RBC QN AUTO: 29.9 PG (ref 27–31)
MCH RBC QN AUTO: 30.1 PG (ref 27–31)
MCHC RBC AUTO-ENTMCNC: 31.4 % (ref 32–36)
MCHC RBC AUTO-ENTMCNC: 31.6 % (ref 32–36)
MCV RBC AUTO: 95.2 FL (ref 78–100)
MCV RBC AUTO: 95.3 FL (ref 78–100)
MONOCYTES ABSOLUTE: 0.6 K/CU MM
MONOCYTES ABSOLUTE: 0.6 K/CU MM
MONOCYTES RELATIVE PERCENT: 4.9 % (ref 0–4)
MONOCYTES RELATIVE PERCENT: 6.4 % (ref 0–4)
NUCLEATED RBC %: 0 %
NUCLEATED RBC %: 0 %
PDW BLD-RTO: 15.5 % (ref 11.7–14.9)
PDW BLD-RTO: 15.7 % (ref 11.7–14.9)
PLATELET # BLD: 222 K/CU MM (ref 140–440)
PLATELET # BLD: 253 K/CU MM (ref 140–440)
PMV BLD AUTO: 8.9 FL (ref 7.5–11.1)
PMV BLD AUTO: 8.9 FL (ref 7.5–11.1)
POTASSIUM SERPL-SCNC: 3.2 MMOL/L (ref 3.5–5.1)
POTASSIUM SERPL-SCNC: 4.3 MMOL/L (ref 3.5–5.1)
RBC # BLD: 2.56 M/CU MM (ref 4.2–5.4)
RBC # BLD: 2.71 M/CU MM (ref 4.2–5.4)
SEGMENTED NEUTROPHILS ABSOLUTE COUNT: 7.2 K/CU MM
SEGMENTED NEUTROPHILS ABSOLUTE COUNT: 8.4 K/CU MM
SEGMENTED NEUTROPHILS RELATIVE PERCENT: 71.9 % (ref 36–66)
SEGMENTED NEUTROPHILS RELATIVE PERCENT: 73.5 % (ref 36–66)
SODIUM BLD-SCNC: 139 MMOL/L (ref 135–145)
SODIUM BLD-SCNC: 140 MMOL/L (ref 135–145)
TOTAL IMMATURE NEUTOROPHIL: 0.09 K/CU MM
TOTAL IMMATURE NEUTOROPHIL: 0.09 K/CU MM
TOTAL NUCLEATED RBC: 0 K/CU MM
TOTAL NUCLEATED RBC: 0 K/CU MM
WBC # BLD: 10 K/CU MM (ref 4–10.5)
WBC # BLD: 11.4 K/CU MM (ref 4–10.5)

## 2018-10-24 PROCEDURE — 6370000000 HC RX 637 (ALT 250 FOR IP): Performed by: HOSPITALIST

## 2018-10-24 PROCEDURE — 2580000003 HC RX 258: Performed by: INTERNAL MEDICINE

## 2018-10-24 PROCEDURE — 6370000000 HC RX 637 (ALT 250 FOR IP): Performed by: INTERNAL MEDICINE

## 2018-10-24 PROCEDURE — 6360000002 HC RX W HCPCS: Performed by: HOSPITALIST

## 2018-10-24 PROCEDURE — 83735 ASSAY OF MAGNESIUM: CPT

## 2018-10-24 PROCEDURE — 2709999900 HC NON-CHARGEABLE SUPPLY: Performed by: SPECIALIST

## 2018-10-24 PROCEDURE — 2500000003 HC RX 250 WO HCPCS: Performed by: NURSE ANESTHETIST, CERTIFIED REGISTERED

## 2018-10-24 PROCEDURE — 6370000000 HC RX 637 (ALT 250 FOR IP): Performed by: FAMILY MEDICINE

## 2018-10-24 PROCEDURE — 6360000002 HC RX W HCPCS: Performed by: INTERNAL MEDICINE

## 2018-10-24 PROCEDURE — 2580000003 HC RX 258: Performed by: NURSE ANESTHETIST, CERTIFIED REGISTERED

## 2018-10-24 PROCEDURE — 2700000000 HC OXYGEN THERAPY PER DAY

## 2018-10-24 PROCEDURE — 99254 IP/OBS CNSLTJ NEW/EST MOD 60: CPT | Performed by: SURGERY

## 2018-10-24 PROCEDURE — 3700000001 HC ADD 15 MINUTES (ANESTHESIA): Performed by: SPECIALIST

## 2018-10-24 PROCEDURE — 0DBH8ZX EXCISION OF CECUM, VIA NATURAL OR ARTIFICIAL OPENING ENDOSCOPIC, DIAGNOSTIC: ICD-10-PCS | Performed by: SPECIALIST

## 2018-10-24 PROCEDURE — 6360000002 HC RX W HCPCS: Performed by: NURSE ANESTHETIST, CERTIFIED REGISTERED

## 2018-10-24 PROCEDURE — 82962 GLUCOSE BLOOD TEST: CPT

## 2018-10-24 PROCEDURE — 1200000000 HC SEMI PRIVATE

## 2018-10-24 PROCEDURE — 85025 COMPLETE CBC W/AUTO DIFF WBC: CPT

## 2018-10-24 PROCEDURE — 88305 TISSUE EXAM BY PATHOLOGIST: CPT

## 2018-10-24 PROCEDURE — 99232 SBSQ HOSP IP/OBS MODERATE 35: CPT | Performed by: INTERNAL MEDICINE

## 2018-10-24 PROCEDURE — 94640 AIRWAY INHALATION TREATMENT: CPT

## 2018-10-24 PROCEDURE — 94761 N-INVAS EAR/PLS OXIMETRY MLT: CPT

## 2018-10-24 PROCEDURE — 80048 BASIC METABOLIC PNL TOTAL CA: CPT

## 2018-10-24 PROCEDURE — 3609010200 HC COLONOSCOPY ABLATION TUMOR POLYP/OTHER LES: Performed by: SPECIALIST

## 2018-10-24 PROCEDURE — 3700000000 HC ANESTHESIA ATTENDED CARE: Performed by: SPECIALIST

## 2018-10-24 RX ORDER — LIDOCAINE HYDROCHLORIDE 20 MG/ML
INJECTION, SOLUTION INFILTRATION; PERINEURAL PRN
Status: DISCONTINUED | OUTPATIENT
Start: 2018-10-24 | End: 2018-10-24 | Stop reason: SDUPTHER

## 2018-10-24 RX ORDER — PROPOFOL 10 MG/ML
INJECTION, EMULSION INTRAVENOUS PRN
Status: DISCONTINUED | OUTPATIENT
Start: 2018-10-24 | End: 2018-10-24 | Stop reason: SDUPTHER

## 2018-10-24 RX ORDER — POTASSIUM CHLORIDE 20 MEQ/1
40 TABLET, EXTENDED RELEASE ORAL EVERY 4 HOURS
Status: COMPLETED | OUTPATIENT
Start: 2018-10-24 | End: 2018-10-24

## 2018-10-24 RX ORDER — SODIUM CHLORIDE 9 MG/ML
INJECTION, SOLUTION INTRAVENOUS CONTINUOUS PRN
Status: DISCONTINUED | OUTPATIENT
Start: 2018-10-24 | End: 2018-10-24 | Stop reason: SDUPTHER

## 2018-10-24 RX ORDER — MAGNESIUM SULFATE 4 G/50ML
4 INJECTION INTRAVENOUS ONCE
Status: COMPLETED | OUTPATIENT
Start: 2018-10-24 | End: 2018-10-24

## 2018-10-24 RX ORDER — POTASSIUM CHLORIDE 29.8 MG/ML
40 INJECTION INTRAVENOUS ONCE
Status: COMPLETED | OUTPATIENT
Start: 2018-10-24 | End: 2018-10-24

## 2018-10-24 RX ADMIN — POTASSIUM CHLORIDE 40 MEQ: 400 INJECTION, SOLUTION INTRAVENOUS at 12:06

## 2018-10-24 RX ADMIN — TRAZODONE HYDROCHLORIDE 100 MG: 50 TABLET ORAL at 22:19

## 2018-10-24 RX ADMIN — DEXTROSE MONOHYDRATE: 50 INJECTION, SOLUTION INTRAVENOUS at 15:46

## 2018-10-24 RX ADMIN — INSULIN LISPRO 1 UNITS: 100 INJECTION, SOLUTION INTRAVENOUS; SUBCUTANEOUS at 20:05

## 2018-10-24 RX ADMIN — Medication 2 PUFF: at 20:45

## 2018-10-24 RX ADMIN — FLUTICASONE PROPIONATE 1 SPRAY: 50 SPRAY, METERED NASAL at 09:48

## 2018-10-24 RX ADMIN — GLIPIZIDE 10 MG: 10 TABLET ORAL at 14:28

## 2018-10-24 RX ADMIN — IPRATROPIUM BROMIDE AND ALBUTEROL SULFATE 3 ML: .5; 3 SOLUTION RESPIRATORY (INHALATION) at 13:35

## 2018-10-24 RX ADMIN — OXYCODONE HYDROCHLORIDE AND ACETAMINOPHEN 500 MG: 500 TABLET ORAL at 09:46

## 2018-10-24 RX ADMIN — SODIUM CHLORIDE: 9 INJECTION, SOLUTION INTRAVENOUS at 11:05

## 2018-10-24 RX ADMIN — MAGNESIUM SULFATE HEPTAHYDRATE 4 G: 80 INJECTION, SOLUTION INTRAVENOUS at 14:29

## 2018-10-24 RX ADMIN — IPRATROPIUM BROMIDE AND ALBUTEROL SULFATE 3 ML: .5; 3 SOLUTION RESPIRATORY (INHALATION) at 16:50

## 2018-10-24 RX ADMIN — PROPOFOL 50 MG: 10 INJECTION, EMULSION INTRAVENOUS at 11:09

## 2018-10-24 RX ADMIN — PHENYLEPHRINE HYDROCHLORIDE 100 MCG: 10 INJECTION INTRAVENOUS at 11:20

## 2018-10-24 RX ADMIN — Medication 2 PUFF: at 08:11

## 2018-10-24 RX ADMIN — TROSPIUM CHLORIDE 20 MG: 20 TABLET ORAL at 20:04

## 2018-10-24 RX ADMIN — FAMOTIDINE 20 MG: 20 TABLET ORAL at 09:47

## 2018-10-24 RX ADMIN — LIDOCAINE HYDROCHLORIDE 50 MG: 20 INJECTION, SOLUTION INFILTRATION; PERINEURAL at 11:10

## 2018-10-24 RX ADMIN — PROPOFOL 50 MG: 10 INJECTION, EMULSION INTRAVENOUS at 11:10

## 2018-10-24 RX ADMIN — ACETAMINOPHEN 1000 MG: 500 TABLET, FILM COATED ORAL at 12:19

## 2018-10-24 RX ADMIN — PRAVASTATIN SODIUM 20 MG: 20 TABLET ORAL at 22:19

## 2018-10-24 RX ADMIN — HEPARIN SODIUM 5000 UNITS: 5000 INJECTION INTRAVENOUS; SUBCUTANEOUS at 20:04

## 2018-10-24 RX ADMIN — ATENOLOL 25 MG: 25 TABLET ORAL at 20:04

## 2018-10-24 RX ADMIN — ATENOLOL 25 MG: 25 TABLET ORAL at 09:46

## 2018-10-24 RX ADMIN — PROPOFOL 25 MG: 10 INJECTION, EMULSION INTRAVENOUS at 11:05

## 2018-10-24 RX ADMIN — PROPOFOL 50 MG: 10 INJECTION, EMULSION INTRAVENOUS at 11:25

## 2018-10-24 RX ADMIN — PROPOFOL 50 MG: 10 INJECTION, EMULSION INTRAVENOUS at 11:12

## 2018-10-24 RX ADMIN — PROPOFOL 25 MG: 10 INJECTION, EMULSION INTRAVENOUS at 11:07

## 2018-10-24 RX ADMIN — LIDOCAINE HYDROCHLORIDE 50 MG: 20 INJECTION, SOLUTION INFILTRATION; PERINEURAL at 11:05

## 2018-10-24 RX ADMIN — POTASSIUM CHLORIDE 40 MEQ: 20 TABLET, EXTENDED RELEASE ORAL at 12:16

## 2018-10-24 RX ADMIN — FAMOTIDINE 20 MG: 20 TABLET ORAL at 20:04

## 2018-10-24 RX ADMIN — PROPOFOL 20 MG: 10 INJECTION, EMULSION INTRAVENOUS at 11:15

## 2018-10-24 RX ADMIN — POTASSIUM CHLORIDE 40 MEQ: 20 TABLET, EXTENDED RELEASE ORAL at 15:47

## 2018-10-24 RX ADMIN — IPRATROPIUM BROMIDE AND ALBUTEROL SULFATE 3 ML: .5; 3 SOLUTION RESPIRATORY (INHALATION) at 08:10

## 2018-10-24 RX ADMIN — IPRATROPIUM BROMIDE AND ALBUTEROL SULFATE 3 ML: .5; 3 SOLUTION RESPIRATORY (INHALATION) at 20:42

## 2018-10-24 ASSESSMENT — PAIN DESCRIPTION - PROGRESSION
CLINICAL_PROGRESSION: GRADUALLY WORSENING

## 2018-10-24 ASSESSMENT — ENCOUNTER SYMPTOMS
SHORTNESS OF BREATH: 1
BACK PAIN: 0
EYE ITCHING: 0
STRIDOR: 0
EYE REDNESS: 0
COLOR CHANGE: 0
ANAL BLEEDING: 0
SORE THROAT: 0
RECTAL PAIN: 0
APNEA: 0
PHOTOPHOBIA: 0
CHOKING: 0
CONSTIPATION: 0

## 2018-10-24 ASSESSMENT — LIFESTYLE VARIABLES: SMOKING_STATUS: 1

## 2018-10-24 ASSESSMENT — PAIN SCALES - GENERAL
PAINLEVEL_OUTOF10: 8
PAINLEVEL_OUTOF10: 0
PAINLEVEL_OUTOF10: 0

## 2018-10-24 ASSESSMENT — COPD QUESTIONNAIRES: CAT_SEVERITY: SEVERE

## 2018-10-24 NOTE — ANESTHESIA PRE PROCEDURE
Department of Anesthesiology  Preprocedure Note       Name:  Jennifer Estimable   Age:  65 y.o.  :  1955                                          MRN:  [id number]         Date:  10/24/2018      Surgeon: Quynh Dailey):  Cris Hall MD    Procedure: COLONOSCOPY (N/A )    Medications prior to admission:   Prior to Admission medications    Medication Sig Start Date End Date Taking?  Authorizing Provider   vitamin C (ASCORBIC ACID) 500 MG tablet Take 500 mg by mouth daily    Historical Provider, MD   CPAP Machine MISC by Does not apply route    Historical Provider, MD   acetaminophen (TYLENOL) 500 MG tablet Take 1,000 mg by mouth every 6 hours as needed for Pain    Historical Provider, MD   linagliptin (TRADJENTA) 5 MG tablet Take 1 tablet by mouth daily 10/8/18   Dmitriy Johnson MD   potassium chloride (K-TAB) 10 MEQ extended release tablet Take 1 tablet by mouth daily 10/8/18   Dmitriy Johnson MD   furosemide (LASIX) 40 MG tablet Take 1 tablet by mouth daily 10/8/18   Dmitriy Johnson MD   budesonide-formoterol (SYMBICORT) 160-4.5 MCG/ACT AERO Inhale 2 puffs into the lungs 2 times daily 18   Dmitriy Johnson MD   ipratropium-albuterol (DUONEB) 0.5-2.5 (3) MG/3ML SOLN nebulizer solution Take 3 mLs by nebulization 4 times daily 18   Clifford Meyer MD   albuterol sulfate  (90 Base) MCG/ACT inhaler Inhale 2 puffs into the lungs every 4 hours as needed for Wheezing    Historical Provider, MD   atenolol (TENORMIN) 25 MG tablet TAKE 1 TABLET TWICE A DAY 18   Dmitriy Johnson MD   solifenacin (VESICARE) 10 MG tablet TAKE 1 TABLET DAILY 18   Dmitriy Johnson MD   traZODone (DESYREL) 100 MG tablet TAKE 1 TABLET NIGHTLY 18   Dmitriy Johnson MD   indapamide (LOZOL) 2.5 MG tablet TAKE 1 TABLET DAILY 18   Dmitriy Johnson MD   glipiZIDE (GLUCOTROL XL) 10 MG extended release tablet TAKE 1 TABLET TWICE A DAY 18   Dmitriy Johnson MD   cloNIDine (CATAPRES) 0.1 MG tablet TAKE 1 TABLET TWICE A DAY 18   Dmitriy Johnson MD metFORMIN (GLUCOPHAGE) 1000 MG tablet TAKE 1 TABLET TWICE A DAY WITH MEALS 5/21/18   Willie Burns MD   pravastatin (PRAVACHOL) 20 MG tablet TAKE 1 TABLET DAILY 5/21/18   Willie Burns MD   losartan (COZAAR) 50 MG tablet Take 1 tablet by mouth daily 5/21/18   Willie Burns MD   OXYGEN Inhale 2 L/min into the lungs continuous. Historical Provider, MD   MULTIPLE VITAMIN PO Take 1 tablet by mouth daily. Historical Provider, MD   solifenacin (VESICARE) 10 MG tablet Take 1 tablet by mouth daily.  10/11/13 2/17/14  Willie Burns MD       Current medications:    Current Facility-Administered Medications   Medication Dose Route Frequency Provider Last Rate Last Dose    penicillin G potassium 18 Million Units in dextrose 5 % 500 mL infusion   Intravenous Continuous Raquel Huerta MD 22.5 mL/hr at 10/23/18 1453      heparin (porcine) injection 5,000 Units  5,000 Units Subcutaneous BID Sami Park MD   5,000 Units at 10/23/18 2330    acetaminophen (TYLENOL) tablet 1,000 mg  1,000 mg Oral Q6H PRN Sami Park MD        albuterol sulfate  (90 Base) MCG/ACT inhaler 2 puff  2 puff Inhalation Q4H PRN Sami Park MD        mometasone-formoterol (DULERA) 200-5 MCG/ACT inhaler 2 puff  2 puff Inhalation BID Sami Park MD   2 puff at 10/23/18 1958    linagliptin (TRADJENTA) tablet 5 mg  5 mg Oral Daily Sami Park MD   5 mg at 10/23/18 1015    pravastatin (PRAVACHOL) tablet 20 mg  20 mg Oral Nightly Sami Park MD   20 mg at 10/23/18 2331    traZODone (DESYREL) tablet 100 mg  100 mg Oral Nightly PRN Sami Park MD   100 mg at 10/23/18 2331    sodium chloride flush 0.9 % injection 10 mL  10 mL Intravenous 2 times per day Sami Park MD   10 mL at 10/23/18 1014    sodium chloride flush 0.9 % injection 10 mL  10 mL Intravenous PRN Sami Park MD   10 mL at 10/19/18 1543    magnesium hydroxide (MILK OF MAGNESIA) 400 MG/5ML suspension 30 mL  30 mL Oral Daily PRN MD Chelsea Bauer ondansetron (ZOFRAN) injection 4 mg  4 mg Intravenous Q6H PRN Bennie Toth MD        albuterol (PROVENTIL) nebulizer solution 2.5 mg  2.5 mg Nebulization Q2H PRN Thelma Obregon MD        atenolol (TENORMIN) tablet 25 mg  25 mg Oral BID Thelma Obregon MD   25 mg at 10/23/18 2331    dextrose 5 % solution  100 mL/hr Intravenous PRN Thelma Obregon MD        dextrose 50 % solution 12.5 g  12.5 g Intravenous PRN Thelma Obregon MD        famotidine (PEPCID) tablet 20 mg  20 mg Oral BID Thelma Obregon MD   20 mg at 10/23/18 2332    fluticasone (FLONASE) 50 MCG/ACT nasal spray 1 spray  1 spray Each Nare Daily Thelma Obregon MD   1 spray at 10/23/18 1107    glipiZIDE (GLUCOTROL) tablet 10 mg  10 mg Oral BID AC Thelma Obregon MD   10 mg at 10/23/18 1730    glucagon (rDNA) injection 1 mg  1 mg Intramuscular PRN Thelma Obregon MD        glucose (GLUTOSE) 40 % oral gel 15 g  15 g Oral PRN Thelma Obregon MD        insulin lispro (HUMALOG) injection vial 0-12 Units  0-12 Units Subcutaneous TID WC Thelma Obregon MD   2 Units at 10/23/18 1016    insulin lispro (HUMALOG) injection vial 0-6 Units  0-6 Units Subcutaneous Nightly Thelma Obregon MD   1 Units at 10/22/18 2023    ipratropium-albuterol (DUONEB) nebulizer solution 3 mL  1 vial Nebulization 4x daily Thelma bOregon MD   3 mL at 10/23/18 1950    nicotine (NICODERM CQ) 14 MG/24HR 1 patch  1 patch Transdermal Daily Thelma Obregon MD   1 patch at 10/23/18 1023    trospium (SANCTURA) tablet 20 mg  20 mg Oral Nightly Thelma Obregon MD   20 mg at 10/23/18 2331    vitamin C (ASCORBIC ACID) tablet 500 mg  500 mg Oral Daily Thelma Obregon MD   500 mg at 10/23/18 1014       Allergies:     Allergies   Allergen Reactions    Tomato        Problem List:    Patient Active Problem List   Diagnosis Code    Type 2 diabetes mellitus without complication ([facility] F84.1   

## 2018-10-24 NOTE — BRIEF OP NOTE
Brief Postoperative Note      Aileen Posada is a 65 y.o. female     Pre-operative Diagnosis:  ANEMIA    Post-operative Diagnosis: 3 CM CECAL LESION  -BX  -- 1CM POLYP AT 25 CM POLYPECTOMY DONE  /  AT LEAST 5 SMALL POLYPS IN THE RIGHT COLON 5MM-10 MM POYLPECTOMY NOT DONE / D .COLI  / HDS    Procedure:  COLONOSCOPY WITH POLYPECTOMY    Anesthesia: MAC    Surgeons/Assistants:Mk Olivarez     Estimated Blood Loss:  NONE    Complications: None    Specimens: were obtained    REC  SURGICAL CONSULT   /   F/U COLONOSCOPY IN 6 MONTHS  Mk Olivarez   10/24/2018   11:40 AM

## 2018-10-24 NOTE — PROGRESS NOTES
List :         Impression :     1. Yoly/CKD stage 3  2. Low K/ mg from loop  3. Fluid overload better with valvular dz   4. endocarditis    Recommendation/Plan  :     1. She is NPO and getting scope  2. Hold loop  3. replete K/Mg  4. resume lop in am and also dd aldactone for K and cardioprotection   5. Follow clinically  6.  Keep K . 3.8 GODFREY alannah with arrhythmia       Andrey Henderson MD

## 2018-10-24 NOTE — PROGRESS NOTES
Today's plan: CONTINEU ANTIBIOITCS FOR MITRAL VALVE VEGETATION, will sign off      Admit Date:  10/18/2018    Subjective:NO CHANGE      Chief complaints on admission: ENDOCARDITIS        History of present illness:Kathleen is a 65 y. o.year old who  presents with had no chief complaint listed for this encounter. Past medical history:    has a past medical history of Chronic respiratory failure with hypoxia ([address] COPD (chronic obstructive pulmonary disease) ([address] Diabetes mellitus ([address] Enlarged heart; Heart murmur; History of blood transfusion; Hyperlipidemia; Hypertension; Insomnia; Liver dysfunction; Nasal congestion; Obesity; On home oxygen therapy; Sleep apnea; Steatohepatitis; Systolic murmur; Tobacco abuse disorder; Type 2 diabetes mellitus ([address] and Urinary incontinence, mixed. Past surgical history:   has a past surgical history that includes Hysterectomy (2010); Cholecystectomy (2010); Breast surgery; Tonsillectomy (as a kid); knee surgery (Left, 2000); and Colonoscopy (N/A, 10/24/2018). Social History:   reports that she quit smoking about 6 weeks ago. Her smoking use included Cigarettes. She has a 15.00 pack-year smoking history. She has never used smokeless tobacco. She reports that she does not drink alcohol or use drugs. Family history:  family history includes Alcohol Abuse in her brother; Arthritis in her father, maternal uncle, and mother; Cancer in her father and mother; Depression in her brother; Diabetes in her brother; Early Death in her father; Heart Attack in her maternal grandfather; Heart Disease in her brother; High Blood Pressure in her brother, father, maternal grandfather, maternal grandmother, mother, and sister; High Cholesterol in her brother; Obesity in her brother and mother; Osteoporosis in her maternal uncle and mother; Prostate Cancer in her maternal grandfather; Substance Abuse in her brother.     Allergies   Allergen Reactions    Tomato          Objective:   BP 124/63   Pulse 89   Temp 98.5 °F (36.9 °C) (Oral)   Resp 17   Ht 5' 6\" (1.676 m)   Wt 261 lb 1.6 oz (118.4 kg)   SpO2 93%   BMI 42.14 kg/m²       Intake/Output Summary (Last 24 hours) at 10/24/18 8322  Last data filed at 10/24/18 1139   Gross per 24 hour   Intake             1535 ml   Output                0 ml   Net             1535 ml       TELEMETRY: Sinus     Physical Exam:  Constitutional:  Well developed, Well nourished, No acute distress, Non-toxic appearance. HENT:  Normocephalic, Atraumatic, Bilateral external ears normal, Oropharynx moist, No oral exudates, Nose normal. Neck- Normal range of motion, No tenderness, Supple, No stridor. Eyes:  PERRL, EOMI, Conjunctiva normal, No discharge. Respiratory:  Normal breath sounds, No respiratory distress, No wheezing, No chest tenderness. ,no use of accessory muscles, diaphragm movement is normal  Cardiovascular: (PMI) apex non displaced,no lifts no thrills, no s3,no s4, Normal heart rate, Normal rhythm, No murmurs, No rubs, No gallops. Carotid arteries pulse and amplitude are normal no bruit, no abdominal bruit noted ( normal abdominal aorta ausculation), femoral arteries pulse and amplitude are normal no bruit, pedal pulses are normal  GI:  Bowel sounds normal, Soft, No tenderness, No masses, No pulsatile masses. : External genitalia appear normal, No masses or lesions. No discharge. No CVA tenderness. Musculoskeletal:  Intact distal pulses, No edema, No tenderness, No cyanosis, No clubbing. Good range of motion in all major joints. No tenderness to palpation or major deformities noted. Back- No tenderness. Integument:  Warm, Dry, No erythema, No rash. Lymphatic:  No lymphadenopathy noted. Neurologic:  Alert & oriented x 3, Normal motor function, Normal sensory function, No focal deficits noted.    Psychiatric:  Affect normal, Judgment normal, Mood normal.     Medications:    magnesium sulfate  4 g Intravenous Once    heparin (porcine)

## 2018-10-25 ENCOUNTER — APPOINTMENT (OUTPATIENT)
Dept: GENERAL RADIOLOGY | Age: 63
DRG: 853 | End: 2018-10-25
Attending: HOSPITALIST
Payer: COMMERCIAL

## 2018-10-25 ENCOUNTER — ANESTHESIA (OUTPATIENT)
Dept: OPERATING ROOM | Age: 63
DRG: 853 | End: 2018-10-25
Payer: COMMERCIAL

## 2018-10-25 VITALS
DIASTOLIC BLOOD PRESSURE: 58 MMHG | TEMPERATURE: 99 F | RESPIRATION RATE: 12 BRPM | SYSTOLIC BLOOD PRESSURE: 119 MMHG | OXYGEN SATURATION: 98 %

## 2018-10-25 LAB
BASE EXCESS MIXED: 1.2 (ref 0–2.3)
BASE EXCESS: 0 (ref 0–2.4)
BASE EXCESS: ABNORMAL (ref 0–2.4)
CARBON MONOXIDE, BLOOD: 2.9 % (ref 0–5)
CO2 CONTENT: 27.3 MMOL/L (ref 19–24)
CO2: 27 MMOL/L (ref 21–32)
GLUCOSE BLD-MCNC: 155 MG/DL (ref 70–99)
GLUCOSE BLD-MCNC: 164 MG/DL (ref 70–99)
GLUCOSE BLD-MCNC: 164 MG/DL (ref 70–99)
GLUCOSE BLD-MCNC: 202 MG/DL (ref 70–99)
GLUCOSE BLD-MCNC: 204 MG/DL (ref 70–99)
HCO3 ARTERIAL: 25 MMOL/L (ref 18–23)
HCO3 ARTERIAL: 25.9 MMOL/L (ref 18–23)
HCT VFR BLD CALC: 24 % (ref 37–47)
HEMOGLOBIN: 8.1 GM/DL (ref 12.5–16)
METHEMOGLOBIN ARTERIAL: 1.3 %
O2 SATURATION: 90.4 % (ref 96–97)
O2 SATURATION: 91.1 % (ref 96–97)
PCO2 ARTERIAL: 47 MMHG (ref 32–45)
PCO2 ARTERIAL: 48.8 MMHG (ref 32–45)
PH BLOOD: 7.32 (ref 7.34–7.45)
PH BLOOD: 7.35 (ref 7.34–7.45)
PO2 ARTERIAL: 62 MMHG (ref 75–100)
PO2 ARTERIAL: 67.1 MMHG (ref 75–100)
POC CALCIUM: 1.08 MMOL/L (ref 1.12–1.32)
POC CHLORIDE: 107 MMOL/L (ref 98–109)
POC CREATININE: 1 MG/DL (ref 0.6–1.1)
POTASSIUM SERPL-SCNC: 4.5 MMOL/L (ref 3.5–4.5)
SODIUM BLD-SCNC: 141 MMOL/L (ref 138–146)
SOURCE, BLOOD GAS: ABNORMAL

## 2018-10-25 PROCEDURE — 8E0W4CZ ROBOTIC ASSISTED PROCEDURE OF TRUNK REGION, PERCUTANEOUS ENDOSCOPIC APPROACH: ICD-10-PCS | Performed by: SURGERY

## 2018-10-25 PROCEDURE — 86850 RBC ANTIBODY SCREEN: CPT

## 2018-10-25 PROCEDURE — 7100000001 HC PACU RECOVERY - ADDTL 15 MIN: Performed by: SURGERY

## 2018-10-25 PROCEDURE — 2700000000 HC OXYGEN THERAPY PER DAY

## 2018-10-25 PROCEDURE — 6360000002 HC RX W HCPCS: Performed by: FAMILY MEDICINE

## 2018-10-25 PROCEDURE — 82962 GLUCOSE BLOOD TEST: CPT

## 2018-10-25 PROCEDURE — 2500000003 HC RX 250 WO HCPCS: Performed by: NURSE ANESTHETIST, CERTIFIED REGISTERED

## 2018-10-25 PROCEDURE — P9045 ALBUMIN (HUMAN), 5%, 250 ML: HCPCS | Performed by: NURSE ANESTHETIST, CERTIFIED REGISTERED

## 2018-10-25 PROCEDURE — P9016 RBC LEUKOCYTES REDUCED: HCPCS

## 2018-10-25 PROCEDURE — 6370000000 HC RX 637 (ALT 250 FOR IP): Performed by: NURSE ANESTHETIST, CERTIFIED REGISTERED

## 2018-10-25 PROCEDURE — 31720 CLEARANCE OF AIRWAYS: CPT

## 2018-10-25 PROCEDURE — 3600000009 HC SURGERY ROBOT BASE: Performed by: SURGERY

## 2018-10-25 PROCEDURE — 86922 COMPATIBILITY TEST ANTIGLOB: CPT

## 2018-10-25 PROCEDURE — 6360000002 HC RX W HCPCS: Performed by: NURSE ANESTHETIST, CERTIFIED REGISTERED

## 2018-10-25 PROCEDURE — 2709999900 HC NON-CHARGEABLE SUPPLY: Performed by: SURGERY

## 2018-10-25 PROCEDURE — 3700000000 HC ANESTHESIA ATTENDED CARE: Performed by: SURGERY

## 2018-10-25 PROCEDURE — 6370000000 HC RX 637 (ALT 250 FOR IP): Performed by: HOSPITALIST

## 2018-10-25 PROCEDURE — 6360000002 HC RX W HCPCS: Performed by: INTERNAL MEDICINE

## 2018-10-25 PROCEDURE — 2580000003 HC RX 258: Performed by: NURSE ANESTHETIST, CERTIFIED REGISTERED

## 2018-10-25 PROCEDURE — 2580000003 HC RX 258: Performed by: INTERNAL MEDICINE

## 2018-10-25 PROCEDURE — 0DTF4ZZ RESECTION OF RIGHT LARGE INTESTINE, PERCUTANEOUS ENDOSCOPIC APPROACH: ICD-10-PCS | Performed by: SURGERY

## 2018-10-25 PROCEDURE — 7100000000 HC PACU RECOVERY - FIRST 15 MIN: Performed by: SURGERY

## 2018-10-25 PROCEDURE — 71045 X-RAY EXAM CHEST 1 VIEW: CPT

## 2018-10-25 PROCEDURE — 94761 N-INVAS EAR/PLS OXIMETRY MLT: CPT

## 2018-10-25 PROCEDURE — 86900 BLOOD TYPING SEROLOGIC ABO: CPT

## 2018-10-25 PROCEDURE — 88309 TISSUE EXAM BY PATHOLOGIST: CPT

## 2018-10-25 PROCEDURE — 99232 SBSQ HOSP IP/OBS MODERATE 35: CPT | Performed by: SURGERY

## 2018-10-25 PROCEDURE — C1751 CATH, INF, PER/CENT/MIDLINE: HCPCS | Performed by: SURGERY

## 2018-10-25 PROCEDURE — 2580000003 HC RX 258: Performed by: HOSPITALIST

## 2018-10-25 PROCEDURE — 99233 SBSQ HOSP IP/OBS HIGH 50: CPT | Performed by: INTERNAL MEDICINE

## 2018-10-25 PROCEDURE — 2720000010 HC SURG SUPPLY STERILE: Performed by: SURGERY

## 2018-10-25 PROCEDURE — 2500000003 HC RX 250 WO HCPCS: Performed by: SURGERY

## 2018-10-25 PROCEDURE — 6370000000 HC RX 637 (ALT 250 FOR IP): Performed by: FAMILY MEDICINE

## 2018-10-25 PROCEDURE — 6360000002 HC RX W HCPCS: Performed by: SURGERY

## 2018-10-25 PROCEDURE — 44204 LAPARO PARTIAL COLECTOMY: CPT | Performed by: SURGERY

## 2018-10-25 PROCEDURE — 82803 BLOOD GASES ANY COMBINATION: CPT

## 2018-10-25 PROCEDURE — 94640 AIRWAY INHALATION TREATMENT: CPT

## 2018-10-25 PROCEDURE — 2580000003 HC RX 258: Performed by: ANESTHESIOLOGY

## 2018-10-25 PROCEDURE — S2900 ROBOTIC SURGICAL SYSTEM: HCPCS | Performed by: SURGERY

## 2018-10-25 PROCEDURE — 3600000019 HC SURGERY ROBOT ADDTL 15MIN: Performed by: SURGERY

## 2018-10-25 PROCEDURE — 3700000001 HC ADD 15 MINUTES (ANESTHESIA): Performed by: SURGERY

## 2018-10-25 PROCEDURE — 86901 BLOOD TYPING SEROLOGIC RH(D): CPT

## 2018-10-25 PROCEDURE — 94002 VENT MGMT INPAT INIT DAY: CPT

## 2018-10-25 PROCEDURE — 2780000010 HC IMPLANT OTHER: Performed by: SURGERY

## 2018-10-25 PROCEDURE — 36556 INSERT NON-TUNNEL CV CATH: CPT

## 2018-10-25 PROCEDURE — 2000000000 HC ICU R&B

## 2018-10-25 RX ORDER — ONDANSETRON 2 MG/ML
INJECTION INTRAMUSCULAR; INTRAVENOUS PRN
Status: DISCONTINUED | OUTPATIENT
Start: 2018-10-25 | End: 2018-10-25 | Stop reason: SDUPTHER

## 2018-10-25 RX ORDER — SUCCINYLCHOLINE CHLORIDE 20 MG/ML
INJECTION INTRAMUSCULAR; INTRAVENOUS PRN
Status: DISCONTINUED | OUTPATIENT
Start: 2018-10-25 | End: 2018-10-25 | Stop reason: SDUPTHER

## 2018-10-25 RX ORDER — PROPOFOL 10 MG/ML
INJECTION, EMULSION INTRAVENOUS PRN
Status: DISCONTINUED | OUTPATIENT
Start: 2018-10-25 | End: 2018-10-25 | Stop reason: SDUPTHER

## 2018-10-25 RX ORDER — HYDROMORPHONE HCL 110MG/55ML
PATIENT CONTROLLED ANALGESIA SYRINGE INTRAVENOUS PRN
Status: DISCONTINUED | OUTPATIENT
Start: 2018-10-25 | End: 2018-10-25 | Stop reason: SDUPTHER

## 2018-10-25 RX ORDER — BUPIVACAINE HYDROCHLORIDE 5 MG/ML
INJECTION, SOLUTION EPIDURAL; INTRACAUDAL
Status: COMPLETED | OUTPATIENT
Start: 2018-10-25 | End: 2018-10-25

## 2018-10-25 RX ORDER — CEFAZOLIN SODIUM 1 G/3ML
INJECTION, POWDER, FOR SOLUTION INTRAMUSCULAR; INTRAVENOUS PRN
Status: DISCONTINUED | OUTPATIENT
Start: 2018-10-25 | End: 2018-10-25 | Stop reason: SDUPTHER

## 2018-10-25 RX ORDER — SODIUM CHLORIDE 9 MG/ML
INJECTION, SOLUTION INTRAVENOUS CONTINUOUS PRN
Status: DISCONTINUED | OUTPATIENT
Start: 2018-10-25 | End: 2018-10-25 | Stop reason: SDUPTHER

## 2018-10-25 RX ORDER — HYDROMORPHONE HCL 110MG/55ML
1 PATIENT CONTROLLED ANALGESIA SYRINGE INTRAVENOUS
Status: DISCONTINUED | OUTPATIENT
Start: 2018-10-25 | End: 2018-10-30

## 2018-10-25 RX ORDER — LIDOCAINE HYDROCHLORIDE 20 MG/ML
INJECTION, SOLUTION EPIDURAL; INFILTRATION; INTRACAUDAL; PERINEURAL PRN
Status: DISCONTINUED | OUTPATIENT
Start: 2018-10-25 | End: 2018-10-25 | Stop reason: SDUPTHER

## 2018-10-25 RX ORDER — ALBUMIN, HUMAN INJ 5% 5 %
SOLUTION INTRAVENOUS PRN
Status: DISCONTINUED | OUTPATIENT
Start: 2018-10-25 | End: 2018-10-25 | Stop reason: SDUPTHER

## 2018-10-25 RX ORDER — DEXAMETHASONE SODIUM PHOSPHATE 4 MG/ML
INJECTION, SOLUTION INTRA-ARTICULAR; INTRALESIONAL; INTRAMUSCULAR; INTRAVENOUS; SOFT TISSUE PRN
Status: DISCONTINUED | OUTPATIENT
Start: 2018-10-25 | End: 2018-10-25 | Stop reason: SDUPTHER

## 2018-10-25 RX ORDER — ALBUTEROL SULFATE 90 UG/1
AEROSOL, METERED RESPIRATORY (INHALATION) PRN
Status: DISCONTINUED | OUTPATIENT
Start: 2018-10-25 | End: 2018-10-25 | Stop reason: SDUPTHER

## 2018-10-25 RX ORDER — ACETAMINOPHEN 10 MG/ML
1000 INJECTION, SOLUTION INTRAVENOUS ONCE
Status: DISCONTINUED | OUTPATIENT
Start: 2018-10-25 | End: 2018-10-31

## 2018-10-25 RX ORDER — SODIUM CHLORIDE 9 MG/ML
INJECTION, SOLUTION INTRAVENOUS CONTINUOUS
Status: DISCONTINUED | OUTPATIENT
Start: 2018-10-25 | End: 2018-10-26

## 2018-10-25 RX ADMIN — DEXTROSE MONOHYDRATE: 50 INJECTION, SOLUTION INTRAVENOUS at 11:40

## 2018-10-25 RX ADMIN — PHENYLEPHRINE HYDROCHLORIDE 100 MCG: 10 INJECTION INTRAVENOUS at 12:55

## 2018-10-25 RX ADMIN — PHENYLEPHRINE HYDROCHLORIDE 100 MCG: 10 INJECTION INTRAVENOUS at 12:35

## 2018-10-25 RX ADMIN — ALBUTEROL SULFATE 8 PUFF: 90 AEROSOL, METERED RESPIRATORY (INHALATION) at 12:15

## 2018-10-25 RX ADMIN — DEXAMETHASONE SODIUM PHOSPHATE 8 MG: 4 INJECTION, SOLUTION INTRAMUSCULAR; INTRAVENOUS at 12:15

## 2018-10-25 RX ADMIN — PROPOFOL 60 MG: 10 INJECTION, EMULSION INTRAVENOUS at 11:53

## 2018-10-25 RX ADMIN — INSULIN LISPRO 2 UNITS: 100 INJECTION, SOLUTION INTRAVENOUS; SUBCUTANEOUS at 22:07

## 2018-10-25 RX ADMIN — SODIUM CHLORIDE, PRESERVATIVE FREE 10 ML: 5 INJECTION INTRAVENOUS at 20:17

## 2018-10-25 RX ADMIN — IPRATROPIUM BROMIDE AND ALBUTEROL SULFATE 3 ML: .5; 3 SOLUTION RESPIRATORY (INHALATION) at 20:00

## 2018-10-25 RX ADMIN — PHENYLEPHRINE HYDROCHLORIDE 50 MCG: 10 INJECTION INTRAVENOUS at 11:58

## 2018-10-25 RX ADMIN — SODIUM CHLORIDE: 900 INJECTION INTRAVENOUS at 19:22

## 2018-10-25 RX ADMIN — SODIUM CHLORIDE: 9 INJECTION, SOLUTION INTRAVENOUS at 14:02

## 2018-10-25 RX ADMIN — PHENYLEPHRINE HYDROCHLORIDE 100 MCG: 10 INJECTION INTRAVENOUS at 14:03

## 2018-10-25 RX ADMIN — Medication 2 PUFF: at 07:42

## 2018-10-25 RX ADMIN — PHENYLEPHRINE HYDROCHLORIDE 50 MCG: 10 INJECTION INTRAVENOUS at 12:01

## 2018-10-25 RX ADMIN — ALBUTEROL SULFATE 8 PUFF: 90 AEROSOL, METERED RESPIRATORY (INHALATION) at 12:20

## 2018-10-25 RX ADMIN — HYDROMORPHONE HYDROCHLORIDE 0.4 MG: 2 INJECTION INTRAMUSCULAR; INTRAVENOUS; SUBCUTANEOUS at 13:10

## 2018-10-25 RX ADMIN — SODIUM CHLORIDE: 9 INJECTION, SOLUTION INTRAVENOUS at 11:50

## 2018-10-25 RX ADMIN — HYDROMORPHONE HYDROCHLORIDE 1 MG: 2 INJECTION INTRAMUSCULAR; INTRAVENOUS; SUBCUTANEOUS at 22:02

## 2018-10-25 RX ADMIN — HYDROMORPHONE HYDROCHLORIDE 0.6 MG: 2 INJECTION INTRAMUSCULAR; INTRAVENOUS; SUBCUTANEOUS at 12:00

## 2018-10-25 RX ADMIN — DEXMEDETOMIDINE 0.2 MCG/KG/HR: 100 INJECTION, SOLUTION, CONCENTRATE INTRAVENOUS at 16:57

## 2018-10-25 RX ADMIN — ATENOLOL 25 MG: 25 TABLET ORAL at 21:09

## 2018-10-25 RX ADMIN — PHENYLEPHRINE HYDROCHLORIDE 100 MCG: 10 INJECTION INTRAVENOUS at 13:14

## 2018-10-25 RX ADMIN — IPRATROPIUM BROMIDE AND ALBUTEROL SULFATE 3 ML: .5; 3 SOLUTION RESPIRATORY (INHALATION) at 07:41

## 2018-10-25 RX ADMIN — ALBUTEROL SULFATE 2.5 MG: 2.5 SOLUTION RESPIRATORY (INHALATION) at 10:26

## 2018-10-25 RX ADMIN — ALBUMIN (HUMAN) 250 ML: 0.05 SOLUTION INTRAVENOUS at 13:03

## 2018-10-25 RX ADMIN — PHENYLEPHRINE HYDROCHLORIDE 100 MCG: 10 INJECTION INTRAVENOUS at 13:00

## 2018-10-25 RX ADMIN — LIDOCAINE HYDROCHLORIDE 100 MG: 20 INJECTION, SOLUTION EPIDURAL; INFILTRATION; INTRACAUDAL; PERINEURAL at 11:53

## 2018-10-25 RX ADMIN — CEFAZOLIN 3000 MG: 1 INJECTION, POWDER, FOR SOLUTION INTRAMUSCULAR; INTRAVENOUS; PARENTERAL at 12:10

## 2018-10-25 RX ADMIN — TROSPIUM CHLORIDE 20 MG: 20 TABLET ORAL at 21:09

## 2018-10-25 RX ADMIN — ALBUTEROL SULFATE 8 PUFF: 90 AEROSOL, METERED RESPIRATORY (INHALATION) at 12:25

## 2018-10-25 RX ADMIN — Medication 2 PUFF: at 20:05

## 2018-10-25 RX ADMIN — SUCCINYLCHOLINE CHLORIDE 200 MG: 20 INJECTION, SOLUTION INTRAMUSCULAR; INTRAVENOUS at 11:55

## 2018-10-25 RX ADMIN — PHENYLEPHRINE HYDROCHLORIDE 100 MCG: 10 INJECTION INTRAVENOUS at 13:06

## 2018-10-25 RX ADMIN — ONDANSETRON 4 MG: 2 INJECTION INTRAMUSCULAR; INTRAVENOUS at 11:55

## 2018-10-25 RX ADMIN — HYDROMORPHONE HYDROCHLORIDE 0.4 MG: 2 INJECTION INTRAMUSCULAR; INTRAVENOUS; SUBCUTANEOUS at 12:42

## 2018-10-25 RX ADMIN — ALBUMIN (HUMAN) 250 ML: 0.05 SOLUTION INTRAVENOUS at 12:15

## 2018-10-25 RX ADMIN — PHENYLEPHRINE HYDROCHLORIDE 100 MCG: 10 INJECTION INTRAVENOUS at 13:11

## 2018-10-25 RX ADMIN — FAMOTIDINE 20 MG: 20 TABLET ORAL at 21:10

## 2018-10-25 RX ADMIN — DEXTROSE MONOHYDRATE: 50 INJECTION, SOLUTION INTRAVENOUS at 20:44

## 2018-10-25 RX ADMIN — DEXAMETHASONE SODIUM PHOSPHATE 8 MG: 4 INJECTION, SOLUTION INTRAMUSCULAR; INTRAVENOUS at 11:55

## 2018-10-25 RX ADMIN — PRAVASTATIN SODIUM 20 MG: 20 TABLET ORAL at 21:09

## 2018-10-25 RX ADMIN — HYDROMORPHONE HYDROCHLORIDE 0.6 MG: 2 INJECTION INTRAMUSCULAR; INTRAVENOUS; SUBCUTANEOUS at 13:40

## 2018-10-25 RX ADMIN — PHENYLEPHRINE HYDROCHLORIDE 100 MCG: 10 INJECTION INTRAVENOUS at 12:19

## 2018-10-25 RX ADMIN — SODIUM CHLORIDE: 9 INJECTION, SOLUTION INTRAVENOUS at 16:45

## 2018-10-25 ASSESSMENT — PULMONARY FUNCTION TESTS
PIF_VALUE: 46
PIF_VALUE: 5
PIF_VALUE: 35
PIF_VALUE: 42
PIF_VALUE: 46
PIF_VALUE: 46
PIF_VALUE: 47
PIF_VALUE: 46
PIF_VALUE: 35
PIF_VALUE: 36
PIF_VALUE: 46
PIF_VALUE: 45
PIF_VALUE: 46
PIF_VALUE: 36
PIF_VALUE: 43
PIF_VALUE: 27
PIF_VALUE: 46
PIF_VALUE: 40
PIF_VALUE: 36
PIF_VALUE: 35
PIF_VALUE: 42
PIF_VALUE: 47
PIF_VALUE: 19
PIF_VALUE: 40
PIF_VALUE: 40
PIF_VALUE: 46
PIF_VALUE: 46
PIF_VALUE: 48
PIF_VALUE: 36
PIF_VALUE: 46
PIF_VALUE: 40
PIF_VALUE: 46
PIF_VALUE: 40
PIF_VALUE: 16
PIF_VALUE: 46
PIF_VALUE: 36
PIF_VALUE: 36
PIF_VALUE: 38
PIF_VALUE: 41
PIF_VALUE: 36
PIF_VALUE: 46
PIF_VALUE: 46
PIF_VALUE: 45
PIF_VALUE: 36
PIF_VALUE: 46
PIF_VALUE: 46
PIF_VALUE: 35
PIF_VALUE: 46
PIF_VALUE: 46
PIF_VALUE: 44
PIF_VALUE: 44
PIF_VALUE: 36
PIF_VALUE: 46
PIF_VALUE: 31
PIF_VALUE: 46
PIF_VALUE: 32
PIF_VALUE: 41
PIF_VALUE: 46
PIF_VALUE: 46
PIF_VALUE: 40
PIF_VALUE: 46
PIF_VALUE: 46
PIF_VALUE: 44
PIF_VALUE: 43
PIF_VALUE: 46
PIF_VALUE: 43
PIF_VALUE: 0
PIF_VALUE: 43
PIF_VALUE: 46
PIF_VALUE: 43
PIF_VALUE: 46
PIF_VALUE: 3
PIF_VALUE: 47
PIF_VALUE: 36
PIF_VALUE: 17
PIF_VALUE: 46
PIF_VALUE: 46
PIF_VALUE: 35
PIF_VALUE: 46
PIF_VALUE: 44
PIF_VALUE: 36
PIF_VALUE: 46
PIF_VALUE: 46
PIF_VALUE: 36
PIF_VALUE: 35
PIF_VALUE: 46
PIF_VALUE: 40
PIF_VALUE: 46
PIF_VALUE: 23
PIF_VALUE: 36
PIF_VALUE: 46
PIF_VALUE: 35
PIF_VALUE: 46
PIF_VALUE: 46
PIF_VALUE: 32
PIF_VALUE: 46
PIF_VALUE: 44
PIF_VALUE: 36
PIF_VALUE: 34
PIF_VALUE: 46
PIF_VALUE: 36
PIF_VALUE: 46
PIF_VALUE: 35
PIF_VALUE: 46
PIF_VALUE: 46
PIF_VALUE: 36
PIF_VALUE: 35
PIF_VALUE: 40
PIF_VALUE: 39
PIF_VALUE: 46
PIF_VALUE: 40
PIF_VALUE: 43
PIF_VALUE: 46
PIF_VALUE: 46
PIF_VALUE: 36
PIF_VALUE: 45
PIF_VALUE: 44
PIF_VALUE: 46
PIF_VALUE: 46
PIF_VALUE: 40
PIF_VALUE: 36
PIF_VALUE: 23
PIF_VALUE: 46
PIF_VALUE: 7
PIF_VALUE: 46
PIF_VALUE: 43
PIF_VALUE: 35
PIF_VALUE: 44
PIF_VALUE: 43
PIF_VALUE: 46
PIF_VALUE: 40
PIF_VALUE: 46
PIF_VALUE: 48
PIF_VALUE: 46
PIF_VALUE: 48
PIF_VALUE: 45
PIF_VALUE: 46
PIF_VALUE: 36
PIF_VALUE: 46
PIF_VALUE: 48
PIF_VALUE: 43
PIF_VALUE: 46
PIF_VALUE: 44
PIF_VALUE: 41
PIF_VALUE: 46
PIF_VALUE: 40
PIF_VALUE: 32
PIF_VALUE: 47
PIF_VALUE: 36
PIF_VALUE: 46
PIF_VALUE: 46
PIF_VALUE: 47
PIF_VALUE: 46
PIF_VALUE: 36
PIF_VALUE: 1
PIF_VALUE: 1
PIF_VALUE: 30
PIF_VALUE: 46
PIF_VALUE: 43
PIF_VALUE: 46
PIF_VALUE: 45
PIF_VALUE: 35
PIF_VALUE: 36
PIF_VALUE: 46
PIF_VALUE: 45
PIF_VALUE: 46
PIF_VALUE: 35
PIF_VALUE: 40
PIF_VALUE: 44
PIF_VALUE: 40
PIF_VALUE: 44
PIF_VALUE: 43
PIF_VALUE: 36
PIF_VALUE: 46
PIF_VALUE: 46
PIF_VALUE: 35
PIF_VALUE: 46
PIF_VALUE: 6
PIF_VALUE: 35
PIF_VALUE: 46
PIF_VALUE: 36
PIF_VALUE: 40
PIF_VALUE: 35
PIF_VALUE: 36
PIF_VALUE: 49
PIF_VALUE: 35
PIF_VALUE: 46
PIF_VALUE: 46
PIF_VALUE: 42
PIF_VALUE: 46
PIF_VALUE: 45
PIF_VALUE: 47
PIF_VALUE: 46
PIF_VALUE: 8
PIF_VALUE: 15
PIF_VALUE: 1
PIF_VALUE: 40
PIF_VALUE: 40
PIF_VALUE: 46
PIF_VALUE: 21
PIF_VALUE: 46
PIF_VALUE: 36
PIF_VALUE: 36
PIF_VALUE: 46
PIF_VALUE: 46
PIF_VALUE: 44
PIF_VALUE: 46
PIF_VALUE: 4
PIF_VALUE: 43
PIF_VALUE: 31
PIF_VALUE: 29
PIF_VALUE: 46
PIF_VALUE: 43
PIF_VALUE: 44
PIF_VALUE: 40
PIF_VALUE: 35
PIF_VALUE: 46
PIF_VALUE: 38
PIF_VALUE: 46
PIF_VALUE: 40
PIF_VALUE: 43
PIF_VALUE: 36
PIF_VALUE: 46
PIF_VALUE: 44
PIF_VALUE: 23
PIF_VALUE: 46
PIF_VALUE: 49
PIF_VALUE: 4
PIF_VALUE: 46
PIF_VALUE: 46
PIF_VALUE: 40
PIF_VALUE: 46
PIF_VALUE: 46
PIF_VALUE: 44
PIF_VALUE: 36
PIF_VALUE: 44
PIF_VALUE: 46
PIF_VALUE: 35
PIF_VALUE: 46
PIF_VALUE: 42
PIF_VALUE: 47
PIF_VALUE: 36
PIF_VALUE: 40
PIF_VALUE: 36
PIF_VALUE: 36
PIF_VALUE: 44
PIF_VALUE: 36
PIF_VALUE: 46
PIF_VALUE: 47
PIF_VALUE: 46
PIF_VALUE: 43
PIF_VALUE: 39
PIF_VALUE: 43

## 2018-10-25 ASSESSMENT — ENCOUNTER SYMPTOMS
BACK PAIN: 0
EYE REDNESS: 0
ANAL BLEEDING: 0
PHOTOPHOBIA: 0
APNEA: 0
COLOR CHANGE: 0
CHOKING: 0
SORE THROAT: 0
STRIDOR: 0
EYE ITCHING: 0
CONSTIPATION: 0
RECTAL PAIN: 0

## 2018-10-25 ASSESSMENT — COPD QUESTIONNAIRES: CAT_SEVERITY: SEVERE

## 2018-10-25 ASSESSMENT — PAIN SCALES - GENERAL
PAINLEVEL_OUTOF10: 0
PAINLEVEL_OUTOF10: 5
PAINLEVEL_OUTOF10: 0
PAINLEVEL_OUTOF10: 0

## 2018-10-25 ASSESSMENT — PAIN DESCRIPTION - FREQUENCY: FREQUENCY: CONTINUOUS

## 2018-10-25 ASSESSMENT — PAIN DESCRIPTION - ONSET: ONSET: GRADUAL

## 2018-10-25 ASSESSMENT — PAIN DESCRIPTION - DESCRIPTORS: DESCRIPTORS: ACHING;SHARP

## 2018-10-25 ASSESSMENT — PAIN DESCRIPTION - LOCATION: LOCATION: ABDOMEN

## 2018-10-25 ASSESSMENT — PAIN DESCRIPTION - PAIN TYPE: TYPE: SURGICAL PAIN

## 2018-10-25 NOTE — CONSULTS
12/05/2012    LABALBU 3.3 10/23/2018    CALCIUM 8.1 10/24/2018    BILITOT 0.3 10/23/2018    ALKPHOS 62 10/23/2018    AST 10 10/23/2018    ALT 20 10/23/2018       IMPRESSION:        Patient Active Problem List:     Type 2 diabetes mellitus without complication ([facility]     Essential hypertension     Urinary incontinence, mixed     Tobacco abuse disorder     Obstructive sleep apnea     Pulmonary hypertension (HCC)     Systolic murmur     Chronic respiratory failure with hypoxia (HCC)     Microalbuminuria due to type 2 diabetes mellitus ([address]     Liver dysfunction     Steatohepatitis     Mixed hyperlipidemia     Morbid obesity due to excess calories (HCC)     COPD, severe (HCC)     COPD exacerbation ([address]     Fall at home     Type 2 diabetes mellitus ([address]     Hypertension     Hyperlipidemia     Tobacco abuse     Obesity due to excess calories     CHF (congestive heart failure) ([address]     Physical debility     Bilateral carotid artery stenosis     Cerebrovascular accident (CVA) ([address]     COPD exacerbation (HCC)     Type 2 diabetes mellitus ([address]     Hypertension     Tobacco abuse     Hyperlipidemia     COPD (chronic obstructive pulmonary disease) ([address]     Pneumonia     Bacteremia     Streptococcal bacteremia     Subacute bacterial endocarditis     Cecum mass        PLAN:    Cecal mass is likely to be malignant. Patient is a high risk for surgical intervention for the above-stated reasons. Patient will need right colon resection. Care was discussed with Dr. Kenyetta Herzog. I/A discussed her care with her daughter and patient. She wants to have everything done and would like it done on this admission. I will discuss her care with Dr. Gris Becerra. Keep her nothing by mouth after midnight for possible surgery in the morning. CT scan reviewed. No metastatic disease noted.         Dariel Pillai MD

## 2018-10-25 NOTE — PROGRESS NOTES
10/25/18 1940   Vent Information   Vent Type 980   Vent Mode CPAP   Vt Ordered 0 mL   Rate Set 0 bmp   Peak Flow 0 L/min   Pressure Support 10 cmH20   FiO2  35 %   Sensitivity 3   PEEP/CPAP 5   I Time/ I Time % 0 s   Vent Patient Data   High Peep/I Pressure 0   Peak Inspiratory Pressure 17 cmH2O   Mean Airway Pressure 8.3 cmH20   Rate Measured 26 br/min   Vt Exhaled 363 mL   Minute Volume 9.8 Liters   I:E Ratio 1:2.10   Spontaneous Breathing Trial (SBT) RT Doc   Pulse 82   SpO2 95 %   Additional Respiratory  Assessments   Resp 23   Alarm Settings   High Pressure Alarm 40 cmH2O   ETT (adult)   Placement Date/Time: 10/25/18 1247   Preoxygenation: Yes  Mask Ventilation: Mask ventilation not attempted (0)  Technique: Direct laryngoscopy  Type: Cuffed  Tube Size: 8 mm  Laryngoscope: Kapadia  Blade Size: 2  Location: Oral  Grade View: Partial vie. ..    Secured at 24 cm   Measured From Lips   Secured By Commercial tube pacheco   Site Condition Dry

## 2018-10-25 NOTE — PROGRESS NOTES
Patient received from the OR monitor placed and alarms on. Patient being recovered in room 2107. Report received from Adryan Alexander.

## 2018-10-25 NOTE — ANESTHESIA PRE PROCEDURE
TWICE A DAY 8/2/18   Savannah Qureshi MD   metFORMIN (GLUCOPHAGE) 1000 MG tablet TAKE 1 TABLET TWICE A DAY WITH MEALS 5/21/18   Savannah Qureshi MD   pravastatin (PRAVACHOL) 20 MG tablet TAKE 1 TABLET DAILY 5/21/18   Savannah Qureshi MD   losartan (COZAAR) 50 MG tablet Take 1 tablet by mouth daily 5/21/18   Savannah Qureshi MD   OXYGEN Inhale 2 L/min into the lungs continuous. Historical Provider, MD   MULTIPLE VITAMIN PO Take 1 tablet by mouth daily. Historical Provider, MD   solifenacin (VESICARE) 10 MG tablet Take 1 tablet by mouth daily.  10/11/13 2/17/14  Savannah Qureshi MD       Current medications:    Current Facility-Administered Medications   Medication Dose Route Frequency Provider Last Rate Last Dose    acetaminophen (OFIRMEV) infusion 1,000 mg  1,000 mg Intravenous Once JOSÉ MANUEL Lim - CRNA        penicillin G potassium 18 Million Units in dextrose 5 % 500 mL infusion   Intravenous Continuous Franny Peñaloza MD 22.5 mL/hr at 10/24/18 1546      heparin (porcine) injection 5,000 Units  5,000 Units Subcutaneous BID Danielle Willams MD   5,000 Units at 10/24/18 2004    acetaminophen (TYLENOL) tablet 1,000 mg  1,000 mg Oral Q6H PRN Danielle Willams MD   1,000 mg at 10/24/18 1219    albuterol sulfate  (90 Base) MCG/ACT inhaler 2 puff  2 puff Inhalation Q4H PRN Danielle Willams MD        mometasone-formoterol (DULERA) 200-5 MCG/ACT inhaler 2 puff  2 puff Inhalation BID Danielle Willams MD   2 puff at 10/25/18 0742    linagliptin (TRADJENTA) tablet 5 mg  5 mg Oral Daily Danielle Willams MD   5 mg at 10/23/18 1015    pravastatin (PRAVACHOL) tablet 20 mg  20 mg Oral Nightly Danielle Willams MD   20 mg at 10/24/18 2219    traZODone (DESYREL) tablet 100 mg  100 mg Oral Nightly PRN Danielle Willams MD   100 mg at 10/24/18 2219    sodium chloride flush 0.9 % injection 10 mL  10 mL Intravenous 2 times per day Danielle Willams MD   10 mL at 10/23/18 1014    sodium chloride flush 0.9 % injection 10 mL  10 mL Intravenous PRN Reactions    Tomato        Problem List:    Patient Active Problem List   Diagnosis Code    Type 2 diabetes mellitus without complication ([facility]) V56.8    Essential hypertension I10    Urinary incontinence, mixed N39.46    Tobacco abuse disorder Z72.0    Obstructive sleep apnea G47.33    Pulmonary hypertension ([facility]) W46.93    Systolic murmur F12.3    Chronic respiratory failure with hypoxia ([facility]) J96.11    Microalbuminuria due to type 2 diabetes mellitus ([facility] E11.29, R80.9    Liver dysfunction K76.89    Steatohepatitis K75.81    Mixed hyperlipidemia E78.2    Morbid obesity due to excess calories ([facility]) E66.01    COPD, severe ([facility]) J44.9    COPD exacerbation ([facility] J44.1    Fall at home W19. Jaclyn Query, Y92.009    Type 2 diabetes mellitus ([facility] E11.9    Hypertension I10    Hyperlipidemia E78.5    Tobacco abuse Z72.0    Obesity due to excess calories E66.09    CHF (congestive heart failure) ([facility]) I50.9    Physical debility R53.81    Bilateral carotid artery stenosis I65.23    Cerebrovascular accident (CVA) ([facility]) I63.9    COPD exacerbation ([facility]) J44.1    Type 2 diabetes mellitus ([facility] E11.9    Hypertension I10    Tobacco abuse Z72.0    Hyperlipidemia E78.5    COPD (chronic obstructive pulmonary disease) ([facility]) J44.9    Pneumonia J18.9    Bacteremia R78.81    Streptococcal bacteremia R78.81, B95.5    Subacute bacterial endocarditis I33.0    Cecum mass K63.9       Past Medical History:        Diagnosis Date    Chronic respiratory failure with hypoxia ([facility]) 10/19/2015    COPD (chronic obstructive [facility] disease) ([facility] 10/11/2013    follow with Dr Shaniqua Yanes    Diabetes mellitus ([facility] 10/11/2013    \"been diabetic for 7 yrs\"    Enlarged heart     Heart murmur     History of blood transfusion     Hyperlipidemia 10/11/2013    Hypertension 10/11/2013    Insomnia 10/11/2013    Liver dysfunction 12/21/2015    Liver, core biopsies: Steatohepatitis, acute and chronic, grade 2, stage 2.     Nasal

## 2018-10-26 LAB
ANION GAP SERPL CALCULATED.3IONS-SCNC: 11 MMOL/L (ref 4–16)
ANION GAP SERPL CALCULATED.3IONS-SCNC: 12 MMOL/L (ref 4–16)
BASOPHILS ABSOLUTE: 0 K/CU MM
BASOPHILS RELATIVE PERCENT: 0.1 % (ref 0–1)
BUN BLDV-MCNC: 21 MG/DL (ref 6–23)
BUN BLDV-MCNC: 23 MG/DL (ref 6–23)
CALCIUM SERPL-MCNC: 7.9 MG/DL (ref 8.3–10.6)
CALCIUM SERPL-MCNC: 7.9 MG/DL (ref 8.3–10.6)
CHLORIDE BLD-SCNC: 102 MMOL/L (ref 99–110)
CHLORIDE BLD-SCNC: 103 MMOL/L (ref 99–110)
CO2: 26 MMOL/L (ref 21–32)
CO2: 26 MMOL/L (ref 21–32)
CREAT SERPL-MCNC: 1.2 MG/DL (ref 0.6–1.1)
CREAT SERPL-MCNC: 1.2 MG/DL (ref 0.6–1.1)
DIFFERENTIAL TYPE: ABNORMAL
EOSINOPHILS ABSOLUTE: 0 K/CU MM
EOSINOPHILS RELATIVE PERCENT: 0 % (ref 0–3)
GFR AFRICAN AMERICAN: 55 ML/MIN/1.73M2
GFR AFRICAN AMERICAN: 55 ML/MIN/1.73M2
GFR NON-AFRICAN AMERICAN: 46 ML/MIN/1.73M2
GFR NON-AFRICAN AMERICAN: 46 ML/MIN/1.73M2
GLUCOSE BLD-MCNC: 147 MG/DL (ref 70–99)
GLUCOSE BLD-MCNC: 148 MG/DL (ref 70–99)
GLUCOSE BLD-MCNC: 151 MG/DL (ref 70–99)
GLUCOSE BLD-MCNC: 157 MG/DL (ref 70–99)
GLUCOSE BLD-MCNC: 181 MG/DL (ref 70–99)
GLUCOSE BLD-MCNC: 187 MG/DL (ref 70–99)
HCT VFR BLD CALC: 24.1 % (ref 37–47)
HEMOGLOBIN: 7.4 GM/DL (ref 12.5–16)
IMMATURE NEUTROPHIL %: 0.5 % (ref 0–0.43)
LYMPHOCYTES ABSOLUTE: 0.9 K/CU MM
LYMPHOCYTES RELATIVE PERCENT: 6.3 % (ref 24–44)
MCH RBC QN AUTO: 30.3 PG (ref 27–31)
MCHC RBC AUTO-ENTMCNC: 30.7 % (ref 32–36)
MCV RBC AUTO: 98.8 FL (ref 78–100)
MONOCYTES ABSOLUTE: 0.8 K/CU MM
MONOCYTES RELATIVE PERCENT: 5.6 % (ref 0–4)
NUCLEATED RBC %: 0 %
PDW BLD-RTO: 15.6 % (ref 11.7–14.9)
PLATELET # BLD: 199 K/CU MM (ref 140–440)
PMV BLD AUTO: 9.3 FL (ref 7.5–11.1)
POTASSIUM SERPL-SCNC: 5 MMOL/L (ref 3.5–5.1)
POTASSIUM SERPL-SCNC: 5.4 MMOL/L (ref 3.5–5.1)
RBC # BLD: 2.44 M/CU MM (ref 4.2–5.4)
SEGMENTED NEUTROPHILS ABSOLUTE COUNT: 11.9 K/CU MM
SEGMENTED NEUTROPHILS RELATIVE PERCENT: 87.5 % (ref 36–66)
SODIUM BLD-SCNC: 140 MMOL/L (ref 135–145)
SODIUM BLD-SCNC: 140 MMOL/L (ref 135–145)
TOTAL IMMATURE NEUTOROPHIL: 0.07 K/CU MM
TOTAL NUCLEATED RBC: 0 K/CU MM
WBC # BLD: 13.6 K/CU MM (ref 4–10.5)

## 2018-10-26 PROCEDURE — 94664 DEMO&/EVAL PT USE INHALER: CPT

## 2018-10-26 PROCEDURE — 2000000000 HC ICU R&B

## 2018-10-26 PROCEDURE — 2580000003 HC RX 258: Performed by: INTERNAL MEDICINE

## 2018-10-26 PROCEDURE — 94640 AIRWAY INHALATION TREATMENT: CPT

## 2018-10-26 PROCEDURE — 2500000003 HC RX 250 WO HCPCS: Performed by: INTERNAL MEDICINE

## 2018-10-26 PROCEDURE — 2700000000 HC OXYGEN THERAPY PER DAY

## 2018-10-26 PROCEDURE — 6360000002 HC RX W HCPCS: Performed by: SURGERY

## 2018-10-26 PROCEDURE — 6370000000 HC RX 637 (ALT 250 FOR IP): Performed by: FAMILY MEDICINE

## 2018-10-26 PROCEDURE — 6360000002 HC RX W HCPCS: Performed by: INTERNAL MEDICINE

## 2018-10-26 PROCEDURE — 2580000003 HC RX 258: Performed by: HOSPITALIST

## 2018-10-26 PROCEDURE — 6360000002 HC RX W HCPCS: Performed by: HOSPITALIST

## 2018-10-26 PROCEDURE — 80048 BASIC METABOLIC PNL TOTAL CA: CPT

## 2018-10-26 PROCEDURE — 2580000003 HC RX 258: Performed by: ANESTHESIOLOGY

## 2018-10-26 PROCEDURE — 85025 COMPLETE CBC W/AUTO DIFF WBC: CPT

## 2018-10-26 PROCEDURE — 94761 N-INVAS EAR/PLS OXIMETRY MLT: CPT

## 2018-10-26 PROCEDURE — 82962 GLUCOSE BLOOD TEST: CPT

## 2018-10-26 PROCEDURE — 6370000000 HC RX 637 (ALT 250 FOR IP): Performed by: HOSPITALIST

## 2018-10-26 PROCEDURE — 99024 POSTOP FOLLOW-UP VISIT: CPT | Performed by: SURGERY

## 2018-10-26 RX ADMIN — INSULIN LISPRO 2 UNITS: 100 INJECTION, SOLUTION INTRAVENOUS; SUBCUTANEOUS at 17:21

## 2018-10-26 RX ADMIN — FAMOTIDINE 20 MG: 20 TABLET ORAL at 20:33

## 2018-10-26 RX ADMIN — OXYCODONE HYDROCHLORIDE AND ACETAMINOPHEN 500 MG: 500 TABLET ORAL at 08:43

## 2018-10-26 RX ADMIN — INSULIN LISPRO 1 UNITS: 100 INJECTION, SOLUTION INTRAVENOUS; SUBCUTANEOUS at 21:17

## 2018-10-26 RX ADMIN — IPRATROPIUM BROMIDE AND ALBUTEROL SULFATE 3 ML: .5; 3 SOLUTION RESPIRATORY (INHALATION) at 11:34

## 2018-10-26 RX ADMIN — SODIUM CHLORIDE, PRESERVATIVE FREE 10 ML: 5 INJECTION INTRAVENOUS at 20:32

## 2018-10-26 RX ADMIN — HEPARIN SODIUM 5000 UNITS: 5000 INJECTION INTRAVENOUS; SUBCUTANEOUS at 20:38

## 2018-10-26 RX ADMIN — IPRATROPIUM BROMIDE AND ALBUTEROL SULFATE 3 ML: .5; 3 SOLUTION RESPIRATORY (INHALATION) at 15:26

## 2018-10-26 RX ADMIN — HYDROMORPHONE HYDROCHLORIDE 1 MG: 2 INJECTION INTRAMUSCULAR; INTRAVENOUS; SUBCUTANEOUS at 03:43

## 2018-10-26 RX ADMIN — Medication 2 PUFF: at 19:57

## 2018-10-26 RX ADMIN — DEXTROSE MONOHYDRATE: 50 INJECTION, SOLUTION INTRAVENOUS at 21:17

## 2018-10-26 RX ADMIN — INSULIN LISPRO 2 UNITS: 100 INJECTION, SOLUTION INTRAVENOUS; SUBCUTANEOUS at 08:48

## 2018-10-26 RX ADMIN — PRAVASTATIN SODIUM 20 MG: 20 TABLET ORAL at 20:33

## 2018-10-26 RX ADMIN — SODIUM CHLORIDE, PRESERVATIVE FREE 10 ML: 5 INJECTION INTRAVENOUS at 12:48

## 2018-10-26 RX ADMIN — LINAGLIPTIN 5 MG: 5 TABLET, FILM COATED ORAL at 08:43

## 2018-10-26 RX ADMIN — HEPARIN SODIUM 5000 UNITS: 5000 INJECTION INTRAVENOUS; SUBCUTANEOUS at 08:43

## 2018-10-26 RX ADMIN — FAMOTIDINE 20 MG: 20 TABLET ORAL at 08:43

## 2018-10-26 RX ADMIN — ATENOLOL 25 MG: 25 TABLET ORAL at 08:43

## 2018-10-26 RX ADMIN — SODIUM BICARBONATE: 84 INJECTION, SOLUTION INTRAVENOUS at 20:32

## 2018-10-26 RX ADMIN — IPRATROPIUM BROMIDE AND ALBUTEROL SULFATE 3 ML: .5; 3 SOLUTION RESPIRATORY (INHALATION) at 19:55

## 2018-10-26 RX ADMIN — ATENOLOL 25 MG: 25 TABLET ORAL at 20:33

## 2018-10-26 RX ADMIN — SODIUM BICARBONATE: 84 INJECTION, SOLUTION INTRAVENOUS at 09:55

## 2018-10-26 RX ADMIN — HYDROMORPHONE HYDROCHLORIDE 1 MG: 2 INJECTION INTRAMUSCULAR; INTRAVENOUS; SUBCUTANEOUS at 19:26

## 2018-10-26 RX ADMIN — TRAZODONE HYDROCHLORIDE 100 MG: 50 TABLET ORAL at 20:32

## 2018-10-26 RX ADMIN — TROSPIUM CHLORIDE 20 MG: 20 TABLET ORAL at 20:33

## 2018-10-26 RX ADMIN — SODIUM CHLORIDE, PRESERVATIVE FREE 10 ML: 5 INJECTION INTRAVENOUS at 08:58

## 2018-10-26 RX ADMIN — Medication 2 PUFF: at 07:39

## 2018-10-26 RX ADMIN — INSULIN LISPRO 2 UNITS: 100 INJECTION, SOLUTION INTRAVENOUS; SUBCUTANEOUS at 12:46

## 2018-10-26 RX ADMIN — IPRATROPIUM BROMIDE AND ALBUTEROL SULFATE 3 ML: .5; 3 SOLUTION RESPIRATORY (INHALATION) at 07:38

## 2018-10-26 RX ADMIN — SODIUM CHLORIDE: 900 INJECTION INTRAVENOUS at 05:23

## 2018-10-26 RX ADMIN — HYDROMORPHONE HYDROCHLORIDE 1 MG: 2 INJECTION INTRAMUSCULAR; INTRAVENOUS; SUBCUTANEOUS at 08:51

## 2018-10-26 RX ADMIN — HYDROMORPHONE HYDROCHLORIDE 1 MG: 2 INJECTION INTRAMUSCULAR; INTRAVENOUS; SUBCUTANEOUS at 22:43

## 2018-10-26 RX ADMIN — HYDROMORPHONE HYDROCHLORIDE 1 MG: 2 INJECTION INTRAMUSCULAR; INTRAVENOUS; SUBCUTANEOUS at 15:28

## 2018-10-26 ASSESSMENT — PAIN DESCRIPTION - DESCRIPTORS
DESCRIPTORS: ACHING
DESCRIPTORS: ACHING
DESCRIPTORS: ACHING;DULL
DESCRIPTORS: ACHING

## 2018-10-26 ASSESSMENT — PAIN SCALES - GENERAL
PAINLEVEL_OUTOF10: 7
PAINLEVEL_OUTOF10: 9
PAINLEVEL_OUTOF10: 0
PAINLEVEL_OUTOF10: 6
PAINLEVEL_OUTOF10: 7
PAINLEVEL_OUTOF10: 7
PAINLEVEL_OUTOF10: 9
PAINLEVEL_OUTOF10: 9

## 2018-10-26 ASSESSMENT — PAIN DESCRIPTION - PROGRESSION
CLINICAL_PROGRESSION: GRADUALLY WORSENING
CLINICAL_PROGRESSION: GRADUALLY IMPROVING
CLINICAL_PROGRESSION: GRADUALLY WORSENING

## 2018-10-26 ASSESSMENT — PAIN DESCRIPTION - LOCATION
LOCATION: ABDOMEN

## 2018-10-26 ASSESSMENT — PAIN DESCRIPTION - ORIENTATION
ORIENTATION: LEFT
ORIENTATION: LEFT;LOWER
ORIENTATION: LEFT;LOWER

## 2018-10-26 ASSESSMENT — PAIN DESCRIPTION - PAIN TYPE
TYPE: SURGICAL PAIN

## 2018-10-26 ASSESSMENT — ENCOUNTER SYMPTOMS
RECTAL PAIN: 0
COLOR CHANGE: 0
BACK PAIN: 0
CONSTIPATION: 0
CHOKING: 0
SORE THROAT: 0
ANAL BLEEDING: 0
EYE ITCHING: 0
SHORTNESS OF BREATH: 1
EYE REDNESS: 0
APNEA: 0
PHOTOPHOBIA: 0
STRIDOR: 0

## 2018-10-26 ASSESSMENT — PAIN DESCRIPTION - ONSET
ONSET: ON-GOING

## 2018-10-26 ASSESSMENT — PAIN DESCRIPTION - FREQUENCY
FREQUENCY: CONTINUOUS

## 2018-10-26 NOTE — PLAN OF CARE
Problem: Falls - Risk of:  Goal: Will remain free from falls  Will remain free from falls   Outcome: Ongoing    Goal: Absence of physical injury  Absence of physical injury   Outcome: Ongoing      Problem: Discharge Planning:  Goal: Discharged to appropriate level of care  Discharged to appropriate level of care   Outcome: Ongoing      Problem: Breathing Pattern - Ineffective:  Goal: Ability to achieve and maintain a regular respiratory rate will improve  Ability to achieve and maintain a regular respiratory rate will improve   Outcome: Ongoing      Problem: Restraint Use - Nonviolent/Non-Self-Destructive Behavior:  Goal: Absence of restraint indications  Absence of restraint indications   Outcome: Ongoing    Goal: Absence of restraint-related injury  Absence of restraint-related injury   Outcome: Ongoing

## 2018-10-26 NOTE — PROGRESS NOTES
- has cpap and does use it    Steatohepatitis 3/21/2016    Liver, core biopsies: Steatohepatitis, acute and chronic, grade 2, stage 2.  Systolic murmur 3787    Tobacco abuse disorder 10/11/2013    Type 2 diabetes mellitus [facility]     Urinary incontinence, mixed 10/11/2013       Objective:     Vitals:    10/25/18 2109   BP: (!) 148/45   Pulse: 88   Resp:    Temp:    SpO2:        TEMPERATURE:  Current - Temp: 98.2 °F (36.8 °C); Max - Temp  Av.1 °F (37.3 °C)  Min: 96.8 °F (36 °C)  Max: 99.3 °F (37.4 °C)    I/O this shift:  In: 1100 [I.V.:1100]  Out: 475 [Urine:240; Other:35; Blood:200]I/O last 3 completed shifts: In: 1953 [P.O.:380; I.V.:1223; Blood:350]  Out: 400 [Urine:400]      Physical Exam:    Physical Exam   Constitutional: She is oriented to person, place, and time. HENT:   Head: Normocephalic and atraumatic. Eyes: Pupils are equal, round, and reactive to light. Conjunctivae are normal.   Neck: Normal range of motion. Cardiovascular: Normal rate and regular rhythm. Pulmonary/Chest: No respiratory distress. She has no wheezes. Abdominal: Soft. She exhibits no distension. There is no tenderness. Musculoskeletal: Normal range of motion. Neurological: She is alert and oriented to person, place, and time. Skin: Skin is warm.            Scheduled Meds:   acetaminophen  1,000 mg Intravenous Once    heparin (porcine)  5,000 Units Subcutaneous BID    mometasone-formoterol  2 puff Inhalation BID    linagliptin  5 mg Oral Daily    pravastatin  20 mg Oral Nightly    sodium chloride flush  10 mL Intravenous 2 times per day    atenolol  25 mg Oral BID    famotidine  20 mg Oral BID    fluticasone  1 spray Each Nare Daily    glipiZIDE  10 mg Oral BID AC    insulin lispro  0-12 Units Subcutaneous TID WC    insulin lispro  0-6 Units Subcutaneous Nightly    ipratropium-albuterol  1 vial Nebulization 4x daily    nicotine  1 patch Transdermal Daily    trospium  20 mg Oral Nightly  vitamin C  500 mg Oral Daily     Continuous Infusions:   sodium chloride 100 mL/hr at 10/25/18 1922    dexmedetomidine [facility]) IV infusion Stopped (10/25/18 2203)    IV infusion builder 22.5 mL/hr at 10/25/18 2044    dextrose       PRN Meds:HYDROmorphone, acetaminophen, albuterol sulfate HFA, traZODone, sodium chloride flush, magnesium hydroxide, ondansetron, albuterol, dextrose, dextrose, glucagon (rDNA), glucose      Labs/Imaging Results:   Lab Results   Component Value Date    WBC 11.4 (H) 10/24/2018    HGB 8.1 (L) 10/25/2018    HCT 24.0 (L) 10/25/2018    MCV 95.2 10/24/2018     10/24/2018     Lab Results   Component Value Date     10/25/2018    K 4.5 10/25/2018    CL 98 (L) 10/24/2018    CO2 27 10/25/2018    BUN 17 10/24/2018    CREATININE 1.0 10/25/2018    GLUCOSE 125 (H) 10/24/2018    CALCIUM 8.1 (L) 10/24/2018    PROT 5.6 (L) 10/23/2018    LABALBU 3.3 (L) 10/23/2018    BILITOT 0.3 10/23/2018    ALKPHOS 62 10/23/2018    AST 10 (L) 10/23/2018    ALT 20 10/23/2018    LABGLOM 46 (L) 10/24/2018    GFRAA 55 (L) 10/24/2018       Assessment:     Patient Active Problem List:     Type 2 diabetes mellitus without complication ([address]     Essential hypertension     Urinary incontinence, mixed     Tobacco abuse disorder     Obstructive sleep apnea     Pulmonary hypertension (HCC)     Systolic murmur     Chronic respiratory failure with hypoxia (HCC)     Microalbuminuria due to type 2 diabetes mellitus ([address]     Liver dysfunction     Steatohepatitis     Mixed hyperlipidemia     Morbid obesity due to excess calories (HCC)     COPD, severe (HCC)     COPD exacerbation ([address]     Fall at home     Type 2 diabetes mellitus ([address]     Hypertension     Hyperlipidemia     Tobacco abuse     Obesity due to excess calories     CHF (congestive heart failure) ([address]     Physical debility     Bilateral carotid artery stenosis     Cerebrovascular accident (CVA) ([address]     COPD exacerbation (HCC)     Type 2 diabetes

## 2018-10-26 NOTE — PROGRESS NOTES
Nephrology Progress Note  10/26/2018 9:24 AM  Subjective:   Admit Date: 10/18/2018  PCP: Eddie Hayes MD  Interval History: pt weak and arousable daughter in room and pt with ngt pod 1 surgery    Diet: Diet NPO Effective Now Exceptions are: Ice Chips, Sips with Meds  Pain is: Moderate      Data:   Scheduled Meds:   acetaminophen  1,000 mg Intravenous Once    heparin (porcine)  5,000 Units Subcutaneous BID    mometasone-formoterol  2 puff Inhalation BID    linagliptin  5 mg Oral Daily    pravastatin  20 mg Oral Nightly    sodium chloride flush  10 mL Intravenous 2 times per day    atenolol  25 mg Oral BID    famotidine  20 mg Oral BID    fluticasone  1 spray Each Nare Daily    glipiZIDE  10 mg Oral BID AC    insulin lispro  0-12 Units Subcutaneous TID WC    insulin lispro  0-6 Units Subcutaneous Nightly    ipratropium-albuterol  1 vial Nebulization 4x daily    nicotine  1 patch Transdermal Daily    trospium  20 mg Oral Nightly    vitamin C  500 mg Oral Daily     Continuous Infusions:   IV infusion builder      dexmedetomidine (PRECEDEX) IV infusion Stopped (10/25/18 2203)    IV infusion builder 22.5 mL/hr at 10/25/18 2044    dextrose       PRN Meds:HYDROmorphone, acetaminophen, albuterol sulfate HFA, traZODone, sodium chloride flush, magnesium hydroxide, ondansetron, albuterol, dextrose, dextrose, glucagon (rDNA), glucose  I/O last 3 completed shifts: In: 0403 [I.V.:3715; Blood:350]  Out: 9794 [Urine:1015; Emesis/NG output:110; Other:35; Blood:200]  No intake/output data recorded.     Intake/Output Summary (Last 24 hours) at 10/26/18 0924  Last data filed at 10/26/18 0603   Gross per 24 hour   Intake             4065 ml   Output             1360 ml   Net             2705 ml     CBC:   Recent Labs      10/24/18   0530  10/24/18   1858  10/25/18   1452  10/26/18   0400   WBC  10.0  11.4*   --   13.6*   HGB  7.7*  8.1*  8.1*  7.4*   PLT  222  253   --   199     BMP:  Recent Labs      10/24/18   0530

## 2018-10-26 NOTE — PROGRESS NOTES
bedside      Patient resting in bed with no distress. NGT with minimal drain. Minimal abdominal pain. No gas or BM yet. Ten point ROS reviewed negative, unless as noted above    Objective: Intake/Output Summary (Last 24 hours) at 10/26/18 0809  Last data filed at 10/26/18 0603   Gross per 24 hour   Intake             4065 ml   Output             1360 ml   Net             2705 ml      Vitals:   Vitals:    10/26/18 0749   BP:    Pulse:    Resp: 29   Temp:    SpO2: 92%     Physical Exam:    GEN Awake female, alert and oriented x 3  HENT Mucous membranes are moist. Very poor dentition   RESP Clear to auscultation, no wheezes, rales or rhonchi. CARDIO/VASC -S1/S2 auscultated. Regular rate without appreciable murmurs, rubs, or gallops. Peripheral pulses equal bilaterally and palpable. No peripheral edema. GI Surgical site clean - minimal abdominal tenderness, no paddy or guarding. Bowel sounds are normoactive. Rectal exam deferred.  Costello catheter is present. MSK No gross joint deformities. Spontaneous movement of all extremities  SKIN Normal coloration, warm, dry.   NEURO Alert and oriented x3 -     Medications:   Medications:    acetaminophen  1,000 mg Intravenous Once    heparin (porcine)  5,000 Units Subcutaneous BID    mometasone-formoterol  2 puff Inhalation BID    linagliptin  5 mg Oral Daily    pravastatin  20 mg Oral Nightly    sodium chloride flush  10 mL Intravenous 2 times per day    atenolol  25 mg Oral BID    famotidine  20 mg Oral BID    fluticasone  1 spray Each Nare Daily    glipiZIDE  10 mg Oral BID AC    insulin lispro  0-12 Units Subcutaneous TID WC    insulin lispro  0-6 Units Subcutaneous Nightly    ipratropium-albuterol  1 vial Nebulization 4x daily    nicotine  1 patch Transdermal Daily    trospium  20 mg Oral Nightly    vitamin C  500 mg Oral Daily      Infusions:    IV infusion builder      dexmedetomidine (PRECEDEX) IV infusion Stopped (10/25/18 2203)    IV

## 2018-10-27 LAB
ANION GAP SERPL CALCULATED.3IONS-SCNC: 9 MMOL/L (ref 4–16)
BASOPHILS ABSOLUTE: 0 K/CU MM
BASOPHILS RELATIVE PERCENT: 0.2 % (ref 0–1)
BUN BLDV-MCNC: 26 MG/DL (ref 6–23)
CALCIUM SERPL-MCNC: 7.7 MG/DL (ref 8.3–10.6)
CHLORIDE BLD-SCNC: 103 MMOL/L (ref 99–110)
CO2: 28 MMOL/L (ref 21–32)
CREAT SERPL-MCNC: 1.2 MG/DL (ref 0.6–1.1)
DIFFERENTIAL TYPE: ABNORMAL
EOSINOPHILS ABSOLUTE: 0 K/CU MM
EOSINOPHILS RELATIVE PERCENT: 0.2 % (ref 0–3)
GFR AFRICAN AMERICAN: 55 ML/MIN/1.73M2
GFR NON-AFRICAN AMERICAN: 46 ML/MIN/1.73M2
GLUCOSE BLD-MCNC: 136 MG/DL (ref 70–99)
GLUCOSE BLD-MCNC: 144 MG/DL (ref 70–99)
GLUCOSE BLD-MCNC: 152 MG/DL (ref 70–99)
GLUCOSE BLD-MCNC: 172 MG/DL (ref 70–99)
GLUCOSE BLD-MCNC: 186 MG/DL (ref 70–99)
HCT VFR BLD CALC: 20 % (ref 37–47)
HCT VFR BLD CALC: 21.1 % (ref 37–47)
HCT VFR BLD CALC: 23.5 % (ref 37–47)
HEMOGLOBIN: 6 GM/DL (ref 12.5–16)
HEMOGLOBIN: 6.5 GM/DL (ref 12.5–16)
HEMOGLOBIN: 7.6 GM/DL (ref 12.5–16)
IMMATURE NEUTROPHIL %: 0.9 % (ref 0–0.43)
LYMPHOCYTES ABSOLUTE: 1.9 K/CU MM
LYMPHOCYTES RELATIVE PERCENT: 11.6 % (ref 24–44)
MCH RBC QN AUTO: 30.2 PG (ref 27–31)
MCHC RBC AUTO-ENTMCNC: 30 % (ref 32–36)
MCV RBC AUTO: 100.5 FL (ref 78–100)
MONOCYTES ABSOLUTE: 1.3 K/CU MM
MONOCYTES RELATIVE PERCENT: 8.1 % (ref 0–4)
NUCLEATED RBC %: 0 %
PDW BLD-RTO: 16.6 % (ref 11.7–14.9)
PLATELET # BLD: 218 K/CU MM (ref 140–440)
PMV BLD AUTO: 8.9 FL (ref 7.5–11.1)
POTASSIUM SERPL-SCNC: 4.9 MMOL/L (ref 3.5–5.1)
RBC # BLD: 1.99 M/CU MM (ref 4.2–5.4)
SEGMENTED NEUTROPHILS ABSOLUTE COUNT: 12.9 K/CU MM
SEGMENTED NEUTROPHILS RELATIVE PERCENT: 79 % (ref 36–66)
SODIUM BLD-SCNC: 140 MMOL/L (ref 135–145)
TOTAL IMMATURE NEUTOROPHIL: 0.15 K/CU MM
TOTAL NUCLEATED RBC: 0 K/CU MM
WBC # BLD: 16.3 K/CU MM (ref 4–10.5)

## 2018-10-27 PROCEDURE — 2580000003 HC RX 258: Performed by: INTERNAL MEDICINE

## 2018-10-27 PROCEDURE — 6360000002 HC RX W HCPCS: Performed by: INTERNAL MEDICINE

## 2018-10-27 PROCEDURE — 94761 N-INVAS EAR/PLS OXIMETRY MLT: CPT

## 2018-10-27 PROCEDURE — 31720 CLEARANCE OF AIRWAYS: CPT

## 2018-10-27 PROCEDURE — 2580000003 HC RX 258

## 2018-10-27 PROCEDURE — 85014 HEMATOCRIT: CPT

## 2018-10-27 PROCEDURE — 85025 COMPLETE CBC W/AUTO DIFF WBC: CPT

## 2018-10-27 PROCEDURE — 99024 POSTOP FOLLOW-UP VISIT: CPT | Performed by: SURGERY

## 2018-10-27 PROCEDURE — 2580000003 HC RX 258: Performed by: HOSPITALIST

## 2018-10-27 PROCEDURE — 2700000000 HC OXYGEN THERAPY PER DAY

## 2018-10-27 PROCEDURE — 2500000003 HC RX 250 WO HCPCS: Performed by: INTERNAL MEDICINE

## 2018-10-27 PROCEDURE — 2000000000 HC ICU R&B

## 2018-10-27 PROCEDURE — 6360000002 HC RX W HCPCS: Performed by: SURGERY

## 2018-10-27 PROCEDURE — 94640 AIRWAY INHALATION TREATMENT: CPT

## 2018-10-27 PROCEDURE — 6370000000 HC RX 637 (ALT 250 FOR IP): Performed by: FAMILY MEDICINE

## 2018-10-27 PROCEDURE — 82962 GLUCOSE BLOOD TEST: CPT

## 2018-10-27 PROCEDURE — 6370000000 HC RX 637 (ALT 250 FOR IP): Performed by: HOSPITALIST

## 2018-10-27 PROCEDURE — 85018 HEMOGLOBIN: CPT

## 2018-10-27 PROCEDURE — 80048 BASIC METABOLIC PNL TOTAL CA: CPT

## 2018-10-27 PROCEDURE — 6360000002 HC RX W HCPCS: Performed by: HOSPITALIST

## 2018-10-27 PROCEDURE — 36430 TRANSFUSION BLD/BLD COMPNT: CPT

## 2018-10-27 RX ORDER — 0.9 % SODIUM CHLORIDE 0.9 %
250 INTRAVENOUS SOLUTION INTRAVENOUS ONCE
Status: COMPLETED | OUTPATIENT
Start: 2018-10-27 | End: 2018-10-27

## 2018-10-27 RX ORDER — FUROSEMIDE 10 MG/ML
20 INJECTION INTRAMUSCULAR; INTRAVENOUS ONCE
Status: COMPLETED | OUTPATIENT
Start: 2018-10-27 | End: 2018-10-27

## 2018-10-27 RX ORDER — SODIUM CHLORIDE 9 MG/ML
INJECTION, SOLUTION INTRAVENOUS
Status: COMPLETED
Start: 2018-10-27 | End: 2018-10-27

## 2018-10-27 RX ORDER — SODIUM CHLORIDE 9 MG/ML
INJECTION, SOLUTION INTRAVENOUS ONCE
Status: DISCONTINUED | OUTPATIENT
Start: 2018-10-27 | End: 2018-11-09 | Stop reason: HOSPADM

## 2018-10-27 RX ADMIN — Medication 2 PUFF: at 07:44

## 2018-10-27 RX ADMIN — INSULIN LISPRO 2 UNITS: 100 INJECTION, SOLUTION INTRAVENOUS; SUBCUTANEOUS at 13:11

## 2018-10-27 RX ADMIN — PRAVASTATIN SODIUM 20 MG: 20 TABLET ORAL at 20:16

## 2018-10-27 RX ADMIN — SODIUM CHLORIDE 250 ML: 9 INJECTION, SOLUTION INTRAVENOUS at 06:10

## 2018-10-27 RX ADMIN — FAMOTIDINE 20 MG: 20 TABLET ORAL at 09:39

## 2018-10-27 RX ADMIN — OXYCODONE HYDROCHLORIDE AND ACETAMINOPHEN 500 MG: 500 TABLET ORAL at 09:37

## 2018-10-27 RX ADMIN — TROSPIUM CHLORIDE 20 MG: 20 TABLET ORAL at 20:16

## 2018-10-27 RX ADMIN — SODIUM CHLORIDE 250 ML: 9 INJECTION, SOLUTION INTRAVENOUS at 14:50

## 2018-10-27 RX ADMIN — INSULIN LISPRO 2 UNITS: 100 INJECTION, SOLUTION INTRAVENOUS; SUBCUTANEOUS at 17:52

## 2018-10-27 RX ADMIN — IPRATROPIUM BROMIDE AND ALBUTEROL SULFATE 3 ML: .5; 3 SOLUTION RESPIRATORY (INHALATION) at 16:14

## 2018-10-27 RX ADMIN — Medication 2 PUFF: at 19:42

## 2018-10-27 RX ADMIN — SODIUM BICARBONATE: 84 INJECTION, SOLUTION INTRAVENOUS at 07:43

## 2018-10-27 RX ADMIN — HYDROMORPHONE HYDROCHLORIDE 1 MG: 2 INJECTION INTRAMUSCULAR; INTRAVENOUS; SUBCUTANEOUS at 10:27

## 2018-10-27 RX ADMIN — SODIUM CHLORIDE, PRESERVATIVE FREE 10 ML: 5 INJECTION INTRAVENOUS at 09:41

## 2018-10-27 RX ADMIN — HYDROMORPHONE HYDROCHLORIDE 1 MG: 2 INJECTION INTRAMUSCULAR; INTRAVENOUS; SUBCUTANEOUS at 06:02

## 2018-10-27 RX ADMIN — LINAGLIPTIN 5 MG: 5 TABLET, FILM COATED ORAL at 09:38

## 2018-10-27 RX ADMIN — IPRATROPIUM BROMIDE AND ALBUTEROL SULFATE 3 ML: .5; 3 SOLUTION RESPIRATORY (INHALATION) at 12:10

## 2018-10-27 RX ADMIN — ATENOLOL 25 MG: 25 TABLET ORAL at 20:16

## 2018-10-27 RX ADMIN — HEPARIN SODIUM 5000 UNITS: 5000 INJECTION INTRAVENOUS; SUBCUTANEOUS at 20:17

## 2018-10-27 RX ADMIN — FAMOTIDINE 20 MG: 20 TABLET ORAL at 20:16

## 2018-10-27 RX ADMIN — HYDROMORPHONE HYDROCHLORIDE 1 MG: 2 INJECTION INTRAMUSCULAR; INTRAVENOUS; SUBCUTANEOUS at 23:02

## 2018-10-27 RX ADMIN — IPRATROPIUM BROMIDE AND ALBUTEROL SULFATE 3 ML: .5; 3 SOLUTION RESPIRATORY (INHALATION) at 07:44

## 2018-10-27 RX ADMIN — ATENOLOL 25 MG: 25 TABLET ORAL at 09:36

## 2018-10-27 RX ADMIN — DEXTROSE MONOHYDRATE: 50 INJECTION, SOLUTION INTRAVENOUS at 21:14

## 2018-10-27 RX ADMIN — HYDROMORPHONE HYDROCHLORIDE 1 MG: 2 INJECTION INTRAMUSCULAR; INTRAVENOUS; SUBCUTANEOUS at 16:22

## 2018-10-27 RX ADMIN — HYDROMORPHONE HYDROCHLORIDE 1 MG: 2 INJECTION INTRAMUSCULAR; INTRAVENOUS; SUBCUTANEOUS at 19:22

## 2018-10-27 RX ADMIN — IPRATROPIUM BROMIDE AND ALBUTEROL SULFATE 3 ML: .5; 3 SOLUTION RESPIRATORY (INHALATION) at 19:35

## 2018-10-27 RX ADMIN — HEPARIN SODIUM 5000 UNITS: 5000 INJECTION INTRAVENOUS; SUBCUTANEOUS at 09:39

## 2018-10-27 RX ADMIN — GLIPIZIDE 10 MG: 10 TABLET ORAL at 09:38

## 2018-10-27 RX ADMIN — TRAZODONE HYDROCHLORIDE 100 MG: 50 TABLET ORAL at 23:01

## 2018-10-27 RX ADMIN — SODIUM CHLORIDE, PRESERVATIVE FREE 10 ML: 5 INJECTION INTRAVENOUS at 02:08

## 2018-10-27 RX ADMIN — FUROSEMIDE 20 MG: 10 INJECTION, SOLUTION INTRAVENOUS at 13:12

## 2018-10-27 RX ADMIN — HYDROMORPHONE HYDROCHLORIDE 1 MG: 2 INJECTION INTRAMUSCULAR; INTRAVENOUS; SUBCUTANEOUS at 02:08

## 2018-10-27 RX ADMIN — INSULIN LISPRO 2 UNITS: 100 INJECTION, SOLUTION INTRAVENOUS; SUBCUTANEOUS at 09:40

## 2018-10-27 RX ADMIN — SODIUM CHLORIDE, PRESERVATIVE FREE 10 ML: 5 INJECTION INTRAVENOUS at 20:17

## 2018-10-27 RX ADMIN — FLUTICASONE PROPIONATE 1 SPRAY: 50 SPRAY, METERED NASAL at 11:11

## 2018-10-27 ASSESSMENT — PAIN SCALES - GENERAL
PAINLEVEL_OUTOF10: 8
PAINLEVEL_OUTOF10: 9
PAINLEVEL_OUTOF10: 5
PAINLEVEL_OUTOF10: 8
PAINLEVEL_OUTOF10: 6
PAINLEVEL_OUTOF10: 5
PAINLEVEL_OUTOF10: 5
PAINLEVEL_OUTOF10: 10
PAINLEVEL_OUTOF10: 8
PAINLEVEL_OUTOF10: 5
PAINLEVEL_OUTOF10: 2
PAINLEVEL_OUTOF10: 7
PAINLEVEL_OUTOF10: 10
PAINLEVEL_OUTOF10: 3
PAINLEVEL_OUTOF10: 8
PAINLEVEL_OUTOF10: 2
PAINLEVEL_OUTOF10: 5

## 2018-10-27 ASSESSMENT — PAIN DESCRIPTION - FREQUENCY
FREQUENCY: CONTINUOUS

## 2018-10-27 ASSESSMENT — PAIN DESCRIPTION - ONSET
ONSET: ON-GOING

## 2018-10-27 ASSESSMENT — PAIN DESCRIPTION - PROGRESSION
CLINICAL_PROGRESSION: NOT CHANGED
CLINICAL_PROGRESSION: NOT CHANGED
CLINICAL_PROGRESSION: GRADUALLY IMPROVING
CLINICAL_PROGRESSION: GRADUALLY WORSENING
CLINICAL_PROGRESSION: GRADUALLY IMPROVING
CLINICAL_PROGRESSION: GRADUALLY WORSENING
CLINICAL_PROGRESSION: GRADUALLY IMPROVING
CLINICAL_PROGRESSION: GRADUALLY IMPROVING
CLINICAL_PROGRESSION: NOT CHANGED
CLINICAL_PROGRESSION: GRADUALLY IMPROVING
CLINICAL_PROGRESSION: GRADUALLY WORSENING
CLINICAL_PROGRESSION: GRADUALLY IMPROVING

## 2018-10-27 ASSESSMENT — PAIN DESCRIPTION - ORIENTATION
ORIENTATION: RIGHT
ORIENTATION: LEFT;LOWER
ORIENTATION: RIGHT
ORIENTATION: LEFT;LOWER
ORIENTATION: LEFT;LOWER
ORIENTATION: RIGHT
ORIENTATION: LEFT
ORIENTATION: LEFT;LOWER

## 2018-10-27 ASSESSMENT — PAIN DESCRIPTION - LOCATION
LOCATION: ABDOMEN

## 2018-10-27 ASSESSMENT — PAIN DESCRIPTION - PAIN TYPE
TYPE: SURGICAL PAIN
TYPE: ACUTE PAIN
TYPE: SURGICAL PAIN
TYPE: ACUTE PAIN
TYPE: SURGICAL PAIN
TYPE: ACUTE PAIN;SURGICAL PAIN
TYPE: ACUTE PAIN
TYPE: ACUTE PAIN;SURGICAL PAIN
TYPE: SURGICAL PAIN
TYPE: ACUTE PAIN
TYPE: ACUTE PAIN

## 2018-10-27 ASSESSMENT — PAIN DESCRIPTION - DESCRIPTORS
DESCRIPTORS: ACHING

## 2018-10-27 NOTE — PROGRESS NOTES
and does use it    Steatohepatitis 3/21/2016    Liver, core biopsies: Steatohepatitis, acute and chronic, grade 2, stage 2.  Systolic murmur 1271    Tobacco abuse disorder 10/11/2013    Type 2 diabetes mellitus (HCC)     Urinary incontinence, mixed 10/11/2013       Objective:     Vitals:    10/26/18 1957   BP:    Pulse:    Resp:    Temp:    SpO2: 92%       TEMPERATURE:  Current - Temp: 98.3 °F (36.8 °C); Max - Temp  Av.3 °F (36.8 °C)  Min: 98.1 °F (36.7 °C)  Max: 98.6 °F (37 °C)    I/O this shift:  In: 120 [P.O.:120]  Out: 225 [Urine:225]I/O last 3 completed shifts: In: 4577 [I.V.:2715]  Out: 960 [Urine:615; Emesis/NG output:110; Other:35; Blood:200]      Physical Exam:    Physical Exam   Constitutional: She is oriented to person, place, and time. HENT:   Head: Normocephalic and atraumatic. Eyes: Pupils are equal, round, and reactive to light. Conjunctivae are normal.   Neck: Normal range of motion. Cardiovascular: Normal rate and regular rhythm. Pulmonary/Chest: No respiratory distress. She has no wheezes. Abdominal: Soft. She exhibits no distension. There is no tenderness. Musculoskeletal: Normal range of motion. Neurological: She is alert and oriented to person, place, and time. Skin: Skin is warm.            Scheduled Meds:   acetaminophen  1,000 mg Intravenous Once    heparin (porcine)  5,000 Units Subcutaneous BID    mometasone-formoterol  2 puff Inhalation BID    linagliptin  5 mg Oral Daily    pravastatin  20 mg Oral Nightly    sodium chloride flush  10 mL Intravenous 2 times per day    atenolol  25 mg Oral BID    famotidine  20 mg Oral BID    fluticasone  1 spray Each Nare Daily    glipiZIDE  10 mg Oral BID AC    insulin lispro  0-12 Units Subcutaneous TID WC    insulin lispro  0-6 Units Subcutaneous Nightly    ipratropium-albuterol  1 vial Nebulization 4x daily    nicotine  1 patch Transdermal Daily    trospium  20 mg Oral Nightly    vitamin C  500 mg Oral Daily     Continuous Infusions:   IV infusion builder 100 mL/hr at 10/26/18 0955    dexmedetomidine (PRECEDEX) IV infusion Stopped (10/25/18 2203)    IV infusion builder 22.5 mL/hr at 10/25/18 2044    dextrose       PRN Meds:HYDROmorphone, acetaminophen, albuterol sulfate HFA, traZODone, sodium chloride flush, magnesium hydroxide, ondansetron, albuterol, dextrose, dextrose, glucagon (rDNA), glucose      Labs/Imaging Results:   Lab Results   Component Value Date    WBC 13.6 (H) 10/26/2018    HGB 7.4 (L) 10/26/2018    HCT 24.1 (L) 10/26/2018    MCV 98.8 10/26/2018     10/26/2018     Lab Results   Component Value Date     10/26/2018    K 5.0 10/26/2018     10/26/2018    CO2 26 10/26/2018    BUN 23 10/26/2018    CREATININE 1.2 (H) 10/26/2018    GLUCOSE 148 (H) 10/26/2018    CALCIUM 7.9 (L) 10/26/2018    PROT 5.6 (L) 10/23/2018    LABALBU 3.3 (L) 10/23/2018    BILITOT 0.3 10/23/2018    ALKPHOS 62 10/23/2018    AST 10 (L) 10/23/2018    ALT 20 10/23/2018    LABGLOM 46 (L) 10/26/2018    GFRAA 55 (L) 10/26/2018       Assessment:     Patient Active Problem List:     Type 2 diabetes mellitus without complication ([address]     Essential hypertension     Urinary incontinence, mixed     Tobacco abuse disorder     Obstructive sleep apnea     Pulmonary hypertension (HCC)     Systolic murmur     Chronic respiratory failure with hypoxia (HCC)     Microalbuminuria due to type 2 diabetes mellitus ([address]     Liver dysfunction     Steatohepatitis     Mixed hyperlipidemia     Morbid obesity due to excess calories (HCC)     COPD, severe (HCC)     COPD exacerbation ([address]     Fall at home     Type 2 diabetes mellitus ([address]     Hypertension     Hyperlipidemia     Tobacco abuse     Obesity due to excess calories     CHF (congestive heart failure) ([address]     Physical debility     Bilateral carotid artery stenosis     Cerebrovascular accident (CVA) ([address]     COPD exacerbation ([address]     Type 2 diabetes mellitus ([address] Hypertension     Tobacco abuse     Hyperlipidemia     COPD (chronic obstructive pulmonary disease) (HCC)     Pneumonia     Bacteremia     Streptococcal bacteremia     Subacute bacterial endocarditis     Cecum mass      Plan:       S/p right colon resection. NG tube removed today. Will start patient on clear liquid diet. Patient needs to be out of bed and ambulating. Shortness breath is at baseline. Await bowel function.       Rancho Herring MD

## 2018-10-27 NOTE — PROGRESS NOTES
Nephrology Progress Note  10/27/2018 11:25 AM  Subjective:   Admit Date: 10/18/2018  PCP: Rimma Witt MD  Interval History: pt doing better today and awake weak and try increase breathing as o2 decrease    Diet: DIET CLEAR LIQUID;  Pain is: Moderate      Data:   Scheduled Meds:   acetaminophen  1,000 mg Intravenous Once    heparin (porcine)  5,000 Units Subcutaneous BID    mometasone-formoterol  2 puff Inhalation BID    linagliptin  5 mg Oral Daily    pravastatin  20 mg Oral Nightly    sodium chloride flush  10 mL Intravenous 2 times per day    atenolol  25 mg Oral BID    famotidine  20 mg Oral BID    fluticasone  1 spray Each Nare Daily    glipiZIDE  10 mg Oral BID AC    insulin lispro  0-12 Units Subcutaneous TID WC    insulin lispro  0-6 Units Subcutaneous Nightly    ipratropium-albuterol  1 vial Nebulization 4x daily    nicotine  1 patch Transdermal Daily    trospium  20 mg Oral Nightly    vitamin C  500 mg Oral Daily     Continuous Infusions:   sodium chloride      IV infusion builder 22.5 mL/hr at 10/26/18 2117    dextrose       PRN Meds:HYDROmorphone, acetaminophen, albuterol sulfate HFA, traZODone, sodium chloride flush, magnesium hydroxide, ondansetron, albuterol, dextrose, dextrose, glucagon (rDNA), glucose  I/O last 3 completed shifts: In: 6855 [P.O.:120;  I.V.:1484]  Out: 650 [Urine:650]  I/O this shift:  In: 350 [Blood:350]  Out: -     Intake/Output Summary (Last 24 hours) at 10/27/18 1125  Last data filed at 10/27/18 0948   Gross per 24 hour   Intake             1954 ml   Output              650 ml   Net             1304 ml     CBC:   Recent Labs      10/24/18   1858  10/25/18   1452  10/26/18   0400  10/27/18   0450   WBC  11.4*   --   13.6*  16.3*   HGB  8.1*  8.1*  7.4*  6.0*   PLT  253   --   199  218     BMP:    Recent Labs      10/26/18   0400  10/26/18   1250  10/27/18   0450   NA  140  140  140   K  5.4*  5.0  4.9   CL  103  102  103   CO2  26  26  28   BUN  21  23  26* CREATININE  1.2*  1.2*  1.2*   GLUCOSE  181*  148*  144*     Hepatic: No results for input(s): AST, ALT, ALB, BILITOT, ALKPHOS in the last 72 hours. Troponin: No results for input(s): TROPONINI in the last 72 hours. BNP: No results for input(s): BNP in the last 72 hours. Lipids: No results for input(s): CHOL, HDL in the last 72 hours. Invalid input(s): LDLCALCU  ABGs:   Lab Results   Component Value Date    PO2ART 62 10/25/2018    JSM2YVX 47.0 10/25/2018     INR: No results for input(s): INR in the last 72 hours. Renal Labs  Albumin:    Lab Results   Component Value Date    LABALBU 3.3 10/23/2018     Calcium:    Lab Results   Component Value Date    CALCIUM 7.7 10/27/2018     Phosphorus:    Lab Results   Component Value Date    PHOS 3.5 10/20/2018     U/A:    Lab Results   Component Value Date    NITRU NEGATIVE 10/19/2018    COLORU STRAW 10/19/2018    WBCUA <1 10/19/2018    RBCUA <1 10/19/2018    MUCUS RARE 10/19/2018    TRICHOMONAS NONE SEEN 10/19/2018    BACTERIA NEGATIVE 10/19/2018    CLARITYU CLEAR 10/19/2018    SPECGRAV 1.010 10/19/2018    UROBILINOGEN NORMAL 10/19/2018    BILIRUBINUR NEGATIVE 10/19/2018    BLOODU NEGATIVE 10/19/2018    KETUA NEGATIVE 10/19/2018     ABG:    Lab Results   Component Value Date    WRK0LUR 47.0 10/25/2018    PO2ART 62 10/25/2018    KME6GOG 25.9 10/25/2018     HgBA1c:    Lab Results   Component Value Date    LABA1C 6.9 08/02/2018     Microalbumen/Creatinine ratio:  No components found for: RUCREAT          Objective:   Vitals: BP (!) 129/45   Pulse 90   Temp 98.4 °F (36.9 °C) (Oral)   Resp 19   Ht 5' 6\" (1.676 m)   Wt 274 lb 14.6 oz (124.7 kg)   SpO2 91%   BMI 44.37 kg/m²   General appearance: awake weak  HEENT: Head: Normal, normocephalic, atraumatic.   Neck: supple, symmetrical, trachea midline  Lungs: diminished breath sounds bilaterally  Heart: S1, S2 normal  Abdomen: abnormal findings:  soft mild tender  Extremities: edema trace  Neurologic: Mental status:

## 2018-10-28 ENCOUNTER — APPOINTMENT (OUTPATIENT)
Dept: GENERAL RADIOLOGY | Age: 63
DRG: 853 | End: 2018-10-28
Attending: HOSPITALIST
Payer: COMMERCIAL

## 2018-10-28 LAB
ANION GAP SERPL CALCULATED.3IONS-SCNC: 10 MMOL/L (ref 4–16)
BASOPHILS ABSOLUTE: 0 K/CU MM
BASOPHILS RELATIVE PERCENT: 0.2 % (ref 0–1)
BUN BLDV-MCNC: 30 MG/DL (ref 6–23)
CALCIUM SERPL-MCNC: 7.8 MG/DL (ref 8.3–10.6)
CHLORIDE BLD-SCNC: 100 MMOL/L (ref 99–110)
CO2: 28 MMOL/L (ref 21–32)
CREAT SERPL-MCNC: 1.3 MG/DL (ref 0.6–1.1)
DIFFERENTIAL TYPE: ABNORMAL
EOSINOPHILS ABSOLUTE: 0 K/CU MM
EOSINOPHILS RELATIVE PERCENT: 0.4 % (ref 0–3)
GFR AFRICAN AMERICAN: 50 ML/MIN/1.73M2
GFR NON-AFRICAN AMERICAN: 42 ML/MIN/1.73M2
GLUCOSE BLD-MCNC: 148 MG/DL (ref 70–99)
GLUCOSE BLD-MCNC: 154 MG/DL (ref 70–99)
GLUCOSE BLD-MCNC: 165 MG/DL (ref 70–99)
HCT VFR BLD CALC: 26.8 % (ref 37–47)
HEMOGLOBIN: 8.3 GM/DL (ref 12.5–16)
IMMATURE NEUTROPHIL %: 0.5 % (ref 0–0.43)
LYMPHOCYTES ABSOLUTE: 1.7 K/CU MM
LYMPHOCYTES RELATIVE PERCENT: 16.2 % (ref 24–44)
MCH RBC QN AUTO: 29.5 PG (ref 27–31)
MCHC RBC AUTO-ENTMCNC: 31 % (ref 32–36)
MCV RBC AUTO: 95.4 FL (ref 78–100)
MONOCYTES ABSOLUTE: 0.9 K/CU MM
MONOCYTES RELATIVE PERCENT: 8.8 % (ref 0–4)
NUCLEATED RBC %: 0 %
PDW BLD-RTO: 16.5 % (ref 11.7–14.9)
PLATELET # BLD: 189 K/CU MM (ref 140–440)
PMV BLD AUTO: 9.1 FL (ref 7.5–11.1)
POTASSIUM SERPL-SCNC: 4.9 MMOL/L (ref 3.5–5.1)
RBC # BLD: 2.81 M/CU MM (ref 4.2–5.4)
SEGMENTED NEUTROPHILS ABSOLUTE COUNT: 7.9 K/CU MM
SEGMENTED NEUTROPHILS RELATIVE PERCENT: 73.9 % (ref 36–66)
SODIUM BLD-SCNC: 138 MMOL/L (ref 135–145)
TOTAL IMMATURE NEUTOROPHIL: 0.05 K/CU MM
TOTAL NUCLEATED RBC: 0 K/CU MM
WBC # BLD: 10.7 K/CU MM (ref 4–10.5)

## 2018-10-28 PROCEDURE — 2000000000 HC ICU R&B

## 2018-10-28 PROCEDURE — 94640 AIRWAY INHALATION TREATMENT: CPT

## 2018-10-28 PROCEDURE — 36430 TRANSFUSION BLD/BLD COMPNT: CPT

## 2018-10-28 PROCEDURE — 99024 POSTOP FOLLOW-UP VISIT: CPT | Performed by: SURGERY

## 2018-10-28 PROCEDURE — 6370000000 HC RX 637 (ALT 250 FOR IP): Performed by: FAMILY MEDICINE

## 2018-10-28 PROCEDURE — P9047 ALBUMIN (HUMAN), 25%, 50ML: HCPCS | Performed by: INTERNAL MEDICINE

## 2018-10-28 PROCEDURE — 85025 COMPLETE CBC W/AUTO DIFF WBC: CPT

## 2018-10-28 PROCEDURE — 6360000002 HC RX W HCPCS: Performed by: SURGERY

## 2018-10-28 PROCEDURE — 82962 GLUCOSE BLOOD TEST: CPT

## 2018-10-28 PROCEDURE — 2580000003 HC RX 258: Performed by: HOSPITALIST

## 2018-10-28 PROCEDURE — 2700000000 HC OXYGEN THERAPY PER DAY

## 2018-10-28 PROCEDURE — 80048 BASIC METABOLIC PNL TOTAL CA: CPT

## 2018-10-28 PROCEDURE — 2580000003 HC RX 258: Performed by: INTERNAL MEDICINE

## 2018-10-28 PROCEDURE — 6360000002 HC RX W HCPCS: Performed by: INTERNAL MEDICINE

## 2018-10-28 PROCEDURE — 74018 RADEX ABDOMEN 1 VIEW: CPT

## 2018-10-28 PROCEDURE — 94761 N-INVAS EAR/PLS OXIMETRY MLT: CPT

## 2018-10-28 PROCEDURE — 6370000000 HC RX 637 (ALT 250 FOR IP): Performed by: HOSPITALIST

## 2018-10-28 PROCEDURE — 71045 X-RAY EXAM CHEST 1 VIEW: CPT

## 2018-10-28 PROCEDURE — 99232 SBSQ HOSP IP/OBS MODERATE 35: CPT | Performed by: INTERNAL MEDICINE

## 2018-10-28 PROCEDURE — 2580000003 HC RX 258: Performed by: FAMILY MEDICINE

## 2018-10-28 RX ORDER — ALBUMIN (HUMAN) 12.5 G/50ML
25 SOLUTION INTRAVENOUS ONCE
Status: COMPLETED | OUTPATIENT
Start: 2018-10-28 | End: 2018-10-28

## 2018-10-28 RX ORDER — 0.9 % SODIUM CHLORIDE 0.9 %
250 INTRAVENOUS SOLUTION INTRAVENOUS ONCE
Status: COMPLETED | OUTPATIENT
Start: 2018-10-28 | End: 2018-10-28

## 2018-10-28 RX ORDER — FUROSEMIDE 10 MG/ML
20 INJECTION INTRAMUSCULAR; INTRAVENOUS ONCE
Status: COMPLETED | OUTPATIENT
Start: 2018-10-28 | End: 2018-10-28

## 2018-10-28 RX ADMIN — TRAZODONE HYDROCHLORIDE 100 MG: 50 TABLET ORAL at 22:03

## 2018-10-28 RX ADMIN — HYDROMORPHONE HYDROCHLORIDE 1 MG: 2 INJECTION INTRAMUSCULAR; INTRAVENOUS; SUBCUTANEOUS at 17:05

## 2018-10-28 RX ADMIN — HYDROMORPHONE HYDROCHLORIDE 1 MG: 2 INJECTION INTRAMUSCULAR; INTRAVENOUS; SUBCUTANEOUS at 23:49

## 2018-10-28 RX ADMIN — Medication 2 PUFF: at 20:11

## 2018-10-28 RX ADMIN — HYDROMORPHONE HYDROCHLORIDE 1 MG: 2 INJECTION INTRAMUSCULAR; INTRAVENOUS; SUBCUTANEOUS at 20:19

## 2018-10-28 RX ADMIN — SODIUM CHLORIDE, PRESERVATIVE FREE 10 ML: 5 INJECTION INTRAVENOUS at 20:18

## 2018-10-28 RX ADMIN — FLUTICASONE PROPIONATE 1 SPRAY: 50 SPRAY, METERED NASAL at 13:43

## 2018-10-28 RX ADMIN — FAMOTIDINE 20 MG: 20 TABLET ORAL at 20:18

## 2018-10-28 RX ADMIN — HYDROMORPHONE HYDROCHLORIDE 1 MG: 2 INJECTION INTRAMUSCULAR; INTRAVENOUS; SUBCUTANEOUS at 13:38

## 2018-10-28 RX ADMIN — IPRATROPIUM BROMIDE AND ALBUTEROL SULFATE 3 ML: .5; 3 SOLUTION RESPIRATORY (INHALATION) at 12:48

## 2018-10-28 RX ADMIN — IPRATROPIUM BROMIDE AND ALBUTEROL SULFATE 3 ML: .5; 3 SOLUTION RESPIRATORY (INHALATION) at 16:26

## 2018-10-28 RX ADMIN — IPRATROPIUM BROMIDE AND ALBUTEROL SULFATE 3 ML: .5; 3 SOLUTION RESPIRATORY (INHALATION) at 20:05

## 2018-10-28 RX ADMIN — PRAVASTATIN SODIUM 20 MG: 20 TABLET ORAL at 20:18

## 2018-10-28 RX ADMIN — ATENOLOL 25 MG: 25 TABLET ORAL at 20:18

## 2018-10-28 RX ADMIN — Medication 2 PUFF: at 08:42

## 2018-10-28 RX ADMIN — IPRATROPIUM BROMIDE AND ALBUTEROL SULFATE 3 ML: .5; 3 SOLUTION RESPIRATORY (INHALATION) at 08:42

## 2018-10-28 RX ADMIN — TROSPIUM CHLORIDE 20 MG: 20 TABLET ORAL at 20:18

## 2018-10-28 RX ADMIN — FUROSEMIDE 20 MG: 10 INJECTION, SOLUTION INTRAVENOUS at 13:43

## 2018-10-28 RX ADMIN — ALBUMIN (HUMAN) 25 G: 0.25 INJECTION, SOLUTION INTRAVENOUS at 13:42

## 2018-10-28 RX ADMIN — HYDROMORPHONE HYDROCHLORIDE 1 MG: 2 INJECTION INTRAMUSCULAR; INTRAVENOUS; SUBCUTANEOUS at 07:47

## 2018-10-28 RX ADMIN — HYDROMORPHONE HYDROCHLORIDE 1 MG: 2 INJECTION INTRAMUSCULAR; INTRAVENOUS; SUBCUTANEOUS at 03:53

## 2018-10-28 RX ADMIN — DEXTROSE MONOHYDRATE: 50 INJECTION, SOLUTION INTRAVENOUS at 19:12

## 2018-10-28 RX ADMIN — SODIUM CHLORIDE 250 ML: 9 INJECTION, SOLUTION INTRAVENOUS at 02:26

## 2018-10-28 ASSESSMENT — PAIN DESCRIPTION - PAIN TYPE
TYPE: ACUTE PAIN;SURGICAL PAIN
TYPE: SURGICAL PAIN
TYPE: ACUTE PAIN;SURGICAL PAIN
TYPE: SURGICAL PAIN
TYPE: ACUTE PAIN;SURGICAL PAIN
TYPE: SURGICAL PAIN
TYPE: ACUTE PAIN;SURGICAL PAIN
TYPE: SURGICAL PAIN
TYPE: SURGICAL PAIN

## 2018-10-28 ASSESSMENT — PAIN DESCRIPTION - DESCRIPTORS
DESCRIPTORS: ACHING;DISCOMFORT
DESCRIPTORS: ACHING

## 2018-10-28 ASSESSMENT — PAIN DESCRIPTION - ONSET
ONSET: ON-GOING

## 2018-10-28 ASSESSMENT — PAIN DESCRIPTION - FREQUENCY
FREQUENCY: CONTINUOUS
FREQUENCY: INTERMITTENT
FREQUENCY: CONTINUOUS

## 2018-10-28 ASSESSMENT — PAIN DESCRIPTION - PROGRESSION
CLINICAL_PROGRESSION: NOT CHANGED
CLINICAL_PROGRESSION: NOT CHANGED
CLINICAL_PROGRESSION: GRADUALLY WORSENING
CLINICAL_PROGRESSION: NOT CHANGED
CLINICAL_PROGRESSION: GRADUALLY WORSENING
CLINICAL_PROGRESSION: GRADUALLY IMPROVING
CLINICAL_PROGRESSION: GRADUALLY WORSENING
CLINICAL_PROGRESSION: GRADUALLY IMPROVING

## 2018-10-28 ASSESSMENT — PAIN SCALES - GENERAL
PAINLEVEL_OUTOF10: 9
PAINLEVEL_OUTOF10: 5
PAINLEVEL_OUTOF10: 2
PAINLEVEL_OUTOF10: 7
PAINLEVEL_OUTOF10: 9
PAINLEVEL_OUTOF10: 7
PAINLEVEL_OUTOF10: 7
PAINLEVEL_OUTOF10: 5
PAINLEVEL_OUTOF10: 2
PAINLEVEL_OUTOF10: 8
PAINLEVEL_OUTOF10: 10
PAINLEVEL_OUTOF10: 5
PAINLEVEL_OUTOF10: 8

## 2018-10-28 ASSESSMENT — PAIN DESCRIPTION - LOCATION
LOCATION: ABDOMEN

## 2018-10-28 ASSESSMENT — PAIN DESCRIPTION - ORIENTATION
ORIENTATION: LEFT;LOWER
ORIENTATION: LEFT;MID
ORIENTATION: LEFT;MID
ORIENTATION: LEFT;LOWER
ORIENTATION: LEFT;MID

## 2018-10-28 NOTE — PROGRESS NOTES
General Surgery-Dr. WELLS [facility] Day: 11    Chief Complaint on Admission: anemia      Subjective:     Pain controlled. Had Hgb in 6 range yesterday. From I/O chart, appears she received 2 units pRBCs and her Hgb this AM was 8.3 which is appropriate response. Pt had KUB yesterday for concern of distension and was made NPO. Pt denies N/V. She is asking for liquids. Denies any BM. Min to no flatus. Denies F/C, CP. Receiving breathing treatments with respiratory. UOP has picked up. ROS:  Review of Systems Negative except as above. Allergies  Tomato          Diagnosis Date    Chronic respiratory failure with hypoxia ([facility] 10/19/2015    COPD (chronic obstructive pulmonary disease) ([facility] 10/11/2013    follow with Dr Gibran Fisher    Diabetes mellitus ([facility] 10/11/2013    \"been diabetic for 7 yrs\"    Enlarged heart     Heart murmur     History of blood transfusion     Hyperlipidemia 10/11/2013    Hypertension 10/11/2013    Insomnia 10/11/2013    Liver dysfunction 2015    Liver, core biopsies: Steatohepatitis, acute and chronic, grade 2, stage 2.     Nasal congestion 2016    \"no cough and no fever associated with it\"    Obesity 10/11/2013    On home oxygen therapy     2l/nc 24 hrs per day    Sleep apnea 10/11/2013    had sleep apnea - has cpap and does use it    Steatohepatitis 3/21/2016    Liver, core biopsies: Steatohepatitis, acute and chronic, grade 2, stage 2.  Systolic murmur 124    Tobacco abuse disorder 10/11/2013    Type 2 diabetes mellitus (HCC)     Urinary incontinence, mixed 10/11/2013       Objective:     Vitals:    10/28/18 1003   BP: (!) 153/40   Pulse: 94   Resp: 27   Temp:    SpO2: (!) 88%       TEMPERATURE:  Current -Temp: 99.1 °F (37.3 °C); Max - Temp  Av.1 °F (37.3 °C)  Min: 98.9 °F (37.2 °C)  Max: 99.5 °F (37.5 °C)    No intake/output data recorded. I/O last 3 completed shifts:   In: 2238.5 [P.O.:30; I.V.:1475.2; Blood:733.3]  Out: 875

## 2018-10-28 NOTE — PLAN OF CARE
Problem: Falls - Risk of:  Goal: Will remain free from falls  Will remain free from falls   Outcome: Ongoing    Goal: Absence of physical injury  Absence of physical injury   Outcome: Ongoing      Problem: Discharge Planning:  Goal: Discharged to appropriate level of care  Discharged to appropriate level of care   Outcome: Ongoing      Problem: Breathing Pattern - Ineffective:  Goal: Ability to achieve and maintain a regular respiratory rate will improve  Ability to achieve and maintain a regular respiratory rate will improve   Outcome: Ongoing      Problem: Risk for Impaired Skin Integrity  Goal: Tissue integrity - skin and mucous membranes  Structural intactness and normal physiological function of skin and  mucous membranes.    Outcome: Ongoing      Problem: Pain:  Goal: Pain level will decrease  Pain level will decrease   Outcome: Ongoing    Goal: Control of acute pain  Control of acute pain   Outcome: Ongoing    Goal: Control of chronic pain  Control of chronic pain   Outcome: Ongoing

## 2018-10-28 NOTE — PROGRESS NOTES
Nephrology Progress Note  10/28/2018 10:41 AM  Subjective:   Admit Date: 10/18/2018  PCP: Camron Costa MD  Interval History: pt APPEAR stable and not able to take po today    Diet: DIET CLEAR LIQUID;  Pain is: Moderate      Data:   Scheduled Meds:   sodium chloride  250 mL Intravenous Once    acetaminophen  1,000 mg Intravenous Once    heparin (porcine)  5,000 Units Subcutaneous BID    mometasone-formoterol  2 puff Inhalation BID    linagliptin  5 mg Oral Daily    pravastatin  20 mg Oral Nightly    sodium chloride flush  10 mL Intravenous 2 times per day    atenolol  25 mg Oral BID    famotidine  20 mg Oral BID    fluticasone  1 spray Each Nare Daily    glipiZIDE  10 mg Oral BID AC    insulin lispro  0-12 Units Subcutaneous TID WC    insulin lispro  0-6 Units Subcutaneous Nightly    ipratropium-albuterol  1 vial Nebulization 4x daily    nicotine  1 patch Transdermal Daily    trospium  20 mg Oral Nightly    vitamin C  500 mg Oral Daily     Continuous Infusions:   sodium chloride      IV infusion builder 22.5 mL/hr at 10/27/18 2114    dextrose       PRN Meds:HYDROmorphone, acetaminophen, albuterol sulfate HFA, traZODone, sodium chloride flush, magnesium hydroxide, ondansetron, albuterol, dextrose, dextrose, glucagon (rDNA), glucose  I/O last 3 completed shifts: In: 2238.5 [P.O.:30; I.V.:1475.2; Blood:733.3]  Out: 875 [Urine:875]  No intake/output data recorded.     Intake/Output Summary (Last 24 hours) at 10/28/18 1041  Last data filed at 10/28/18 0611   Gross per 24 hour   Intake          1888.53 ml   Output              875 ml   Net          1013.53 ml     CBC:   Recent Labs      10/26/18   0400  10/27/18   0450  10/27/18   1230  10/27/18   2037  10/28/18   0540   WBC  13.6*  16.3*   --    --   10.7*   HGB  7.4*  6.0*  6.5*  7.6*  8.3*   PLT  199  218   --    --   189     BMP:    Recent Labs      10/26/18   1250  10/27/18   0450  10/28/18   0540   NA  140  140  138   K  5.0  4.9  4.9   CL  102

## 2018-10-29 LAB
ANION GAP SERPL CALCULATED.3IONS-SCNC: 11 MMOL/L (ref 4–16)
BASOPHILS ABSOLUTE: 0 K/CU MM
BASOPHILS RELATIVE PERCENT: 0.3 % (ref 0–1)
BUN BLDV-MCNC: 33 MG/DL (ref 6–23)
CALCIUM SERPL-MCNC: 8 MG/DL (ref 8.3–10.6)
CHLORIDE BLD-SCNC: 98 MMOL/L (ref 99–110)
CO2: 27 MMOL/L (ref 21–32)
CREAT SERPL-MCNC: 1.2 MG/DL (ref 0.6–1.1)
DIFFERENTIAL TYPE: ABNORMAL
EOSINOPHILS ABSOLUTE: 0.1 K/CU MM
EOSINOPHILS RELATIVE PERCENT: 1 % (ref 0–3)
GFR AFRICAN AMERICAN: 55 ML/MIN/1.73M2
GFR NON-AFRICAN AMERICAN: 46 ML/MIN/1.73M2
GLUCOSE BLD-MCNC: 112 MG/DL (ref 70–99)
GLUCOSE BLD-MCNC: 123 MG/DL (ref 70–99)
GLUCOSE BLD-MCNC: 133 MG/DL (ref 70–99)
GLUCOSE BLD-MCNC: 148 MG/DL (ref 70–99)
GLUCOSE BLD-MCNC: 90 MG/DL (ref 70–99)
HCT VFR BLD CALC: 26.5 % (ref 37–47)
HEMOGLOBIN: 8.3 GM/DL (ref 12.5–16)
IMMATURE NEUTROPHIL %: 0.3 % (ref 0–0.43)
LYMPHOCYTES ABSOLUTE: 1.2 K/CU MM
LYMPHOCYTES RELATIVE PERCENT: 19.5 % (ref 24–44)
MCH RBC QN AUTO: 30 PG (ref 27–31)
MCHC RBC AUTO-ENTMCNC: 31.3 % (ref 32–36)
MCV RBC AUTO: 95.7 FL (ref 78–100)
MONOCYTES ABSOLUTE: 0.7 K/CU MM
MONOCYTES RELATIVE PERCENT: 12.1 % (ref 0–4)
NUCLEATED RBC %: 0 %
PDW BLD-RTO: 16.5 % (ref 11.7–14.9)
PLATELET # BLD: 211 K/CU MM (ref 140–440)
PMV BLD AUTO: 9.2 FL (ref 7.5–11.1)
POTASSIUM SERPL-SCNC: 5.1 MMOL/L (ref 3.5–5.1)
RBC # BLD: 2.77 M/CU MM (ref 4.2–5.4)
SEGMENTED NEUTROPHILS ABSOLUTE COUNT: 4.1 K/CU MM
SEGMENTED NEUTROPHILS RELATIVE PERCENT: 66.8 % (ref 36–66)
SODIUM BLD-SCNC: 136 MMOL/L (ref 135–145)
TOTAL IMMATURE NEUTOROPHIL: 0.02 K/CU MM
TOTAL NUCLEATED RBC: 0 K/CU MM
WBC # BLD: 6.1 K/CU MM (ref 4–10.5)

## 2018-10-29 PROCEDURE — 94761 N-INVAS EAR/PLS OXIMETRY MLT: CPT

## 2018-10-29 PROCEDURE — 6360000002 HC RX W HCPCS: Performed by: INTERNAL MEDICINE

## 2018-10-29 PROCEDURE — 6360000002 HC RX W HCPCS: Performed by: SURGERY

## 2018-10-29 PROCEDURE — 2580000003 HC RX 258: Performed by: INTERNAL MEDICINE

## 2018-10-29 PROCEDURE — 6370000000 HC RX 637 (ALT 250 FOR IP): Performed by: HOSPITALIST

## 2018-10-29 PROCEDURE — 6370000000 HC RX 637 (ALT 250 FOR IP): Performed by: FAMILY MEDICINE

## 2018-10-29 PROCEDURE — 85025 COMPLETE CBC W/AUTO DIFF WBC: CPT

## 2018-10-29 PROCEDURE — 82962 GLUCOSE BLOOD TEST: CPT

## 2018-10-29 PROCEDURE — 2580000003 HC RX 258: Performed by: HOSPITALIST

## 2018-10-29 PROCEDURE — 99024 POSTOP FOLLOW-UP VISIT: CPT | Performed by: SURGERY

## 2018-10-29 PROCEDURE — 6360000002 HC RX W HCPCS: Performed by: HOSPITALIST

## 2018-10-29 PROCEDURE — 94640 AIRWAY INHALATION TREATMENT: CPT

## 2018-10-29 PROCEDURE — 80048 BASIC METABOLIC PNL TOTAL CA: CPT

## 2018-10-29 PROCEDURE — 2700000000 HC OXYGEN THERAPY PER DAY

## 2018-10-29 PROCEDURE — 2060000000 HC ICU INTERMEDIATE R&B

## 2018-10-29 PROCEDURE — 99232 SBSQ HOSP IP/OBS MODERATE 35: CPT | Performed by: INTERNAL MEDICINE

## 2018-10-29 RX ORDER — FUROSEMIDE 10 MG/ML
40 INJECTION INTRAMUSCULAR; INTRAVENOUS 2 TIMES DAILY
Status: COMPLETED | OUTPATIENT
Start: 2018-10-29 | End: 2018-10-29

## 2018-10-29 RX ADMIN — HYDROMORPHONE HYDROCHLORIDE 1 MG: 2 INJECTION INTRAMUSCULAR; INTRAVENOUS; SUBCUTANEOUS at 04:33

## 2018-10-29 RX ADMIN — IPRATROPIUM BROMIDE AND ALBUTEROL SULFATE 3 ML: .5; 3 SOLUTION RESPIRATORY (INHALATION) at 12:06

## 2018-10-29 RX ADMIN — SODIUM CHLORIDE, PRESERVATIVE FREE 10 ML: 5 INJECTION INTRAVENOUS at 07:32

## 2018-10-29 RX ADMIN — FAMOTIDINE 20 MG: 20 TABLET ORAL at 20:40

## 2018-10-29 RX ADMIN — SODIUM CHLORIDE, PRESERVATIVE FREE 10 ML: 5 INJECTION INTRAVENOUS at 20:41

## 2018-10-29 RX ADMIN — HYDROMORPHONE HYDROCHLORIDE 1 MG: 2 INJECTION INTRAMUSCULAR; INTRAVENOUS; SUBCUTANEOUS at 13:31

## 2018-10-29 RX ADMIN — SODIUM CHLORIDE, PRESERVATIVE FREE 10 ML: 5 INJECTION INTRAVENOUS at 08:53

## 2018-10-29 RX ADMIN — GLIPIZIDE 10 MG: 10 TABLET ORAL at 08:44

## 2018-10-29 RX ADMIN — ATENOLOL 25 MG: 25 TABLET ORAL at 10:17

## 2018-10-29 RX ADMIN — HYDROMORPHONE HYDROCHLORIDE 1 MG: 2 INJECTION INTRAMUSCULAR; INTRAVENOUS; SUBCUTANEOUS at 20:40

## 2018-10-29 RX ADMIN — OXYCODONE HYDROCHLORIDE AND ACETAMINOPHEN 500 MG: 500 TABLET ORAL at 08:45

## 2018-10-29 RX ADMIN — HYDROMORPHONE HYDROCHLORIDE 1 MG: 2 INJECTION INTRAMUSCULAR; INTRAVENOUS; SUBCUTANEOUS at 07:32

## 2018-10-29 RX ADMIN — ONDANSETRON HYDROCHLORIDE 4 MG: 2 INJECTION, SOLUTION INTRAMUSCULAR; INTRAVENOUS at 19:06

## 2018-10-29 RX ADMIN — TROSPIUM CHLORIDE 20 MG: 20 TABLET ORAL at 20:39

## 2018-10-29 RX ADMIN — SODIUM CHLORIDE, PRESERVATIVE FREE 10 ML: 5 INJECTION INTRAVENOUS at 08:52

## 2018-10-29 RX ADMIN — FUROSEMIDE 40 MG: 10 INJECTION, SOLUTION INTRAMUSCULAR; INTRAVENOUS at 17:51

## 2018-10-29 RX ADMIN — PRAVASTATIN SODIUM 20 MG: 20 TABLET ORAL at 20:39

## 2018-10-29 RX ADMIN — FLUTICASONE PROPIONATE 1 SPRAY: 50 SPRAY, METERED NASAL at 08:50

## 2018-10-29 RX ADMIN — SODIUM CHLORIDE, PRESERVATIVE FREE 10 ML: 5 INJECTION INTRAVENOUS at 08:50

## 2018-10-29 RX ADMIN — IPRATROPIUM BROMIDE AND ALBUTEROL SULFATE 3 ML: .5; 3 SOLUTION RESPIRATORY (INHALATION) at 21:20

## 2018-10-29 RX ADMIN — ATENOLOL 25 MG: 25 TABLET ORAL at 20:39

## 2018-10-29 RX ADMIN — Medication 2 PUFF: at 08:06

## 2018-10-29 RX ADMIN — MAGNESIUM HYDROXIDE 30 ML: 400 SUSPENSION ORAL at 09:02

## 2018-10-29 RX ADMIN — Medication 2 PUFF: at 21:27

## 2018-10-29 RX ADMIN — FUROSEMIDE 40 MG: 10 INJECTION, SOLUTION INTRAMUSCULAR; INTRAVENOUS at 09:02

## 2018-10-29 RX ADMIN — IPRATROPIUM BROMIDE AND ALBUTEROL SULFATE 3 ML: .5; 3 SOLUTION RESPIRATORY (INHALATION) at 08:06

## 2018-10-29 RX ADMIN — GLIPIZIDE 10 MG: 10 TABLET ORAL at 17:52

## 2018-10-29 RX ADMIN — DEXTROSE MONOHYDRATE: 50 INJECTION, SOLUTION INTRAVENOUS at 20:52

## 2018-10-29 RX ADMIN — LINAGLIPTIN 5 MG: 5 TABLET, FILM COATED ORAL at 08:45

## 2018-10-29 RX ADMIN — FAMOTIDINE 20 MG: 20 TABLET ORAL at 08:45

## 2018-10-29 RX ADMIN — INSULIN LISPRO 2 UNITS: 100 INJECTION, SOLUTION INTRAVENOUS; SUBCUTANEOUS at 08:52

## 2018-10-29 ASSESSMENT — PAIN DESCRIPTION - ONSET
ONSET: ON-GOING

## 2018-10-29 ASSESSMENT — PAIN DESCRIPTION - LOCATION
LOCATION: ABDOMEN

## 2018-10-29 ASSESSMENT — PAIN DESCRIPTION - PAIN TYPE
TYPE: SURGICAL PAIN
TYPE: ACUTE PAIN;SURGICAL PAIN
TYPE: SURGICAL PAIN
TYPE: ACUTE PAIN;SURGICAL PAIN
TYPE: SURGICAL PAIN
TYPE: ACUTE PAIN;SURGICAL PAIN
TYPE: SURGICAL PAIN

## 2018-10-29 ASSESSMENT — ENCOUNTER SYMPTOMS
COLOR CHANGE: 0
BACK PAIN: 0
EYE ITCHING: 0
STRIDOR: 0
PHOTOPHOBIA: 0
SORE THROAT: 0
CONSTIPATION: 0
SHORTNESS OF BREATH: 1
CHOKING: 0
APNEA: 0
EYE REDNESS: 0
ANAL BLEEDING: 0
RECTAL PAIN: 0

## 2018-10-29 ASSESSMENT — PAIN DESCRIPTION - FREQUENCY
FREQUENCY: CONTINUOUS

## 2018-10-29 ASSESSMENT — PAIN DESCRIPTION - PROGRESSION
CLINICAL_PROGRESSION: NOT CHANGED
CLINICAL_PROGRESSION: GRADUALLY WORSENING
CLINICAL_PROGRESSION: GRADUALLY IMPROVING
CLINICAL_PROGRESSION: GRADUALLY IMPROVING
CLINICAL_PROGRESSION: GRADUALLY WORSENING
CLINICAL_PROGRESSION: GRADUALLY IMPROVING
CLINICAL_PROGRESSION: GRADUALLY WORSENING
CLINICAL_PROGRESSION: NOT CHANGED

## 2018-10-29 ASSESSMENT — PAIN DESCRIPTION - ORIENTATION
ORIENTATION: LEFT;LOWER
ORIENTATION: RIGHT;LEFT;LOWER;MID
ORIENTATION: RIGHT;LEFT;LOWER;MID
ORIENTATION: LEFT;LOWER

## 2018-10-29 ASSESSMENT — PAIN SCALES - GENERAL
PAINLEVEL_OUTOF10: 5
PAINLEVEL_OUTOF10: 9
PAINLEVEL_OUTOF10: 4
PAINLEVEL_OUTOF10: 5
PAINLEVEL_OUTOF10: 6
PAINLEVEL_OUTOF10: 9
PAINLEVEL_OUTOF10: 8
PAINLEVEL_OUTOF10: 3
PAINLEVEL_OUTOF10: 10
PAINLEVEL_OUTOF10: 8
PAINLEVEL_OUTOF10: 5
PAINLEVEL_OUTOF10: 5
PAINLEVEL_OUTOF10: 8
PAINLEVEL_OUTOF10: 8

## 2018-10-29 ASSESSMENT — PAIN DESCRIPTION - DESCRIPTORS
DESCRIPTORS: ACHING
DESCRIPTORS: SHARP
DESCRIPTORS: ACHING

## 2018-10-29 NOTE — PLAN OF CARE
Problem: Nutrition  Goal: Optimal nutrition therapy  Outcome: Ongoing  Nutrition Problem: Inadequate oral intake  Intervention: Food and/or Nutrient Delivery: Modify current diet, Start ONS  Nutritional Goals: Diet will resume and be tolerated within the next 24-72 hours s/p surgery

## 2018-10-29 NOTE — PROGRESS NOTES
mass        Plan:        Will give Milk of Mag   oob and possibly ambulate  Await bowel function  Adv to mary Herring MD

## 2018-10-29 NOTE — PROGRESS NOTES
Dr. Kiley Oleary at bedside with pt and daughter. Updated on pt's condition and BP. New orders received.  Stiven Cheng RN

## 2018-10-29 NOTE — PROGRESS NOTES
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 10/24 S/p colonoscopy with polypectomy: 3 cm cecal lesion, 1cm polyp at 25 cm, 5 small polyps in the right colon  · S/p Robotic assisted laparoscopic right hemicolectomy on 10/25  ? 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 ? Blood culture 10/15 2/, 10/18 ngtd  ? Abdominal ileus  ? Some shortness of breath  ? WBC downward trend 10.9  · Pulmonary congestion  · Atherosclerotic vascular disease  · Obesity  ? COPD with chronic respiratory failure on home oxygen  ? CKD stage III  ? Type II DM  · Multi-morbidity: per PMHx  Plan:  ? continue Penicillin G continuous infusion for 4 weeks ( end date 18)  Weekly labs drawn on Monday during the course of treatment (on discharge)  CBC, BMP, ESR, CRP  ? D/w Dr. Roel Campos, will give furosemide  ? Will follow patient peripherally      Ongoing Antimicrobial Therapy  IV penicillin G 10/19? Completed Antimicrobial Therapy  Ceftriaxone 10/15-  Vancomycin 10/16-  Azithromycin? 10/19  Levofloxacin 10/15-    History:? Interval history noted Left sided S bovis endocarditis AV and MV vegetations  Abdominal pain is post-surgical, states she can't get a full breath in. Physical Exam:  Vital Signs: BP (!) 127/51   Pulse 86   Temp 98.2 °F (36.8 °C) (Oral)   Resp 21   Ht 5' 6\" (1.676 m)   Wt 274 lb 14.6 oz (124.7 kg)   SpO2 95%   BMI 44.37 kg/m²     Gen: alert and oriented X3, no distress  Skin: no stigmata of endocarditis  Wounds: abdominal wound is C/D/I  HEMT: AT/NC Oropharynx pink, moist, and without lesions or exudates; dentition in good state of repair  Eyes: PERRLA, EOMI, conjunctiva pink, sclera anicteric. Neck: Supple. Trachea midline.  No LAD.  Chest: no distress and CTA. Good air movement. Heart: RRR and high-pitched mid diastolic murmur in aortic area   Abd: dressing is C/D/Isoft, non-distended, no tenderness, no hepatomegaly. Normoactive bowel sounds. Ext: no clubbing, cyanosis, or edema  Catheter Site: without erythema or tenderness  Neuro: Mental status intact. CN 2-12 intact and no focal sensory or motor deficits     Radiologic / Imaging / TESTING  No results found.      Labs:    Recent Results (from the past 24 hour(s))   POCT Glucose    Collection Time: 10/28/18  9:24 AM   Result Value Ref Range    POC Glucose 165 (H) 70 - 99 MG/DL   POCT Glucose    Collection Time: 10/28/18  5:03 PM   Result Value Ref Range    POC Glucose 148 (H) 70 - 99 MG/DL   Basic Metabolic Panel w/ Reflex to MG    Collection Time: 10/29/18  4:30 AM   Result Value Ref Range    Sodium 136 135 - 145 MMOL/L    Potassium 5.1 3.5 - 5.1 MMOL/L    Chloride 98 (L) 99 - 110 mMol/L    CO2 27 21 - 32 MMOL/L    Anion Gap 11 4 - 16    BUN 33 (H) 6 - 23 MG/DL    CREATININE 1.2 (H) 0.6 - 1.1 MG/DL    Glucose 133 (H) 70 - 99 MG/DL    Calcium 8.0 (L) 8.3 - 10.6 MG/DL    GFR Non- 46 (L) >60 mL/min/1.73m2    GFR  55 (L) >60 mL/min/1.73m2   CBC auto differential    Collection Time: 10/29/18  4:30 AM   Result Value Ref Range    WBC 6.1 4.0 - 10.5 K/CU MM    RBC 2.77 (L) 4.2 - 5.4 M/CU MM    Hemoglobin 8.3 (L) 12.5 - 16.0 GM/DL    Hematocrit 26.5 (L) 37 - 47 %    MCV 95.7 78 - 100 FL    MCH 30.0 27 - 31 PG    MCHC 31.3 (L) 32.0 - 36.0 %    RDW 16.5 (H) 11.7 - 14.9 %    Platelets 354 878 - 473 K/CU MM    MPV 9.2 7.5 - 11.1 FL    Differential Type AUTOMATED DIFFERENTIAL     Segs Relative 66.8 (H) 36 - 66 %    Lymphocytes % 19.5 (L) 24 - 44 %    Monocytes % 12.1 (H) 0 - 4 %    Eosinophils % 1.0 0 - 3 %    Basophils % 0.3 0 - 1 %    Segs Absolute 4.1 K/CU MM    Lymphocytes # 1.2 K/CU MM    Monocytes # 0.7 K/CU MM    Eosinophils # 0.1 K/CU MM    Basophils # 0.0 K/CU MM    Nucleated RBC % 0.0 %    Total Nucleated RBC 0.0 K/CU MM    Total Immature Neutrophil 0.02 K/CU MM    Immature Neutrophil % 0.3 0 - 0.43 %     CULTURE results: Invalid input(s): BLOOD CULTURE,  URINE CULTURE, SURGICAL CULTURE    Diagnosis:  Patient Active Problem List   Diagnosis    Type 2 diabetes mellitus without complication ([address]    Essential hypertension    Urinary incontinence, mixed    Tobacco abuse disorder    Obstructive sleep apnea    Pulmonary hypertension (HCC)    Systolic murmur    [facility]    Microalbuminuria due to type 2 diabetes mellitus ([address]    Liver dysfunction    Steatohepatitis    Mixed hyperlipidemia    Morbid obesity due to excess calories ([address]    COPD, severe ([address]    COPD exacerbation ([address]    Fall at home    Type 2 diabetes mellitus ([address]    Hypertension    Hyperlipidemia    Tobacco abuse    Obesity due to excess calories    CHF (congestive heart failure) (HCC)    Physical debility    Bilateral carotid artery stenosis    Cerebrovascular accident (CVA) ([address]    COPD exacerbation (HCC)    Type 2 diabetes mellitus ([address]    Hypertension    Tobacco abuse    Hyperlipidemia    COPD (chronic obstructive pulmonary disease) ([address]    Pneumonia    Bacteremia    Streptococcal bacteremia    Subacute bacterial endocarditis    Cecum mass       Active Problems  Active Problems:    Streptococcal bacteremia    Subacute bacterial endocarditis    Cecum mass  Resolved Problems:    * No resolved hospital problems.  *    Electronically signed by: Electronically signed by Jayy Barrera MD on 10/29/2018 at 8:17 AM

## 2018-10-30 ENCOUNTER — APPOINTMENT (OUTPATIENT)
Dept: GENERAL RADIOLOGY | Age: 63
DRG: 853 | End: 2018-10-30
Attending: HOSPITALIST
Payer: COMMERCIAL

## 2018-10-30 LAB
ALBUMIN SERPL-MCNC: 3.1 GM/DL (ref 3.4–5)
ANION GAP SERPL CALCULATED.3IONS-SCNC: 11 MMOL/L (ref 4–16)
BASOPHILS ABSOLUTE: 0 K/CU MM
BASOPHILS RELATIVE PERCENT: 0.2 % (ref 0–1)
BUN BLDV-MCNC: 34 MG/DL (ref 6–23)
CALCIUM SERPL-MCNC: 8.3 MG/DL (ref 8.3–10.6)
CHLORIDE BLD-SCNC: 98 MMOL/L (ref 99–110)
CO2: 31 MMOL/L (ref 21–32)
CREAT SERPL-MCNC: 1.2 MG/DL (ref 0.6–1.1)
DIFFERENTIAL TYPE: ABNORMAL
EOSINOPHILS ABSOLUTE: 0 K/CU MM
EOSINOPHILS RELATIVE PERCENT: 0.3 % (ref 0–3)
GFR AFRICAN AMERICAN: 55 ML/MIN/1.73M2
GFR NON-AFRICAN AMERICAN: 46 ML/MIN/1.73M2
GLUCOSE BLD-MCNC: 101 MG/DL (ref 70–99)
GLUCOSE BLD-MCNC: 105 MG/DL (ref 70–99)
GLUCOSE BLD-MCNC: 114 MG/DL (ref 70–99)
GLUCOSE BLD-MCNC: 130 MG/DL (ref 70–99)
GLUCOSE BLD-MCNC: 131 MG/DL (ref 70–99)
HCT VFR BLD CALC: 27.4 % (ref 37–47)
HEMOGLOBIN: 8.5 GM/DL (ref 12.5–16)
IMMATURE NEUTROPHIL %: 0.5 % (ref 0–0.43)
LYMPHOCYTES ABSOLUTE: 1 K/CU MM
LYMPHOCYTES RELATIVE PERCENT: 15.5 % (ref 24–44)
MCH RBC QN AUTO: 29.8 PG (ref 27–31)
MCHC RBC AUTO-ENTMCNC: 31 % (ref 32–36)
MCV RBC AUTO: 96.1 FL (ref 78–100)
MONOCYTES ABSOLUTE: 0.6 K/CU MM
MONOCYTES RELATIVE PERCENT: 9.2 % (ref 0–4)
NUCLEATED RBC %: 0 %
PDW BLD-RTO: 16.1 % (ref 11.7–14.9)
PHOSPHORUS: 4.2 MG/DL (ref 2.5–4.9)
PLATELET # BLD: 231 K/CU MM (ref 140–440)
PMV BLD AUTO: 8.5 FL (ref 7.5–11.1)
POTASSIUM SERPL-SCNC: 4.8 MMOL/L (ref 3.5–5.1)
RBC # BLD: 2.85 M/CU MM (ref 4.2–5.4)
SEGMENTED NEUTROPHILS ABSOLUTE COUNT: 4.8 K/CU MM
SEGMENTED NEUTROPHILS RELATIVE PERCENT: 74.3 % (ref 36–66)
SODIUM BLD-SCNC: 140 MMOL/L (ref 135–145)
TOTAL IMMATURE NEUTOROPHIL: 0.03 K/CU MM
TOTAL NUCLEATED RBC: 0 K/CU MM
WBC # BLD: 6.5 K/CU MM (ref 4–10.5)

## 2018-10-30 PROCEDURE — 97535 SELF CARE MNGMENT TRAINING: CPT

## 2018-10-30 PROCEDURE — 97116 GAIT TRAINING THERAPY: CPT

## 2018-10-30 PROCEDURE — 6370000000 HC RX 637 (ALT 250 FOR IP): Performed by: FAMILY MEDICINE

## 2018-10-30 PROCEDURE — 6360000002 HC RX W HCPCS: Performed by: SURGERY

## 2018-10-30 PROCEDURE — G8987 SELF CARE CURRENT STATUS: HCPCS

## 2018-10-30 PROCEDURE — 2580000003 HC RX 258: Performed by: INTERNAL MEDICINE

## 2018-10-30 PROCEDURE — G8988 SELF CARE GOAL STATUS: HCPCS

## 2018-10-30 PROCEDURE — 74018 RADEX ABDOMEN 1 VIEW: CPT

## 2018-10-30 PROCEDURE — 2060000000 HC ICU INTERMEDIATE R&B

## 2018-10-30 PROCEDURE — 2700000000 HC OXYGEN THERAPY PER DAY

## 2018-10-30 PROCEDURE — 82962 GLUCOSE BLOOD TEST: CPT

## 2018-10-30 PROCEDURE — 6370000000 HC RX 637 (ALT 250 FOR IP): Performed by: HOSPITALIST

## 2018-10-30 PROCEDURE — 97168 OT RE-EVAL EST PLAN CARE: CPT

## 2018-10-30 PROCEDURE — 97530 THERAPEUTIC ACTIVITIES: CPT

## 2018-10-30 PROCEDURE — 6360000002 HC RX W HCPCS: Performed by: HOSPITALIST

## 2018-10-30 PROCEDURE — 74019 RADEX ABDOMEN 2 VIEWS: CPT

## 2018-10-30 PROCEDURE — 94640 AIRWAY INHALATION TREATMENT: CPT

## 2018-10-30 PROCEDURE — 97164 PT RE-EVAL EST PLAN CARE: CPT

## 2018-10-30 PROCEDURE — 6360000002 HC RX W HCPCS: Performed by: INTERNAL MEDICINE

## 2018-10-30 PROCEDURE — 85025 COMPLETE CBC W/AUTO DIFF WBC: CPT

## 2018-10-30 PROCEDURE — 80069 RENAL FUNCTION PANEL: CPT

## 2018-10-30 PROCEDURE — 94761 N-INVAS EAR/PLS OXIMETRY MLT: CPT

## 2018-10-30 PROCEDURE — 2580000003 HC RX 258: Performed by: HOSPITALIST

## 2018-10-30 RX ORDER — OXYCODONE HYDROCHLORIDE AND ACETAMINOPHEN 5; 325 MG/1; MG/1
1 TABLET ORAL EVERY 4 HOURS PRN
Status: DISCONTINUED | OUTPATIENT
Start: 2018-10-30 | End: 2018-11-09 | Stop reason: HOSPADM

## 2018-10-30 RX ADMIN — SODIUM CHLORIDE, PRESERVATIVE FREE 10 ML: 5 INJECTION INTRAVENOUS at 08:35

## 2018-10-30 RX ADMIN — Medication 2 PUFF: at 19:47

## 2018-10-30 RX ADMIN — Medication 2 PUFF: at 08:32

## 2018-10-30 RX ADMIN — IPRATROPIUM BROMIDE AND ALBUTEROL SULFATE 3 ML: .5; 3 SOLUTION RESPIRATORY (INHALATION) at 19:47

## 2018-10-30 RX ADMIN — HYDROMORPHONE HYDROCHLORIDE 1 MG: 2 INJECTION INTRAMUSCULAR; INTRAVENOUS; SUBCUTANEOUS at 06:37

## 2018-10-30 RX ADMIN — IPRATROPIUM BROMIDE AND ALBUTEROL SULFATE 3 ML: .5; 3 SOLUTION RESPIRATORY (INHALATION) at 08:32

## 2018-10-30 RX ADMIN — OXYCODONE AND ACETAMINOPHEN 1 TABLET: 5; 325 TABLET ORAL at 23:10

## 2018-10-30 RX ADMIN — DEXTROSE MONOHYDRATE: 50 INJECTION, SOLUTION INTRAVENOUS at 22:53

## 2018-10-30 RX ADMIN — ONDANSETRON HYDROCHLORIDE 4 MG: 2 INJECTION, SOLUTION INTRAMUSCULAR; INTRAVENOUS at 20:25

## 2018-10-30 RX ADMIN — ATENOLOL 25 MG: 25 TABLET ORAL at 12:35

## 2018-10-30 RX ADMIN — FAMOTIDINE 20 MG: 20 TABLET ORAL at 12:35

## 2018-10-30 RX ADMIN — IPRATROPIUM BROMIDE AND ALBUTEROL SULFATE 3 ML: .5; 3 SOLUTION RESPIRATORY (INHALATION) at 12:23

## 2018-10-30 RX ADMIN — ONDANSETRON HYDROCHLORIDE 4 MG: 2 INJECTION, SOLUTION INTRAMUSCULAR; INTRAVENOUS at 08:35

## 2018-10-30 ASSESSMENT — PAIN SCALES - GENERAL
PAINLEVEL_OUTOF10: 0
PAINLEVEL_OUTOF10: 4
PAINLEVEL_OUTOF10: 7
PAINLEVEL_OUTOF10: 2
PAINLEVEL_OUTOF10: 7
PAINLEVEL_OUTOF10: 0

## 2018-10-30 ASSESSMENT — PAIN DESCRIPTION - PAIN TYPE: TYPE: SURGICAL PAIN;ACUTE PAIN

## 2018-10-30 ASSESSMENT — PAIN DESCRIPTION - LOCATION: LOCATION: ABDOMEN

## 2018-10-30 NOTE — PROGRESS NOTES
Physical Therapy    Physical Therapy Re-evaluation and Treatment Note  Name: Girish Asencio MRN: [id number] :   1955   Date:  10/30/2018   Admission Date: 10/18/2018 Room:  -A   Restrictions/Precautions:  Restrictions/Precautions  Restrictions/Precautions: Fall Risk, General Precautions       Communication with other providers:  Co tx with OT, handoff to RN  Subjective:  Patient states:  Patient reports she's being nauseous this morning. Pain:   Location, Type, Intensity (0/10 to 10/10): Denies  Objective:    Observation:    ROM: WFL  Strength: 4+/5, minimally impaired in function  Sitting balance: mod I  Standing balance: SBA  Bed mobiltiy: min A  Transfers: CGA/SBA  Gait: 50ft with CGA  Biggest impairment is endurance. Treatment, including education/measures:  Transfers: patient sitting EOB upon beginning of session, sitting balance mod I. STS from bed with RW CGA with min cues for safety. Gait: ambulated x50ft with x1 standing rest break long term, patient on 3L of O2 and utilized pulse ox to self-monitor O2 saturation. Able to maintain O2 88%-92% throughout gait with cues for slow controlled antoine and focus on pursed lip breathing. Standing balance SBA with RW at sink for personal hygiene, cues to maintain O2. Returned to supine, min A to assist with LE and mod A to scoot to [facility]. Assessment / Impression:    Patient would continue to benefit from ARU upon d/c. Patient's tolerance of treatment:  Tolerated well   Adverse Reaction: None  Significant change in status and impact:  Improved mobility  Barriers to improvement:  endurance, strength  Plan for Next Session:    Progress mobility while maintaining O2 sats, patient able to self-monitor with pulse ox.   Time in:  948  Time out:  1030  Timed treatment minutes:  42  Total treatment time:  43    Previously filed items:  Social/Functional History  Lives With: Alone  Type of Home: House  Home Layout: Two level, Able to Live on Main level

## 2018-10-30 NOTE — CONSULTS
[address], [facility]                                 CONSULTATION    PATIENT NAME: Carla Feldman                     :         1955  MED REC NO:   [id number]                          ROOM:  ACCOUNT NO:   [de-identified]                           ADMIT DATE:  10/18/2018  PROVIDER:     Jazmin Graham MD    CORRECTED ACCT#:  10/29/18 EDB    CONSULT DATE:  10/19/2018    CONSULT REQUESTED BY:  eCcily Wilkes M.D. REASON FOR CONSULT:  Acute kidney injury. BRIEF HISTORY:  The patient is a 80-year-old overweight white female  with multiple medical conditions, which include, but not limited to  atherosclerotic cardiovascular disease, diabetes mellitus and COPD and  perhaps CKD stage III, who was transferred here from [facility] after  being found sepsis with Streptococcus bovis to mainly rule out  endocarditis and other workup. Initially, she had one-week history of shortness of breath and  respiratory symptoms, fever as high as 103 which led to see her  primary care. She was sent to the hospital for a chest x-ray which  showed potential abnormality and she was subsequently admitted at  [facility].  She was treated there with empirical antibiotics. It looks like her hospital course is complicated by acute  decompensated heart failure necessitating diuretic therapy, anemia  necessitating transfusion, but when the blood culture came back  positive for Streptococcus bovis, she was sent here for further  evaluation mainly to rule out endocarditis and potentially GI source. Throughout the procedure, of course, her creatinine went to 1.3 which  is higher than her baseline of 1.1; hence, the renal consult was  requested. Unfortunately, I do not see any plain urinalysis at this  time. PAST MEDICAL HISTORY:  1.  Probable CKD, stage III. 2.  Diabetes mellitus type 2, diagnosed in , managed on oral pill.   No

## 2018-10-30 NOTE — PROGRESS NOTES
Nephrology Progress Note  10/30/2018 7:47 AM        Subjective:   Admit Date: 10/18/2018  PCP: Cheryle Dikes, MD    Interval History: N/V     Diet: poor     ROS:  N/V- UOP better with IV loop     Data:     Current med's:    acetaminophen  1,000 mg Intravenous Once    mometasone-formoterol  2 puff Inhalation BID    linagliptin  5 mg Oral Daily    pravastatin  20 mg Oral Nightly    sodium chloride flush  10 mL Intravenous 2 times per day    atenolol  25 mg Oral BID    famotidine  20 mg Oral BID    fluticasone  1 spray Each Nare Daily    glipiZIDE  10 mg Oral BID AC    insulin lispro  0-12 Units Subcutaneous TID WC    insulin lispro  0-6 Units Subcutaneous Nightly    ipratropium-albuterol  1 vial Nebulization 4x daily    nicotine  1 patch Transdermal Daily    trospium  20 mg Oral Nightly    vitamin C  500 mg Oral Daily      sodium chloride      IV infusion builder 22.5 mL/hr at 10/29/18 2052    dextrose           I/O last 3 completed shifts: In: 470 [P.O.:180; I.V.:290]  Out: 2451 [Urine:1450; Emesis/NG output:1001]    CBC:   Recent Labs      10/28/18   0540  10/29/18   0430  10/30/18   0535   WBC  10.7*  6.1  6.5   HGB  8.3*  8.3*  8.5*   PLT  189  211  231          Recent Labs      10/28/18   0540  10/29/18   0430  10/30/18   0535   NA  138  136  140   K  4.9  5.1  4.8   CL  100  98*  98*   CO2  28  27  31   BUN  30*  33*  34*   CREATININE  1.3*  1.2*  1.2*   GLUCOSE  154*  133*  130*       Lab Results   Component Value Date    CALCIUM 8.3 10/30/2018    PHOS 4.2 10/30/2018       Objective:     Vitals: BP (!) 115/42   Pulse 83   Temp 98.3 °F (36.8 °C) (Oral)   Resp 23   Ht 5' 6\" (1.676 m)   Wt 274 lb 14.6 oz (124.7 kg)   SpO2 96%   BMI 44.37 kg/m²     General appearance:  No  Acute distress  HEENT:  +pallor  Neck:  supple  Lungs:  + basil pati crackles  Heart:  irregular  Abdomen: soft  Extremities:  ++ LE edema       Problem List :         Impression :     1.  STORM- / CKD stage 3- stable - but has risk for recurrent STORM ( real - structural rather  than functional )   2. Fluid overload with underlying valvular   dz and compromised cardiac systolic and diastolic fx   3. Endo carditis-    4. ? constipation ? Ileus  5. DM     Recommendation/Plan  :     1. She has some risk for GI fluid sequestration and IVV depletion  2. I will d/w Dr. Jia Crawford-   3. For now keep LOOP and if GI sx worsen or continue- may have to hold  4.  Daily weigh  5. follow clinically       Vicente Kline MD

## 2018-10-30 NOTE — PLAN OF CARE
Problem: Falls - Risk of:  Goal: Will remain free from falls  Will remain free from falls   Outcome: Ongoing    Goal: Absence of physical injury  Absence of physical injury   Outcome: Ongoing      Problem: Discharge Planning:  Goal: Discharged to appropriate level of care  Discharged to appropriate level of care   Outcome: Ongoing      Problem: Breathing Pattern - Ineffective:  Goal: Ability to achieve and maintain a regular respiratory rate will improve  Ability to achieve and maintain a regular respiratory rate will improve   Outcome: Ongoing      Problem: Nutrition  Goal: Optimal nutrition therapy  Outcome: Ongoing      Problem: Risk for Impaired Skin Integrity  Goal: Tissue integrity - skin and mucous membranes  Structural intactness and normal physiological function of skin and  mucous membranes.    Outcome: Ongoing      Problem: Pain:  Goal: Pain level will decrease  Pain level will decrease   Outcome: Ongoing    Goal: Control of acute pain  Control of acute pain   Outcome: Ongoing    Goal: Control of chronic pain  Control of chronic pain   Outcome: Ongoing

## 2018-10-31 LAB
ANION GAP SERPL CALCULATED.3IONS-SCNC: 9 MMOL/L (ref 4–16)
BASOPHILS ABSOLUTE: 0 K/CU MM
BASOPHILS RELATIVE PERCENT: 0.5 % (ref 0–1)
BUN BLDV-MCNC: 30 MG/DL (ref 6–23)
CALCIUM SERPL-MCNC: 8.3 MG/DL (ref 8.3–10.6)
CHLORIDE BLD-SCNC: 99 MMOL/L (ref 99–110)
CO2: 34 MMOL/L (ref 21–32)
CREAT SERPL-MCNC: 1 MG/DL (ref 0.6–1.1)
DIFFERENTIAL TYPE: ABNORMAL
EOSINOPHILS ABSOLUTE: 0.1 K/CU MM
EOSINOPHILS RELATIVE PERCENT: 1.5 % (ref 0–3)
GFR AFRICAN AMERICAN: >60 ML/MIN/1.73M2
GFR NON-AFRICAN AMERICAN: 56 ML/MIN/1.73M2
GLUCOSE BLD-MCNC: 111 MG/DL (ref 70–99)
GLUCOSE BLD-MCNC: 123 MG/DL (ref 70–99)
GLUCOSE BLD-MCNC: 125 MG/DL (ref 70–99)
GLUCOSE BLD-MCNC: 131 MG/DL (ref 70–99)
GLUCOSE BLD-MCNC: 98 MG/DL (ref 70–99)
HCT VFR BLD CALC: 26.6 % (ref 37–47)
HEMOGLOBIN: 8 GM/DL (ref 12.5–16)
IMMATURE NEUTROPHIL %: 0.5 % (ref 0–0.43)
LYMPHOCYTES ABSOLUTE: 1.4 K/CU MM
LYMPHOCYTES RELATIVE PERCENT: 21.7 % (ref 24–44)
MCH RBC QN AUTO: 29.2 PG (ref 27–31)
MCHC RBC AUTO-ENTMCNC: 30.1 % (ref 32–36)
MCV RBC AUTO: 97.1 FL (ref 78–100)
MONOCYTES ABSOLUTE: 0.7 K/CU MM
MONOCYTES RELATIVE PERCENT: 10.5 % (ref 0–4)
NUCLEATED RBC %: 0 %
PDW BLD-RTO: 16.2 % (ref 11.7–14.9)
PLATELET # BLD: 280 K/CU MM (ref 140–440)
PMV BLD AUTO: 8.9 FL (ref 7.5–11.1)
POTASSIUM SERPL-SCNC: 4.2 MMOL/L (ref 3.5–5.1)
POTASSIUM SERPL-SCNC: 4.5 MMOL/L (ref 3.5–5.1)
PRO-BNP: 3147 PG/ML
RBC # BLD: 2.74 M/CU MM (ref 4.2–5.4)
SEGMENTED NEUTROPHILS ABSOLUTE COUNT: 4.3 K/CU MM
SEGMENTED NEUTROPHILS RELATIVE PERCENT: 65.3 % (ref 36–66)
SODIUM BLD-SCNC: 142 MMOL/L (ref 135–145)
TOTAL IMMATURE NEUTOROPHIL: 0.03 K/CU MM
TOTAL NUCLEATED RBC: 0 K/CU MM
WBC # BLD: 6.5 K/CU MM (ref 4–10.5)

## 2018-10-31 PROCEDURE — 84132 ASSAY OF SERUM POTASSIUM: CPT

## 2018-10-31 PROCEDURE — 80048 BASIC METABOLIC PNL TOTAL CA: CPT

## 2018-10-31 PROCEDURE — 99232 SBSQ HOSP IP/OBS MODERATE 35: CPT | Performed by: INTERNAL MEDICINE

## 2018-10-31 PROCEDURE — 2580000003 HC RX 258: Performed by: HOSPITALIST

## 2018-10-31 PROCEDURE — 6360000002 HC RX W HCPCS: Performed by: SURGERY

## 2018-10-31 PROCEDURE — 6370000000 HC RX 637 (ALT 250 FOR IP): Performed by: FAMILY MEDICINE

## 2018-10-31 PROCEDURE — 85027 COMPLETE CBC AUTOMATED: CPT

## 2018-10-31 PROCEDURE — 82271 OCCULT BLOOD OTHER SOURCES: CPT

## 2018-10-31 PROCEDURE — 82962 GLUCOSE BLOOD TEST: CPT

## 2018-10-31 PROCEDURE — 2060000000 HC ICU INTERMEDIATE R&B

## 2018-10-31 PROCEDURE — 6360000002 HC RX W HCPCS: Performed by: HOSPITALIST

## 2018-10-31 PROCEDURE — 83880 ASSAY OF NATRIURETIC PEPTIDE: CPT

## 2018-10-31 PROCEDURE — 94640 AIRWAY INHALATION TREATMENT: CPT

## 2018-10-31 PROCEDURE — 94761 N-INVAS EAR/PLS OXIMETRY MLT: CPT

## 2018-10-31 PROCEDURE — 85025 COMPLETE CBC W/AUTO DIFF WBC: CPT

## 2018-10-31 PROCEDURE — 2580000003 HC RX 258: Performed by: INTERNAL MEDICINE

## 2018-10-31 PROCEDURE — C9113 INJ PANTOPRAZOLE SODIUM, VIA: HCPCS | Performed by: INTERNAL MEDICINE

## 2018-10-31 PROCEDURE — 6360000002 HC RX W HCPCS: Performed by: INTERNAL MEDICINE

## 2018-10-31 PROCEDURE — 6370000000 HC RX 637 (ALT 250 FOR IP): Performed by: HOSPITALIST

## 2018-10-31 PROCEDURE — 2700000000 HC OXYGEN THERAPY PER DAY

## 2018-10-31 RX ORDER — DIPHENHYDRAMINE HYDROCHLORIDE 50 MG/ML
12.5 INJECTION INTRAMUSCULAR; INTRAVENOUS NIGHTLY PRN
Status: DISCONTINUED | OUTPATIENT
Start: 2018-10-31 | End: 2018-11-02

## 2018-10-31 RX ADMIN — PRAVASTATIN SODIUM 20 MG: 20 TABLET ORAL at 21:21

## 2018-10-31 RX ADMIN — DIPHENHYDRAMINE HYDROCHLORIDE 12.5 MG: 50 INJECTION INTRAMUSCULAR; INTRAVENOUS at 22:12

## 2018-10-31 RX ADMIN — TROSPIUM CHLORIDE 20 MG: 20 TABLET ORAL at 21:21

## 2018-10-31 RX ADMIN — IPRATROPIUM BROMIDE AND ALBUTEROL SULFATE 3 ML: .5; 3 SOLUTION RESPIRATORY (INHALATION) at 11:58

## 2018-10-31 RX ADMIN — ATENOLOL 25 MG: 25 TABLET ORAL at 21:21

## 2018-10-31 RX ADMIN — TRAZODONE HYDROCHLORIDE 100 MG: 50 TABLET ORAL at 22:12

## 2018-10-31 RX ADMIN — SODIUM CHLORIDE, PRESERVATIVE FREE 10 ML: 5 INJECTION INTRAVENOUS at 08:30

## 2018-10-31 RX ADMIN — Medication 2 PUFF: at 08:34

## 2018-10-31 RX ADMIN — Medication 2 PUFF: at 19:49

## 2018-10-31 RX ADMIN — OXYCODONE AND ACETAMINOPHEN 1 TABLET: 5; 325 TABLET ORAL at 22:12

## 2018-10-31 RX ADMIN — DEXTROSE MONOHYDRATE: 50 INJECTION, SOLUTION INTRAVENOUS at 22:13

## 2018-10-31 RX ADMIN — ATENOLOL 25 MG: 25 TABLET ORAL at 09:28

## 2018-10-31 RX ADMIN — ONDANSETRON HYDROCHLORIDE 4 MG: 2 INJECTION, SOLUTION INTRAMUSCULAR; INTRAVENOUS at 08:30

## 2018-10-31 RX ADMIN — IPRATROPIUM BROMIDE AND ALBUTEROL SULFATE 3 ML: .5; 3 SOLUTION RESPIRATORY (INHALATION) at 16:43

## 2018-10-31 RX ADMIN — SODIUM CHLORIDE, PRESERVATIVE FREE 10 ML: 5 INJECTION INTRAVENOUS at 21:20

## 2018-10-31 RX ADMIN — IPRATROPIUM BROMIDE AND ALBUTEROL SULFATE 3 ML: .5; 3 SOLUTION RESPIRATORY (INHALATION) at 19:45

## 2018-10-31 RX ADMIN — SODIUM CHLORIDE 8 MG/HR: 9 INJECTION, SOLUTION INTRAVENOUS at 17:13

## 2018-10-31 RX ADMIN — IPRATROPIUM BROMIDE AND ALBUTEROL SULFATE 3 ML: .5; 3 SOLUTION RESPIRATORY (INHALATION) at 08:33

## 2018-10-31 RX ADMIN — FAMOTIDINE 20 MG: 20 TABLET ORAL at 13:26

## 2018-10-31 ASSESSMENT — PAIN DESCRIPTION - LOCATION
LOCATION: ABDOMEN
LOCATION: ABDOMEN

## 2018-10-31 ASSESSMENT — PAIN DESCRIPTION - FREQUENCY
FREQUENCY: CONTINUOUS
FREQUENCY: CONTINUOUS

## 2018-10-31 ASSESSMENT — PAIN DESCRIPTION - ORIENTATION
ORIENTATION: MID;LOWER
ORIENTATION: MID

## 2018-10-31 ASSESSMENT — PAIN DESCRIPTION - DESCRIPTORS
DESCRIPTORS: ACHING;DISCOMFORT
DESCRIPTORS: ACHING;DISCOMFORT

## 2018-10-31 ASSESSMENT — PAIN DESCRIPTION - PAIN TYPE
TYPE: SURGICAL PAIN
TYPE: SURGICAL PAIN;ACUTE PAIN

## 2018-10-31 ASSESSMENT — PAIN DESCRIPTION - ONSET
ONSET: ON-GOING
ONSET: ON-GOING

## 2018-10-31 ASSESSMENT — PAIN SCALES - GENERAL
PAINLEVEL_OUTOF10: 0
PAINLEVEL_OUTOF10: 8
PAINLEVEL_OUTOF10: 8

## 2018-10-31 ASSESSMENT — PAIN DESCRIPTION - PROGRESSION: CLINICAL_PROGRESSION: NOT CHANGED

## 2018-10-31 NOTE — CARE COORDINATION
Spoke with fela, they need to do a clincal review again since it has been 9 days and requested an exact date of discharge prior to reopening case. LM for Suzy (AYANNA) with update.   TS

## 2018-10-31 NOTE — PROGRESS NOTES
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 10/24 S/p colonoscopy with polypectomy: 3 cm cecal lesion, 1cm polyp at 25 cm, 5 small polyps in the right colon  · S/p Robotic assisted laparoscopic right hemicolectomy on 10/25  ? 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 ? Blood culture 10/15 2/, 10/18 ngtd  ? Abdominal ileus  ? Some shortness of breath  ? WBC downward trend 10.9  · Partial ileus  ? NG secretions, maroon colored, suspect GI bleed  · Pulmonary congestion  · Atherosclerotic vascular disease  · Obesity  ? COPD with chronic respiratory failure on home oxygen  ? CKD stage III  ? Type II DM  · Multi-morbidity: per PMHx  Plan:  ? continue Penicillin G continuous infusion for 4 weeks ( end date 18)  Weekly labs drawn on Monday during the course of treatment (on discharge)  CBC, BMP, ESR, CRP  D/c famotidine  ? IV pantoprazole infusion  ? Cbc, pt/ptt/inr now  ? D/w Dr. Kevin Hernandez. Ongoing Antimicrobial Therapy  IV penicillin G 10/19? Completed Antimicrobial Therapy  Ceftriaxone 10/15-  Vancomycin 10/16-  Azithromycin? 10/19  Levofloxacin 10/15-16    History:? Interval history noted Left sided S bovis endocarditis AV and MV vegetations   NG tube placed for partial ileus, however, maroon colored GI secretion with shaunna-umbilical pain present.   Physical Exam:  Vital Signs: BP (!) 143/54   Pulse 90   Temp 98.2 °F (36.8 °C) (Oral)   Resp 23   Ht 5' 6\" (1.676 m)   Wt 264 lb 15.9 oz (120.2 kg)   SpO2 96%   BMI 42.77 kg/m²     Gen: alert and oriented X3, no distress  Skin: no stigmata of endocarditis  Wounds: abdominal wound is C/D/I  HEMT: AT/NC Oropharynx pink, moist, and without lesions or exudates; dentition in good state of K/CU MM    Monocytes # 0.7 K/CU MM    Eosinophils # 0.1 K/CU MM    Basophils # 0.0 K/CU MM    Nucleated RBC % 0.0 %    Total Nucleated RBC 0.0 K/CU MM    Total Immature Neutrophil 0.03 K/CU MM    Immature Neutrophil % 0.5 (H) 0 - 0.43 %   POCT Glucose    Collection Time: 10/31/18  8:53 AM   Result Value Ref Range    POC Glucose 123 (H) 70 - 99 MG/DL   POCT Glucose    Collection Time: 10/31/18 11:57 AM   Result Value Ref Range    POC Glucose 111 (H) 70 - 99 MG/DL     CULTURE results: Invalid input(s): BLOOD CULTURE,  URINE CULTURE, SURGICAL CULTURE    Diagnosis:  Patient Active Problem List   Diagnosis    Type 2 diabetes mellitus without complication ([address]    Essential hypertension    Urinary incontinence, mixed    Tobacco abuse disorder    Obstructive sleep apnea    Pulmonary hypertension (HCC)    Systolic murmur    Chronic respiratory failure with hypoxia (HCC)    Microalbuminuria due to type 2 diabetes mellitus ([address]    Liver dysfunction    Steatohepatitis    Mixed hyperlipidemia    Morbid obesity due to excess calories ([address]    COPD, severe ([address]    COPD exacerbation ([address]    Fall at home    Type 2 diabetes mellitus ([address]    Hypertension    Hyperlipidemia    Tobacco abuse    Obesity due to excess calories    CHF (congestive heart failure) (HCC)    Physical debility    Bilateral carotid artery stenosis    Cerebrovascular accident (CVA) ([address]    COPD exacerbation (HCC)    Type 2 diabetes mellitus ([address]    Hypertension    Tobacco abuse    Hyperlipidemia    COPD (chronic obstructive pulmonary disease) (HCC)    Pneumonia    Bacteremia    Streptococcal bacteremia    Subacute bacterial endocarditis    Cecum mass       Active Problems  Active Problems:    Streptococcal bacteremia    Subacute bacterial endocarditis    Cecum mass  Resolved Problems:    * No resolved hospital problems.  *    Electronically signed by: Electronically signed by Celene Bernheim, MD on 10/31/2018 at 3:00 PM

## 2018-11-01 ENCOUNTER — APPOINTMENT (OUTPATIENT)
Dept: GENERAL RADIOLOGY | Age: 63
DRG: 853 | End: 2018-11-01
Attending: HOSPITALIST
Payer: COMMERCIAL

## 2018-11-01 LAB
ANION GAP SERPL CALCULATED.3IONS-SCNC: 9 MMOL/L (ref 4–16)
APTT: 29.6 SECONDS (ref 21.2–33)
BASOPHILS ABSOLUTE: 0 K/CU MM
BASOPHILS RELATIVE PERCENT: 0.4 % (ref 0–1)
BUN BLDV-MCNC: 25 MG/DL (ref 6–23)
CALCIUM SERPL-MCNC: 8.1 MG/DL (ref 8.3–10.6)
CHLORIDE BLD-SCNC: 98 MMOL/L (ref 99–110)
CO2: 35 MMOL/L (ref 21–32)
CREAT SERPL-MCNC: 0.9 MG/DL (ref 0.6–1.1)
DIFFERENTIAL TYPE: ABNORMAL
EOSINOPHILS ABSOLUTE: 0.1 K/CU MM
EOSINOPHILS RELATIVE PERCENT: 1.5 % (ref 0–3)
GFR AFRICAN AMERICAN: >60 ML/MIN/1.73M2
GFR NON-AFRICAN AMERICAN: >60 ML/MIN/1.73M2
GLUCOSE BLD-MCNC: 101 MG/DL (ref 70–99)
GLUCOSE BLD-MCNC: 109 MG/DL (ref 70–99)
GLUCOSE BLD-MCNC: 119 MG/DL (ref 70–99)
GLUCOSE BLD-MCNC: 123 MG/DL (ref 70–99)
GLUCOSE BLD-MCNC: 145 MG/DL (ref 70–99)
HCT VFR BLD CALC: 28.4 % (ref 37–47)
HEMOGLOBIN: 8.7 GM/DL (ref 12.5–16)
IMMATURE NEUTROPHIL %: 0.5 % (ref 0–0.43)
INR BLD: 1.43 INDEX
LYMPHOCYTES ABSOLUTE: 1.2 K/CU MM
LYMPHOCYTES RELATIVE PERCENT: 14.3 % (ref 24–44)
MCH RBC QN AUTO: 30.3 PG (ref 27–31)
MCHC RBC AUTO-ENTMCNC: 30.6 % (ref 32–36)
MCV RBC AUTO: 99 FL (ref 78–100)
MONOCYTES ABSOLUTE: 0.7 K/CU MM
MONOCYTES RELATIVE PERCENT: 8.8 % (ref 0–4)
NUCLEATED RBC %: 0 %
PDW BLD-RTO: 15.9 % (ref 11.7–14.9)
PLATELET # BLD: 336 K/CU MM (ref 140–440)
PMV BLD AUTO: 9 FL (ref 7.5–11.1)
POTASSIUM SERPL-SCNC: 4.3 MMOL/L (ref 3.5–5.1)
PROTHROMBIN TIME: 16.2 SECONDS (ref 9.12–12.5)
RBC # BLD: 2.87 M/CU MM (ref 4.2–5.4)
SEGMENTED NEUTROPHILS ABSOLUTE COUNT: 6.1 K/CU MM
SEGMENTED NEUTROPHILS RELATIVE PERCENT: 74.5 % (ref 36–66)
SODIUM BLD-SCNC: 142 MMOL/L (ref 135–145)
TOTAL IMMATURE NEUTOROPHIL: 0.04 K/CU MM
TOTAL NUCLEATED RBC: 0 K/CU MM
WBC # BLD: 8.2 K/CU MM (ref 4–10.5)

## 2018-11-01 PROCEDURE — 2580000003 HC RX 258: Performed by: HOSPITALIST

## 2018-11-01 PROCEDURE — 80048 BASIC METABOLIC PNL TOTAL CA: CPT

## 2018-11-01 PROCEDURE — 6360000002 HC RX W HCPCS: Performed by: INTERNAL MEDICINE

## 2018-11-01 PROCEDURE — 71045 X-RAY EXAM CHEST 1 VIEW: CPT

## 2018-11-01 PROCEDURE — 85025 COMPLETE CBC W/AUTO DIFF WBC: CPT

## 2018-11-01 PROCEDURE — 6370000000 HC RX 637 (ALT 250 FOR IP): Performed by: FAMILY MEDICINE

## 2018-11-01 PROCEDURE — 82962 GLUCOSE BLOOD TEST: CPT

## 2018-11-01 PROCEDURE — 6370000000 HC RX 637 (ALT 250 FOR IP): Performed by: SPECIALIST

## 2018-11-01 PROCEDURE — 2580000003 HC RX 258: Performed by: INTERNAL MEDICINE

## 2018-11-01 PROCEDURE — C9113 INJ PANTOPRAZOLE SODIUM, VIA: HCPCS | Performed by: INTERNAL MEDICINE

## 2018-11-01 PROCEDURE — 99232 SBSQ HOSP IP/OBS MODERATE 35: CPT | Performed by: INTERNAL MEDICINE

## 2018-11-01 PROCEDURE — 85730 THROMBOPLASTIN TIME PARTIAL: CPT

## 2018-11-01 PROCEDURE — 94640 AIRWAY INHALATION TREATMENT: CPT

## 2018-11-01 PROCEDURE — 6370000000 HC RX 637 (ALT 250 FOR IP): Performed by: HOSPITALIST

## 2018-11-01 PROCEDURE — 85610 PROTHROMBIN TIME: CPT

## 2018-11-01 PROCEDURE — 2060000000 HC ICU INTERMEDIATE R&B

## 2018-11-01 PROCEDURE — 2700000000 HC OXYGEN THERAPY PER DAY

## 2018-11-01 PROCEDURE — 6360000002 HC RX W HCPCS: Performed by: SURGERY

## 2018-11-01 RX ORDER — FUROSEMIDE 10 MG/ML
20 INJECTION INTRAMUSCULAR; INTRAVENOUS 2 TIMES DAILY
Status: DISCONTINUED | OUTPATIENT
Start: 2018-11-01 | End: 2018-11-02

## 2018-11-01 RX ORDER — SUCRALFATE ORAL 1 G/10ML
1 SUSPENSION ORAL EVERY 6 HOURS SCHEDULED
Status: DISCONTINUED | OUTPATIENT
Start: 2018-11-01 | End: 2018-11-09 | Stop reason: HOSPADM

## 2018-11-01 RX ADMIN — SODIUM CHLORIDE 8 MG/HR: 9 INJECTION, SOLUTION INTRAVENOUS at 12:34

## 2018-11-01 RX ADMIN — SODIUM CHLORIDE, PRESERVATIVE FREE 10 ML: 5 INJECTION INTRAVENOUS at 21:27

## 2018-11-01 RX ADMIN — SUCRALFATE 1 G: 1 SUSPENSION ORAL at 12:34

## 2018-11-01 RX ADMIN — SODIUM CHLORIDE 8 MG/HR: 9 INJECTION, SOLUTION INTRAVENOUS at 22:54

## 2018-11-01 RX ADMIN — TROSPIUM CHLORIDE 20 MG: 20 TABLET ORAL at 21:28

## 2018-11-01 RX ADMIN — Medication 2 PUFF: at 21:06

## 2018-11-01 RX ADMIN — SODIUM CHLORIDE, PRESERVATIVE FREE 10 ML: 5 INJECTION INTRAVENOUS at 09:51

## 2018-11-01 RX ADMIN — LINAGLIPTIN 5 MG: 5 TABLET, FILM COATED ORAL at 09:50

## 2018-11-01 RX ADMIN — GLIPIZIDE 10 MG: 10 TABLET ORAL at 16:22

## 2018-11-01 RX ADMIN — IPRATROPIUM BROMIDE AND ALBUTEROL SULFATE 3 ML: .5; 3 SOLUTION RESPIRATORY (INHALATION) at 08:34

## 2018-11-01 RX ADMIN — SODIUM CHLORIDE 8 MG/HR: 9 INJECTION, SOLUTION INTRAVENOUS at 04:07

## 2018-11-01 RX ADMIN — SUCRALFATE 1 G: 1 SUSPENSION ORAL at 16:22

## 2018-11-01 RX ADMIN — DEXTROSE MONOHYDRATE: 50 INJECTION, SOLUTION INTRAVENOUS at 22:53

## 2018-11-01 RX ADMIN — IPRATROPIUM BROMIDE AND ALBUTEROL SULFATE 3 ML: .5; 3 SOLUTION RESPIRATORY (INHALATION) at 21:04

## 2018-11-01 RX ADMIN — OXYCODONE AND ACETAMINOPHEN 1 TABLET: 5; 325 TABLET ORAL at 21:27

## 2018-11-01 RX ADMIN — DIPHENHYDRAMINE HYDROCHLORIDE 12.5 MG: 50 INJECTION INTRAMUSCULAR; INTRAVENOUS at 22:54

## 2018-11-01 RX ADMIN — ATENOLOL 25 MG: 25 TABLET ORAL at 21:27

## 2018-11-01 RX ADMIN — GLIPIZIDE 10 MG: 10 TABLET ORAL at 09:50

## 2018-11-01 RX ADMIN — IPRATROPIUM BROMIDE AND ALBUTEROL SULFATE 3 ML: .5; 3 SOLUTION RESPIRATORY (INHALATION) at 15:35

## 2018-11-01 RX ADMIN — FUROSEMIDE 20 MG: 10 INJECTION, SOLUTION INTRAVENOUS at 17:41

## 2018-11-01 RX ADMIN — ATENOLOL 25 MG: 25 TABLET ORAL at 09:50

## 2018-11-01 RX ADMIN — Medication 2 PUFF: at 08:35

## 2018-11-01 RX ADMIN — INSULIN LISPRO 2 UNITS: 100 INJECTION, SOLUTION INTRAVENOUS; SUBCUTANEOUS at 10:09

## 2018-11-01 RX ADMIN — FUROSEMIDE 20 MG: 10 INJECTION, SOLUTION INTRAVENOUS at 09:51

## 2018-11-01 RX ADMIN — IPRATROPIUM BROMIDE AND ALBUTEROL SULFATE 3 ML: .5; 3 SOLUTION RESPIRATORY (INHALATION) at 11:53

## 2018-11-01 RX ADMIN — OXYCODONE HYDROCHLORIDE AND ACETAMINOPHEN 500 MG: 500 TABLET ORAL at 09:50

## 2018-11-01 ASSESSMENT — PAIN SCALES - GENERAL
PAINLEVEL_OUTOF10: 3
PAINLEVEL_OUTOF10: 8
PAINLEVEL_OUTOF10: 7

## 2018-11-01 ASSESSMENT — PAIN DESCRIPTION - PAIN TYPE: TYPE: SURGICAL PAIN;ACUTE PAIN

## 2018-11-01 NOTE — PLAN OF CARE
Problem: Falls - Risk of:  Goal: Will remain free from falls  Will remain free from falls   Outcome: Ongoing    Goal: Absence of physical injury  Absence of physical injury   Outcome: Ongoing      Problem: Discharge Planning:  Goal: Discharged to appropriate level of care  Discharged to appropriate level of care   Outcome: Ongoing      Problem: Breathing Pattern - Ineffective:  Goal: Ability to achieve and maintain a regular respiratory rate will improve  Ability to achieve and maintain a regular respiratory rate will improve   Outcome: Ongoing      Problem: Risk for Impaired Skin Integrity  Goal: Tissue integrity - skin and mucous membranes  Structural intactness and normal physiological function of skin and  mucous membranes.    Outcome: Ongoing      Problem: Pain:  Goal: Pain level will decrease  Pain level will decrease   Outcome: Ongoing    Goal: Control of acute pain  Control of acute pain   Outcome: Ongoing    Goal: Control of chronic pain  Control of chronic pain   Outcome: Ongoing      Comments: Electronically signed by Sofía Reyes RN on 11/1/2018 at 3:55 AM

## 2018-11-01 NOTE — PROGRESS NOTES
1. CKD stage 3  But low UOP- has risk for STORM-   2. Also physical; exam/ wt gain and CXR suggest- she has pulmo edema   3. Endocarditis/ colon sx - / ileus etc    Recommendation/Plan  :     1. Lasix 20 IV BID  2. Check K in am as it may go down and she is not able to take po med's - alannah aldactone  3.  Daily weigh  4. follow clinically   5. watch  for any additional iatrogenic and nosocomial complication       Kari Ashley MD

## 2018-11-02 LAB
ANION GAP SERPL CALCULATED.3IONS-SCNC: 7 MMOL/L (ref 4–16)
BASOPHILS ABSOLUTE: 0 K/CU MM
BASOPHILS RELATIVE PERCENT: 0.4 % (ref 0–1)
BUN BLDV-MCNC: 23 MG/DL (ref 6–23)
CALCIUM SERPL-MCNC: 8.1 MG/DL (ref 8.3–10.6)
CHLORIDE BLD-SCNC: 99 MMOL/L (ref 99–110)
CO2: 37 MMOL/L (ref 21–32)
CREAT SERPL-MCNC: 1 MG/DL (ref 0.6–1.1)
DIFFERENTIAL TYPE: ABNORMAL
EOSINOPHILS ABSOLUTE: 0.1 K/CU MM
EOSINOPHILS RELATIVE PERCENT: 1.4 % (ref 0–3)
GFR AFRICAN AMERICAN: >60 ML/MIN/1.73M2
GFR NON-AFRICAN AMERICAN: 56 ML/MIN/1.73M2
GLUCOSE BLD-MCNC: 110 MG/DL (ref 70–99)
GLUCOSE BLD-MCNC: 163 MG/DL (ref 70–99)
GLUCOSE BLD-MCNC: 83 MG/DL (ref 70–99)
GLUCOSE BLD-MCNC: 89 MG/DL (ref 70–99)
GLUCOSE BLD-MCNC: 93 MG/DL (ref 70–99)
GLUCOSE BLD-MCNC: 99 MG/DL (ref 70–99)
HCT VFR BLD CALC: 26.7 % (ref 37–47)
HEMOGLOBIN: 8.1 GM/DL (ref 12.5–16)
IMMATURE NEUTROPHIL %: 0.7 % (ref 0–0.43)
LYMPHOCYTES ABSOLUTE: 1.6 K/CU MM
LYMPHOCYTES RELATIVE PERCENT: 20.8 % (ref 24–44)
MCH RBC QN AUTO: 29.7 PG (ref 27–31)
MCHC RBC AUTO-ENTMCNC: 30.3 % (ref 32–36)
MCV RBC AUTO: 97.8 FL (ref 78–100)
MONOCYTES ABSOLUTE: 0.7 K/CU MM
MONOCYTES RELATIVE PERCENT: 9.7 % (ref 0–4)
NUCLEATED RBC %: 0 %
PDW BLD-RTO: 15.9 % (ref 11.7–14.9)
PLATELET # BLD: 320 K/CU MM (ref 140–440)
PMV BLD AUTO: 8.8 FL (ref 7.5–11.1)
POTASSIUM SERPL-SCNC: 3.7 MMOL/L (ref 3.5–5.1)
RBC # BLD: 2.73 M/CU MM (ref 4.2–5.4)
SEGMENTED NEUTROPHILS ABSOLUTE COUNT: 5.1 K/CU MM
SEGMENTED NEUTROPHILS RELATIVE PERCENT: 67 % (ref 36–66)
SODIUM BLD-SCNC: 143 MMOL/L (ref 135–145)
TOTAL IMMATURE NEUTOROPHIL: 0.05 K/CU MM
TOTAL NUCLEATED RBC: 0 K/CU MM
WBC # BLD: 7.6 K/CU MM (ref 4–10.5)

## 2018-11-02 PROCEDURE — 97110 THERAPEUTIC EXERCISES: CPT

## 2018-11-02 PROCEDURE — 6370000000 HC RX 637 (ALT 250 FOR IP): Performed by: HOSPITALIST

## 2018-11-02 PROCEDURE — 2060000000 HC ICU INTERMEDIATE R&B

## 2018-11-02 PROCEDURE — 6370000000 HC RX 637 (ALT 250 FOR IP): Performed by: FAMILY MEDICINE

## 2018-11-02 PROCEDURE — 94640 AIRWAY INHALATION TREATMENT: CPT

## 2018-11-02 PROCEDURE — 6370000000 HC RX 637 (ALT 250 FOR IP): Performed by: SPECIALIST

## 2018-11-02 PROCEDURE — 99232 SBSQ HOSP IP/OBS MODERATE 35: CPT | Performed by: INTERNAL MEDICINE

## 2018-11-02 PROCEDURE — 2580000003 HC RX 258: Performed by: INTERNAL MEDICINE

## 2018-11-02 PROCEDURE — 6360000002 HC RX W HCPCS: Performed by: INTERNAL MEDICINE

## 2018-11-02 PROCEDURE — 85025 COMPLETE CBC W/AUTO DIFF WBC: CPT

## 2018-11-02 PROCEDURE — 6370000000 HC RX 637 (ALT 250 FOR IP): Performed by: INTERNAL MEDICINE

## 2018-11-02 PROCEDURE — 94761 N-INVAS EAR/PLS OXIMETRY MLT: CPT

## 2018-11-02 PROCEDURE — 93308 TTE F-UP OR LMTD: CPT

## 2018-11-02 PROCEDURE — 2580000003 HC RX 258: Performed by: HOSPITALIST

## 2018-11-02 PROCEDURE — 2700000000 HC OXYGEN THERAPY PER DAY

## 2018-11-02 PROCEDURE — 82962 GLUCOSE BLOOD TEST: CPT

## 2018-11-02 PROCEDURE — 97116 GAIT TRAINING THERAPY: CPT

## 2018-11-02 PROCEDURE — C9113 INJ PANTOPRAZOLE SODIUM, VIA: HCPCS | Performed by: INTERNAL MEDICINE

## 2018-11-02 PROCEDURE — 80048 BASIC METABOLIC PNL TOTAL CA: CPT

## 2018-11-02 PROCEDURE — 97530 THERAPEUTIC ACTIVITIES: CPT

## 2018-11-02 RX ORDER — DIPHENHYDRAMINE HCL 25 MG
25 TABLET ORAL EVERY 6 HOURS PRN
Status: DISCONTINUED | OUTPATIENT
Start: 2018-11-02 | End: 2018-11-09 | Stop reason: HOSPADM

## 2018-11-02 RX ORDER — FUROSEMIDE 10 MG/ML
40 INJECTION INTRAMUSCULAR; INTRAVENOUS 2 TIMES DAILY
Status: DISCONTINUED | OUTPATIENT
Start: 2018-11-02 | End: 2018-11-05

## 2018-11-02 RX ADMIN — DIPHENHYDRAMINE HCL 25 MG: 25 TABLET ORAL at 21:35

## 2018-11-02 RX ADMIN — IPRATROPIUM BROMIDE AND ALBUTEROL SULFATE 3 ML: .5; 3 SOLUTION RESPIRATORY (INHALATION) at 16:13

## 2018-11-02 RX ADMIN — TRAZODONE HYDROCHLORIDE 100 MG: 50 TABLET ORAL at 21:35

## 2018-11-02 RX ADMIN — ATENOLOL 25 MG: 25 TABLET ORAL at 21:34

## 2018-11-02 RX ADMIN — SUCRALFATE 1 G: 1 SUSPENSION ORAL at 00:05

## 2018-11-02 RX ADMIN — OXYCODONE AND ACETAMINOPHEN 1 TABLET: 5; 325 TABLET ORAL at 21:34

## 2018-11-02 RX ADMIN — LINAGLIPTIN 5 MG: 5 TABLET, FILM COATED ORAL at 09:51

## 2018-11-02 RX ADMIN — INSULIN LISPRO 2 UNITS: 100 INJECTION, SOLUTION INTRAVENOUS; SUBCUTANEOUS at 13:10

## 2018-11-02 RX ADMIN — OXYCODONE HYDROCHLORIDE AND ACETAMINOPHEN 500 MG: 500 TABLET ORAL at 09:51

## 2018-11-02 RX ADMIN — SODIUM CHLORIDE 8 MG/HR: 9 INJECTION, SOLUTION INTRAVENOUS at 13:25

## 2018-11-02 RX ADMIN — Medication 2 PUFF: at 08:12

## 2018-11-02 RX ADMIN — GLIPIZIDE 10 MG: 10 TABLET ORAL at 09:57

## 2018-11-02 RX ADMIN — IPRATROPIUM BROMIDE AND ALBUTEROL SULFATE 3 ML: .5; 3 SOLUTION RESPIRATORY (INHALATION) at 08:11

## 2018-11-02 RX ADMIN — SUCRALFATE 1 G: 1 SUSPENSION ORAL at 13:10

## 2018-11-02 RX ADMIN — IPRATROPIUM BROMIDE AND ALBUTEROL SULFATE 3 ML: .5; 3 SOLUTION RESPIRATORY (INHALATION) at 20:24

## 2018-11-02 RX ADMIN — SODIUM CHLORIDE, PRESERVATIVE FREE 10 ML: 5 INJECTION INTRAVENOUS at 09:53

## 2018-11-02 RX ADMIN — SUCRALFATE 1 G: 1 SUSPENSION ORAL at 18:00

## 2018-11-02 RX ADMIN — OXYCODONE AND ACETAMINOPHEN 1 TABLET: 5; 325 TABLET ORAL at 06:08

## 2018-11-02 RX ADMIN — ATENOLOL 25 MG: 25 TABLET ORAL at 09:51

## 2018-11-02 RX ADMIN — PRAVASTATIN SODIUM 20 MG: 20 TABLET ORAL at 21:35

## 2018-11-02 RX ADMIN — Medication 2 PUFF: at 20:26

## 2018-11-02 RX ADMIN — SUCRALFATE 1 G: 1 SUSPENSION ORAL at 05:59

## 2018-11-02 RX ADMIN — IPRATROPIUM BROMIDE AND ALBUTEROL SULFATE 3 ML: .5; 3 SOLUTION RESPIRATORY (INHALATION) at 11:53

## 2018-11-02 RX ADMIN — TROSPIUM CHLORIDE 20 MG: 20 TABLET ORAL at 21:36

## 2018-11-02 RX ADMIN — FUROSEMIDE 40 MG: 10 INJECTION, SOLUTION INTRAMUSCULAR; INTRAVENOUS at 18:00

## 2018-11-02 ASSESSMENT — PAIN SCALES - GENERAL
PAINLEVEL_OUTOF10: 0
PAINLEVEL_OUTOF10: 9
PAINLEVEL_OUTOF10: 8

## 2018-11-02 NOTE — PROGRESS NOTES
Physical Therapy    Physical Therapy Treatment Note  Name: Girish Asencio MRN: [id number] :   1955   Date:  2018   Admission Date: 10/18/2018 Room:  A   Restrictions/Precautions:  Restrictions/Precautions  Restrictions/Precautions: Fall Risk, General Precautions       Communication with other providers:  Per nurse ok to tx and was notified pt c/o big red itchy area on right side. Subjective:  Patient states:  Pt pleasant and motivated. Pain:   Location, Type, Intensity (0/10 to 10/10):  No c/o pain during tx session  Objective:    Observation:  Alert and oriented sitting EOB. Family member present and very supportive  Treatment, including education/measures:  Sit to sup mod assist of 2 to lift legs and guide trunk. Sit<=>stand from bed cga and cues for hand placement for safety  amb with rw 140' sba/cga and cues for walker safety with turning   Ex in sitting:  3 reps trunk stretches with shoulder flex and cues for deep breathing  10 reps aps and circles  10 reps laqs  Safety  Patient left safely in the bed, with call light/phone in reach with alarm applied. Gait belt was used for transfers and gait. Assessment / Impression:       Patient's tolerance of treatment:  good   Adverse Reaction: na  Significant change in status and impact:  na  Barriers to improvement:  Strength and safety  Plan for Next Session:    Cont. POC  Time in:  1320  Time out:  1400  Timed treatment minutes:  40  Total treatment time:  40    Previously filed items:  Social/Functional History  Lives With: Alone  Type of Home: House  Home Layout: Two level, Able to Live on Main level with bedroom/bathroom  Home Access: Stairs to enter with rails  Entrance Stairs - Number of Steps: pt has 2 steps to get in, 1 normal step, and 1 small step.    Bathroom Shower/Tub: Tub/Shower unit  Bathroom Equipment: Grab bars in shower, Hand-held shower, Shower chair (pt has a suction cup grab bar, but it doesnt work well. )  Home Equipment:

## 2018-11-02 NOTE — PROGRESS NOTES
mellitus2-sliding scale for insulin, diabetic diet  6.  Hypertension-atenolol    DVT Prophylaxis: scd  Diet: DIET CLEAR LIQUID;  Dietary Nutrition Supplements: Clear Liquid Oral Supplement  Code Status: Full Code    Dispo: snf/swing  if continues to improve and tolerating diet    Electronically signed by Richie Gill MD on 11/2/2018 at 12:31 PM

## 2018-11-02 NOTE — CARE COORDINATION
CM in room to follow up on discharge planning. Patient awake and sitting on the side of the bed. NGT in place. Daughter in room. Plan is to return to [facility] bed once medically stable. Will need pre-cert which has been initiated. CM to follow.

## 2018-11-02 NOTE — PROGRESS NOTES
MCV 97.8 78 - 100 FL    MCH 29.7 27 - 31 PG    MCHC 30.3 (L) 32.0 - 36.0 %    RDW 15.9 (H) 11.7 - 14.9 %    Platelets 274 795 - 024 K/CU MM    MPV 8.8 7.5 - 11.1 FL    Differential Type AUTOMATED DIFFERENTIAL     Segs Relative 67.0 (H) 36 - 66 %    Lymphocytes % 20.8 (L) 24 - 44 %    Monocytes % 9.7 (H) 0 - 4 %    Eosinophils % 1.4 0 - 3 %    Basophils % 0.4 0 - 1 %    Segs Absolute 5.1 K/CU MM    Lymphocytes # 1.6 K/CU MM    Monocytes # 0.7 K/CU MM    Eosinophils # 0.1 K/CU MM    Basophils # 0.0 K/CU MM    Nucleated RBC % 0.0 %    Total Nucleated RBC 0.0 K/CU MM    Total Immature Neutrophil 0.05 K/CU MM    Immature Neutrophil % 0.7 (H) 0 - 0.43 %   POCT Glucose    Collection Time: 11/02/18  8:35 AM   Result Value Ref Range    POC Glucose 99 70 - 99 MG/DL     CULTURE results: Invalid input(s): BLOOD CULTURE,  URINE CULTURE, SURGICAL CULTURE    Diagnosis:  Patient Active Problem List   Diagnosis    Type 2 diabetes mellitus without complication (HCC)    Essential hypertension    Urinary incontinence, mixed    Tobacco abuse disorder    Obstructive sleep apnea    Pulmonary hypertension (HCC)    Systolic murmur    Chronic respiratory failure with hypoxia (HCC)    Microalbuminuria due to type 2 diabetes mellitus (HCC)    Liver dysfunction    Steatohepatitis    Mixed hyperlipidemia    Morbid obesity due to excess calories (HCC)    COPD, severe ([address]    COPD exacerbation (HCC)    Fall at home    Type 2 diabetes mellitus ([address]    Hypertension    Hyperlipidemia    Tobacco abuse    Obesity due to excess calories    CHF (congestive heart failure) (HCC)    Physical debility    Bilateral carotid artery stenosis    Cerebrovascular accident (CVA) ([address]    COPD exacerbation ([address]    Type 2 diabetes mellitus ([address]    Hypertension    Tobacco abuse    Hyperlipidemia    COPD (chronic obstructive pulmonary disease) (HCC)    Pneumonia    Bacteremia    Streptococcal bacteremia    Subacute bacterial endocarditis    Cecum mass       Active Problems  Active Problems:    Streptococcal bacteremia    Subacute bacterial endocarditis    Cecum mass  Resolved Problems:    * No resolved hospital problems.  *    Electronically signed by: Electronically signed by Lucy Hadley MD on 11/2/2018 at 11:35 AM

## 2018-11-02 NOTE — PROGRESS NOTES
pulmonary      SUBJECTIVE: today showing improvement,no more hemoptysis     OBJECTIVE    VITALS:  BP (!) 140/51   Pulse 79   Temp 98.2 °F (36.8 °C) (Oral)   Resp 17   Ht 5' 6\" (1.676 m)   Wt 255 lb 1.2 oz (115.7 kg)   SpO2 99%   BMI 41.17 kg/m²   HEAD AND FACE EXAM:  No throat injection, no active exudate,no thrush  NECK EXAM;No JVD, no masses, symmetrical  CHEST EXAM; Expansion equal and symmetrical, no masses  LUNG EXAM; Good breath sounds bilaterally. There are expiratory wheezes both lungs, there are crackles at both lung bases  CARDIOVASCULAR EXAM: Positive S1 and S2, no S3 or S4, no clicks ,no murmurs  RIGHT AND LEFT LOWER EXTRIMITY EXAM: No edema, no swelling, no inflamation  CNS EXAM: Alert and oriented X3          LABS   Lab Results   Component Value Date    WBC 7.6 11/02/2018    HGB 8.1 (L) 11/02/2018    HCT 26.7 (L) 11/02/2018    MCV 97.8 11/02/2018     11/02/2018     Lab Results   Component Value Date    CREATININE 1.0 11/02/2018    BUN 23 11/02/2018     11/02/2018    K 3.7 11/02/2018    CL 99 11/02/2018    CO2 37 (H) 11/02/2018     Lab Results   Component Value Date    INR 1.43 11/01/2018    PROTIME 16.2 (H) 11/01/2018          Lab Results   Component Value Date    PHOS 4.2 10/30/2018    PHOS 3.5 10/20/2018      No results for input(s): PH, PO2ART, KVI0WWI, HCO3, BEART, O2SAT in the last 72 hours. Wt Readings from Last 3 Encounters:   11/02/18 255 lb 1.2 oz (115.7 kg)   10/18/18 266 lb 11.2 oz (121 kg)   10/15/18 255 lb 12.8 oz (116 kg)               ASSESMENT  Ac on ch resp failure  Ac copd  cristian  endocarditis        PLAN  1. cpm  2.  Cont o2    11/2/2018  Mike Beavers M.D.

## 2018-11-03 LAB
ANION GAP SERPL CALCULATED.3IONS-SCNC: 11 MMOL/L (ref 4–16)
BASOPHILS ABSOLUTE: 0 K/CU MM
BASOPHILS RELATIVE PERCENT: 0.3 % (ref 0–1)
BUN BLDV-MCNC: 24 MG/DL (ref 6–23)
CALCIUM SERPL-MCNC: 8.3 MG/DL (ref 8.3–10.6)
CHLORIDE BLD-SCNC: 96 MMOL/L (ref 99–110)
CO2: 34 MMOL/L (ref 21–32)
CREAT SERPL-MCNC: 1 MG/DL (ref 0.6–1.1)
DIFFERENTIAL TYPE: ABNORMAL
EOSINOPHILS ABSOLUTE: 0.2 K/CU MM
EOSINOPHILS RELATIVE PERCENT: 1.6 % (ref 0–3)
GFR AFRICAN AMERICAN: >60 ML/MIN/1.73M2
GFR NON-AFRICAN AMERICAN: 56 ML/MIN/1.73M2
GLUCOSE BLD-MCNC: 117 MG/DL (ref 70–99)
GLUCOSE BLD-MCNC: 118 MG/DL (ref 70–99)
GLUCOSE BLD-MCNC: 121 MG/DL (ref 70–99)
GLUCOSE BLD-MCNC: 140 MG/DL (ref 70–99)
GLUCOSE BLD-MCNC: 147 MG/DL (ref 70–99)
GLUCOSE BLD-MCNC: 178 MG/DL (ref 70–99)
HCT VFR BLD CALC: 28.3 % (ref 37–47)
HEMOGLOBIN: 8.7 GM/DL (ref 12.5–16)
IMMATURE NEUTROPHIL %: 0.5 % (ref 0–0.43)
LYMPHOCYTES ABSOLUTE: 1.4 K/CU MM
LYMPHOCYTES RELATIVE PERCENT: 15.3 % (ref 24–44)
MCH RBC QN AUTO: 29.9 PG (ref 27–31)
MCHC RBC AUTO-ENTMCNC: 30.7 % (ref 32–36)
MCV RBC AUTO: 97.3 FL (ref 78–100)
MONOCYTES ABSOLUTE: 0.7 K/CU MM
MONOCYTES RELATIVE PERCENT: 7.8 % (ref 0–4)
NUCLEATED RBC %: 0 %
PDW BLD-RTO: 15.6 % (ref 11.7–14.9)
PLATELET # BLD: 365 K/CU MM (ref 140–440)
PMV BLD AUTO: 8.7 FL (ref 7.5–11.1)
POTASSIUM SERPL-SCNC: 3.9 MMOL/L (ref 3.5–5.1)
RBC # BLD: 2.91 M/CU MM (ref 4.2–5.4)
SEGMENTED NEUTROPHILS ABSOLUTE COUNT: 6.9 K/CU MM
SEGMENTED NEUTROPHILS RELATIVE PERCENT: 74.5 % (ref 36–66)
SODIUM BLD-SCNC: 141 MMOL/L (ref 135–145)
TOTAL IMMATURE NEUTOROPHIL: 0.05 K/CU MM
TOTAL NUCLEATED RBC: 0 K/CU MM
WBC # BLD: 9.3 K/CU MM (ref 4–10.5)

## 2018-11-03 PROCEDURE — C9113 INJ PANTOPRAZOLE SODIUM, VIA: HCPCS | Performed by: INTERNAL MEDICINE

## 2018-11-03 PROCEDURE — 82962 GLUCOSE BLOOD TEST: CPT

## 2018-11-03 PROCEDURE — 2060000000 HC ICU INTERMEDIATE R&B

## 2018-11-03 PROCEDURE — 94640 AIRWAY INHALATION TREATMENT: CPT

## 2018-11-03 PROCEDURE — 85025 COMPLETE CBC W/AUTO DIFF WBC: CPT

## 2018-11-03 PROCEDURE — 94761 N-INVAS EAR/PLS OXIMETRY MLT: CPT

## 2018-11-03 PROCEDURE — 6370000000 HC RX 637 (ALT 250 FOR IP): Performed by: FAMILY MEDICINE

## 2018-11-03 PROCEDURE — 99024 POSTOP FOLLOW-UP VISIT: CPT | Performed by: SURGERY

## 2018-11-03 PROCEDURE — 6360000002 HC RX W HCPCS: Performed by: INTERNAL MEDICINE

## 2018-11-03 PROCEDURE — 80048 BASIC METABOLIC PNL TOTAL CA: CPT

## 2018-11-03 PROCEDURE — 6370000000 HC RX 637 (ALT 250 FOR IP): Performed by: SPECIALIST

## 2018-11-03 PROCEDURE — 2580000003 HC RX 258: Performed by: INTERNAL MEDICINE

## 2018-11-03 PROCEDURE — 6370000000 HC RX 637 (ALT 250 FOR IP): Performed by: HOSPITALIST

## 2018-11-03 PROCEDURE — 2700000000 HC OXYGEN THERAPY PER DAY

## 2018-11-03 PROCEDURE — 2580000003 HC RX 258: Performed by: HOSPITALIST

## 2018-11-03 PROCEDURE — 6370000000 HC RX 637 (ALT 250 FOR IP): Performed by: INTERNAL MEDICINE

## 2018-11-03 PROCEDURE — 2500000003 HC RX 250 WO HCPCS: Performed by: SURGERY

## 2018-11-03 PROCEDURE — 6360000002 HC RX W HCPCS: Performed by: HOSPITALIST

## 2018-11-03 RX ORDER — BISACODYL 10 MG
10 SUPPOSITORY, RECTAL RECTAL DAILY PRN
Status: DISCONTINUED | OUTPATIENT
Start: 2018-11-03 | End: 2018-11-09 | Stop reason: HOSPADM

## 2018-11-03 RX ADMIN — IPRATROPIUM BROMIDE AND ALBUTEROL SULFATE 3 ML: .5; 3 SOLUTION RESPIRATORY (INHALATION) at 22:12

## 2018-11-03 RX ADMIN — SODIUM CHLORIDE 8 MG/HR: 9 INJECTION, SOLUTION INTRAVENOUS at 01:09

## 2018-11-03 RX ADMIN — INSULIN LISPRO 2 UNITS: 100 INJECTION, SOLUTION INTRAVENOUS; SUBCUTANEOUS at 09:17

## 2018-11-03 RX ADMIN — FUROSEMIDE 40 MG: 10 INJECTION, SOLUTION INTRAMUSCULAR; INTRAVENOUS at 09:22

## 2018-11-03 RX ADMIN — TROSPIUM CHLORIDE 20 MG: 20 TABLET ORAL at 21:57

## 2018-11-03 RX ADMIN — IPRATROPIUM BROMIDE AND ALBUTEROL SULFATE 3 ML: .5; 3 SOLUTION RESPIRATORY (INHALATION) at 08:33

## 2018-11-03 RX ADMIN — ONDANSETRON HYDROCHLORIDE 4 MG: 2 INJECTION, SOLUTION INTRAMUSCULAR; INTRAVENOUS at 15:37

## 2018-11-03 RX ADMIN — Medication 2 PUFF: at 08:33

## 2018-11-03 RX ADMIN — SUCRALFATE 1 G: 1 SUSPENSION ORAL at 00:15

## 2018-11-03 RX ADMIN — IPRATROPIUM BROMIDE AND ALBUTEROL SULFATE 3 ML: .5; 3 SOLUTION RESPIRATORY (INHALATION) at 11:44

## 2018-11-03 RX ADMIN — FLUTICASONE PROPIONATE 1 SPRAY: 50 SPRAY, METERED NASAL at 09:21

## 2018-11-03 RX ADMIN — SODIUM CHLORIDE, PRESERVATIVE FREE 10 ML: 5 INJECTION INTRAVENOUS at 09:23

## 2018-11-03 RX ADMIN — Medication 2 PUFF: at 22:12

## 2018-11-03 RX ADMIN — PRAVASTATIN SODIUM 20 MG: 20 TABLET ORAL at 21:58

## 2018-11-03 RX ADMIN — ASCORBIC ACID, VITAMIN A PALMITATE, CHOLECALCIFEROL, THIAMINE HYDROCHLORIDE, RIBOFLAVIN-5 PHOSPHATE SODIUM, PYRIDOXINE HYDROCHLORIDE, NIACINAMIDE, DEXPANTHENOL, ALPHA-TOCOPHEROL ACETATE, VITAMIN K1, FOLIC ACID, BIOTIN, CYANOCOBALAMIN: 200; 3300; 200; 6; 3.6; 6; 40; 15; 10; 150; 600; 60; 5 INJECTION, SOLUTION INTRAVENOUS at 17:39

## 2018-11-03 RX ADMIN — DIPHENHYDRAMINE HCL 25 MG: 25 TABLET ORAL at 21:58

## 2018-11-03 RX ADMIN — INSULIN LISPRO 2 UNITS: 100 INJECTION, SOLUTION INTRAVENOUS; SUBCUTANEOUS at 17:03

## 2018-11-03 RX ADMIN — SODIUM CHLORIDE 8 MG/HR: 9 INJECTION, SOLUTION INTRAVENOUS at 15:25

## 2018-11-03 RX ADMIN — INSULIN LISPRO 1 UNITS: 100 INJECTION, SOLUTION INTRAVENOUS; SUBCUTANEOUS at 22:06

## 2018-11-03 RX ADMIN — SUCRALFATE 1 G: 1 SUSPENSION ORAL at 17:49

## 2018-11-03 RX ADMIN — ATENOLOL 25 MG: 25 TABLET ORAL at 21:56

## 2018-11-03 RX ADMIN — OXYCODONE HYDROCHLORIDE AND ACETAMINOPHEN 500 MG: 500 TABLET ORAL at 09:23

## 2018-11-03 RX ADMIN — SODIUM CHLORIDE, PRESERVATIVE FREE 10 ML: 5 INJECTION INTRAVENOUS at 21:56

## 2018-11-03 RX ADMIN — SODIUM CHLORIDE, PRESERVATIVE FREE 10 ML: 5 INJECTION INTRAVENOUS at 00:16

## 2018-11-03 RX ADMIN — BISACODYL 10 MG: 10 SUPPOSITORY RECTAL at 15:24

## 2018-11-03 RX ADMIN — ATENOLOL 25 MG: 25 TABLET ORAL at 09:20

## 2018-11-03 RX ADMIN — SUCRALFATE 1 G: 1 SUSPENSION ORAL at 06:21

## 2018-11-03 RX ADMIN — LINAGLIPTIN 5 MG: 5 TABLET, FILM COATED ORAL at 09:22

## 2018-11-03 RX ADMIN — OXYCODONE AND ACETAMINOPHEN 1 TABLET: 5; 325 TABLET ORAL at 17:07

## 2018-11-03 RX ADMIN — SUCRALFATE 1 G: 1 SUSPENSION ORAL at 23:49

## 2018-11-03 RX ADMIN — SUCRALFATE 1 G: 1 SUSPENSION ORAL at 14:18

## 2018-11-03 RX ADMIN — IPRATROPIUM BROMIDE AND ALBUTEROL SULFATE 3 ML: .5; 3 SOLUTION RESPIRATORY (INHALATION) at 15:56

## 2018-11-03 RX ADMIN — OXYCODONE AND ACETAMINOPHEN 1 TABLET: 5; 325 TABLET ORAL at 23:49

## 2018-11-03 RX ADMIN — DEXTROSE MONOHYDRATE: 50 INJECTION, SOLUTION INTRAVENOUS at 01:09

## 2018-11-03 RX ADMIN — FUROSEMIDE 40 MG: 10 INJECTION, SOLUTION INTRAMUSCULAR; INTRAVENOUS at 17:38

## 2018-11-03 ASSESSMENT — ENCOUNTER SYMPTOMS
EYE REDNESS: 0
PHOTOPHOBIA: 0
APNEA: 0
COLOR CHANGE: 0
ANAL BLEEDING: 0
RECTAL PAIN: 0
STRIDOR: 0
EYE ITCHING: 0
BACK PAIN: 0
CONSTIPATION: 0
CHOKING: 0
SORE THROAT: 0

## 2018-11-03 ASSESSMENT — PAIN SCALES - GENERAL
PAINLEVEL_OUTOF10: 8
PAINLEVEL_OUTOF10: 6
PAINLEVEL_OUTOF10: 8
PAINLEVEL_OUTOF10: 7

## 2018-11-03 ASSESSMENT — PAIN DESCRIPTION - ORIENTATION: ORIENTATION: LEFT

## 2018-11-03 ASSESSMENT — PAIN DESCRIPTION - LOCATION: LOCATION: ABDOMEN

## 2018-11-03 NOTE — PLAN OF CARE
Problem: Falls - Risk of:  Goal: Will remain free from falls  Will remain free from falls   Outcome: Ongoing  Patient educated to use the call light when help  Is needed and not to get out of bed alone. Patient states understanding.

## 2018-11-03 NOTE — PROGRESS NOTES
PATIENT NAME: Angel Sorenson    TODAY'S DATE: 11/03/18    SUBJECTIVE:    Pt is sitting in chair. OBJECTIVE:   VITALS:    Vitals:    11/03/18 1202   BP: (!) 124/47   Pulse: 78   Resp: 25   Temp:    SpO2: 99%     INTAKE/OUTPUT:    Date 11/03/18 0000 - 11/03/18 2359   Shift 1391-8231 8382-3509 2742-1936 24 Hour Total   I  N  T  A  K  E   I.V.  (mL/kg/hr) 686  (0.7)   686    Shift Total  (mL/kg) 686  (5.8)   686  (5.8)   O  U  T  P  U  T   Urine  (mL/kg/hr) 800  (0.8) 400  1200    Emesis/NG output 200   200    Shift Total  (mL/kg) 1000  (8.5) 400  (3.4)  1400  (11.8)   Weight (kg) 118.2 118.2 118.2 118.2        EXAM:  Blood pressure (!) 124/47, pulse 78, temperature 97.8 °F (36.6 °C), temperature source Oral, resp. rate 25, height 5' 6\" (1.676 m), weight 260 lb 9.3 oz (118.2 kg), SpO2 99 %, not currently breastfeeding. General appearance: No apparent distress, appears stated age and cooperative. Skin: unremarkable  HEENT Normocephalic, atraumatic without obvious deformity. Neck: Supple, Trachea midline   Lungs: Good respiratory effort. Clear to auscultation, bilaterally  Heart: Regular rate/ rhythm   Abdomen: Soft, non-tender or non-distended   Extremities: Stable lower extremity edema warm well perfused  Neurologic: Alert, grossly intact  Mental status: normal affect      Data:  CBC:   Recent Labs      11/01/18   0730  11/02/18   0500  11/03/18   0635   WBC  8.2  7.6  9.3   HGB  8.7*  8.1*  8.7*   HCT  28.4*  26.7*  28.3*   PLT  336  320  365     BMP:  Recent Labs      11/01/18   0730  11/02/18   0500  11/03/18   0635   NA  142  143  141   K  4.3  3.7  3.9   CL  98*  99  96*   CO2  35*  37*  34*   BUN  25*  23  24*   CREATININE  0.9  1.0  1.0   GLUCOSE  119*  83  118*     Hepatic: No results for input(s): AST, ALT, ALB, BILITOT, ALKPHOS in the last 72 hours. Mag:    No results for input(s): MG in the last 72 hours. Phos:   No results for input(s): PHOS in the last 72 hours.    INR:   Recent Labs

## 2018-11-03 NOTE — PROGRESS NOTES
- stable   2. Fluid overload better - / low UOP with loop   3. Ileus / bowel / colon sx   4. Endocarditis with abx   5.  DM    Recommendation/Plan  :     1. If UOP really low- hold loop   follow UOP and daily weigh   Labs in am   Watch mainly for  capillary \"plasma refill \"      Misty Oneal MD

## 2018-11-03 NOTE — PROGRESS NOTES
albuterol sulfate HFA 2 puff Q4H PRN   traZODone 100 mg Nightly PRN   sodium chloride flush 10 mL PRN   magnesium hydroxide 30 mL Daily PRN   ondansetron 4 mg Q6H PRN   albuterol 2.5 mg Q2H PRN   dextrose 100 mL/hr PRN   dextrose 12.5 g PRN   glucagon (rDNA) 1 mg PRN   glucose 15 g PRN       Data/Labs:     Recent Labs      11/01/18   0730  11/02/18   0500  11/03/18   0635   WBC  8.2  7.6  9.3   HGB  8.7*  8.1*  8.7*   HCT  28.4*  26.7*  28.3*   PLT  336  320  365      Recent Labs      11/01/18   0730  11/02/18   0500  11/03/18   0635   NA  142  143  141   K  4.3  3.7  3.9   CL  98*  99  96*   CO2  35*  37*  34*   BUN  25*  23  24*   CREATININE  0.9  1.0  1.0     No results for input(s): AST, ALT, ALB, BILIDIR, BILITOT, ALKPHOS in the last 72 hours. Recent Labs      11/01/18   1005   INR  1.43     No results for input(s): CKTOTAL, CKMB, CKMBINDEX, TROPONINT in the last 72 hours. I/O last 3 completed shifts: In: 1914.6 [I.V.:1914.6]  Out: 1075 [Urine:875; Emesis/NG output:200]    Intake/Output Summary (Last 24 hours) at 11/03/18 1250  Last data filed at 11/03/18 1134   Gross per 24 hour   Intake          1914.57 ml   Output             1575 ml   Net           339.57 ml        Assessment/Plan:   1. Colon cancer of the cecum status post robotic assisted right colon resection by surgery on 10/25/18, postop day 5  2. Partial ileus probably due to narcotic increase post surgery, she does have hypoactive bowel sounds that are tingling, bowel regimen, ambulate patient  10/31/18-patient started vomiting bile material brownish last night surgery was called and reinserted the NG and is expelling copious amount of secretions, had a KUB done which showed no obstruction  11/03/18-continues with NG but secretions have decreased significantly, patient is able to tolerate clear liquids at this time.;  No active bowel sounds at this time  3.  Strep bovis endocarditis patient's blood cultures have been negative since 10/18/18 patient on penicillin G per infectious disease  4. Acute on chronic blood loss anemia/iron deficiency anemia status post transfusion, continued to watch H&H daily, will treat if indicated  5. Diabetes mellitus2-sliding scale for insulin, diabetic diet  6.  Hypertension-atenolol    DVT Prophylaxis: scd  Diet: DIET CLEAR LIQUID;  Dietary Nutrition Supplements: Clear Liquid Oral Supplement  Code Status: Full Code    Dispo: snf/swing  if continues to improve and tolerating diet    Electronically signed by Rj Vanegas MD on 11/3/2018 at 12:50 PM

## 2018-11-03 NOTE — PROGRESS NOTES
Renee Thakur BSN RN CCRN-K clinical  for [facility] SURGICAL [facility] RN students 07-19:00 this date.

## 2018-11-03 NOTE — PROGRESS NOTES
General Surgery-[facility] Day: 17    ChiefComplaint on Admission: right colon mass      Subjective: Tonya Bobo is a 65 y.o. female with POD # 8 s/p robotic right colon resection  . Patient reports abdominal pain. Tolerating DIET CLEAR LIQUID;  Dietary Nutrition Supplements: Clear Liquid Oral Supplement  PN-Adult Premix  4.25/10 - Standard Electrolytes - Peripheral Line. - BM. + flatus    ROS:  Review of Systems   Constitutional: Negative for chills and fever. HENT: Negative for ear pain, mouth sores, sore throat and tinnitus. Eyes: Negative for photophobia, redness and itching. Respiratory: Negative for apnea, choking and stridor. Cardiovascular: Negative for chest pain and palpitations. Gastrointestinal: Negative for anal bleeding, constipation and rectal pain. Endocrine: Negative for polydipsia. Genitourinary: Negative for enuresis, flank pain and hematuria. Musculoskeletal: Negative for back pain, joint swelling and myalgias. Skin: Negative for color change and pallor. Allergic/Immunologic: Negative for environmental allergies. Neurological: Negative for syncope and speech difficulty. Psychiatric/Behavioral: Negative for confusion and hallucinations.        Allergies  Tomato          Diagnosis Date    Chronic respiratory failure with hypoxia ([facility] 10/19/2015    COPD (chronic obstructive pulmonary disease) ([facility] 10/11/2013    follow with Dr Jaycee Lisa Diabetes mellitus ([facility] 10/11/2013    \"been diabetic for 7 yrs\"    Enlarged heart     Heart murmur     History of blood transfusion     Hyperlipidemia 10/11/2013    Hypertension 10/11/2013    Insomnia 10/11/2013    Liver dysfunction 12/21/2015    Liver, core biopsies: Steatohepatitis, acute and chronic, grade 2, stage 2.     Nasal congestion 1/20/2016    \"no cough and no fever associated with it\"    Obesity 10/11/2013    On home oxygen therapy     2l/nc 24 hrs per day    Sleep apnea 10/11/2013    had sleep

## 2018-11-04 LAB
ANION GAP SERPL CALCULATED.3IONS-SCNC: 10 MMOL/L (ref 4–16)
BASOPHILS ABSOLUTE: 0 K/CU MM
BASOPHILS RELATIVE PERCENT: 0.4 % (ref 0–1)
BUN BLDV-MCNC: 31 MG/DL (ref 6–23)
CALCIUM SERPL-MCNC: 7.9 MG/DL (ref 8.3–10.6)
CHLORIDE BLD-SCNC: 95 MMOL/L (ref 99–110)
CO2: 33 MMOL/L (ref 21–32)
CREAT SERPL-MCNC: 1.1 MG/DL (ref 0.6–1.1)
DIFFERENTIAL TYPE: ABNORMAL
EOSINOPHILS ABSOLUTE: 0.2 K/CU MM
EOSINOPHILS RELATIVE PERCENT: 2.2 % (ref 0–3)
GFR AFRICAN AMERICAN: >60 ML/MIN/1.73M2
GFR NON-AFRICAN AMERICAN: 50 ML/MIN/1.73M2
GLUCOSE BLD-MCNC: 142 MG/DL (ref 70–99)
GLUCOSE BLD-MCNC: 153 MG/DL (ref 70–99)
GLUCOSE BLD-MCNC: 167 MG/DL (ref 70–99)
GLUCOSE BLD-MCNC: 168 MG/DL (ref 70–99)
GLUCOSE BLD-MCNC: 169 MG/DL (ref 70–99)
HCT VFR BLD CALC: 26.1 % (ref 37–47)
HEMOGLOBIN: 8 GM/DL (ref 12.5–16)
IMMATURE NEUTROPHIL %: 0.5 % (ref 0–0.43)
LYMPHOCYTES ABSOLUTE: 1.4 K/CU MM
LYMPHOCYTES RELATIVE PERCENT: 19.5 % (ref 24–44)
MCH RBC QN AUTO: 29.7 PG (ref 27–31)
MCHC RBC AUTO-ENTMCNC: 30.7 % (ref 32–36)
MCV RBC AUTO: 97 FL (ref 78–100)
MONOCYTES ABSOLUTE: 0.7 K/CU MM
MONOCYTES RELATIVE PERCENT: 9.3 % (ref 0–4)
NUCLEATED RBC %: 0 %
PDW BLD-RTO: 15.3 % (ref 11.7–14.9)
PLATELET # BLD: 334 K/CU MM (ref 140–440)
PMV BLD AUTO: 8.7 FL (ref 7.5–11.1)
POTASSIUM SERPL-SCNC: 3.8 MMOL/L (ref 3.5–5.1)
RBC # BLD: 2.69 M/CU MM (ref 4.2–5.4)
SEGMENTED NEUTROPHILS ABSOLUTE COUNT: 5 K/CU MM
SEGMENTED NEUTROPHILS RELATIVE PERCENT: 68.1 % (ref 36–66)
SODIUM BLD-SCNC: 138 MMOL/L (ref 135–145)
TOTAL IMMATURE NEUTOROPHIL: 0.04 K/CU MM
TOTAL NUCLEATED RBC: 0 K/CU MM
WBC # BLD: 7.4 K/CU MM (ref 4–10.5)

## 2018-11-04 PROCEDURE — 2580000003 HC RX 258: Performed by: INTERNAL MEDICINE

## 2018-11-04 PROCEDURE — 6370000000 HC RX 637 (ALT 250 FOR IP): Performed by: FAMILY MEDICINE

## 2018-11-04 PROCEDURE — 2060000000 HC ICU INTERMEDIATE R&B

## 2018-11-04 PROCEDURE — 6360000002 HC RX W HCPCS: Performed by: INTERNAL MEDICINE

## 2018-11-04 PROCEDURE — 99024 POSTOP FOLLOW-UP VISIT: CPT | Performed by: SURGERY

## 2018-11-04 PROCEDURE — 2700000000 HC OXYGEN THERAPY PER DAY

## 2018-11-04 PROCEDURE — 94640 AIRWAY INHALATION TREATMENT: CPT

## 2018-11-04 PROCEDURE — 99232 SBSQ HOSP IP/OBS MODERATE 35: CPT | Performed by: INTERNAL MEDICINE

## 2018-11-04 PROCEDURE — 94761 N-INVAS EAR/PLS OXIMETRY MLT: CPT

## 2018-11-04 PROCEDURE — 80048 BASIC METABOLIC PNL TOTAL CA: CPT

## 2018-11-04 PROCEDURE — 2500000003 HC RX 250 WO HCPCS: Performed by: SURGERY

## 2018-11-04 PROCEDURE — 85025 COMPLETE CBC W/AUTO DIFF WBC: CPT

## 2018-11-04 PROCEDURE — 82962 GLUCOSE BLOOD TEST: CPT

## 2018-11-04 PROCEDURE — 6370000000 HC RX 637 (ALT 250 FOR IP): Performed by: HOSPITALIST

## 2018-11-04 PROCEDURE — 6370000000 HC RX 637 (ALT 250 FOR IP): Performed by: SPECIALIST

## 2018-11-04 PROCEDURE — 2580000003 HC RX 258: Performed by: HOSPITALIST

## 2018-11-04 PROCEDURE — C9113 INJ PANTOPRAZOLE SODIUM, VIA: HCPCS | Performed by: INTERNAL MEDICINE

## 2018-11-04 RX ADMIN — INSULIN LISPRO 2 UNITS: 100 INJECTION, SOLUTION INTRAVENOUS; SUBCUTANEOUS at 12:06

## 2018-11-04 RX ADMIN — SODIUM CHLORIDE, PRESERVATIVE FREE 10 ML: 5 INJECTION INTRAVENOUS at 22:00

## 2018-11-04 RX ADMIN — IPRATROPIUM BROMIDE AND ALBUTEROL SULFATE 3 ML: .5; 3 SOLUTION RESPIRATORY (INHALATION) at 08:03

## 2018-11-04 RX ADMIN — TRAZODONE HYDROCHLORIDE 100 MG: 50 TABLET ORAL at 21:59

## 2018-11-04 RX ADMIN — SUCRALFATE 1 G: 1 SUSPENSION ORAL at 12:06

## 2018-11-04 RX ADMIN — TROSPIUM CHLORIDE 20 MG: 20 TABLET ORAL at 21:53

## 2018-11-04 RX ADMIN — OXYCODONE HYDROCHLORIDE AND ACETAMINOPHEN 500 MG: 500 TABLET ORAL at 08:53

## 2018-11-04 RX ADMIN — INSULIN LISPRO 2 UNITS: 100 INJECTION, SOLUTION INTRAVENOUS; SUBCUTANEOUS at 18:08

## 2018-11-04 RX ADMIN — ASCORBIC ACID, VITAMIN A PALMITATE, CHOLECALCIFEROL, THIAMINE HYDROCHLORIDE, RIBOFLAVIN-5 PHOSPHATE SODIUM, PYRIDOXINE HYDROCHLORIDE, NIACINAMIDE, DEXPANTHENOL, ALPHA-TOCOPHEROL ACETATE, VITAMIN K1, FOLIC ACID, BIOTIN, CYANOCOBALAMIN: 200; 3300; 200; 6; 3.6; 6; 40; 15; 10; 150; 600; 60; 5 INJECTION, SOLUTION INTRAVENOUS at 18:11

## 2018-11-04 RX ADMIN — FUROSEMIDE 40 MG: 10 INJECTION, SOLUTION INTRAMUSCULAR; INTRAVENOUS at 08:53

## 2018-11-04 RX ADMIN — Medication 2 PUFF: at 20:59

## 2018-11-04 RX ADMIN — SODIUM CHLORIDE, PRESERVATIVE FREE 10 ML: 5 INJECTION INTRAVENOUS at 08:54

## 2018-11-04 RX ADMIN — SODIUM CHLORIDE 8 MG/HR: 9 INJECTION, SOLUTION INTRAVENOUS at 10:21

## 2018-11-04 RX ADMIN — Medication 2 PUFF: at 08:03

## 2018-11-04 RX ADMIN — IPRATROPIUM BROMIDE AND ALBUTEROL SULFATE 3 ML: .5; 3 SOLUTION RESPIRATORY (INHALATION) at 20:58

## 2018-11-04 RX ADMIN — INSULIN LISPRO 2 UNITS: 100 INJECTION, SOLUTION INTRAVENOUS; SUBCUTANEOUS at 09:50

## 2018-11-04 RX ADMIN — IPRATROPIUM BROMIDE AND ALBUTEROL SULFATE 3 ML: .5; 3 SOLUTION RESPIRATORY (INHALATION) at 15:28

## 2018-11-04 RX ADMIN — SODIUM CHLORIDE 8 MG/HR: 9 INJECTION, SOLUTION INTRAVENOUS at 21:53

## 2018-11-04 RX ADMIN — IPRATROPIUM BROMIDE AND ALBUTEROL SULFATE 3 ML: .5; 3 SOLUTION RESPIRATORY (INHALATION) at 11:50

## 2018-11-04 RX ADMIN — SUCRALFATE 1 G: 1 SUSPENSION ORAL at 06:20

## 2018-11-04 RX ADMIN — PRAVASTATIN SODIUM 20 MG: 20 TABLET ORAL at 21:54

## 2018-11-04 RX ADMIN — OXYCODONE AND ACETAMINOPHEN 1 TABLET: 5; 325 TABLET ORAL at 21:53

## 2018-11-04 RX ADMIN — SUCRALFATE 1 G: 1 SUSPENSION ORAL at 17:22

## 2018-11-04 RX ADMIN — DEXTROSE MONOHYDRATE: 50 INJECTION, SOLUTION INTRAVENOUS at 01:36

## 2018-11-04 RX ADMIN — LINAGLIPTIN 5 MG: 5 TABLET, FILM COATED ORAL at 08:53

## 2018-11-04 RX ADMIN — ATENOLOL 25 MG: 25 TABLET ORAL at 08:53

## 2018-11-04 RX ADMIN — INSULIN LISPRO 1 UNITS: 100 INJECTION, SOLUTION INTRAVENOUS; SUBCUTANEOUS at 22:13

## 2018-11-04 RX ADMIN — FUROSEMIDE 40 MG: 10 INJECTION, SOLUTION INTRAMUSCULAR; INTRAVENOUS at 17:22

## 2018-11-04 RX ADMIN — SODIUM CHLORIDE 8 MG/HR: 9 INJECTION, SOLUTION INTRAVENOUS at 01:36

## 2018-11-04 RX ADMIN — ATENOLOL 25 MG: 25 TABLET ORAL at 21:53

## 2018-11-04 ASSESSMENT — ENCOUNTER SYMPTOMS
EYE REDNESS: 0
EYE ITCHING: 0
CHOKING: 0
APNEA: 0
CONSTIPATION: 0
PHOTOPHOBIA: 0
ANAL BLEEDING: 0
STRIDOR: 0
SORE THROAT: 0
BACK PAIN: 0
COLOR CHANGE: 0
RECTAL PAIN: 0

## 2018-11-04 ASSESSMENT — PAIN DESCRIPTION - LOCATION: LOCATION: ABDOMEN

## 2018-11-04 ASSESSMENT — PAIN SCALES - GENERAL
PAINLEVEL_OUTOF10: 4
PAINLEVEL_OUTOF10: 7

## 2018-11-04 ASSESSMENT — PAIN DESCRIPTION - DESCRIPTORS: DESCRIPTORS: ACHING

## 2018-11-04 ASSESSMENT — PAIN DESCRIPTION - ORIENTATION: ORIENTATION: ANTERIOR

## 2018-11-04 NOTE — PROGRESS NOTES
HOSPITALIST PROGRESS NOTE  Date: 11/4/2018   Name: Elvia Medina   MRN: [id number]   YOB: 1955      Subjective/Interval Hx:   Patient secretions from the nasogastric tube has pretty much stopped since yesterday, she still complains of just some lethargy other than that she denies any abdominal pain, no coughing or spitting or vomiting blood, no nausea, her NG remains until surgery removes;  Tolerating liquids well at this time    Objective:   Physical Exam:   BP (!) 122/43   Pulse 82   Temp 98.1 °F (36.7 °C) (Oral)   Resp 23   Ht 5' 6\" (1.676 m)   Wt 260 lb 9.3 oz (118.2 kg)   SpO2 100%   BMI 42.06 kg/m²   General: no acute distress, well nourished and well hydrated  HEENT: NCAT  Heart: S1S2 RRR  Lungs: Clear to ascultation bilaterally, respiratory effort normal  Abdomen: soft,tender, hypoactive bowel sounds, positive bowel sounds  Extremities: no pitting edema, nontender   Neuro: patient is awake, alert and orientated times 3, no gross deficits  Skin: no rashes or ecchymosis        Meds:   Meds:    furosemide  40 mg Intravenous BID    sucralfate  1 g Oral 4 times per day    mometasone-formoterol  2 puff Inhalation BID    linagliptin  5 mg Oral Daily    pravastatin  20 mg Oral Nightly    sodium chloride flush  10 mL Intravenous 2 times per day    atenolol  25 mg Oral BID    fluticasone  1 spray Each Nare Daily    insulin lispro  0-12 Units Subcutaneous TID WC    insulin lispro  0-6 Units Subcutaneous Nightly    ipratropium-albuterol  1 vial Nebulization 4x daily    nicotine  1 patch Transdermal Daily    trospium  20 mg Oral Nightly    vitamin C  500 mg Oral Daily      Infusions:    PN-Adult Premix  4.25/10 - Standard Electrolytes - Peripheral Line 50 mL/hr at 11/03/18 2319    [START ON 11/5/2018] fat emulsion      pantoprozole (PROTONIX) infusion 8 mg/hr (11/04/18 1021)    sodium chloride      IV infusion builder 22.5 mL/hr at 11/04/18 0136    dextrose       PRN Meds:

## 2018-11-04 NOTE — PROGRESS NOTES
distress  HEENT:  +pallor  Neck:  supple  Lungs:  + b/l basal crackles / fine insp and exp   Heart:  RRR with DEJON  Abdomen: soft  Extremities:  Less edema       Problem List :         Impression :     1. STORM/CKD stage 3- acceptable UOP with loop- stable  2. Endocarditis  with valvular damage and fluid overload  3. Anemia/ colon - tub ulo villous adenoma     Recommendation/Plan  :     1. Keep loop alannah with TPN now   2. adjust electrolytes  with TPN  3. Watch fluid status  4.  Follow clinically       Yunior Vega MD

## 2018-11-04 NOTE — PROGRESS NOTES
PATIENT NAME: Stanford Russ    TODAY'S DATE: 11/04/18    SUBJECTIVE:    Pt is sitting in chair and stated she slept well. OBJECTIVE:   VITALS:    Vitals:    11/04/18 0803   BP:    Pulse:    Resp: 17   Temp:    SpO2: 100%     INTAKE/OUTPUT:    Date 11/04/18 0000 - 11/04/18 2359   Shift 1137-6814 7909-2516 1486-2829 24 Hour Total   I  N  T  A  K  E   I.V.  (mL/kg/hr) 1644  (1.7)   1644       600    Shift Total  (mL/kg) 2244  (19)   2244  (19)   O  U  T  P  U  T   Urine  (mL/kg/hr) 600  (0.6)   600    Shift Total  (mL/kg) 600  (5.1)   600  (5.1)   Weight (kg) 118.2 118.2 118.2 118.2        EXAM:  Blood pressure (!) 116/44, pulse 92, temperature 98.1 °F (36.7 °C), temperature source Oral, resp. rate 17, height 5' 6\" (1.676 m), weight 260 lb 9.3 oz (118.2 kg), SpO2 100 %, not currently breastfeeding. General appearance: No apparent distress, appears stated age and cooperative. Skin: unremarkable  HEENT Normocephalic, atraumatic without obvious deformity. Neck: Supple, Trachea midline   Lungs: Good respiratory effort. Clear to auscultation, bilaterally  Heart: Regular rate/ rhythm   Abdomen: Appropriately tender  Extremities: Stable edema warm well perfused  Neurologic: Alert, grossly intact  Mental status: normal affect      Data:  CBC:   Recent Labs      11/02/18   0500  11/03/18   0635  11/04/18   0640   WBC  7.6  9.3  7.4   HGB  8.1*  8.7*  8.0*   HCT  26.7*  28.3*  26.1*   PLT  320  365  334     BMP:  Recent Labs      11/02/18   0500  11/03/18   0635  11/04/18   0640   NA  143  141  138   K  3.7  3.9  3.8   CL  99  96*  95*   CO2  37*  34*  33*   BUN  23  24*  31*   CREATININE  1.0  1.0  1.1   GLUCOSE  83  118*  142*     Hepatic: No results for input(s): AST, ALT, ALB, BILITOT, ALKPHOS in the last 72 hours. Mag:    No results for input(s): MG in the last 72 hours. Phos:   No results for input(s): PHOS in the last 72 hours.    INR:   Recent Labs      11/01/18   1005   INR  1.43     Radiology

## 2018-11-05 ENCOUNTER — APPOINTMENT (OUTPATIENT)
Dept: CT IMAGING | Age: 63
DRG: 853 | End: 2018-11-05
Attending: HOSPITALIST
Payer: COMMERCIAL

## 2018-11-05 LAB
ANION GAP SERPL CALCULATED.3IONS-SCNC: 13 MMOL/L (ref 4–16)
BASOPHILS ABSOLUTE: 0 K/CU MM
BASOPHILS RELATIVE PERCENT: 0.2 % (ref 0–1)
BUN BLDV-MCNC: 42 MG/DL (ref 6–23)
CALCIUM SERPL-MCNC: 8.1 MG/DL (ref 8.3–10.6)
CHLORIDE BLD-SCNC: 92 MMOL/L (ref 99–110)
CO2: 31 MMOL/L (ref 21–32)
CREAT SERPL-MCNC: 1.1 MG/DL (ref 0.6–1.1)
DIFFERENTIAL TYPE: ABNORMAL
EOSINOPHILS ABSOLUTE: 0.1 K/CU MM
EOSINOPHILS RELATIVE PERCENT: 1.4 % (ref 0–3)
GFR AFRICAN AMERICAN: >60 ML/MIN/1.73M2
GFR NON-AFRICAN AMERICAN: 50 ML/MIN/1.73M2
GLUCOSE BLD-MCNC: 158 MG/DL (ref 70–99)
GLUCOSE BLD-MCNC: 170 MG/DL (ref 70–99)
GLUCOSE BLD-MCNC: 171 MG/DL (ref 70–99)
GLUCOSE BLD-MCNC: 176 MG/DL (ref 70–99)
GLUCOSE BLD-MCNC: 179 MG/DL (ref 70–99)
GLUCOSE BLD-MCNC: 198 MG/DL (ref 70–99)
HCT VFR BLD CALC: 28.9 % (ref 37–47)
HEMOGLOBIN: 8.9 GM/DL (ref 12.5–16)
IMMATURE NEUTROPHIL %: 0.7 % (ref 0–0.43)
LYMPHOCYTES ABSOLUTE: 1.5 K/CU MM
LYMPHOCYTES RELATIVE PERCENT: 15 % (ref 24–44)
MAGNESIUM: 1.8 MG/DL (ref 1.8–2.4)
MCH RBC QN AUTO: 29.8 PG (ref 27–31)
MCHC RBC AUTO-ENTMCNC: 30.8 % (ref 32–36)
MCV RBC AUTO: 96.7 FL (ref 78–100)
MONOCYTES ABSOLUTE: 1 K/CU MM
MONOCYTES RELATIVE PERCENT: 10.3 % (ref 0–4)
NUCLEATED RBC %: 0 %
PDW BLD-RTO: 15.1 % (ref 11.7–14.9)
PHOSPHORUS: 3.6 MG/DL (ref 2.5–4.9)
PLATELET # BLD: 380 K/CU MM (ref 140–440)
PMV BLD AUTO: 8.9 FL (ref 7.5–11.1)
POTASSIUM SERPL-SCNC: 3.9 MMOL/L (ref 3.5–5.1)
RBC # BLD: 2.99 M/CU MM (ref 4.2–5.4)
SEGMENTED NEUTROPHILS ABSOLUTE COUNT: 7.2 K/CU MM
SEGMENTED NEUTROPHILS RELATIVE PERCENT: 72.4 % (ref 36–66)
SODIUM BLD-SCNC: 136 MMOL/L (ref 135–145)
TOTAL IMMATURE NEUTOROPHIL: 0.07 K/CU MM
TOTAL NUCLEATED RBC: 0 K/CU MM
WBC # BLD: 10 K/CU MM (ref 4–10.5)

## 2018-11-05 PROCEDURE — 94640 AIRWAY INHALATION TREATMENT: CPT

## 2018-11-05 PROCEDURE — 99024 POSTOP FOLLOW-UP VISIT: CPT | Performed by: SURGERY

## 2018-11-05 PROCEDURE — 80048 BASIC METABOLIC PNL TOTAL CA: CPT

## 2018-11-05 PROCEDURE — 6360000002 HC RX W HCPCS: Performed by: INTERNAL MEDICINE

## 2018-11-05 PROCEDURE — 85025 COMPLETE CBC W/AUTO DIFF WBC: CPT

## 2018-11-05 PROCEDURE — 6360000004 HC RX CONTRAST MEDICATION: Performed by: SURGERY

## 2018-11-05 PROCEDURE — 99232 SBSQ HOSP IP/OBS MODERATE 35: CPT | Performed by: INTERNAL MEDICINE

## 2018-11-05 PROCEDURE — 6370000000 HC RX 637 (ALT 250 FOR IP): Performed by: FAMILY MEDICINE

## 2018-11-05 PROCEDURE — 6360000002 HC RX W HCPCS: Performed by: HOSPITALIST

## 2018-11-05 PROCEDURE — 94761 N-INVAS EAR/PLS OXIMETRY MLT: CPT

## 2018-11-05 PROCEDURE — 2580000003 HC RX 258: Performed by: HOSPITALIST

## 2018-11-05 PROCEDURE — 6370000000 HC RX 637 (ALT 250 FOR IP): Performed by: SPECIALIST

## 2018-11-05 PROCEDURE — 2580000003 HC RX 258: Performed by: INTERNAL MEDICINE

## 2018-11-05 PROCEDURE — 97116 GAIT TRAINING THERAPY: CPT

## 2018-11-05 PROCEDURE — 2500000003 HC RX 250 WO HCPCS: Performed by: SURGERY

## 2018-11-05 PROCEDURE — 2700000000 HC OXYGEN THERAPY PER DAY

## 2018-11-05 PROCEDURE — 97530 THERAPEUTIC ACTIVITIES: CPT

## 2018-11-05 PROCEDURE — C9113 INJ PANTOPRAZOLE SODIUM, VIA: HCPCS | Performed by: INTERNAL MEDICINE

## 2018-11-05 PROCEDURE — 84100 ASSAY OF PHOSPHORUS: CPT

## 2018-11-05 PROCEDURE — 6370000000 HC RX 637 (ALT 250 FOR IP): Performed by: HOSPITALIST

## 2018-11-05 PROCEDURE — 2060000000 HC ICU INTERMEDIATE R&B

## 2018-11-05 PROCEDURE — 82962 GLUCOSE BLOOD TEST: CPT

## 2018-11-05 PROCEDURE — 97535 SELF CARE MNGMENT TRAINING: CPT

## 2018-11-05 PROCEDURE — 83735 ASSAY OF MAGNESIUM: CPT

## 2018-11-05 PROCEDURE — 74176 CT ABD & PELVIS W/O CONTRAST: CPT

## 2018-11-05 RX ORDER — PANTOPRAZOLE SODIUM 40 MG/10ML
40 INJECTION, POWDER, LYOPHILIZED, FOR SOLUTION INTRAVENOUS DAILY
Status: DISCONTINUED | OUTPATIENT
Start: 2018-11-05 | End: 2018-11-05

## 2018-11-05 RX ORDER — FUROSEMIDE 10 MG/ML
20 INJECTION INTRAMUSCULAR; INTRAVENOUS 2 TIMES DAILY
Status: DISCONTINUED | OUTPATIENT
Start: 2018-11-05 | End: 2018-11-09 | Stop reason: HOSPADM

## 2018-11-05 RX ORDER — PANTOPRAZOLE SODIUM 40 MG/10ML
40 INJECTION, POWDER, LYOPHILIZED, FOR SOLUTION INTRAVENOUS 2 TIMES DAILY
Status: DISCONTINUED | OUTPATIENT
Start: 2018-11-05 | End: 2018-11-07

## 2018-11-05 RX ORDER — DIAPER,BRIEF,INFANT-TODD,DISP
EACH MISCELLANEOUS 2 TIMES DAILY
Status: DISCONTINUED | OUTPATIENT
Start: 2018-11-05 | End: 2018-11-09 | Stop reason: HOSPADM

## 2018-11-05 RX ADMIN — FLUTICASONE PROPIONATE 1 SPRAY: 50 SPRAY, METERED NASAL at 09:15

## 2018-11-05 RX ADMIN — PRAVASTATIN SODIUM 20 MG: 20 TABLET ORAL at 21:50

## 2018-11-05 RX ADMIN — LINAGLIPTIN 5 MG: 5 TABLET, FILM COATED ORAL at 09:18

## 2018-11-05 RX ADMIN — TROSPIUM CHLORIDE 20 MG: 20 TABLET ORAL at 21:45

## 2018-11-05 RX ADMIN — SUCRALFATE 1 G: 1 SUSPENSION ORAL at 00:27

## 2018-11-05 RX ADMIN — DEXTROSE MONOHYDRATE: 50 INJECTION, SOLUTION INTRAVENOUS at 21:50

## 2018-11-05 RX ADMIN — DEXTROSE MONOHYDRATE: 50 INJECTION, SOLUTION INTRAVENOUS at 00:31

## 2018-11-05 RX ADMIN — IPRATROPIUM BROMIDE AND ALBUTEROL SULFATE 3 ML: .5; 3 SOLUTION RESPIRATORY (INHALATION) at 13:17

## 2018-11-05 RX ADMIN — ONDANSETRON HYDROCHLORIDE 4 MG: 2 INJECTION, SOLUTION INTRAMUSCULAR; INTRAVENOUS at 11:41

## 2018-11-05 RX ADMIN — FUROSEMIDE 20 MG: 10 INJECTION, SOLUTION INTRAVENOUS at 18:00

## 2018-11-05 RX ADMIN — IOHEXOL 50 ML: 240 INJECTION, SOLUTION INTRATHECAL; INTRAVASCULAR; INTRAVENOUS; ORAL at 12:20

## 2018-11-05 RX ADMIN — SODIUM CHLORIDE, PRESERVATIVE FREE 10 ML: 5 INJECTION INTRAVENOUS at 09:20

## 2018-11-05 RX ADMIN — IPRATROPIUM BROMIDE AND ALBUTEROL SULFATE 3 ML: .5; 3 SOLUTION RESPIRATORY (INHALATION) at 17:02

## 2018-11-05 RX ADMIN — OXYCODONE HYDROCHLORIDE AND ACETAMINOPHEN 500 MG: 500 TABLET ORAL at 09:18

## 2018-11-05 RX ADMIN — INSULIN LISPRO 2 UNITS: 100 INJECTION, SOLUTION INTRAVENOUS; SUBCUTANEOUS at 18:02

## 2018-11-05 RX ADMIN — I.V. FAT EMULSION 500 ML: 20 EMULSION INTRAVENOUS at 17:56

## 2018-11-05 RX ADMIN — INSULIN LISPRO 2 UNITS: 100 INJECTION, SOLUTION INTRAVENOUS; SUBCUTANEOUS at 12:24

## 2018-11-05 RX ADMIN — ATENOLOL 25 MG: 25 TABLET ORAL at 09:19

## 2018-11-05 RX ADMIN — SUCRALFATE 1 G: 1 SUSPENSION ORAL at 21:47

## 2018-11-05 RX ADMIN — ASCORBIC ACID, VITAMIN A PALMITATE, CHOLECALCIFEROL, THIAMINE HYDROCHLORIDE, RIBOFLAVIN-5 PHOSPHATE SODIUM, PYRIDOXINE HYDROCHLORIDE, NIACINAMIDE, DEXPANTHENOL, ALPHA-TOCOPHEROL ACETATE, VITAMIN K1, FOLIC ACID, BIOTIN, CYANOCOBALAMIN: 200; 3300; 200; 6; 3.6; 6; 40; 15; 10; 150; 600; 60; 5 INJECTION, SOLUTION INTRAVENOUS at 17:56

## 2018-11-05 RX ADMIN — SODIUM CHLORIDE, PRESERVATIVE FREE 10 ML: 5 INJECTION INTRAVENOUS at 21:46

## 2018-11-05 RX ADMIN — INSULIN LISPRO 2 UNITS: 100 INJECTION, SOLUTION INTRAVENOUS; SUBCUTANEOUS at 09:28

## 2018-11-05 RX ADMIN — SUCRALFATE 1 G: 1 SUSPENSION ORAL at 14:01

## 2018-11-05 RX ADMIN — TRAZODONE HYDROCHLORIDE 100 MG: 50 TABLET ORAL at 21:44

## 2018-11-05 RX ADMIN — HYDROCORTISONE: 1 CREAM TOPICAL at 21:50

## 2018-11-05 RX ADMIN — FUROSEMIDE 40 MG: 10 INJECTION, SOLUTION INTRAMUSCULAR; INTRAVENOUS at 09:15

## 2018-11-05 RX ADMIN — Medication 2 PUFF: at 13:23

## 2018-11-05 RX ADMIN — ATENOLOL 25 MG: 25 TABLET ORAL at 21:45

## 2018-11-05 RX ADMIN — SUCRALFATE 1 G: 1 SUSPENSION ORAL at 17:19

## 2018-11-05 RX ADMIN — SODIUM CHLORIDE 8 MG/HR: 9 INJECTION, SOLUTION INTRAVENOUS at 08:03

## 2018-11-05 RX ADMIN — INSULIN LISPRO 1 UNITS: 100 INJECTION, SOLUTION INTRAVENOUS; SUBCUTANEOUS at 23:42

## 2018-11-05 RX ADMIN — ONDANSETRON HYDROCHLORIDE 4 MG: 2 INJECTION, SOLUTION INTRAMUSCULAR; INTRAVENOUS at 03:10

## 2018-11-05 RX ADMIN — PANTOPRAZOLE SODIUM 40 MG: 40 INJECTION, POWDER, FOR SOLUTION INTRAVENOUS at 21:44

## 2018-11-05 RX ADMIN — OXYCODONE AND ACETAMINOPHEN 1 TABLET: 5; 325 TABLET ORAL at 03:10

## 2018-11-05 RX ADMIN — OXYCODONE AND ACETAMINOPHEN 1 TABLET: 5; 325 TABLET ORAL at 21:45

## 2018-11-05 RX ADMIN — IPRATROPIUM BROMIDE AND ALBUTEROL SULFATE 3 ML: .5; 3 SOLUTION RESPIRATORY (INHALATION) at 09:58

## 2018-11-05 ASSESSMENT — PAIN SCALES - GENERAL
PAINLEVEL_OUTOF10: 5
PAINLEVEL_OUTOF10: 7
PAINLEVEL_OUTOF10: 7

## 2018-11-05 ASSESSMENT — ENCOUNTER SYMPTOMS
SORE THROAT: 0
CHOKING: 0
RECTAL PAIN: 0
APNEA: 0
COLOR CHANGE: 0
STRIDOR: 0
ANAL BLEEDING: 0
EYE ITCHING: 0
EYE REDNESS: 0
CONSTIPATION: 0
PHOTOPHOBIA: 0
BACK PAIN: 0

## 2018-11-05 NOTE — PROGRESS NOTES
pulmonary      SUBJECTIVE: no significant change     OBJECTIVE    VITALS:  BP (!) 111/58   Pulse 80   Temp 97.5 °F (36.4 °C) (Oral)   Resp 25   Ht 5' 6\" (1.676 m)   Wt 184 lb 15.5 oz (83.9 kg)   SpO2 96%   BMI 29.85 kg/m²   HEAD AND FACE EXAM:  No throat injection, no active exudate,no thrush  NECK EXAM;No JVD, no masses, symmetrical  CHEST EXAM; Expansion equal and symmetrical, no masses  LUNG EXAM; Good breath sounds bilaterally. There are expiratory wheezes both lungs, there are crackles at both lung bases  CARDIOVASCULAR EXAM: Positive S1 and S2, no S3 or S4, no clicks ,no murmurs  RIGHT AND LEFT LOWER EXTRIMITY EXAM: No edema, no swelling, no inflamation  CNS EXAM: Alert and oriented X3          LABS   Lab Results   Component Value Date    WBC 10.0 11/05/2018    HGB 8.9 (L) 11/05/2018    HCT 28.9 (L) 11/05/2018    MCV 96.7 11/05/2018     11/05/2018     Lab Results   Component Value Date    CREATININE 1.1 11/05/2018    BUN 42 (H) 11/05/2018     11/05/2018    K 3.9 11/05/2018    CL 92 (L) 11/05/2018    CO2 31 11/05/2018     Lab Results   Component Value Date    INR 1.43 11/01/2018    PROTIME 16.2 (H) 11/01/2018          Lab Results   Component Value Date    PHOS 3.6 11/05/2018    PHOS 4.2 10/30/2018    PHOS 3.5 10/20/2018      No results for input(s): PH, PO2ART, MSZ8GCU, HCO3, BEART, O2SAT in the last 72 hours. Wt Readings from Last 3 Encounters:   11/05/18 184 lb 15.5 oz (83.9 kg)   10/18/18 266 lb 11.2 oz (121 kg)   10/15/18 255 lb 12.8 oz (116 kg)               ASSESMENT  Endocarditis  sev copd  cristian        PLAN  1. cpm  2.  Off pap rx b/c of ng tube    11/5/2018  Elana Badillo M.D.

## 2018-11-05 NOTE — PROGRESS NOTES
nostril, no sinus tendernes, poor oral dentition  Eyes: PERRLA, EOMI, conjunctiva pink, sclera anicteric. Neck: Supple. Trachea midline. No LAD. Chest: crackles in bilateral posterior middle and lower lung zone  Heart: RRR and high-pitched mid diastolic murmur in aortic area   Abd: dressing is moderately soaked, soft, non-distended, umbilical tenderness on deep palpation, no hepatomegaly. hypoactive bowel sounds. Ext: no clubbing, cyanosis, or edema  Catheter Site: without erythema or tenderness  Neuro: Mental status intact. CN 2-12 intact and no focal sensory or motor deficits     Radiologic / Imaging / TESTING  No results found.      Labs:    Recent Results (from the past 24 hour(s))   POCT Glucose    Collection Time: 11/04/18 11:50 AM   Result Value Ref Range    POC Glucose 167 (H) 70 - 99 MG/DL   POCT Glucose    Collection Time: 11/04/18  5:22 PM   Result Value Ref Range    POC Glucose 153 (H) 70 - 99 MG/DL   POCT Glucose    Collection Time: 11/04/18 10:07 PM   Result Value Ref Range    POC Glucose 169 (H) 70 - 99 MG/DL   POCT Glucose    Collection Time: 11/05/18  3:18 AM   Result Value Ref Range    POC Glucose 171 (H) 70 - 99 MG/DL   Basic Metabolic Panel w/ Reflex to MG    Collection Time: 11/05/18  5:50 AM   Result Value Ref Range    Sodium 136 135 - 145 MMOL/L    Potassium 3.9 3.5 - 5.1 MMOL/L    Chloride 92 (L) 99 - 110 mMol/L    CO2 31 21 - 32 MMOL/L    Anion Gap 13 4 - 16    BUN 42 (H) 6 - 23 MG/DL    CREATININE 1.1 0.6 - 1.1 MG/DL    Glucose 179 (H) 70 - 99 MG/DL    Calcium 8.1 (L) 8.3 - 10.6 MG/DL    GFR Non-African American 50 (L) >60 mL/min/1.73m2    GFR African American >60 >60 mL/min/1.73m2   CBC auto differential    Collection Time: 11/05/18  5:50 AM   Result Value Ref Range    WBC 10.0 4.0 - 10.5 K/CU MM    RBC 2.99 (L) 4.2 - 5.4 M/CU MM    Hemoglobin 8.9 (L) 12.5 - 16.0 GM/DL    Hematocrit 28.9 (L) 37 - 47 %    MCV 96.7 78 - 100 FL    MCH 29.8 27 - 31 PG    MCHC 30.8 (L) 32.0 - 36.0 %    RDW  COPD (chronic obstructive pulmonary disease) (HCC)    Pneumonia    Bacteremia    Streptococcal bacteremia    Subacute bacterial endocarditis    Cecum mass       Active Problems  Active Problems:    Streptococcal bacteremia    Subacute bacterial endocarditis    Cecum mass  Resolved Problems:    * No resolved hospital problems.  *    Electronically signed by: Electronically signed by Zaid Castro MD on 11/5/2018 at 9:27 AM

## 2018-11-05 NOTE — FLOWSHEET NOTE
Patient awake c/o of heartburn  . assessment remains the same. Iv med for nausea given .  And oral med  for pain given

## 2018-11-05 NOTE — PROGRESS NOTES
255 lb (115.7 kg)  · Usual Body Wt:  (~245-255 lbs)  · % Weight Change: severe weight changes in the past few days likely due to fluids  · Ideal Body Wt: 130 lb (59 kg), % Ideal Body 141%  · BMI Classification: BMI > or equal to 40.0 Obese Class III  · Comparative Standards (Estimated Nutrition Needs):  · Estimated Daily Total Kcal: 3853-0704  · Estimated Daily Protein (g): 71-89  · Estimated Daily Total Fluid (ml/day): 1300-4329    Estimated Intake vs Estimated Needs: Intake Less Than Needs    Nutrition Risk Level: High    Nutrition Interventions:   Continue current diet, Continue current ONS, Modify current Parenteral Nutrition  Continued Inpatient Monitoring, Coordination of Care, Education not appropriate at this time    Nutrition Evaluation:   · Evaluation: Progressing toward goals   · Goals: Diet will resume and be tolerated within the next 24-72 hours s/p surgery    · Monitoring: Meal Intake, Supplement Intake, Diet Progression, PN Intake, PN Tolerance, Weight, Wound Healing    See Adult Nutrition Doc Flowsheet for more detail.      Electronically signed by Janae Amor RD, LD on 34/6/71 at 1:30 PM    Contact Number: [phone]

## 2018-11-05 NOTE — PROGRESS NOTES
Sally Celis BSN RN CCRN-K clinical  for [facility] SURGICAL \[facility] [place] [facility]\"" RN students 07-19:00 this date.

## 2018-11-05 NOTE — PROGRESS NOTES
+pallor  Neck:  supple  Lungs:  + Lt basilary  crackles  Heart:  Seems RRR with DEJON  Abdomen: soft  Extremities:  + feet edema b/l      Problem List :         Impression :     1. CKD stage 3- stable - high BUN likely from TPN   2. Fluid overload better - Endocarditis/ anemia  / anemia / etc     Recommendation/Plan  :     1. Keep loop  2. But watch for adequate \"palsma refill  \"   3. Labs in am   4.  Lower loop 20 BID for today       Brooks Rivas MD

## 2018-11-06 LAB
ALBUMIN SERPL-MCNC: 2.9 GM/DL (ref 3.4–5)
ALP BLD-CCNC: 69 IU/L (ref 40–129)
ALT SERPL-CCNC: 9 U/L (ref 10–40)
ANION GAP SERPL CALCULATED.3IONS-SCNC: 10 MMOL/L (ref 4–16)
AST SERPL-CCNC: 13 IU/L (ref 15–37)
BASOPHILS ABSOLUTE: 0 K/CU MM
BASOPHILS RELATIVE PERCENT: 0.3 % (ref 0–1)
BILIRUB SERPL-MCNC: 0.4 MG/DL (ref 0–1)
BILIRUBIN DIRECT: 0.2 MG/DL (ref 0–0.3)
BILIRUBIN, INDIRECT: 0.2 MG/DL (ref 0–0.7)
BUN BLDV-MCNC: 45 MG/DL (ref 6–23)
CALCIUM SERPL-MCNC: 7.8 MG/DL (ref 8.3–10.6)
CHLORIDE BLD-SCNC: 92 MMOL/L (ref 99–110)
CO2: 33 MMOL/L (ref 21–32)
CREAT SERPL-MCNC: 1 MG/DL (ref 0.6–1.1)
DIFFERENTIAL TYPE: ABNORMAL
EOSINOPHILS ABSOLUTE: 0.2 K/CU MM
EOSINOPHILS RELATIVE PERCENT: 1.9 % (ref 0–3)
ERYTHROCYTE SEDIMENTATION RATE: 74 MM/HR (ref 0–30)
GFR AFRICAN AMERICAN: >60 ML/MIN/1.73M2
GFR NON-AFRICAN AMERICAN: 56 ML/MIN/1.73M2
GLUCOSE BLD-MCNC: 161 MG/DL (ref 70–99)
GLUCOSE BLD-MCNC: 163 MG/DL (ref 70–99)
GLUCOSE BLD-MCNC: 170 MG/DL (ref 70–99)
GLUCOSE BLD-MCNC: 170 MG/DL (ref 70–99)
GLUCOSE BLD-MCNC: 177 MG/DL (ref 70–99)
HCT VFR BLD CALC: 25.2 % (ref 37–47)
HEMOGLOBIN: 7.8 GM/DL (ref 12.5–16)
HIGH SENSITIVE C-REACTIVE PROTEIN: 42 MG/L
IMMATURE NEUTROPHIL %: 1.8 % (ref 0–0.43)
LYMPHOCYTES ABSOLUTE: 2.1 K/CU MM
LYMPHOCYTES RELATIVE PERCENT: 19.3 % (ref 24–44)
MAGNESIUM: 1.5 MG/DL (ref 1.8–2.4)
MCH RBC QN AUTO: 30.1 PG (ref 27–31)
MCHC RBC AUTO-ENTMCNC: 31 % (ref 32–36)
MCV RBC AUTO: 97.3 FL (ref 78–100)
MONOCYTES ABSOLUTE: 0.8 K/CU MM
MONOCYTES RELATIVE PERCENT: 6.9 % (ref 0–4)
NUCLEATED RBC %: 0 %
PDW BLD-RTO: 15 % (ref 11.7–14.9)
PLATELET # BLD: 383 K/CU MM (ref 140–440)
PMV BLD AUTO: 9.1 FL (ref 7.5–11.1)
POTASSIUM SERPL-SCNC: 3.4 MMOL/L (ref 3.5–5.1)
RBC # BLD: 2.59 M/CU MM (ref 4.2–5.4)
SEGMENTED NEUTROPHILS ABSOLUTE COUNT: 7.7 K/CU MM
SEGMENTED NEUTROPHILS RELATIVE PERCENT: 69.8 % (ref 36–66)
SODIUM BLD-SCNC: 135 MMOL/L (ref 135–145)
TOTAL IMMATURE NEUTOROPHIL: 0.2 K/CU MM
TOTAL NUCLEATED RBC: 0 K/CU MM
TOTAL PROTEIN: 5.2 GM/DL (ref 6.4–8.2)
WBC # BLD: 11 K/CU MM (ref 4–10.5)

## 2018-11-06 PROCEDURE — C9113 INJ PANTOPRAZOLE SODIUM, VIA: HCPCS | Performed by: INTERNAL MEDICINE

## 2018-11-06 PROCEDURE — 80053 COMPREHEN METABOLIC PANEL: CPT

## 2018-11-06 PROCEDURE — 6370000000 HC RX 637 (ALT 250 FOR IP): Performed by: FAMILY MEDICINE

## 2018-11-06 PROCEDURE — 2580000003 HC RX 258: Performed by: HOSPITALIST

## 2018-11-06 PROCEDURE — 2060000000 HC ICU INTERMEDIATE R&B

## 2018-11-06 PROCEDURE — 82962 GLUCOSE BLOOD TEST: CPT

## 2018-11-06 PROCEDURE — 86141 C-REACTIVE PROTEIN HS: CPT

## 2018-11-06 PROCEDURE — 85652 RBC SED RATE AUTOMATED: CPT

## 2018-11-06 PROCEDURE — 85025 COMPLETE CBC W/AUTO DIFF WBC: CPT

## 2018-11-06 PROCEDURE — 6360000002 HC RX W HCPCS: Performed by: INTERNAL MEDICINE

## 2018-11-06 PROCEDURE — 6370000000 HC RX 637 (ALT 250 FOR IP): Performed by: INTERNAL MEDICINE

## 2018-11-06 PROCEDURE — 82248 BILIRUBIN DIRECT: CPT

## 2018-11-06 PROCEDURE — 2500000003 HC RX 250 WO HCPCS: Performed by: SURGERY

## 2018-11-06 PROCEDURE — 6370000000 HC RX 637 (ALT 250 FOR IP): Performed by: HOSPITALIST

## 2018-11-06 PROCEDURE — 83735 ASSAY OF MAGNESIUM: CPT

## 2018-11-06 PROCEDURE — 94761 N-INVAS EAR/PLS OXIMETRY MLT: CPT

## 2018-11-06 PROCEDURE — 94640 AIRWAY INHALATION TREATMENT: CPT

## 2018-11-06 PROCEDURE — 6370000000 HC RX 637 (ALT 250 FOR IP): Performed by: SPECIALIST

## 2018-11-06 PROCEDURE — 2700000000 HC OXYGEN THERAPY PER DAY

## 2018-11-06 RX ORDER — SPIRONOLACTONE 50 MG/1
50 TABLET, FILM COATED ORAL DAILY
Status: DISCONTINUED | OUTPATIENT
Start: 2018-11-06 | End: 2018-11-09 | Stop reason: HOSPADM

## 2018-11-06 RX ORDER — POTASSIUM CHLORIDE 20 MEQ/1
20 TABLET, EXTENDED RELEASE ORAL 2 TIMES DAILY WITH MEALS
Status: DISCONTINUED | OUTPATIENT
Start: 2018-11-06 | End: 2018-11-09 | Stop reason: HOSPADM

## 2018-11-06 RX ADMIN — PANTOPRAZOLE SODIUM 40 MG: 40 INJECTION, POWDER, FOR SOLUTION INTRAVENOUS at 09:57

## 2018-11-06 RX ADMIN — SUCRALFATE 1 G: 1 SUSPENSION ORAL at 12:36

## 2018-11-06 RX ADMIN — SODIUM CHLORIDE, PRESERVATIVE FREE 10 ML: 5 INJECTION INTRAVENOUS at 21:16

## 2018-11-06 RX ADMIN — INSULIN LISPRO 2 UNITS: 100 INJECTION, SOLUTION INTRAVENOUS; SUBCUTANEOUS at 12:36

## 2018-11-06 RX ADMIN — POTASSIUM CHLORIDE 20 MEQ: 20 TABLET, EXTENDED RELEASE ORAL at 18:50

## 2018-11-06 RX ADMIN — BISACODYL 10 MG: 10 SUPPOSITORY RECTAL at 11:40

## 2018-11-06 RX ADMIN — PANTOPRAZOLE SODIUM 40 MG: 40 INJECTION, POWDER, FOR SOLUTION INTRAVENOUS at 21:16

## 2018-11-06 RX ADMIN — IPRATROPIUM BROMIDE AND ALBUTEROL SULFATE 3 ML: .5; 3 SOLUTION RESPIRATORY (INHALATION) at 19:51

## 2018-11-06 RX ADMIN — SUCRALFATE 1 G: 1 SUSPENSION ORAL at 19:05

## 2018-11-06 RX ADMIN — TRAZODONE HYDROCHLORIDE 100 MG: 50 TABLET ORAL at 22:58

## 2018-11-06 RX ADMIN — Medication 2 PUFF: at 19:51

## 2018-11-06 RX ADMIN — IPRATROPIUM BROMIDE AND ALBUTEROL SULFATE 3 ML: .5; 3 SOLUTION RESPIRATORY (INHALATION) at 15:32

## 2018-11-06 RX ADMIN — ASCORBIC ACID, VITAMIN A PALMITATE, CHOLECALCIFEROL, THIAMINE HYDROCHLORIDE, RIBOFLAVIN-5 PHOSPHATE SODIUM, PYRIDOXINE HYDROCHLORIDE, NIACINAMIDE, DEXPANTHENOL, ALPHA-TOCOPHEROL ACETATE, VITAMIN K1, FOLIC ACID, BIOTIN, CYANOCOBALAMIN: 200; 3300; 200; 6; 3.6; 6; 40; 15; 10; 150; 600; 60; 5 INJECTION, SOLUTION INTRAVENOUS at 18:50

## 2018-11-06 RX ADMIN — SUCRALFATE 1 G: 1 SUSPENSION ORAL at 06:01

## 2018-11-06 RX ADMIN — LINAGLIPTIN 5 MG: 5 TABLET, FILM COATED ORAL at 10:14

## 2018-11-06 RX ADMIN — INSULIN LISPRO 2 UNITS: 100 INJECTION, SOLUTION INTRAVENOUS; SUBCUTANEOUS at 10:18

## 2018-11-06 RX ADMIN — IPRATROPIUM BROMIDE AND ALBUTEROL SULFATE 3 ML: .5; 3 SOLUTION RESPIRATORY (INHALATION) at 08:17

## 2018-11-06 RX ADMIN — TROSPIUM CHLORIDE 20 MG: 20 TABLET ORAL at 21:17

## 2018-11-06 RX ADMIN — FLUTICASONE PROPIONATE 1 SPRAY: 50 SPRAY, METERED NASAL at 09:57

## 2018-11-06 RX ADMIN — INSULIN LISPRO 2 UNITS: 100 INJECTION, SOLUTION INTRAVENOUS; SUBCUTANEOUS at 19:10

## 2018-11-06 RX ADMIN — OXYCODONE HYDROCHLORIDE AND ACETAMINOPHEN 500 MG: 500 TABLET ORAL at 09:55

## 2018-11-06 RX ADMIN — Medication 2 PUFF: at 08:17

## 2018-11-06 RX ADMIN — FUROSEMIDE 20 MG: 10 INJECTION, SOLUTION INTRAVENOUS at 19:05

## 2018-11-06 RX ADMIN — HYDROCORTISONE: 1 CREAM TOPICAL at 10:09

## 2018-11-06 RX ADMIN — PRAVASTATIN SODIUM 20 MG: 20 TABLET ORAL at 21:17

## 2018-11-06 RX ADMIN — IPRATROPIUM BROMIDE AND ALBUTEROL SULFATE 3 ML: .5; 3 SOLUTION RESPIRATORY (INHALATION) at 12:02

## 2018-11-06 RX ADMIN — SUCRALFATE 1 G: 1 SUSPENSION ORAL at 23:16

## 2018-11-06 RX ADMIN — POTASSIUM CHLORIDE 20 MEQ: 20 TABLET, EXTENDED RELEASE ORAL at 09:54

## 2018-11-06 RX ADMIN — SPIRONOLACTONE 50 MG: 50 TABLET ORAL at 09:55

## 2018-11-06 RX ADMIN — SODIUM CHLORIDE, PRESERVATIVE FREE 10 ML: 5 INJECTION INTRAVENOUS at 09:57

## 2018-11-06 RX ADMIN — FUROSEMIDE 20 MG: 10 INJECTION, SOLUTION INTRAVENOUS at 09:55

## 2018-11-06 ASSESSMENT — PAIN SCALES - GENERAL
PAINLEVEL_OUTOF10: 0
PAINLEVEL_OUTOF10: 0

## 2018-11-06 NOTE — PROGRESS NOTES
pulmonary      SUBJECTIVE: sti;ll with ng and nguyen in,no further hemoptysis     OBJECTIVE    VITALS:  BP (!) 124/41   Pulse 80   Temp 98.2 °F (36.8 °C) (Oral)   Resp 24   Ht 5' 6\" (1.676 m)   Wt 184 lb 15.5 oz (83.9 kg)   SpO2 97%   BMI 29.85 kg/m²   HEAD AND FACE EXAM:  No throat injection, no active exudate,no thrush  NECK EXAM;No JVD, no masses, symmetrical  CHEST EXAM; Expansion equal and symmetrical, no masses  LUNG EXAM; Good breath sounds bilaterally. There are expiratory wheezes both lungs, there are crackles at both lung bases  CARDIOVASCULAR EXAM: Positive S1 and S2, no S3 or S4, no clicks ,no murmurs  RIGHT AND LEFT LOWER EXTRIMITY EXAM: No edema, no swelling, no inflamation  CNS EXAM: Alert and oriented X3          LABS   Lab Results   Component Value Date    WBC 11.0 (H) 11/06/2018    HGB 7.8 (L) 11/06/2018    HCT 25.2 (L) 11/06/2018    MCV 97.3 11/06/2018     11/06/2018     Lab Results   Component Value Date    CREATININE 1.0 11/06/2018    BUN 45 (H) 11/06/2018     11/06/2018    K 3.4 (L) 11/06/2018    CL 92 (L) 11/06/2018    CO2 33 (H) 11/06/2018     Lab Results   Component Value Date    INR 1.43 11/01/2018    PROTIME 16.2 (H) 11/01/2018          Lab Results   Component Value Date    PHOS 3.6 11/05/2018    PHOS 4.2 10/30/2018    PHOS 3.5 10/20/2018      No results for input(s): PH, PO2ART, NEH6MTZ, HCO3, BEART, O2SAT in the last 72 hours. Wt Readings from Last 3 Encounters:   11/05/18 184 lb 15.5 oz (83.9 kg)   10/18/18 266 lb 11.2 oz (121 kg)   10/15/18 255 lb 12.8 oz (116 kg)               ASSESMENT  Endocarditis  Ac copd  cristian        PLAN  1.  Bd rx  2. antibx   3. o2 as needed    11/6/2018  Zuleyma Christopher M.D.

## 2018-11-06 NOTE — CARE COORDINATION
Follow up visit today. Pt continues with NG/TPN/PCN continuous infusion. Cm spoke with pt's nurse who advises pt will have an enema today. If pt has bowel movement and NG is able to come out will notify Swing Bed Coordinator at that time to initiate precert. Cm to follow.

## 2018-11-06 NOTE — PROGRESS NOTES
Patient awake, alert and sitting up in chair with daughter at bedside. Patient has call light in reach, feet elevated in chair and chair alarm on for patient safety. Patient is drinking beef broth at hot tea. Will continue to monitor patient.  Electronically signed by González Mckeon RN on 11/6/2018 at 11:21 AM

## 2018-11-06 NOTE — PROGRESS NOTES
time.;  No active bowel sounds at this time  11/04/18-patient remains with NG but secretions are very minimal, has been started on TPN per surgery continues to improve daily  11/6 plan to advance diet, await BM. If does have BM, NG can be removed today. cont post operative care. 3. Strep bovis endocarditis patient's blood cultures have been negative since 10/18/18 patient on penicillin G  Infusion until  11/14/18 per infectious disease; ECHO shows vegetation remains     4. Acute on chronic blood loss anemia/iron deficiency anemia status post transfusion, continued to watch H&H daily, will treat if indicated    5. Diabetes mellitus2-sliding scale for insulin, diabetic diet    6.  Hypertension-atenolol    DVT Prophylaxis: scd  Diet: DIET CLEAR LIQUID;  Dietary Nutrition Supplements: Clear Liquid Oral Supplement  PN-Adult Premix  4.25/10 - Standard Electrolytes - Peripheral Line  Code Status: Full Code    Dispo: snf/swing  if continues to improve and tolerating diet    Electronically signed by Merari Saha MD on 11/6/2018 at 11:42 AM

## 2018-11-07 LAB
ANION GAP SERPL CALCULATED.3IONS-SCNC: 8 MMOL/L (ref 4–16)
BASOPHILS ABSOLUTE: 0 K/CU MM
BASOPHILS RELATIVE PERCENT: 0.2 % (ref 0–1)
BUN BLDV-MCNC: 41 MG/DL (ref 6–23)
CALCIUM SERPL-MCNC: 8.1 MG/DL (ref 8.3–10.6)
CHLORIDE BLD-SCNC: 94 MMOL/L (ref 99–110)
CO2: 32 MMOL/L (ref 21–32)
CREAT SERPL-MCNC: 0.9 MG/DL (ref 0.6–1.1)
DIFFERENTIAL TYPE: ABNORMAL
EOSINOPHILS ABSOLUTE: 0.2 K/CU MM
EOSINOPHILS RELATIVE PERCENT: 1.6 % (ref 0–3)
GFR AFRICAN AMERICAN: >60 ML/MIN/1.73M2
GFR NON-AFRICAN AMERICAN: >60 ML/MIN/1.73M2
GLUCOSE BLD-MCNC: 137 MG/DL (ref 70–99)
GLUCOSE BLD-MCNC: 139 MG/DL (ref 70–99)
GLUCOSE BLD-MCNC: 139 MG/DL (ref 70–99)
GLUCOSE BLD-MCNC: 170 MG/DL (ref 70–99)
GLUCOSE BLD-MCNC: 181 MG/DL (ref 70–99)
GLUCOSE BLD-MCNC: 217 MG/DL (ref 70–99)
HCT VFR BLD CALC: 23 % (ref 37–47)
HEMOGLOBIN: 7.1 GM/DL (ref 12.5–16)
IMMATURE NEUTROPHIL %: 2 % (ref 0–0.43)
LYMPHOCYTES ABSOLUTE: 2 K/CU MM
LYMPHOCYTES RELATIVE PERCENT: 16.4 % (ref 24–44)
MCH RBC QN AUTO: 29.6 PG (ref 27–31)
MCHC RBC AUTO-ENTMCNC: 30.9 % (ref 32–36)
MCV RBC AUTO: 95.8 FL (ref 78–100)
MONOCYTES ABSOLUTE: 0.8 K/CU MM
MONOCYTES RELATIVE PERCENT: 6.5 % (ref 0–4)
NUCLEATED RBC %: 0 %
PDW BLD-RTO: 14.8 % (ref 11.7–14.9)
PHOSPHORUS: 2.5 MG/DL (ref 2.5–4.9)
PLATELET # BLD: 368 K/CU MM (ref 140–440)
PMV BLD AUTO: 8.8 FL (ref 7.5–11.1)
POTASSIUM SERPL-SCNC: 3.9 MMOL/L (ref 3.5–5.1)
RBC # BLD: 2.4 M/CU MM (ref 4.2–5.4)
SEGMENTED NEUTROPHILS ABSOLUTE COUNT: 8.9 K/CU MM
SEGMENTED NEUTROPHILS RELATIVE PERCENT: 73.3 % (ref 36–66)
SODIUM BLD-SCNC: 134 MMOL/L (ref 135–145)
TOTAL IMMATURE NEUTOROPHIL: 0.24 K/CU MM
TOTAL NUCLEATED RBC: 0 K/CU MM
WBC # BLD: 12.1 K/CU MM (ref 4–10.5)

## 2018-11-07 PROCEDURE — 2700000000 HC OXYGEN THERAPY PER DAY

## 2018-11-07 PROCEDURE — 87103 BLOOD FUNGUS CULTURE: CPT

## 2018-11-07 PROCEDURE — 94761 N-INVAS EAR/PLS OXIMETRY MLT: CPT

## 2018-11-07 PROCEDURE — 2580000003 HC RX 258: Performed by: HOSPITALIST

## 2018-11-07 PROCEDURE — 99232 SBSQ HOSP IP/OBS MODERATE 35: CPT | Performed by: INTERNAL MEDICINE

## 2018-11-07 PROCEDURE — C9113 INJ PANTOPRAZOLE SODIUM, VIA: HCPCS | Performed by: INTERNAL MEDICINE

## 2018-11-07 PROCEDURE — 2060000000 HC ICU INTERMEDIATE R&B

## 2018-11-07 PROCEDURE — 6370000000 HC RX 637 (ALT 250 FOR IP): Performed by: HOSPITALIST

## 2018-11-07 PROCEDURE — 6370000000 HC RX 637 (ALT 250 FOR IP): Performed by: SPECIALIST

## 2018-11-07 PROCEDURE — 6360000002 HC RX W HCPCS: Performed by: INTERNAL MEDICINE

## 2018-11-07 PROCEDURE — 36415 COLL VENOUS BLD VENIPUNCTURE: CPT

## 2018-11-07 PROCEDURE — 6370000000 HC RX 637 (ALT 250 FOR IP): Performed by: INTERNAL MEDICINE

## 2018-11-07 PROCEDURE — 6370000000 HC RX 637 (ALT 250 FOR IP): Performed by: FAMILY MEDICINE

## 2018-11-07 PROCEDURE — 99024 POSTOP FOLLOW-UP VISIT: CPT | Performed by: SURGERY

## 2018-11-07 PROCEDURE — 84100 ASSAY OF PHOSPHORUS: CPT

## 2018-11-07 PROCEDURE — 80048 BASIC METABOLIC PNL TOTAL CA: CPT

## 2018-11-07 PROCEDURE — 36592 COLLECT BLOOD FROM PICC: CPT

## 2018-11-07 PROCEDURE — 87040 BLOOD CULTURE FOR BACTERIA: CPT

## 2018-11-07 PROCEDURE — 2580000003 HC RX 258: Performed by: INTERNAL MEDICINE

## 2018-11-07 PROCEDURE — 82962 GLUCOSE BLOOD TEST: CPT

## 2018-11-07 PROCEDURE — 85025 COMPLETE CBC W/AUTO DIFF WBC: CPT

## 2018-11-07 PROCEDURE — 94640 AIRWAY INHALATION TREATMENT: CPT

## 2018-11-07 RX ORDER — PANTOPRAZOLE SODIUM 40 MG/1
40 TABLET, DELAYED RELEASE ORAL
Status: DISCONTINUED | OUTPATIENT
Start: 2018-11-08 | End: 2018-11-09 | Stop reason: HOSPADM

## 2018-11-07 RX ADMIN — POTASSIUM CHLORIDE 20 MEQ: 20 TABLET, EXTENDED RELEASE ORAL at 17:40

## 2018-11-07 RX ADMIN — ATENOLOL 25 MG: 25 TABLET ORAL at 21:11

## 2018-11-07 RX ADMIN — SUCRALFATE 1 G: 1 SUSPENSION ORAL at 17:40

## 2018-11-07 RX ADMIN — TRAZODONE HYDROCHLORIDE 100 MG: 50 TABLET ORAL at 23:03

## 2018-11-07 RX ADMIN — IPRATROPIUM BROMIDE AND ALBUTEROL SULFATE 3 ML: .5; 3 SOLUTION RESPIRATORY (INHALATION) at 21:10

## 2018-11-07 RX ADMIN — IPRATROPIUM BROMIDE AND ALBUTEROL SULFATE 3 ML: .5; 3 SOLUTION RESPIRATORY (INHALATION) at 07:36

## 2018-11-07 RX ADMIN — SUCRALFATE 1 G: 1 SUSPENSION ORAL at 06:32

## 2018-11-07 RX ADMIN — Medication 2 PUFF: at 07:37

## 2018-11-07 RX ADMIN — HYDROCORTISONE: 1 CREAM TOPICAL at 12:36

## 2018-11-07 RX ADMIN — SPIRONOLACTONE 50 MG: 50 TABLET ORAL at 09:49

## 2018-11-07 RX ADMIN — IPRATROPIUM BROMIDE AND ALBUTEROL SULFATE 3 ML: .5; 3 SOLUTION RESPIRATORY (INHALATION) at 14:58

## 2018-11-07 RX ADMIN — SUCRALFATE 1 G: 1 SUSPENSION ORAL at 12:39

## 2018-11-07 RX ADMIN — PANTOPRAZOLE SODIUM 40 MG: 40 INJECTION, POWDER, FOR SOLUTION INTRAVENOUS at 09:49

## 2018-11-07 RX ADMIN — OXYCODONE HYDROCHLORIDE AND ACETAMINOPHEN 500 MG: 500 TABLET ORAL at 09:49

## 2018-11-07 RX ADMIN — FUROSEMIDE 20 MG: 10 INJECTION, SOLUTION INTRAVENOUS at 17:39

## 2018-11-07 RX ADMIN — SODIUM CHLORIDE, PRESERVATIVE FREE 10 ML: 5 INJECTION INTRAVENOUS at 09:50

## 2018-11-07 RX ADMIN — PRAVASTATIN SODIUM 20 MG: 20 TABLET ORAL at 21:11

## 2018-11-07 RX ADMIN — Medication 2 PUFF: at 21:18

## 2018-11-07 RX ADMIN — DEXTROSE MONOHYDRATE: 50 INJECTION, SOLUTION INTRAVENOUS at 01:55

## 2018-11-07 RX ADMIN — SUCRALFATE 1 G: 1 SUSPENSION ORAL at 23:03

## 2018-11-07 RX ADMIN — OXYCODONE AND ACETAMINOPHEN 1 TABLET: 5; 325 TABLET ORAL at 23:02

## 2018-11-07 RX ADMIN — FUROSEMIDE 20 MG: 10 INJECTION, SOLUTION INTRAVENOUS at 09:49

## 2018-11-07 RX ADMIN — FLUTICASONE PROPIONATE 1 SPRAY: 50 SPRAY, METERED NASAL at 09:51

## 2018-11-07 RX ADMIN — ATENOLOL 25 MG: 25 TABLET ORAL at 09:49

## 2018-11-07 RX ADMIN — TROSPIUM CHLORIDE 20 MG: 20 TABLET ORAL at 21:10

## 2018-11-07 RX ADMIN — HYDROCORTISONE: 1 CREAM TOPICAL at 21:10

## 2018-11-07 RX ADMIN — SODIUM CHLORIDE, PRESERVATIVE FREE 10 ML: 5 INJECTION INTRAVENOUS at 21:11

## 2018-11-07 RX ADMIN — INSULIN LISPRO 2 UNITS: 100 INJECTION, SOLUTION INTRAVENOUS; SUBCUTANEOUS at 17:40

## 2018-11-07 RX ADMIN — INSULIN LISPRO 4 UNITS: 100 INJECTION, SOLUTION INTRAVENOUS; SUBCUTANEOUS at 09:57

## 2018-11-07 RX ADMIN — LINAGLIPTIN 5 MG: 5 TABLET, FILM COATED ORAL at 09:49

## 2018-11-07 RX ADMIN — POTASSIUM CHLORIDE 20 MEQ: 20 TABLET, EXTENDED RELEASE ORAL at 09:49

## 2018-11-07 ASSESSMENT — ENCOUNTER SYMPTOMS
SORE THROAT: 0
CONSTIPATION: 0
BACK PAIN: 0
APNEA: 0
STRIDOR: 0
PHOTOPHOBIA: 0
ANAL BLEEDING: 0
EYE REDNESS: 0
EYE ITCHING: 0
RECTAL PAIN: 0
COLOR CHANGE: 0
CHOKING: 0
ABDOMINAL PAIN: 1

## 2018-11-07 ASSESSMENT — PAIN DESCRIPTION - LOCATION: LOCATION: ABDOMEN

## 2018-11-07 ASSESSMENT — PAIN SCALES - GENERAL
PAINLEVEL_OUTOF10: 0
PAINLEVEL_OUTOF10: 7
PAINLEVEL_OUTOF10: 7

## 2018-11-07 ASSESSMENT — PAIN DESCRIPTION - PAIN TYPE: TYPE: SURGICAL PAIN

## 2018-11-07 NOTE — PROGRESS NOTES
BID    linagliptin  5 mg Oral Daily    pravastatin  20 mg Oral Nightly    sodium chloride flush  10 mL Intravenous 2 times per day    atenolol  25 mg Oral BID    fluticasone  1 spray Each Nare Daily    insulin lispro  0-12 Units Subcutaneous TID WC    insulin lispro  0-6 Units Subcutaneous Nightly    ipratropium-albuterol  1 vial Nebulization 4x daily    nicotine  1 patch Transdermal Daily    trospium  20 mg Oral Nightly    vitamin C  500 mg Oral Daily     Continuous Infusions:   fat emulsion Stopped (11/06/18 0600)    sodium chloride      IV infusion builder 22.5 mL/hr at 11/07/18 0155    dextrose       PRN Meds:bisacodyl, diphenhydrAMINE, oxyCODONE-acetaminophen, acetaminophen, albuterol sulfate HFA, traZODone, sodium chloride flush, magnesium hydroxide, ondansetron, albuterol, dextrose, dextrose, glucagon (rDNA), glucose      Labs/Imaging Results:   Lab Results   Component Value Date    WBC 12.1 (H) 11/07/2018    HGB 7.1 (L) 11/07/2018    HCT 23.0 (L) 11/07/2018    MCV 95.8 11/07/2018     11/07/2018     Lab Results   Component Value Date     (L) 11/07/2018    K 3.9 11/07/2018    CL 94 (L) 11/07/2018    CO2 32 11/07/2018    BUN 41 (H) 11/07/2018    CREATININE 0.9 11/07/2018    GLUCOSE 139 (H) 11/07/2018    CALCIUM 8.1 (L) 11/07/2018    PROT 5.2 (L) 11/06/2018    LABALBU 2.9 (L) 11/06/2018    BILITOT 0.4 11/06/2018    ALKPHOS 69 11/06/2018    AST 13 (L) 11/06/2018    ALT 9 (L) 11/06/2018    LABGLOM >60 11/07/2018    GFRAA >60 11/07/2018       Assessment:     Patient Active Problem List:     Type 2 diabetes mellitus without complication ([facility] [address]     Essential hypertension     Urinary incontinence, mixed     Tobacco abuse disorder     Obstructive sleep apnea     Pulmonary hypertension (HCC)     Systolic murmur     Chronic respiratory failure with hypoxia (HCC)     Microalbuminuria due to type 2 diabetes mellitus (HCC)     Liver dysfunction     Steatohepatitis     Mixed hyperlipidemia Morbid obesity due to excess calories (HCC)     COPD, severe ([address]     COPD exacerbation ([address]     Fall at home     Type 2 diabetes mellitus ([address]     Hypertension     Hyperlipidemia     Tobacco abuse     Obesity due to excess calories     CHF (congestive heart failure) (HCC)     Physical debility     Bilateral carotid artery stenosis     Cerebrovascular accident (CVA) ([address]     COPD exacerbation (HCC)     Type 2 diabetes mellitus ([address]     Hypertension     Tobacco abuse     Hyperlipidemia     COPD (chronic obstructive pulmonary disease) (HCC)     Pneumonia     Bacteremia     Streptococcal bacteremia     Subacute bacterial endocarditis     Cecum mass    Plan:     + BM and doing well  Will advance diet to fulls and likely to rehab I 1-2 days  Will taper and wean TPN off    Krystal Taylor MD

## 2018-11-07 NOTE — TELEPHONE ENCOUNTER
Patient did have and appt with us on 11/08/2018 but she has cancelled that appt. Please advise patient needs refills.

## 2018-11-07 NOTE — CARE COORDINATION
Dr Raines Litter note reviewed which indicates that pt has had BM, TPN is to be weaned and to rehab in 1-2 days. Cm contacted Swing Bed Coordinator, Shruthi Matt., to updated. Cherise Hughes will initiate precert with pt's insurance today.

## 2018-11-08 LAB
ANION GAP SERPL CALCULATED.3IONS-SCNC: 10 MMOL/L (ref 4–16)
BASOPHILS ABSOLUTE: 0 K/CU MM
BASOPHILS RELATIVE PERCENT: 0.3 % (ref 0–1)
BUN BLDV-MCNC: 36 MG/DL (ref 6–23)
CALCIUM SERPL-MCNC: 8.2 MG/DL (ref 8.3–10.6)
CHLORIDE BLD-SCNC: 92 MMOL/L (ref 99–110)
CO2: 30 MMOL/L (ref 21–32)
CREAT SERPL-MCNC: 0.9 MG/DL (ref 0.6–1.1)
DIFFERENTIAL TYPE: ABNORMAL
EOSINOPHILS ABSOLUTE: 0.2 K/CU MM
EOSINOPHILS RELATIVE PERCENT: 1.8 % (ref 0–3)
GFR AFRICAN AMERICAN: >60 ML/MIN/1.73M2
GFR NON-AFRICAN AMERICAN: >60 ML/MIN/1.73M2
GLUCOSE BLD-MCNC: 133 MG/DL (ref 70–99)
GLUCOSE BLD-MCNC: 135 MG/DL (ref 70–99)
GLUCOSE BLD-MCNC: 152 MG/DL (ref 70–99)
GLUCOSE BLD-MCNC: 154 MG/DL (ref 70–99)
GLUCOSE BLD-MCNC: 162 MG/DL (ref 70–99)
HCT VFR BLD CALC: 24 % (ref 37–47)
HEMOGLOBIN: 7.4 GM/DL (ref 12.5–16)
IMMATURE NEUTROPHIL %: 2.1 % (ref 0–0.43)
LYMPHOCYTES ABSOLUTE: 2.1 K/CU MM
LYMPHOCYTES RELATIVE PERCENT: 19.4 % (ref 24–44)
MCH RBC QN AUTO: 29.2 PG (ref 27–31)
MCHC RBC AUTO-ENTMCNC: 30.8 % (ref 32–36)
MCV RBC AUTO: 94.9 FL (ref 78–100)
MONOCYTES ABSOLUTE: 0.8 K/CU MM
MONOCYTES RELATIVE PERCENT: 7 % (ref 0–4)
NUCLEATED RBC %: 0 %
PDW BLD-RTO: 14.9 % (ref 11.7–14.9)
PLATELET # BLD: 369 K/CU MM (ref 140–440)
PMV BLD AUTO: 8.4 FL (ref 7.5–11.1)
POTASSIUM SERPL-SCNC: 3.9 MMOL/L (ref 3.5–5.1)
RBC # BLD: 2.53 M/CU MM (ref 4.2–5.4)
SEGMENTED NEUTROPHILS ABSOLUTE COUNT: 7.6 K/CU MM
SEGMENTED NEUTROPHILS RELATIVE PERCENT: 69.4 % (ref 36–66)
SODIUM BLD-SCNC: 132 MMOL/L (ref 135–145)
TOTAL IMMATURE NEUTOROPHIL: 0.23 K/CU MM
TOTAL NUCLEATED RBC: 0 K/CU MM
WBC # BLD: 10.9 K/CU MM (ref 4–10.5)

## 2018-11-08 PROCEDURE — 2700000000 HC OXYGEN THERAPY PER DAY

## 2018-11-08 PROCEDURE — 2060000000 HC ICU INTERMEDIATE R&B

## 2018-11-08 PROCEDURE — 94640 AIRWAY INHALATION TREATMENT: CPT

## 2018-11-08 PROCEDURE — 6370000000 HC RX 637 (ALT 250 FOR IP): Performed by: FAMILY MEDICINE

## 2018-11-08 PROCEDURE — 6370000000 HC RX 637 (ALT 250 FOR IP): Performed by: INTERNAL MEDICINE

## 2018-11-08 PROCEDURE — 99232 SBSQ HOSP IP/OBS MODERATE 35: CPT | Performed by: INTERNAL MEDICINE

## 2018-11-08 PROCEDURE — 6370000000 HC RX 637 (ALT 250 FOR IP): Performed by: HOSPITALIST

## 2018-11-08 PROCEDURE — 2580000003 HC RX 258: Performed by: INTERNAL MEDICINE

## 2018-11-08 PROCEDURE — 6360000002 HC RX W HCPCS: Performed by: INTERNAL MEDICINE

## 2018-11-08 PROCEDURE — 99024 POSTOP FOLLOW-UP VISIT: CPT | Performed by: SURGERY

## 2018-11-08 PROCEDURE — 6370000000 HC RX 637 (ALT 250 FOR IP): Performed by: SPECIALIST

## 2018-11-08 PROCEDURE — 94761 N-INVAS EAR/PLS OXIMETRY MLT: CPT

## 2018-11-08 PROCEDURE — 80048 BASIC METABOLIC PNL TOTAL CA: CPT

## 2018-11-08 PROCEDURE — 85025 COMPLETE CBC W/AUTO DIFF WBC: CPT

## 2018-11-08 PROCEDURE — 82962 GLUCOSE BLOOD TEST: CPT

## 2018-11-08 PROCEDURE — 2580000003 HC RX 258: Performed by: HOSPITALIST

## 2018-11-08 RX ORDER — METOLAZONE 5 MG/1
5 TABLET ORAL DAILY
Qty: 30 TABLET | Refills: 3 | Status: SHIPPED | OUTPATIENT
Start: 2018-11-09 | End: 2018-11-09

## 2018-11-08 RX ORDER — METOLAZONE 2.5 MG/1
5 TABLET ORAL DAILY
Status: DISCONTINUED | OUTPATIENT
Start: 2018-11-08 | End: 2018-11-09 | Stop reason: HOSPADM

## 2018-11-08 RX ORDER — OXYCODONE HYDROCHLORIDE AND ACETAMINOPHEN 5; 325 MG/1; MG/1
1 TABLET ORAL EVERY 4 HOURS PRN
Qty: 15 TABLET | Refills: 0 | Status: ON HOLD | OUTPATIENT
Start: 2018-11-08 | End: 2018-11-19

## 2018-11-08 RX ORDER — LOSARTAN POTASSIUM 50 MG/1
TABLET ORAL
Qty: 90 TABLET | Refills: 1 | Status: ON HOLD | OUTPATIENT
Start: 2018-11-08 | End: 2019-01-03 | Stop reason: ALTCHOICE

## 2018-11-08 RX ORDER — PRAVASTATIN SODIUM 20 MG
TABLET ORAL
Qty: 90 TABLET | Refills: 1 | Status: SHIPPED | OUTPATIENT
Start: 2018-11-08 | End: 2019-02-04 | Stop reason: SDUPTHER

## 2018-11-08 RX ORDER — SPIRONOLACTONE 50 MG/1
50 TABLET, FILM COATED ORAL DAILY
Qty: 30 TABLET | Refills: 3 | Status: SHIPPED | OUTPATIENT
Start: 2018-11-09 | End: 2018-11-08 | Stop reason: HOSPADM

## 2018-11-08 RX ORDER — METOLAZONE 5 MG/1
5 TABLET ORAL DAILY
Qty: 30 TABLET | Refills: 3 | Status: SHIPPED | OUTPATIENT
Start: 2018-11-09 | End: 2018-11-08 | Stop reason: HOSPADM

## 2018-11-08 RX ORDER — TORSEMIDE 20 MG/1
20 TABLET ORAL 2 TIMES DAILY
Qty: 60 TABLET | Refills: 0 | Status: ON HOLD | OUTPATIENT
Start: 2018-11-08 | End: 2019-01-21 | Stop reason: HOSPADM

## 2018-11-08 RX ADMIN — PANTOPRAZOLE SODIUM 40 MG: 40 TABLET, DELAYED RELEASE ORAL at 06:12

## 2018-11-08 RX ADMIN — INSULIN LISPRO 2 UNITS: 100 INJECTION, SOLUTION INTRAVENOUS; SUBCUTANEOUS at 12:42

## 2018-11-08 RX ADMIN — FUROSEMIDE 20 MG: 10 INJECTION, SOLUTION INTRAVENOUS at 09:33

## 2018-11-08 RX ADMIN — METOLAZONE 5 MG: 2.5 TABLET ORAL at 09:32

## 2018-11-08 RX ADMIN — SUCRALFATE 1 G: 1 SUSPENSION ORAL at 18:25

## 2018-11-08 RX ADMIN — POTASSIUM CHLORIDE 20 MEQ: 20 TABLET, EXTENDED RELEASE ORAL at 09:32

## 2018-11-08 RX ADMIN — OXYCODONE HYDROCHLORIDE AND ACETAMINOPHEN 500 MG: 500 TABLET ORAL at 09:32

## 2018-11-08 RX ADMIN — TROSPIUM CHLORIDE 20 MG: 20 TABLET ORAL at 20:43

## 2018-11-08 RX ADMIN — IPRATROPIUM BROMIDE AND ALBUTEROL SULFATE 3 ML: .5; 3 SOLUTION RESPIRATORY (INHALATION) at 08:00

## 2018-11-08 RX ADMIN — IPRATROPIUM BROMIDE AND ALBUTEROL SULFATE 3 ML: .5; 3 SOLUTION RESPIRATORY (INHALATION) at 12:08

## 2018-11-08 RX ADMIN — INSULIN LISPRO 1 UNITS: 100 INJECTION, SOLUTION INTRAVENOUS; SUBCUTANEOUS at 20:49

## 2018-11-08 RX ADMIN — IPRATROPIUM BROMIDE AND ALBUTEROL SULFATE 3 ML: .5; 3 SOLUTION RESPIRATORY (INHALATION) at 21:53

## 2018-11-08 RX ADMIN — PRAVASTATIN SODIUM 20 MG: 20 TABLET ORAL at 20:43

## 2018-11-08 RX ADMIN — SUCRALFATE 1 G: 1 SUSPENSION ORAL at 12:22

## 2018-11-08 RX ADMIN — LINAGLIPTIN 5 MG: 5 TABLET, FILM COATED ORAL at 09:32

## 2018-11-08 RX ADMIN — Medication 2 PUFF: at 21:54

## 2018-11-08 RX ADMIN — TRAZODONE HYDROCHLORIDE 100 MG: 50 TABLET ORAL at 23:29

## 2018-11-08 RX ADMIN — SPIRONOLACTONE 50 MG: 50 TABLET ORAL at 09:32

## 2018-11-08 RX ADMIN — POTASSIUM CHLORIDE 20 MEQ: 20 TABLET, EXTENDED RELEASE ORAL at 16:53

## 2018-11-08 RX ADMIN — DEXTROSE MONOHYDRATE: 50 INJECTION, SOLUTION INTRAVENOUS at 04:53

## 2018-11-08 RX ADMIN — FLUTICASONE PROPIONATE 1 SPRAY: 50 SPRAY, METERED NASAL at 09:38

## 2018-11-08 RX ADMIN — Medication 2 PUFF: at 08:02

## 2018-11-08 RX ADMIN — ATENOLOL 25 MG: 25 TABLET ORAL at 09:32

## 2018-11-08 RX ADMIN — IPRATROPIUM BROMIDE AND ALBUTEROL SULFATE 3 ML: .5; 3 SOLUTION RESPIRATORY (INHALATION) at 15:45

## 2018-11-08 RX ADMIN — SODIUM CHLORIDE, PRESERVATIVE FREE 10 ML: 5 INJECTION INTRAVENOUS at 21:00

## 2018-11-08 RX ADMIN — SUCRALFATE 1 G: 1 SUSPENSION ORAL at 06:12

## 2018-11-08 RX ADMIN — SUCRALFATE 1 G: 1 SUSPENSION ORAL at 23:30

## 2018-11-08 RX ADMIN — SODIUM CHLORIDE, PRESERVATIVE FREE 10 ML: 5 INJECTION INTRAVENOUS at 09:32

## 2018-11-08 RX ADMIN — OXYCODONE AND ACETAMINOPHEN 1 TABLET: 5; 325 TABLET ORAL at 23:29

## 2018-11-08 RX ADMIN — INSULIN LISPRO 2 UNITS: 100 INJECTION, SOLUTION INTRAVENOUS; SUBCUTANEOUS at 09:38

## 2018-11-08 RX ADMIN — FUROSEMIDE 20 MG: 10 INJECTION, SOLUTION INTRAVENOUS at 18:25

## 2018-11-08 RX ADMIN — HYDROCORTISONE: 1 CREAM TOPICAL at 09:34

## 2018-11-08 ASSESSMENT — ENCOUNTER SYMPTOMS
COLOR CHANGE: 0
RECTAL PAIN: 0
ABDOMINAL PAIN: 1
APNEA: 0
PHOTOPHOBIA: 0
CHOKING: 0
CONSTIPATION: 0
SORE THROAT: 0
ANAL BLEEDING: 0
STRIDOR: 0
EYE REDNESS: 0
EYE ITCHING: 0
BACK PAIN: 0

## 2018-11-08 ASSESSMENT — PAIN DESCRIPTION - PAIN TYPE
TYPE: SURGICAL PAIN
TYPE: SURGICAL PAIN

## 2018-11-08 ASSESSMENT — PAIN DESCRIPTION - ORIENTATION: ORIENTATION: ANTERIOR

## 2018-11-08 ASSESSMENT — PAIN DESCRIPTION - DESCRIPTORS: DESCRIPTORS: ACHING

## 2018-11-08 ASSESSMENT — PAIN SCALES - GENERAL
PAINLEVEL_OUTOF10: 4
PAINLEVEL_OUTOF10: 8
PAINLEVEL_OUTOF10: 4
PAINLEVEL_OUTOF10: 0

## 2018-11-08 ASSESSMENT — PAIN DESCRIPTION - LOCATION
LOCATION: ABDOMEN
LOCATION: ABDOMEN

## 2018-11-08 ASSESSMENT — PAIN DESCRIPTION - PROGRESSION: CLINICAL_PROGRESSION: NOT CHANGED

## 2018-11-08 ASSESSMENT — PAIN DESCRIPTION - ONSET: ONSET: ON-GOING

## 2018-11-08 ASSESSMENT — PAIN DESCRIPTION - FREQUENCY: FREQUENCY: CONTINUOUS

## 2018-11-08 NOTE — PROGRESS NOTES
Hypertension 10/11/2013    Insomnia 10/11/2013    Liver dysfunction 2015    Liver, core biopsies: Steatohepatitis, acute and chronic, grade 2, stage 2.     Nasal congestion 2016    \"no cough and no fever associated with it\"    Obesity 10/11/2013    On home oxygen therapy     2l/nc 24 hrs per day    Sleep apnea 10/11/2013    had sleep apnea - has cpap and does use it    Steatohepatitis 3/21/2016    Liver, core biopsies: Steatohepatitis, acute and chronic, grade 2, stage 2.  Systolic murmur 2735    Tobacco abuse disorder 10/11/2013    Type 2 diabetes mellitus (HCC)     Urinary incontinence, mixed 10/11/2013       Objective:     Vitals:    18 1400   BP: (!) 130/45   Pulse: 90   Resp: 26   Temp:    SpO2:        TEMPERATURE:  Current - Temp: 98.1 °F (36.7 °C); Max - Temp  Av.5 °F (36.9 °C)  Min: 98.1 °F (36.7 °C)  Max: 98.9 °F (37.2 °C)    No intake/output data recorded. I/O last 3 completed shifts: In: 1083 [I.V.:506]  Out: 2400 [Urine:2400]      Physical Exam:  Physical Exam   Constitutional: She is oriented to person, place, and time. She appears well-developed and well-nourished. HENT:   Head: Normocephalic. Eyes: Pupils are equal, round, and reactive to light. Neck: Normal range of motion. Neck supple. Cardiovascular: Normal rate. Pulmonary/Chest: Effort normal.   Abdominal: Soft. She exhibits no distension and no mass. There is tenderness. There is no rebound and no guarding. Musculoskeletal: Normal range of motion. Neurological: She is alert and oriented to person, place, and time. Skin: Skin is warm. Psychiatric: She has a normal mood and affect.            Scheduled Meds:   metolazone  5 mg Oral Daily    pantoprazole  40 mg Oral QAM AC    potassium chloride  20 mEq Oral BID WC    spironolactone  50 mg Oral Daily    hydrocortisone   Topical BID    furosemide  20 mg Intravenous BID    sucralfate  1 g Oral 4 times per day    mometasone-formoterol

## 2018-11-08 NOTE — CARE COORDINATION
Pt's nurse, Carloz Sherman, approached  and advised that he received a call from [facility]outte at [facility] stating that insurance [employer] has been received. Carloz Sherman did state that there is/may be an issue getting pt's IV medication if pt were to transfer there later today.  attempted to contact Boutte with swing bed to clarify and awaiting return call.

## 2018-11-08 NOTE — PROGRESS NOTES
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 10/24 S/p colonoscopy with polypectomy: 3 cm cecal lesion, 1cm polyp at 25 cm, 5 small polyps in the right colon  · S/p Robotic assisted laparoscopic right hemicolectomy on 10/25  ? Blood culture 10/15 2/, 10/18 ngtd  ? Sleep has improved with CPAP  · Partial ileus (resolved)  ? NGT removed on   · Leukocytosis  ? In the context of CVC, midline, TPN, bowel surgery, will need to consider infection-fungal  ? May be reactive, as the patient has anemia (this anemia may be iatrogenic from daily blood draws)  · Pulmonary congestion  · Atherosclerotic vascular disease  · Obesity  ? COPD with chronic respiratory failure on home oxygen  ? CKD stage III  ? Type II DM  · Multi-morbidity: per PMHx  Plan:  ? continue Penicillin G continuous infusion for 4 weeks ( end date 18)  ? F.u Blood and fungal cultures    ? Daily blood draws was d/c, may lead to iatrogenic anemia  ? D/c right IJ CVC  Weekly labs drawn on Monday during the course of treatment (on discharge)   CBC, BMP, ESR, CRP  ? Protonix 40 mg daily, will need GI f/u, this may need to be adjusted before discharge          Ongoing Antimicrobial Therapy  IV penicillin G 10/19? Completed Antimicrobial Therapy  Ceftriaxone 10/15-  Vancomycin 10/16-  Azithromycin? 10/19  Levofloxacin 10/15-    History:? Interval history noted Left sided S bovis endocarditis AV and MV vegetations  Feels better, sleeping in recliner  Passing flatus and stool.   Physical Exam:  Vital Signs: BP (!) 130/45   Pulse 90   Temp 98.2 °F (36.8 °C) (Oral)   Resp 26   Ht 5' 6\" (1.676 m)   Wt 184 lb 15.5 oz (83.9 kg)   SpO2 100%   BMI 29.85 kg/m²     Gen: alert and oriented X3, no distress  Skin: no stigmata of endocarditis  Wounds: abdominal wound is C/D/I  HEMT: AT/NC, NGT in left nostril, no sinus tendernes, poor oral dentition  Eyes: PERRLA, EOMI, conjunctiva pink, sclera anicteric. Neck: Supple. Trachea midline. No LAD. Chest: CTA bilaterally  Heart: RRR and high-pitched mid diastolic murmur in aortic area   Abd: CDI, soft, non-distended, nil tenderness no hepatomegaly. hypoactive bowel sounds. Ext: no clubbing, cyanosis, or edema  Catheter Site: right IJ, right midline without erythema or tenderness  Neuro: Mental status intact. CN 2-12 intact and no focal sensory or motor deficits     Radiologic / Imaging / TESTING  No results found.      Labs:    Recent Results (from the past 24 hour(s))   POCT Glucose    Collection Time: 11/07/18  5:15 PM   Result Value Ref Range    POC Glucose 170 (H) 70 - 99 MG/DL   POCT Glucose    Collection Time: 11/07/18  9:16 PM   Result Value Ref Range    POC Glucose 137 (H) 70 - 99 MG/DL   Basic Metabolic Panel w/ Reflex to MG    Collection Time: 11/08/18  4:55 AM   Result Value Ref Range    Sodium 132 (L) 135 - 145 MMOL/L    Potassium 3.9 3.5 - 5.1 MMOL/L    Chloride 92 (L) 99 - 110 mMol/L    CO2 30 21 - 32 MMOL/L    Anion Gap 10 4 - 16    BUN 36 (H) 6 - 23 MG/DL    CREATININE 0.9 0.6 - 1.1 MG/DL    Glucose 133 (H) 70 - 99 MG/DL    Calcium 8.2 (L) 8.3 - 10.6 MG/DL    GFR Non-African American >60 >60 mL/min/1.73m2    GFR African American >60 >60 mL/min/1.73m2   CBC auto differential    Collection Time: 11/08/18  4:55 AM   Result Value Ref Range    WBC 10.9 (H) 4.0 - 10.5 K/CU MM    RBC 2.53 (L) 4.2 - 5.4 M/CU MM    Hemoglobin 7.4 (L) 12.5 - 16.0 GM/DL    Hematocrit 24.0 (L) 37 - 47 %    MCV 94.9 78 - 100 FL    MCH 29.2 27 - 31 PG    MCHC 30.8 (L) 32.0 - 36.0 %    RDW 14.9 11.7 - 14.9 %    Platelets 218 808 - 293 K/CU MM    MPV 8.4 7.5 - 11.1 FL    Differential Type AUTOMATED DIFFERENTIAL     Segs Relative 69.4 (H) 36 - 66 %    Lymphocytes % 19.4 (L) 24 - 44 %    Monocytes % 7.0 (H) 0 - 4 mass    Leukocytosis    Tubulovillous adenoma of colon  Resolved Problems:    * No resolved hospital problems.  *    Electronically signed by: Electronically signed by Farhat Jaeger MD on 11/8/2018 at 4:51 PM

## 2018-11-09 ENCOUNTER — HOSPITAL ENCOUNTER (INPATIENT)
Age: 63
LOS: 10 days | Discharge: HOME | DRG: 289 | End: 2018-11-19
Attending: FAMILY MEDICINE | Admitting: FAMILY MEDICINE
Payer: COMMERCIAL

## 2018-11-09 VITALS
HEART RATE: 80 BPM | SYSTOLIC BLOOD PRESSURE: 153 MMHG | DIASTOLIC BLOOD PRESSURE: 46 MMHG | BODY MASS INDEX: 29.73 KG/M2 | HEIGHT: 66 IN | RESPIRATION RATE: 22 BRPM | TEMPERATURE: 98 F | WEIGHT: 184.97 LBS | OXYGEN SATURATION: 100 %

## 2018-11-09 DIAGNOSIS — I33.0 SUBACUTE BACTERIAL ENDOCARDITIS: ICD-10-CM

## 2018-11-09 DIAGNOSIS — R53.81 PHYSICAL DEBILITY: ICD-10-CM

## 2018-11-09 LAB
GLUCOSE BLD-MCNC: 130 MG/DL (ref 70–99)
GLUCOSE BLD-MCNC: 163 MG/DL (ref 70–99)

## 2018-11-09 PROCEDURE — 6370000000 HC RX 637 (ALT 250 FOR IP): Performed by: NURSE PRACTITIONER

## 2018-11-09 PROCEDURE — 6360000002 HC RX W HCPCS: Performed by: INTERNAL MEDICINE

## 2018-11-09 PROCEDURE — 97166 OT EVAL MOD COMPLEX 45 MIN: CPT

## 2018-11-09 PROCEDURE — 82962 GLUCOSE BLOOD TEST: CPT

## 2018-11-09 PROCEDURE — 6360000002 HC RX W HCPCS: Performed by: NURSE PRACTITIONER

## 2018-11-09 PROCEDURE — 2580000003 HC RX 258: Performed by: INTERNAL MEDICINE

## 2018-11-09 PROCEDURE — 94640 AIRWAY INHALATION TREATMENT: CPT

## 2018-11-09 PROCEDURE — 2700000000 HC OXYGEN THERAPY PER DAY

## 2018-11-09 PROCEDURE — 6370000000 HC RX 637 (ALT 250 FOR IP): Performed by: INTERNAL MEDICINE

## 2018-11-09 PROCEDURE — 94660 CPAP INITIATION&MGMT: CPT

## 2018-11-09 PROCEDURE — 6370000000 HC RX 637 (ALT 250 FOR IP): Performed by: FAMILY MEDICINE

## 2018-11-09 PROCEDURE — 1200000002 HC SEMI PRIVATE SWING BED

## 2018-11-09 PROCEDURE — G8988 SELF CARE GOAL STATUS: HCPCS

## 2018-11-09 PROCEDURE — 94761 N-INVAS EAR/PLS OXIMETRY MLT: CPT

## 2018-11-09 PROCEDURE — 6370000000 HC RX 637 (ALT 250 FOR IP): Performed by: SPECIALIST

## 2018-11-09 PROCEDURE — 2580000003 HC RX 258: Performed by: HOSPITALIST

## 2018-11-09 PROCEDURE — 2580000003 HC RX 258: Performed by: NURSE PRACTITIONER

## 2018-11-09 PROCEDURE — 6370000000 HC RX 637 (ALT 250 FOR IP): Performed by: HOSPITALIST

## 2018-11-09 PROCEDURE — G8987 SELF CARE CURRENT STATUS: HCPCS

## 2018-11-09 PROCEDURE — 97530 THERAPEUTIC ACTIVITIES: CPT

## 2018-11-09 RX ORDER — PRAVASTATIN SODIUM 10 MG
20 TABLET ORAL DAILY
Status: DISCONTINUED | OUTPATIENT
Start: 2018-11-09 | End: 2018-11-19 | Stop reason: HOSPADM

## 2018-11-09 RX ORDER — GLIPIZIDE 10 MG/1
10 TABLET ORAL
Status: DISCONTINUED | OUTPATIENT
Start: 2018-11-10 | End: 2018-11-19 | Stop reason: HOSPADM

## 2018-11-09 RX ORDER — METOLAZONE 2.5 MG/1
5 TABLET ORAL DAILY
Status: DISCONTINUED | OUTPATIENT
Start: 2018-11-10 | End: 2018-11-10

## 2018-11-09 RX ORDER — ONDANSETRON 2 MG/ML
4 INJECTION INTRAMUSCULAR; INTRAVENOUS EVERY 6 HOURS PRN
Status: DISCONTINUED | OUTPATIENT
Start: 2018-11-09 | End: 2018-11-19 | Stop reason: HOSPADM

## 2018-11-09 RX ORDER — TORSEMIDE 20 MG/1
10 TABLET ORAL 2 TIMES DAILY
Status: DISCONTINUED | OUTPATIENT
Start: 2018-11-09 | End: 2018-11-09

## 2018-11-09 RX ORDER — METOLAZONE 2.5 MG/1
2.5 TABLET ORAL DAILY
Status: DISCONTINUED | OUTPATIENT
Start: 2018-11-09 | End: 2018-11-09 | Stop reason: DRUGHIGH

## 2018-11-09 RX ORDER — TRAZODONE HYDROCHLORIDE 50 MG/1
100 TABLET ORAL NIGHTLY PRN
Status: DISCONTINUED | OUTPATIENT
Start: 2018-11-09 | End: 2018-11-19 | Stop reason: HOSPADM

## 2018-11-09 RX ORDER — ASCORBIC ACID 500 MG
500 TABLET ORAL DAILY
Status: DISCONTINUED | OUTPATIENT
Start: 2018-11-10 | End: 2018-11-19 | Stop reason: HOSPADM

## 2018-11-09 RX ORDER — TROSPIUM CHLORIDE 20 MG/1
20 TABLET, FILM COATED ORAL
Status: DISCONTINUED | OUTPATIENT
Start: 2018-11-10 | End: 2018-11-09 | Stop reason: DRUGHIGH

## 2018-11-09 RX ORDER — SODIUM CHLORIDE 0.9 % (FLUSH) 0.9 %
10 SYRINGE (ML) INJECTION EVERY 12 HOURS SCHEDULED
Status: DISCONTINUED | OUTPATIENT
Start: 2018-11-09 | End: 2018-11-19 | Stop reason: HOSPADM

## 2018-11-09 RX ORDER — POTASSIUM CHLORIDE 750 MG/1
10 TABLET, EXTENDED RELEASE ORAL DAILY
Status: DISCONTINUED | OUTPATIENT
Start: 2018-11-10 | End: 2018-11-19 | Stop reason: HOSPADM

## 2018-11-09 RX ORDER — LOSARTAN POTASSIUM 50 MG/1
50 TABLET ORAL DAILY
Status: DISCONTINUED | OUTPATIENT
Start: 2018-11-10 | End: 2018-11-19 | Stop reason: HOSPADM

## 2018-11-09 RX ORDER — SODIUM CHLORIDE 0.9 % (FLUSH) 0.9 %
10 SYRINGE (ML) INJECTION PRN
Status: DISCONTINUED | OUTPATIENT
Start: 2018-11-09 | End: 2018-11-19 | Stop reason: HOSPADM

## 2018-11-09 RX ORDER — ATENOLOL 25 MG/1
25 TABLET ORAL 2 TIMES DAILY
Status: DISCONTINUED | OUTPATIENT
Start: 2018-11-09 | End: 2018-11-19 | Stop reason: HOSPADM

## 2018-11-09 RX ORDER — TROSPIUM CHLORIDE 20 MG/1
20 TABLET, FILM COATED ORAL NIGHTLY
Status: DISCONTINUED | OUTPATIENT
Start: 2018-11-09 | End: 2018-11-19 | Stop reason: HOSPADM

## 2018-11-09 RX ORDER — CLONIDINE HYDROCHLORIDE 0.1 MG/1
1 TABLET ORAL 2 TIMES DAILY
Status: DISCONTINUED | OUTPATIENT
Start: 2018-11-09 | End: 2018-11-09 | Stop reason: ALTCHOICE

## 2018-11-09 RX ORDER — TORSEMIDE 20 MG/1
10 TABLET ORAL 2 TIMES DAILY
Status: DISCONTINUED | OUTPATIENT
Start: 2018-11-10 | End: 2018-11-10

## 2018-11-09 RX ORDER — ALBUTEROL SULFATE 90 UG/1
2 AEROSOL, METERED RESPIRATORY (INHALATION) EVERY 4 HOURS PRN
Status: DISCONTINUED | OUTPATIENT
Start: 2018-11-09 | End: 2018-11-19 | Stop reason: HOSPADM

## 2018-11-09 RX ORDER — METOLAZONE 5 MG/1
2.5 TABLET ORAL DAILY
Qty: 30 TABLET | Refills: 3 | Status: SHIPPED | OUTPATIENT
Start: 2018-11-09 | End: 2018-11-26 | Stop reason: SDUPTHER

## 2018-11-09 RX ORDER — IPRATROPIUM BROMIDE AND ALBUTEROL SULFATE 2.5; .5 MG/3ML; MG/3ML
1 SOLUTION RESPIRATORY (INHALATION) 4 TIMES DAILY
Status: DISCONTINUED | OUTPATIENT
Start: 2018-11-09 | End: 2018-11-19 | Stop reason: HOSPADM

## 2018-11-09 RX ORDER — ACETAMINOPHEN 500 MG
1000 TABLET ORAL EVERY 6 HOURS PRN
Status: DISCONTINUED | OUTPATIENT
Start: 2018-11-09 | End: 2018-11-19 | Stop reason: HOSPADM

## 2018-11-09 RX ORDER — OXYCODONE HYDROCHLORIDE AND ACETAMINOPHEN 5; 325 MG/1; MG/1
1 TABLET ORAL EVERY 4 HOURS PRN
Status: DISCONTINUED | OUTPATIENT
Start: 2018-11-09 | End: 2018-11-19 | Stop reason: HOSPADM

## 2018-11-09 RX ADMIN — LINAGLIPTIN 5 MG: 5 TABLET, FILM COATED ORAL at 08:11

## 2018-11-09 RX ADMIN — PANTOPRAZOLE SODIUM 40 MG: 40 TABLET, DELAYED RELEASE ORAL at 06:13

## 2018-11-09 RX ADMIN — Medication 2 PUFF: at 08:01

## 2018-11-09 RX ADMIN — INSULIN LISPRO 2 UNITS: 100 INJECTION, SOLUTION INTRAVENOUS; SUBCUTANEOUS at 08:10

## 2018-11-09 RX ADMIN — ATENOLOL 25 MG: 25 TABLET ORAL at 22:28

## 2018-11-09 RX ADMIN — TROSPIUM CHLORIDE 20 MG: 20 TABLET ORAL at 22:27

## 2018-11-09 RX ADMIN — FLUTICASONE PROPIONATE 1 SPRAY: 50 SPRAY, METERED NASAL at 08:12

## 2018-11-09 RX ADMIN — OXYCODONE HYDROCHLORIDE AND ACETAMINOPHEN 500 MG: 500 TABLET ORAL at 08:13

## 2018-11-09 RX ADMIN — MOMETASONE FUROATE AND FORMOTEROL FUMARATE DIHYDRATE 2 PUFF: 200; 5 AEROSOL RESPIRATORY (INHALATION) at 22:27

## 2018-11-09 RX ADMIN — PRAVASTATIN SODIUM 20 MG: 10 TABLET ORAL at 22:27

## 2018-11-09 RX ADMIN — OXYCODONE HYDROCHLORIDE AND ACETAMINOPHEN 1 TABLET: 5; 325 TABLET ORAL at 22:28

## 2018-11-09 RX ADMIN — ATENOLOL 25 MG: 25 TABLET ORAL at 08:11

## 2018-11-09 RX ADMIN — HYDROCORTISONE: 1 CREAM TOPICAL at 08:13

## 2018-11-09 RX ADMIN — PENICILLIN G POTASSIUM: 5000000 INJECTION, POWDER, FOR SOLUTION INTRAMUSCULAR; INTRAVENOUS at 19:00

## 2018-11-09 RX ADMIN — SPIRONOLACTONE 50 MG: 50 TABLET ORAL at 08:11

## 2018-11-09 RX ADMIN — FUROSEMIDE 20 MG: 10 INJECTION, SOLUTION INTRAVENOUS at 08:12

## 2018-11-09 RX ADMIN — TRAZODONE HYDROCHLORIDE 100 MG: 50 TABLET ORAL at 22:28

## 2018-11-09 RX ADMIN — IPRATROPIUM BROMIDE AND ALBUTEROL SULFATE 3 ML: .5; 3 SOLUTION RESPIRATORY (INHALATION) at 11:31

## 2018-11-09 RX ADMIN — SUCRALFATE 1 G: 1 SUSPENSION ORAL at 06:13

## 2018-11-09 RX ADMIN — IPRATROPIUM BROMIDE AND ALBUTEROL SULFATE 3 ML: .5; 3 SOLUTION RESPIRATORY (INHALATION) at 08:00

## 2018-11-09 RX ADMIN — SODIUM CHLORIDE, PRESERVATIVE FREE 10 ML: 5 INJECTION INTRAVENOUS at 08:11

## 2018-11-09 RX ADMIN — DEXTROSE MONOHYDRATE: 50 INJECTION, SOLUTION INTRAVENOUS at 06:13

## 2018-11-09 RX ADMIN — IPRATROPIUM BROMIDE AND ALBUTEROL SULFATE 3 ML: .5; 3 SOLUTION RESPIRATORY (INHALATION) at 19:33

## 2018-11-09 RX ADMIN — METOLAZONE 5 MG: 2.5 TABLET ORAL at 08:11

## 2018-11-09 RX ADMIN — POTASSIUM CHLORIDE 20 MEQ: 20 TABLET, EXTENDED RELEASE ORAL at 08:11

## 2018-11-09 ASSESSMENT — PAIN SCALES - GENERAL
PAINLEVEL_OUTOF10: 7
PAINLEVEL_OUTOF10: 0
PAINLEVEL_OUTOF10: 4
PAINLEVEL_OUTOF10: 0
PAINLEVEL_OUTOF10: 8
PAINLEVEL_OUTOF10: 0

## 2018-11-09 ASSESSMENT — PAIN DESCRIPTION - PAIN TYPE
TYPE: SURGICAL PAIN
TYPE: SURGICAL PAIN

## 2018-11-09 ASSESSMENT — PAIN DESCRIPTION - LOCATION
LOCATION: ABDOMEN;INCISION
LOCATION: INCISION

## 2018-11-09 NOTE — PROGRESS NOTES
[facility] PROGRESS NOTE  Date: 10/16/2018   Name: Aileen Posada   MRN: [id number]   YOB: 1955  Chief Complaint   Patient presents with    Shortness of Breath     onset approx 3 days. Fever. 101-102. Sent from Dr. Ruth Ashton office. Subjective/Interval Hx:     -Fever of 100.8 on 10/16 @1532. She states she was feeling drained and having fevers at home. She was having intermittent wheezing. States she did not have a productive cough before, but now she is bringing stuff up. ROS: negative unless mentioned above  Objective:   Physical Exam:   BP (!) 130/57   Pulse 87   Temp 96.2 °F (35.7 °C) (Oral)   Resp 28   Ht 5' 6\" (1.676 m)   Wt 255 lb (115.7 kg)   SpO2 95%   Breastfeeding? No   BMI 41.16 kg/m²   CONSTITUTIONAL:  No acute distress  HENT:  NCAT  LUNGS:  Rales b/l in mid level of lung and bases  CARDIOVASCULAR:  s1s2 rrr  ABDOMEN:  Soft nt nd  MUSCULOSKELETAL/Extremities:  Nontender, no edema  NEUROLOGIC:  No gross deficits, aao  SKIN:  No gross lesions    Assessment/Plan:     1. Dyspnea  -requiring more oxygen than baseline. Attempt wean. Likely combo of CHF, anemia, and COPD/bronchitis. Does not have significant wheezing today. 2. Acute COPD exacerbation/Possible Bronchitis  -check sputum Cx and Resp PCR, procalcitonin. On levaquin and solumedrol duonebs. Do not appreciate significant wheezing. May have been present on admission. Last fever 10/15 @1532. Wean oxygen requirement  3. Acute CHF exacerbation  -seen on CXR, has rales. check troponin, BNP, placed on IV lasix BID. Recently had lasix decreased to once a day at end of September. EKG: NSR.  4. Anemia  -received 2uPRBC, serial hgb, anemia profile. No gross bleeding. She is scheduled for an outpatient colonoscopy on 10/25. Anemia may have been contributing to her fatigue. 5. DM  -on steroids. Increase SSI, glipizide, linagliptin, and metformin  6.  HTN  7. Obesity        DVT Prophylaxis: SCD due to anemia  Diet: DIET
Physical Therapy    Facility/Department: [facility] UNIT  Initial Assessment    NAME: Lucy Castañeda  : 1955  MRN: [id number]    Date of Service: 10/18/2018    Discharge Recommendations:  Continue to assess pending progress, Home with assist PRN, Home with nursing aide, Home with Home health PT, Outpatient PT, Patient would benefit from continued therapy after discharge (swing bed)   PT Equipment Recommendations  Equipment Needed: Yes  Other: wedge pillow    Patient Diagnosis(es): The primary encounter diagnosis was COPD exacerbation ([facility] [address] A diagnosis of Anemia, unspecified type was also pertinent to this visit. has a past medical history of Chronic respiratory failure with hypoxia ([facility] [address] COPD (chronic obstructive pulmonary disease) ([facility] [address] Diabetes mellitus ([facility] [address] Heart murmur; History of blood transfusion; Hyperlipidemia; Hypertension; Insomnia; Liver dysfunction; Nasal congestion; Obesity; On home oxygen therapy; Sleep apnea; Steatohepatitis; Systolic murmur; Tobacco abuse disorder; and Urinary incontinence, mixed. has a past surgical history that includes Hysterectomy (); Cholecystectomy (); Breast surgery; Tonsillectomy (as a kid); and knee surgery (Left, ). Restrictions  Restrictions/Precautions  Restrictions/Precautions: Fall Risk  Position Activity Restriction  Other position/activity restrictions: O2, telemetry, saline lock. Vision/Hearing  Vision: Impaired  Vision Exceptions: Wears glasses at all times  Hearing: Within functional limits     Subjective  General  Chart Reviewed: Yes  Patient assessed for rehabilitation services?: Yes  Family / Caregiver Present: No  Follows Commands: Within Functional Limits  General Comment  Comments: Pt presented sitting on the closed commode and finishing getting cleaned up. Subjective  Subjective: Pt reports she gets worn out easy and is SOB and tired after getting washed up herself.   Pain Screening  Patient Currently in Pain:
Vitals:   Vitals:    10/17/18 0726   BP: (!) 102/46   Pulse: 78   Resp: 20   Temp: 96.9 °F (36.1 °C)   SpO2: 98%     Physical Exam:   GEN Well developed obese female, lying comfortably in bed, and imild respiratory distress. EYES Pupils are equally round. No scleral erythema, discharge, or conjunctivitis. HENT NCAT. Mucous membranes are moist. Oral pharynx without exudates, no evidence of thrush. NECK Supple, no apparent thyromegaly or masses. RESP Good air entry, mild rhonchi, no wheezing  CARDIO/VASC S1/S2 auscultated. Regular rate without appreciable murmurs, rubs, or gallops. No JVD or carotid bruits. Peripheral pulses equal bilaterally and palpable. GI Abdomen is soft without significant tenderness, masses, or guarding. Bowel sounds are normoactive. Rectal exam deferred.  No costovertebral angle tenderness. Normal appearing external genitalia. Costello catheter is not present. HEME/LYMPH No palpable cervical lymphadenopathy and no hepatosplenomegaly. No petechiae or ecchymoses. MSK No gross joint deformities. No peripheral edema. SKIN Normal coloration, warm, dry. NEURO Awake, alert, oriented x 4. Cranial nerves appear grossly intact, normal speech, no lateralizing weakness. PSYCH Affect appropriate.     Medications:   Medications:    potassium chloride  10 mEq Oral Daily    insulin lispro  0-12 Units Subcutaneous TID WC    insulin lispro  0-6 Units Subcutaneous Nightly    furosemide  40 mg Intravenous BID    glipiZIDE  10 mg Oral BID AC    metFORMIN  1,000 mg Oral BID WC    vancomycin  1,000 mg Intravenous Q12H    And    vancomycin  750 mg Intravenous Q12H    atenolol  25 mg Oral BID    mometasone-formoterol  2 puff Inhalation BID    cloNIDine  0.1 mg Oral BID    hydrochlorothiazide  50 mg Oral Daily    ipratropium-albuterol  1 vial Nebulization 4x daily    linagliptin  5 mg Oral Daily    losartan  50 mg Oral Daily    pravastatin  20 mg Oral Daily    trospium  20 mg Oral
chair (pt has a suction cup grab bar, but it doesnt work well. )  Home Equipment: Rolling walker, Oxygen (pt reports she sleeps laying down in bed with 2-3 pillows. )  ADL Assistance: [facility]: Needs assistance  Homemaking Responsibilities: Yes  Meal Prep Responsibility: Primary  Laundry Responsibility: No  Cleaning Responsibility: Secondary (pt reports she does dishes and light cleaning. )  Shopping Responsibility: Secondary (pt reports she has only gone 3x pushing a cart )  Ambulation Assistance: Needs assistance (pt ambulates with a RW)  Transfer Assistance: Independent  Active : No  Leisure & Hobbies: Pt reports she likes to read, watch tv, and play with her granddaughter. IADL Comments: Pt reports I with ADLS including dressing and bathing        Objective                                            Sensation  Overall Sensation Status: Impaired (intermittent parasthesias in BUE and BLE)        LUE AROM (degrees)  LUE AROM : WFL  RUE AROM (degrees)  RUE AROM : WFL  LUE Strength  Gross LUE Strength: WFL  RUE Strength  Gross RUE Strength: WFL                  Assessment   Performance deficits / Impairments: Decreased functional mobility ; Decreased ADL status; Decreased endurance;Decreased balance;Decreased high-level IADLs;Decreased safe awareness  Assessment: 66 yo female with COPD exacerbation. She presents with decreased endurance, decreased functional mobility, and decreased I adls. OT to see pt to maximize I with adls, transfers, and improve endurance  Treatment Diagnosis: Streptococcal bacteremia  Prognosis: Good  Decision Making: Medium Complexity  History: see above  Exam: see above  Patient Education: role of OT, transfers  Barriers to Learning: no  REQUIRES OT FOLLOW UP: Yes  Activity Tolerance  Activity Tolerance: Patient limited by fatigue  Safety Devices  Safety Devices in place: Yes  Type of devices: Nurse notified;Call light within reach; All fall risk precautions in
mobility, endurance  Prognosis: Fair  Decision Making: Medium Complexity  History: see above  Exam: see above  Patient Education: role of OT, transfers  Barriers to Learning: no  REQUIRES OT FOLLOW UP: Yes  Activity Tolerance  Activity Tolerance: Patient Tolerated treatment well  Safety Devices  Safety Devices in place: Yes  Type of devices: Left in bed;Nurse notified;Call light within reach         Plan   Plan  Times per week: 1+  Current Treatment Recommendations: Strengthening, Balance Training, Functional Mobility Training, Endurance Training, Self-Care / ADL, Patient/Caregiver Education & Training    G-Code  OT G-codes  Functional Limitation: Self care  Self Care Current Status (): At least 40 percent but less than 60 percent impaired, limited or restricted  Self Care Goal Status (): At least 20 percent but less than 40 percent impaired, limited or restricted  OutComes Score                                           AM-PAC Score       AM-PAC 6 click short form for inpatient daily activity:   How much help from another person does the patient currently need. .. Unable  Dep A Lot  Max A A Lot   Mod A A Little  Min A A Little   CGA  SBA None   Mod I  Indep  Sup   1. Putting on and taking off regular lower body clothing? [] 1    [] 2   [] 2   [] 3   [x] 3   [] 4      2. Bathing (including washing, rinsing, drying)? [] 1   [] 2   [] 2 [] 3 [x] 3 [] 4   3. Toileting, which includes using toilet, bedpan, or urinal? [] 1    [] 2   [] 2   [] 3   [x] 3   [] 4     4. Putting on and taking off regular upper body clothing? [] 1   [] 2   [] 2   [] 3   [x] 3    [] 4      5. Taking care of personal grooming such as brushing teeth? [] 1   [] 2    [] 2 [] 3    [x] 3   [] 4      6. Eating meals?    [] 1   [] 2   [] 2   [] 3   [] 3   [x] 4      Raw Score:  19/24  40% impaired    [24=0% impaired(CH), 23=1-19%(CI), 20-22=20-39%(CJ), 15-19=40-59%(CK), 10-14=60-79%(CL), 7-9=80-99%(CM), 6=100%(CN)]         Goals  Short term

## 2018-11-09 NOTE — PROGRESS NOTES
currently need. .. Unable  Dep A Lot  Max A A Lot   Mod A A Little  Min A A Little   CGA  SBA None   Mod I  Indep  Sup   1. Putting on and taking off regular lower body clothing? [] 1    [] 2   [x] 2   [] 3   [] 3   [] 4      2. Bathing (including washing, rinsing, drying)? [] 1   [] 2   [x] 2 [] 3 [] 3 [] 4   3. Toileting, which includes using toilet, bedpan, or urinal? [] 1    [] 2   [] 2   [x] 3   [] 3   [] 4     4. Putting on and taking off regular upper body clothing? [] 1   [] 2   [] 2   [x] 3   [] 3    [] 4      5. Taking care of personal grooming such as brushing teeth? [] 1   [] 2    [] 2 [x] 3    [] 3   [] 4      6. Eating meals?    [] 1   [] 2   [] 2   [] 3   [] 3   [x] 4      Raw Score: 17/24  40-59% impaired     [24=0% impaired(CH), 23=1-19%(CI), 20-22=20-39%(CJ), 15-19=40-59%(CK), 10-14=60-79%(CL), 7-9=80-99%(CM), 6=100%(CN)]         Goals  Short term goals  Time Frame for Short term goals: discharge  Short term goal 1: Pt will perform functional transfers Jennifer w/ RW w/o LOB or falls using good EC and PLB  Short term goal 2: Pt will be MI/I LB adls pacing herself  Short term goal 3: Pt will be able to stand for 5-7 min MI w/o LOB or falls w/o decreased O2 Sat  Short term goal 4: Pt will perform LE bathing/dressing Independent  Short term goal 5: Pt will perform functional task in stand ~10 minutes to increase standing tolerance w/o decrease of 02 saturation  Patient Goals   Patient goals : go home       Therapy Time   Individual Concurrent Group Co-treatment   Time In           Time Out 1745         Minutes           Timed Code Treatment Minutes: 20 [facility], OT

## 2018-11-09 NOTE — PROGRESS NOTES
The patient's daily home medications were updated in the chart's Home Medication List after being reviewed with:   [] the patient. [x] the patient's facility MAR. [] the patient's family or caregiver.  [] the patient's pharmacy.   From [facility] discharge AVS

## 2018-11-09 NOTE — PROGRESS NOTES
Impression :     1. CKD stage 3- no lab available yet- was stable but cr may vary with diuretics  2. Fluid overload- sec to cor pulmonale / DM / infection/ capillary leak / obesity etc   3. endoicardidit S/ valvular damage etc   4. DM    Recommendation/Plan  :     1. Review BMP  2. May d/C with torsemide 20 BID and metolazone 2.5 /d  And same dose aldactone as now   3. Daily weigh   4. Call me with wt gain > 3 lb   5. Fluid restriction to 1500 ml/d   6. Low salt  7. BMP, mg weekly x 3   8. F/U with me in 2-3 wk's   9.  Good BS control       Dino Oliver MD

## 2018-11-10 LAB
ANION GAP SERPL CALCULATED.3IONS-SCNC: 12 MMOL/L (ref 4–16)
BASOPHILS ABSOLUTE: 0 K/CU MM
BASOPHILS RELATIVE PERCENT: 0.3 % (ref 0–1)
BUN BLDV-MCNC: 31 MG/DL (ref 6–23)
CALCIUM SERPL-MCNC: 9 MG/DL (ref 8.3–10.6)
CHLORIDE BLD-SCNC: 98 MMOL/L (ref 99–110)
CO2: 29 MMOL/L (ref 21–32)
CREAT SERPL-MCNC: 1 MG/DL (ref 0.6–1.1)
DIFFERENTIAL TYPE: ABNORMAL
EOSINOPHILS ABSOLUTE: 0.3 K/CU MM
EOSINOPHILS RELATIVE PERCENT: 2.4 % (ref 0–3)
GFR AFRICAN AMERICAN: >60 ML/MIN/1.73M2
GFR NON-AFRICAN AMERICAN: 56 ML/MIN/1.73M2
GLUCOSE BLD-MCNC: 117 MG/DL (ref 70–99)
GLUCOSE BLD-MCNC: 136 MG/DL (ref 70–99)
GLUCOSE BLD-MCNC: 151 MG/DL (ref 70–99)
GLUCOSE BLD-MCNC: 75 MG/DL (ref 70–99)
HCT VFR BLD CALC: 23.1 % (ref 37–47)
HEMOGLOBIN: 7 GM/DL (ref 12.5–16)
IMMATURE NEUTROPHIL %: 1.4 % (ref 0–0.43)
LYMPHOCYTES ABSOLUTE: 2.7 K/CU MM
LYMPHOCYTES RELATIVE PERCENT: 23.1 % (ref 24–44)
MCH RBC QN AUTO: 28.9 PG (ref 27–31)
MCHC RBC AUTO-ENTMCNC: 30.3 % (ref 32–36)
MCV RBC AUTO: 95.5 FL (ref 78–100)
MONOCYTES ABSOLUTE: 0.8 K/CU MM
MONOCYTES RELATIVE PERCENT: 7.2 % (ref 0–4)
PDW BLD-RTO: 14.6 % (ref 11.7–14.9)
PLATELET # BLD: 355 K/CU MM (ref 140–440)
PMV BLD AUTO: 8.8 FL (ref 7.5–11.1)
POTASSIUM SERPL-SCNC: 3.9 MMOL/L (ref 3.5–5.1)
RBC # BLD: 2.42 M/CU MM (ref 4.2–5.4)
SEGMENTED NEUTROPHILS ABSOLUTE COUNT: 7.5 K/CU MM
SEGMENTED NEUTROPHILS RELATIVE PERCENT: 65.6 % (ref 36–66)
SODIUM BLD-SCNC: 139 MMOL/L (ref 135–145)
TOTAL IMMATURE NEUTOROPHIL: 0.16 K/CU MM
WBC # BLD: 11.5 K/CU MM (ref 4–10.5)

## 2018-11-10 PROCEDURE — 6370000000 HC RX 637 (ALT 250 FOR IP): Performed by: NURSE PRACTITIONER

## 2018-11-10 PROCEDURE — 97162 PT EVAL MOD COMPLEX 30 MIN: CPT

## 2018-11-10 PROCEDURE — 94640 AIRWAY INHALATION TREATMENT: CPT

## 2018-11-10 PROCEDURE — 36415 COLL VENOUS BLD VENIPUNCTURE: CPT

## 2018-11-10 PROCEDURE — G8979 MOBILITY GOAL STATUS: HCPCS

## 2018-11-10 PROCEDURE — 1200000002 HC SEMI PRIVATE SWING BED

## 2018-11-10 PROCEDURE — 2700000000 HC OXYGEN THERAPY PER DAY

## 2018-11-10 PROCEDURE — 85025 COMPLETE CBC W/AUTO DIFF WBC: CPT

## 2018-11-10 PROCEDURE — 6370000000 HC RX 637 (ALT 250 FOR IP): Performed by: HOSPITALIST

## 2018-11-10 PROCEDURE — 97110 THERAPEUTIC EXERCISES: CPT

## 2018-11-10 PROCEDURE — 80048 BASIC METABOLIC PNL TOTAL CA: CPT

## 2018-11-10 PROCEDURE — 2580000003 HC RX 258: Performed by: NURSE PRACTITIONER

## 2018-11-10 PROCEDURE — 6360000002 HC RX W HCPCS: Performed by: NURSE PRACTITIONER

## 2018-11-10 PROCEDURE — 82962 GLUCOSE BLOOD TEST: CPT

## 2018-11-10 PROCEDURE — G8978 MOBILITY CURRENT STATUS: HCPCS

## 2018-11-10 RX ORDER — METOLAZONE 2.5 MG/1
2.5 TABLET ORAL DAILY
Status: DISCONTINUED | OUTPATIENT
Start: 2018-11-11 | End: 2018-11-19 | Stop reason: HOSPADM

## 2018-11-10 RX ORDER — TORSEMIDE 20 MG/1
20 TABLET ORAL 2 TIMES DAILY
Status: DISCONTINUED | OUTPATIENT
Start: 2018-11-10 | End: 2018-11-19 | Stop reason: HOSPADM

## 2018-11-10 RX ORDER — DEXTROSE MONOHYDRATE 50 MG/ML
100 INJECTION, SOLUTION INTRAVENOUS PRN
Status: DISCONTINUED | OUTPATIENT
Start: 2018-11-10 | End: 2018-11-19 | Stop reason: HOSPADM

## 2018-11-10 RX ORDER — NICOTINE 21 MG/24HR
1 PATCH, TRANSDERMAL 24 HOURS TRANSDERMAL DAILY
Status: DISCONTINUED | OUTPATIENT
Start: 2018-11-10 | End: 2018-11-19 | Stop reason: HOSPADM

## 2018-11-10 RX ORDER — DEXTROSE MONOHYDRATE 25 G/50ML
12.5 INJECTION, SOLUTION INTRAVENOUS PRN
Status: DISCONTINUED | OUTPATIENT
Start: 2018-11-10 | End: 2018-11-19 | Stop reason: HOSPADM

## 2018-11-10 RX ORDER — NICOTINE POLACRILEX 4 MG
15 LOZENGE BUCCAL PRN
Status: DISCONTINUED | OUTPATIENT
Start: 2018-11-10 | End: 2018-11-19 | Stop reason: HOSPADM

## 2018-11-10 RX ORDER — SPIRONOLACTONE 50 MG/1
50 TABLET, FILM COATED ORAL DAILY
Status: DISCONTINUED | OUTPATIENT
Start: 2018-11-11 | End: 2018-11-19 | Stop reason: HOSPADM

## 2018-11-10 RX ADMIN — IPRATROPIUM BROMIDE AND ALBUTEROL SULFATE 3 ML: .5; 3 SOLUTION RESPIRATORY (INHALATION) at 15:20

## 2018-11-10 RX ADMIN — TORSEMIDE 20 MG: 20 TABLET ORAL at 17:18

## 2018-11-10 RX ADMIN — ATENOLOL 25 MG: 25 TABLET ORAL at 20:51

## 2018-11-10 RX ADMIN — IPRATROPIUM BROMIDE AND ALBUTEROL SULFATE 3 ML: .5; 3 SOLUTION RESPIRATORY (INHALATION) at 07:25

## 2018-11-10 RX ADMIN — MOMETASONE FUROATE AND FORMOTEROL FUMARATE DIHYDRATE 2 PUFF: 200; 5 AEROSOL RESPIRATORY (INHALATION) at 20:51

## 2018-11-10 RX ADMIN — TROSPIUM CHLORIDE 20 MG: 20 TABLET ORAL at 20:51

## 2018-11-10 RX ADMIN — IPRATROPIUM BROMIDE AND ALBUTEROL SULFATE 3 ML: .5; 3 SOLUTION RESPIRATORY (INHALATION) at 11:35

## 2018-11-10 RX ADMIN — SODIUM CHLORIDE, PRESERVATIVE FREE 10 ML: 5 INJECTION INTRAVENOUS at 09:07

## 2018-11-10 RX ADMIN — TRAZODONE HYDROCHLORIDE 100 MG: 50 TABLET ORAL at 23:32

## 2018-11-10 RX ADMIN — POTASSIUM CHLORIDE 10 MEQ: 10 TABLET, EXTENDED RELEASE ORAL at 09:06

## 2018-11-10 RX ADMIN — IPRATROPIUM BROMIDE AND ALBUTEROL SULFATE 3 ML: .5; 3 SOLUTION RESPIRATORY (INHALATION) at 19:49

## 2018-11-10 RX ADMIN — ATENOLOL 25 MG: 25 TABLET ORAL at 09:06

## 2018-11-10 RX ADMIN — METFORMIN HYDROCHLORIDE 1000 MG: 500 TABLET ORAL at 09:07

## 2018-11-10 RX ADMIN — MOMETASONE FUROATE AND FORMOTEROL FUMARATE DIHYDRATE 2 PUFF: 200; 5 AEROSOL RESPIRATORY (INHALATION) at 09:07

## 2018-11-10 RX ADMIN — OXYCODONE HYDROCHLORIDE AND ACETAMINOPHEN 1 TABLET: 5; 325 TABLET ORAL at 23:32

## 2018-11-10 RX ADMIN — OXYCODONE HYDROCHLORIDE AND ACETAMINOPHEN 500 MG: 500 TABLET ORAL at 09:07

## 2018-11-10 RX ADMIN — METOLAZONE 5 MG: 2.5 TABLET ORAL at 09:06

## 2018-11-10 RX ADMIN — LINAGLIPTIN 5 MG: 5 TABLET, FILM COATED ORAL at 09:06

## 2018-11-10 RX ADMIN — PRAVASTATIN SODIUM 20 MG: 10 TABLET ORAL at 20:51

## 2018-11-10 RX ADMIN — TORSEMIDE 10 MG: 20 TABLET ORAL at 09:06

## 2018-11-10 RX ADMIN — LOSARTAN POTASSIUM 50 MG: 50 TABLET ORAL at 09:06

## 2018-11-10 RX ADMIN — PENICILLIN G POTASSIUM: 5000000 INJECTION, POWDER, FOR SOLUTION INTRAMUSCULAR; INTRAVENOUS at 08:02

## 2018-11-10 RX ADMIN — GLIPIZIDE 10 MG: 10 TABLET ORAL at 09:07

## 2018-11-10 ASSESSMENT — PAIN SCALES - GENERAL
PAINLEVEL_OUTOF10: 7
PAINLEVEL_OUTOF10: 0

## 2018-11-10 ASSESSMENT — PAIN DESCRIPTION - PAIN TYPE: TYPE: SURGICAL PAIN

## 2018-11-10 ASSESSMENT — ENCOUNTER SYMPTOMS
DIARRHEA: 0
VOMITING: 0
SHORTNESS OF BREATH: 0

## 2018-11-10 ASSESSMENT — PAIN DESCRIPTION - LOCATION: LOCATION: ABDOMEN

## 2018-11-10 NOTE — PROGRESS NOTES
Yes  Ambulation 1  Surface: level tile  Device: Rolling Walker  Assistance: Modified Independent  Quality of Gait: Pt demosntrated steady gait with decreased speed and step length  Distance: 60' and 80 feet  Comments: pt slightly SOB when ambulating. Stairs/Curb  Stairs?: No     Balance  Posture: Fair  Sitting - Static: Good  Sitting - Dynamic: Good  Standing - Static: Fair;+  Standing - Dynamic: Fair    Exercises  Hip Flexion: seated marching 1x15 B LE  Hip Abduction: resisted hip abd green t-band 1x10   resisted hip add: ball squeeze 1x10  Knee Long Arc Quad: 1x10 B LE with cues for 5 second hold  Upper Extremity: shoulder flexion, abd, and horizontal abd/add 1x10     Assessment   Body structures, Functions, Activity limitations: Decreased functional mobility ; Decreased balance;Decreased coordination;Decreased ADL status; Decreased ROM; Decreased endurance;Decreased high-level IADLs;Decreased strength  Assessment: pt is a pleasant 64 yo female who would benefit from skilled PT to address decreased ROM, balance, strength, and functional mobility. Prognosis: Excellent  Decision Making: Medium Complexity  History: see above  Exam: see above  Clinical Presentation: evolving  Patient Education: pt educated to ambulate again 2 more times with nursing/family and to ambulate tomorrow as well. Barriers to Learning: none  REQUIRES PT FOLLOW UP: Yes  Activity Tolerance  Activity Tolerance: Patient limited by endurance; Patient Tolerated treatment well  Activity Tolerance: pt did well, but required rest breaks and cues to breathe           Plan   Plan  Times per week: Mon-Sat 4+/week  Current Treatment Recommendations: Strengthening, Transfer Training, Endurance Training, Neuromuscular Re-education, Patient/Caregiver Education & Training, ROM, ADL/Self-care Training, Equipment Evaluation, Education, & procurement, Balance Training, IADL Training, Gait Training, Home Exercise Program, Functional Mobility Training, Stair

## 2018-11-10 NOTE — H&P
mellitus ([address]     Urinary incontinence, mixed 10/11/2013       PastSurgical History:        Procedure Laterality Date    BREAST SURGERY      right breast bx 2012    CHOLECYSTECTOMY  2010    \"done with hyster    COLONOSCOPY N/A 10/24/2018    COLONOSCOPY POLYPECTOMY ABLATION/BIOPSY OF CECAL MASS performed by Edil Stevenson MD at [facility]  2010    [address] both ovaries\"    KNEE SURGERY Left 2000    [place] LAP,SURG,COLECTOMY, PARTIAL, W/ANAST N/A 10/25/2018    ROBOTIC ASSISTED  LAPAROSCOPIC RIGHT  HEMICOLECTOMY performed by Bettye Loja MD at [place] [address]  as a kid       Medications Prior to Admission:    Prior to Admission medications    Medication Sig Start Date End Date Taking? Authorizing Provider   metolazone (ZAROXOLYN) 5 MG tablet Take 0.5 tablets by mouth daily 11/9/18  Yes Raymond Mabry MD   losartan (COZAAR) 50 MG tablet TAKE 1 TABLET DAILY 11/8/18  Yes Cheryle Dikes, MD   pravastatin (PRAVACHOL) 20 MG tablet TAKE 1 TABLET DAILY 11/8/18  Yes Cheryle Dikes, MD   metFORMIN (GLUCOPHAGE) 1000 MG tablet TAKE 1 TABLET TWICE A DAY WITH MEALS 11/8/18  Yes Cheryle Dikes, MD   oxyCODONE-acetaminophen (PERCOCET) 5-325 MG per tablet Take 1 tablet by mouth every 4 hours as needed for Pain for up to 7 days. . 11/8/18 11/15/18 Yes Raymond Mabry MD   torsemide (DEMADEX) 20 MG tablet Take 1 tablet by mouth 2 times daily 11/8/18  Yes Raymond Mabry MD   penicillin GK infusion Infuse 3 Million Units intravenously continuous Compound per protocol. Patient taking differently: Infuse 18 Million Units intravenously continuous Compound per protocol.  11/8/18  Yes Raymond Mabry MD   vitamin C (ASCORBIC ACID) 500 MG tablet Take 500 mg by mouth daily   Yes Historical Provider, MD   CPAP Machine MISC by Does not apply route   Yes Historical Provider, MD   acetaminophen (TYLENOL) 500 MG tablet Take 1,000 mg by mouth every 6 hours as needed for Pain   Yes Historical Provider, MD   linagliptin (TRADJENTA) 5 MG tablet Take 1 adrenal nodule. This has attenuation characteristics compatible with lipid rich adenoma. No follow-up imaging is recommended. This measures 1.9 x 1.4 cm. GI/Bowel: Patient is status post right hemicolectomy, new since prior CT exam.  Residual colon appears unremarkable. Multiple prominent predominantly fluid-filled loops of small bowel throughout the abdomen measuring up to approximately 3.4 cm in caliber. No definite bowel wall thickening. Pelvis: Costello catheter within the urinary bladder which is collapsed around the Costello catheter, limiting evaluation. Urinary bladder appears grossly unremarkable. Prior hysterectomy. No adnexal masses. Peritoneum/Retroperitoneum: No ascites or focal fluid collections. No intraperitoneal free air. Atherosclerosis. Abdominal aorta is normal in caliber. Fat-containing periumbilical wall hernia redemonstrated. Bones/Soft Tissues: Subcutaneous edema about the abdomen, predominantly anteriorly to the mid to inferior abdomen/pelvis. No focal fluid collections, radiopaque foreign bodies or soft tissue gas. Multiple surgical skin staples anteriorly. No acute or suspicious bony abnormalities. Partially imaged subacute to chronic fracture involving lateral left 6th rib. Interval right hemicolectomy as compared to prior CT exam. Multiple distended small bowel loops in the abdomen, similar to postoperative KUB studies and most suggestive for postoperative ileus. Small bowel obstruction felt less likely, but continued follow-up suggested. Subcutaneous edema about the abdomen predominantly anteriorly to the mid to inferior abdomen/pelvis. This may reflect third spacing of fluid. No focal fluid collections, radiopaque foreign bodies or soft tissue gas. Persistent hazy airspace opacities along with some Kerley B-lines to the visualized lung bases which may be on the basis of mild pulmonary and interstitial edema.   Multifocal infectious or inflammatory infiltrates could have a similar appearance. Small bilateral pleural effusions, improved from prior exam. Left adrenal nodule with attenuation characteristics compatible with lipid rich adenoma. No follow-up imaging is recommended. Xr Chest Standard (2 Vw)    Result Date: 10/17/2018  EXAMINATION: TWO VIEWS OF THE CHEST 10/17/2018 8:21 am COMPARISON: Chest x-ray, 10/15/2018 HISTORY: ORDERING SYSTEM PROVIDED HISTORY: shortness of breath TECHNOLOGIST PROVIDED HISTORY: Reason for exam:->shortness of breath Ordering Physician Provided Reason for Exam: SOB x 1 week Acuity: Acute Type of Exam: Initial FINDINGS: The heart appears enlarged but stable with heavy atherosclerotic aortic calcification. Patchy airspace disease present at the bilateral lung bases. There is a small right-sided pleural effusion and moderate left-sided pleural effusion. There is perihilar vascular congestion with peribronchial cuffing and minor bibasilar airspace disease. No evidence of pneumothorax on either side. There is cephalization of the pulmonary vasculature. The above findings are compatible with mild pulmonary edema. Follow-up radiographs are recommended. Xr Chest Standard (2 Vw)    Result Date: 10/15/2018  EXAMINATION: TWO VIEWS OF THE CHEST 10/15/2018 4:59 pm COMPARISON: 09/15/2018 HISTORY: Severe shortness of breath x 3 days, fever, mild chest pain. COPD. FINDINGS: Unchanged mild cardiomegaly. Perihilar opacities suggest pulmonary edema. Small pleural effusions. No pneumothorax. Degenerative changes of the acromioclavicular joint. Mild cardiomegaly with small pleural effusions and suspected pulmonary edema, compatible with CHF.      Xr Abdomen (kub) (single Ap View)    Result Date: 10/31/2018  EXAMINATION: SINGLE SUPINE XRAY VIEW(S) OF THE ABDOMEN 10/30/2018 11:52 pm COMPARISON: 6 hours prior HISTORY: ORDERING SYSTEM PROVIDED HISTORY: NG placement TECHNOLOGIST PROVIDED HISTORY: Reason for exam:->NG placement Ordering Physician Provided SYSTEM PROVIDED HISTORY: SOB TECHNOLOGIST PROVIDED HISTORY: Reason for exam:->SOB Ordering Physician Provided Reason for Exam: SOB Acuity: Acute Type of Exam: Initial Relevant Medical/Surgical History: COPD; diabetes mellitus FINDINGS: NG tube, right upper extremity PICC line, right IJ central venous catheter remain in place. The heart and mediastinal structures are stable. Small pleural effusions with bibasilar atelectasis and mild perihilar edema persists. Persistent perihilar infiltrates/edema with small pleural effusions and bibasilar atelectasis. Xr Chest Portable    Result Date: 10/28/2018  EXAMINATION: SINGLE XRAY VIEW OF THE CHEST 10/28/2018 8:06 pm COMPARISON: Chest x-ray October 25, 2018 HISTORY: ORDERING SYSTEM PROVIDED HISTORY: short of breath TECHNOLOGIST PROVIDED HISTORY: Reason for exam:->short of breath Ordering Physician Provided Reason for Exam: short of breath Acuity: Acute Type of Exam: Initial Mechanism of Injury: short of breath Relevant Medical/Surgical History: short of breath FINDINGS: The heart is enlarged but grossly stable. Atherosclerotic changes of aorta. Enteric tube and endotracheal tube have been removed in the interval.  Right IJ central venous catheter remains in place with tip projecting over the superior vena cava. There silhouetting of the left hemidiaphragm most compatible with effusion atelectasis. Likely trace right pleural effusion. Central pulmonary vascular congestion and bilateral interstitial opacities present suggestive of pulmonary edema. No acute osseous abnormality identified. 1. Redemonstration of cardiomegaly and findings most compatible with pulmonary edema. Findings overall appear grossly similar. 2. Left effusion and atelectasis. 3. Likely trace right effusion and atelectasis. 4. Interval removal of endotracheal and enteric tubes.      Xr Chest Portable    Result Date: 10/25/2018  EXAMINATION: SINGLE XRAY VIEW OF THE CHEST 10/25/2018 5:05 pm COMPARISON: October 17, 2018 HISTORY: ORDERING SYSTEM PROVIDED HISTORY: central line and vent placement , patient in room 2107 TECHNOLOGIST PROVIDED HISTORY: Reason for exam:->central line and vent placement , patient in room 2107 Ordering Physician Provided Reason for Exam: central line and vent placement Acuity: Acute Type of Exam: Initial Additional signs and symptoms: na Relevant Medical/Surgical History: diabetes, hypertension, copd FINDINGS: The ETT is 5.5 cm above the roly. The feeding tube is inserted with its tip beyond the field of view. The right IJ catheter is seen with its tip in the superior vena cava. The cardiomediastinal silhouette is enlarged. There is moderate pulmonary vascular congestion. The bilateral costophrenic angles are excluded on this study. There is no evidence of pneumothorax. There is no acute osseous abnormality. Moderate pulmonary vascular congestion, progressed. Cardiomegaly, mildly increased. The bilateral costophrenic angles are excluded on this study. Us Retroperitoneal Limited    Result Date: 10/19/2018  EXAMINATION: ULTRASOUND OF THE KIDNEYS 10/19/2018 7:25 am COMPARISON: None. HISTORY: ORDERING SYSTEM PROVIDED HISTORY: RENAL FAILURE, ACUTE (KIDNEY INJURY) TECHNOLOGIST PROVIDED HISTORY: Ordering Physician Provided Reason for Exam: arf Acuity: Unknown Type of Exam: Initial Additional signs and symptoms: anemia, bacteremia, fever, dm, fever FINDINGS: The right kidney measures 13.4 in length and the left kidney measures 12.1 in length. Kidneys demonstrate normal cortical echogenicity. No hydronephrosis or intrarenal stones. A cyst is seen arising from the superior aspect of the right kidney measuring 3.2 cm. No acute sonographic abnormality identified of the kidneys. Xr Abdomen (2 Views)    Result Date: 10/30/2018  EXAMINATION: TWO XRAY VIEWS OF THE ABDOMEN 10/30/2018 4:11 pm COMPARISON: 10/28/2018.  HISTORY: ORDERING SYSTEM PROVIDED HISTORY: hypo bowel

## 2018-11-11 LAB
GLUCOSE BLD-MCNC: 127 MG/DL (ref 70–99)
GLUCOSE BLD-MCNC: 78 MG/DL (ref 70–99)

## 2018-11-11 PROCEDURE — 6370000000 HC RX 637 (ALT 250 FOR IP): Performed by: HOSPITALIST

## 2018-11-11 PROCEDURE — 82962 GLUCOSE BLOOD TEST: CPT

## 2018-11-11 PROCEDURE — 2580000003 HC RX 258: Performed by: HOSPITALIST

## 2018-11-11 PROCEDURE — 6370000000 HC RX 637 (ALT 250 FOR IP): Performed by: NURSE PRACTITIONER

## 2018-11-11 PROCEDURE — 6360000002 HC RX W HCPCS: Performed by: HOSPITALIST

## 2018-11-11 PROCEDURE — 94640 AIRWAY INHALATION TREATMENT: CPT

## 2018-11-11 PROCEDURE — 2580000003 HC RX 258: Performed by: NURSE PRACTITIONER

## 2018-11-11 PROCEDURE — 2700000000 HC OXYGEN THERAPY PER DAY

## 2018-11-11 PROCEDURE — 1200000002 HC SEMI PRIVATE SWING BED

## 2018-11-11 RX ADMIN — TRAZODONE HYDROCHLORIDE 100 MG: 50 TABLET ORAL at 23:09

## 2018-11-11 RX ADMIN — METOLAZONE 2.5 MG: 2.5 TABLET ORAL at 08:34

## 2018-11-11 RX ADMIN — TORSEMIDE 20 MG: 20 TABLET ORAL at 17:37

## 2018-11-11 RX ADMIN — GLIPIZIDE 10 MG: 10 TABLET ORAL at 08:34

## 2018-11-11 RX ADMIN — PRAVASTATIN SODIUM 20 MG: 10 TABLET ORAL at 22:15

## 2018-11-11 RX ADMIN — ATENOLOL 25 MG: 25 TABLET ORAL at 22:16

## 2018-11-11 RX ADMIN — IPRATROPIUM BROMIDE AND ALBUTEROL SULFATE 3 ML: .5; 3 SOLUTION RESPIRATORY (INHALATION) at 11:07

## 2018-11-11 RX ADMIN — METFORMIN HYDROCHLORIDE 1000 MG: 500 TABLET ORAL at 08:33

## 2018-11-11 RX ADMIN — OXYCODONE HYDROCHLORIDE AND ACETAMINOPHEN 1 TABLET: 5; 325 TABLET ORAL at 23:08

## 2018-11-11 RX ADMIN — IPRATROPIUM BROMIDE AND ALBUTEROL SULFATE 3 ML: .5; 3 SOLUTION RESPIRATORY (INHALATION) at 07:45

## 2018-11-11 RX ADMIN — MOMETASONE FUROATE AND FORMOTEROL FUMARATE DIHYDRATE 2 PUFF: 200; 5 AEROSOL RESPIRATORY (INHALATION) at 22:15

## 2018-11-11 RX ADMIN — SPIRONOLACTONE 50 MG: 50 TABLET ORAL at 08:34

## 2018-11-11 RX ADMIN — IPRATROPIUM BROMIDE AND ALBUTEROL SULFATE 3 ML: .5; 3 SOLUTION RESPIRATORY (INHALATION) at 19:14

## 2018-11-11 RX ADMIN — SODIUM CHLORIDE, PRESERVATIVE FREE 10 ML: 5 INJECTION INTRAVENOUS at 08:33

## 2018-11-11 RX ADMIN — TROSPIUM CHLORIDE 20 MG: 20 TABLET ORAL at 22:15

## 2018-11-11 RX ADMIN — TORSEMIDE 20 MG: 20 TABLET ORAL at 08:34

## 2018-11-11 RX ADMIN — ATENOLOL 25 MG: 25 TABLET ORAL at 08:34

## 2018-11-11 RX ADMIN — LOSARTAN POTASSIUM 50 MG: 50 TABLET ORAL at 08:34

## 2018-11-11 RX ADMIN — LINAGLIPTIN 5 MG: 5 TABLET, FILM COATED ORAL at 08:34

## 2018-11-11 RX ADMIN — PENICILLIN G POTASSIUM: 5000000 INJECTION, POWDER, FOR SOLUTION INTRAMUSCULAR; INTRAVENOUS at 06:41

## 2018-11-11 RX ADMIN — POTASSIUM CHLORIDE 10 MEQ: 10 TABLET, EXTENDED RELEASE ORAL at 08:34

## 2018-11-11 RX ADMIN — MOMETASONE FUROATE AND FORMOTEROL FUMARATE DIHYDRATE 2 PUFF: 200; 5 AEROSOL RESPIRATORY (INHALATION) at 08:33

## 2018-11-11 RX ADMIN — IPRATROPIUM BROMIDE AND ALBUTEROL SULFATE 3 ML: .5; 3 SOLUTION RESPIRATORY (INHALATION) at 15:49

## 2018-11-11 RX ADMIN — OXYCODONE HYDROCHLORIDE AND ACETAMINOPHEN 500 MG: 500 TABLET ORAL at 08:34

## 2018-11-11 ASSESSMENT — PAIN SCALES - GENERAL: PAINLEVEL_OUTOF10: 7

## 2018-11-11 ASSESSMENT — PAIN DESCRIPTION - PAIN TYPE: TYPE: SURGICAL PAIN

## 2018-11-11 ASSESSMENT — PAIN DESCRIPTION - LOCATION: LOCATION: ABDOMEN

## 2018-11-12 LAB
ANION GAP SERPL CALCULATED.3IONS-SCNC: 15 MMOL/L (ref 4–16)
BUN BLDV-MCNC: 33 MG/DL (ref 6–23)
CALCIUM SERPL-MCNC: 9.1 MG/DL (ref 8.3–10.6)
CHLORIDE BLD-SCNC: 101 MMOL/L (ref 99–110)
CO2: 29 MMOL/L (ref 21–32)
CREAT SERPL-MCNC: 1.1 MG/DL (ref 0.6–1.1)
CULTURE: NORMAL
CULTURE: NORMAL
ERYTHROCYTE SEDIMENTATION RATE: 103 MM/HR (ref 0–30)
GFR AFRICAN AMERICAN: >60 ML/MIN/1.73M2
GFR NON-AFRICAN AMERICAN: 50 ML/MIN/1.73M2
GLUCOSE BLD-MCNC: 142 MG/DL (ref 70–99)
HCT VFR BLD CALC: 22.1 % (ref 37–47)
HCT VFR BLD CALC: 49.6 % (ref 37–47)
HEMOGLOBIN: 15.8 GM/DL (ref 12.5–16)
HEMOGLOBIN: 7 GM/DL (ref 12.5–16)
HIGH SENSITIVE C-REACTIVE PROTEIN: 32.3 MG/L
Lab: NORMAL
Lab: NORMAL
MAGNESIUM: 1.3 MG/DL (ref 1.8–2.4)
MCH RBC QN AUTO: 28.8 PG (ref 27–31)
MCH RBC QN AUTO: 29 PG (ref 27–31)
MCHC RBC AUTO-ENTMCNC: 31.7 % (ref 32–36)
MCHC RBC AUTO-ENTMCNC: 31.9 % (ref 32–36)
MCV RBC AUTO: 90.9 FL (ref 78–100)
MCV RBC AUTO: 91.2 FL (ref 78–100)
PDW BLD-RTO: 13.9 % (ref 11.7–14.9)
PDW BLD-RTO: 14.7 % (ref 11.7–14.9)
PLATELET # BLD: 155 K/CU MM (ref 140–440)
PLATELET # BLD: 442 K/CU MM (ref 140–440)
PMV BLD AUTO: 7.6 FL (ref 7.5–11.1)
PMV BLD AUTO: 8.4 FL (ref 7.5–11.1)
POTASSIUM SERPL-SCNC: 4 MMOL/L (ref 3.5–5.1)
RBC # BLD: 2.43 M/CU MM (ref 4.2–5.4)
RBC # BLD: 5.44 M/CU MM (ref 4.2–5.4)
REPORT STATUS: NORMAL
REPORT STATUS: NORMAL
SODIUM BLD-SCNC: 145 MMOL/L (ref 135–145)
SPECIMEN: NORMAL
SPECIMEN: NORMAL
WBC # BLD: 12 K/CU MM (ref 4–10.5)
WBC # BLD: 4 K/CU MM (ref 4–10.5)

## 2018-11-12 PROCEDURE — 85027 COMPLETE CBC AUTOMATED: CPT

## 2018-11-12 PROCEDURE — 97110 THERAPEUTIC EXERCISES: CPT

## 2018-11-12 PROCEDURE — 2700000000 HC OXYGEN THERAPY PER DAY

## 2018-11-12 PROCEDURE — 2580000003 HC RX 258: Performed by: NURSE PRACTITIONER

## 2018-11-12 PROCEDURE — 85652 RBC SED RATE AUTOMATED: CPT

## 2018-11-12 PROCEDURE — 86141 C-REACTIVE PROTEIN HS: CPT

## 2018-11-12 PROCEDURE — 1200000002 HC SEMI PRIVATE SWING BED

## 2018-11-12 PROCEDURE — 6370000000 HC RX 637 (ALT 250 FOR IP): Performed by: HOSPITALIST

## 2018-11-12 PROCEDURE — 6370000000 HC RX 637 (ALT 250 FOR IP): Performed by: NURSE PRACTITIONER

## 2018-11-12 PROCEDURE — 80048 BASIC METABOLIC PNL TOTAL CA: CPT

## 2018-11-12 PROCEDURE — 6360000002 HC RX W HCPCS: Performed by: HOSPITALIST

## 2018-11-12 PROCEDURE — 2580000003 HC RX 258: Performed by: HOSPITALIST

## 2018-11-12 PROCEDURE — 83735 ASSAY OF MAGNESIUM: CPT

## 2018-11-12 PROCEDURE — 94640 AIRWAY INHALATION TREATMENT: CPT

## 2018-11-12 PROCEDURE — 97116 GAIT TRAINING THERAPY: CPT

## 2018-11-12 RX ADMIN — TRAZODONE HYDROCHLORIDE 100 MG: 50 TABLET ORAL at 23:25

## 2018-11-12 RX ADMIN — ATENOLOL 25 MG: 25 TABLET ORAL at 21:18

## 2018-11-12 RX ADMIN — GLIPIZIDE 10 MG: 10 TABLET ORAL at 08:12

## 2018-11-12 RX ADMIN — SPIRONOLACTONE 50 MG: 50 TABLET ORAL at 08:12

## 2018-11-12 RX ADMIN — MOMETASONE FUROATE AND FORMOTEROL FUMARATE DIHYDRATE 2 PUFF: 200; 5 AEROSOL RESPIRATORY (INHALATION) at 21:17

## 2018-11-12 RX ADMIN — IPRATROPIUM BROMIDE AND ALBUTEROL SULFATE 3 ML: .5; 3 SOLUTION RESPIRATORY (INHALATION) at 07:56

## 2018-11-12 RX ADMIN — ATENOLOL 25 MG: 25 TABLET ORAL at 08:12

## 2018-11-12 RX ADMIN — IPRATROPIUM BROMIDE AND ALBUTEROL SULFATE 3 ML: .5; 3 SOLUTION RESPIRATORY (INHALATION) at 19:54

## 2018-11-12 RX ADMIN — OXYCODONE HYDROCHLORIDE AND ACETAMINOPHEN 1 TABLET: 5; 325 TABLET ORAL at 23:25

## 2018-11-12 RX ADMIN — PRAVASTATIN SODIUM 20 MG: 10 TABLET ORAL at 21:17

## 2018-11-12 RX ADMIN — MOMETASONE FUROATE AND FORMOTEROL FUMARATE DIHYDRATE 2 PUFF: 200; 5 AEROSOL RESPIRATORY (INHALATION) at 08:12

## 2018-11-12 RX ADMIN — LINAGLIPTIN 5 MG: 5 TABLET, FILM COATED ORAL at 08:12

## 2018-11-12 RX ADMIN — OXYCODONE HYDROCHLORIDE AND ACETAMINOPHEN 500 MG: 500 TABLET ORAL at 08:12

## 2018-11-12 RX ADMIN — METOLAZONE 2.5 MG: 2.5 TABLET ORAL at 08:12

## 2018-11-12 RX ADMIN — SODIUM CHLORIDE, PRESERVATIVE FREE 10 ML: 5 INJECTION INTRAVENOUS at 08:12

## 2018-11-12 RX ADMIN — POTASSIUM CHLORIDE 10 MEQ: 10 TABLET, EXTENDED RELEASE ORAL at 08:12

## 2018-11-12 RX ADMIN — TORSEMIDE 20 MG: 20 TABLET ORAL at 08:12

## 2018-11-12 RX ADMIN — TORSEMIDE 20 MG: 20 TABLET ORAL at 17:46

## 2018-11-12 RX ADMIN — IPRATROPIUM BROMIDE AND ALBUTEROL SULFATE 3 ML: .5; 3 SOLUTION RESPIRATORY (INHALATION) at 11:20

## 2018-11-12 RX ADMIN — TROSPIUM CHLORIDE 20 MG: 20 TABLET ORAL at 21:17

## 2018-11-12 RX ADMIN — LOSARTAN POTASSIUM 50 MG: 50 TABLET ORAL at 08:12

## 2018-11-12 RX ADMIN — PENICILLIN G POTASSIUM: 5000000 INJECTION, POWDER, FOR SOLUTION INTRAMUSCULAR; INTRAVENOUS at 08:11

## 2018-11-12 RX ADMIN — METFORMIN HYDROCHLORIDE 1000 MG: 500 TABLET ORAL at 08:12

## 2018-11-12 RX ADMIN — IPRATROPIUM BROMIDE AND ALBUTEROL SULFATE 3 ML: .5; 3 SOLUTION RESPIRATORY (INHALATION) at 14:56

## 2018-11-12 ASSESSMENT — PAIN SCALES - GENERAL
PAINLEVEL_OUTOF10: 7
PAINLEVEL_OUTOF10: 3
PAINLEVEL_OUTOF10: 8
PAINLEVEL_OUTOF10: 5

## 2018-11-12 NOTE — DISCHARGE SUMMARY
Lalo Course 1955 [id number]  PCP:  Ximena Olmos MD    Admit date: 10/18/2018  Admitting Physician: Ramila Díaz MD    Discharge date: 11/9/18 Discharge Physician: Chiquis Escobar MD         Discharge Diagnoses Include:  1. Colon Cancer s/p Robotic assisted colon resection 10/25/18  2. Post operative ileus  3. Strp bovis endocarditis  4. Acute on chronic blood loss anemia  5. DM 2    HPI by admitting physician  The patient is a 65 y.o. female with COPD, DM, CVA, carotid stenosis, who presented to ED with 1 week of SOB, fever, chills. Her SOB was present at rest and got worse on moving. She has no associated chest pain or palpitation. She admits having orthopnea. She also has had leg swelling for the past 3 months, and has been on lasix. She has never been diagnosed as CHF but has h/o LVH and heart murmur. She had fever up to 103 at home. She was taking tylenol with relief. She was not on antibiotics. She had cough but no sputum. She was recently treated as \"cellulitis\" of her leg last month. Currently she denies any skin symptoms or rash. She denies recent dental infection or dental procedure. She presented to ED in [place] on 10/15, and she was admitted as COPD exacerbation. Admission blood culture later become positive for Streptococcal bovis, 2 out of 2 sets. She was transferred to get JYOTI to evaluate if she has endocarditis. She has not had colonoscopy in the past.  She is scheduled for outpatient colonoscopy next week by Dr. Blanca Ibarra, as a part of work up for anemia. She says she is constipated but has not felt worsening recently. She denies bloody stool, and her screening stool OB have been negative    Hospital Course best explained per problem list is as follows    6. Colon cancer of the cecum status post robotic assisted right colon resection by surgery on 10/25/18, postop day 6     2.  Partial ileus probably due to narcotic increase post surgery, she does have hypoactive bowel characteristics compatible with lipid rich adenoma. No follow-up imaging is recommended. This measures 1.9 x 1.4 cm. GI/Bowel: Patient is status post right hemicolectomy, new since prior CT exam.  Residual colon appears unremarkable. Multiple prominent predominantly fluid-filled loops of small bowel throughout the abdomen measuring up to approximately 3.4 cm in caliber. No definite bowel wall thickening. Pelvis: Costello catheter within the urinary bladder which is collapsed around the Costello catheter, limiting evaluation. Urinary bladder appears grossly unremarkable. Prior hysterectomy. No adnexal masses. Peritoneum/Retroperitoneum: No ascites or focal fluid collections. No intraperitoneal free air. Atherosclerosis. Abdominal aorta is normal in caliber. Fat-containing periumbilical wall hernia redemonstrated. Bones/Soft Tissues: Subcutaneous edema about the abdomen, predominantly anteriorly to the mid to inferior abdomen/pelvis. No focal fluid collections, radiopaque foreign bodies or soft tissue gas. Multiple surgical skin staples anteriorly. No acute or suspicious bony abnormalities. Partially imaged subacute to chronic fracture involving lateral left 6th rib. Interval right hemicolectomy as compared to prior CT exam. Multiple distended small bowel loops in the abdomen, similar to postoperative KUB studies and most suggestive for postoperative ileus. Small bowel obstruction felt less likely, but continued follow-up suggested. Subcutaneous edema about the abdomen predominantly anteriorly to the mid to inferior abdomen/pelvis. This may reflect third spacing of fluid. No focal fluid collections, radiopaque foreign bodies or soft tissue gas. Persistent hazy airspace opacities along with some Kerley B-lines to the visualized lung bases which may be on the basis of mild pulmonary and interstitial edema. Multifocal infectious or inflammatory infiltrates could have a similar appearance.  Small bilateral distal colon. A 1.4 cm nodule near the terminal ileum, possibly retained stool or polyp. No annular mass or evidence of obstruction. No significant inflammatory change. 2. Bilateral pleural effusions. Ground-glass opacification throughout the lung bases, possibly edema. Additional findings suggesting third-spacing of fluid with mild anasarca. 3. Indeterminate left adrenal nodule measuring 2.4 cm maximally. 4. Moderate atherosclerotic change in the aortoiliac circulation. Significant eccentric plaque in the infrarenal aorta with luminal narrowing, possibly hemodynamically significant. Xr Chest Portable    Result Date: 11/1/2018  EXAMINATION: SINGLE XRAY VIEW OF THE CHEST 11/1/2018 3:13 pm COMPARISON: Chest radiograph 11/01/2018. HISTORY: ORDERING SYSTEM PROVIDED HISTORY: NG placement verification TECHNOLOGIST PROVIDED HISTORY: Reason for exam:->NG placement verification Ordering Physician Provided Reason for Exam: ng tube placement Acuity: Unknown Type of Exam: Subsequent/Follow-up FINDINGS: Single view provided. Stable right CVC with tip in the superior vena cava. Enteric tube crosses the GE junction with the side port at the level of the cardia and the tip extending inferiorly off the image. Stable prominent mediastinal and cardiac silhouettes. There is stable bilateral diffuse interstitial edema with bilateral lower lobe and perihilar peribronchial consolidation. Probable bilateral pleural effusions with associated atelectasis. No pneumothorax or free subdiaphragmatic air. 1. Stable pleural effusions with atelectasis and diffuse interstitial pulmonary edema. 2. Stable bilateral perihilar and lower lobe peribronchial consolidation. 3. Enteric tube in the stomach with the side port at the level of the cardia.      Xr Chest Portable    Result Date: 11/1/2018  EXAMINATION: SINGLE XRAY VIEW OF THE CHEST 11/1/2018 7:18 am COMPARISON: 10/28/2018 HISTORY: ORDERING SYSTEM PROVIDED HISTORY: SOB TECHNOLOGIST

## 2018-11-12 NOTE — PROGRESS NOTES
Patients meds were in medicine cup on bedside table, dietary placed tray on table and the medicine cup fell off and pills went on floor. New meds pulled, spoke with [address] in pharmacy regarding rescanning meds, she stated to double check them and administer the medications.

## 2018-11-12 NOTE — PLAN OF CARE
Problem: Pain:  Goal: Pain level will decrease  Pain level will decrease   Outcome: Ongoing    Goal: Control of acute pain  Control of acute pain   Outcome: Ongoing    Goal: Control of chronic pain  Control of chronic pain   Outcome: Ongoing      Problem: IP MOBILITY  Goal: LTG - patient will be able to go up and down a curb/step with the appropriate device  Outcome: Ongoing    Goal: LTG - patient will demonstrate kitchen mobility  Outcome: Ongoing    Goal: LTG - patient will ambulate household distance  Outcome: Ongoing    Goal: LTG - patient will demonstrate safe mobility requirements  Outcome: Ongoing    Goal: STG - patient will ambulate up and down a curb/step  Outcome: Ongoing    Goal: STG - patient will ascend and descend a flight of stairs  Outcome: Ongoing      Problem: Falls - Risk of:  Goal: Will remain free from falls  Will remain free from falls   Outcome: Ongoing    Goal: Absence of physical injury  Absence of physical injury   Outcome: Ongoing

## 2018-11-12 NOTE — PROGRESS NOTES
Occupational Therapy  Physical Rehabilitation: OCCUPATIONAL THERAPY     [x] daily progress note       [] discharge       Patient Name:  Nelsy Chaney   :  1955 MRN: [id number]  Room:  [address] [place] [address] Date of Admission: 2018  Rehabilitation Diagnosis:   Physical debility [R53.81]       Date 2018       Day of ARU Week:  4   Time IN/OUT 1145/1210   Individual Tx Minutes 25   Group Tx Minutes    Co-Treat Minutes    Concurrent Tx Minutes    TOTAL Tx Time Mins    Variance Time    Variance Time []   Refusal due to:     []   Medical hold/reason:    []   Illness   []   Off Unit for test/procedure  []   Extra time needed to complete task  []   Therapeutic need  []   Other (specify):   Restrictions Restrictions/Precautions: Fall Risk         Communication with other providers: [x]   OK to see per nursing:     []   Spoke with team member regarding:      Subjective observations and cognitive status:      Pain level/location:   0 /10       Location:    Discharge recommendations  Anticipated discharge date: TBD  Destination: []home alone   []home alone w assist prn   [] home w/ family    [] Continuous supervision       []SNF    [] Assisted living     [] Other:   Continued therapy: []HHC OT  []OUTPATIENT  OT   [] No Further OT  Equipment needs:TBD     Toileting:   x       Toilet Transfers:   x  Device Used:    []   Standard Toilet         []   Grab Bars           []  Bedside Commode       []   Elevated Toilet          []   Other:        Bed Mobility:           [x]   Pt received out of bed   Rolling R/L:  x  Scooting:  x  Supine --> Sit:  x  Sit --> Supine:  x    Transfers:    Sit--> Stand:  S  Stand --> Sit:   S  Stand-Pivot:  x  Other:    Assistive device required for transfer:  RW      Functional Mobility:    Assistance:  S had just returned from walking down [address] with sister; OT   Device:   [x]   Negrito Barajas     []   Standard Leyva Shannon []   Wheelchair        []   Brenda Vanegas       []   Taylor Carrasquillo         [] Cardiac Walker       []   Other:        Homemaking Tasks:  x    Additional Therapeutic activities/exercises completed this date:     []   ADL Training   []   Balance/Postural training     []   Bed/Transfer Training   []   Endurance Training   []   Neuromuscular Re-ed   []   Nu-step:  Time:        Level:         #Steps:       []   Rebounder:    []  Seated     []  Standing        []   Supine Ther Ex (reps/sets):     [x]   Seated Ther Ex (reps/sets):  10 reps of cane ex shoulder flex/ext, abd/add, elbow flex/ext, wrist ext/flex, rowing; hip flex exr, knee flex/ext, ankle pumps   []   Standing Ther Ex (reps/sets):     []   Other:PLB techniques      Comments:      Patient/Caregiver Education and Training:   []   [employer] Equipment Use  []   Bed Mobility/Transfer Technique/Safety  []   Energy Conservation Tips  []   Family training  [x]   Postural Awareness  [x]   Safety During Functional Activities  [x]   Reinforced Patient's Precautions   []   Progress was updated and reviewed in Rehabtracker with patient and/or family this         date.     Treatment Plan for Next Sessionadls or exercise or transfers    Assessment:        Treatment/Activity Tolerance:   [x] Tolerated treatment with no adverse effects    [] Patient limited by fatigue  [] Patient limited by pain   [] Patient limited by medical complications:    [] Adverse reaction to Tx:   [] Significant change in status    Safety:       []  bed alarm set    []  chair alarm set    []  Pt refused alarms                []  Telesitter activated      []  Gait belt used during tx session      []other:       Number of Minutes/Billable Intervention  Therapeutic Exercise 25   ADL Self-care    Neuro Re-Ed    Therapeutic Activity    Group    Other:    TOTAL 25       Social History  Social/Functional History  Lives With: Alone  Type of Home: House  Home Layout: Two level, Able to Live on Main level with bedroom/bathroom  Home Access: Stairs to enter with rails  Entrance Stairs - Patient/Caregiver Education & Training, Stair training, Home Management Training, Safety Education & Training, Equipment Evaluation, Education, & procurement, Positioning    Electronically signed by   Ly Hsu,  11/12/2018, 6:14 PM

## 2018-11-12 NOTE — FLOWSHEET NOTE
Cold/hotpack  [ ] Iontophoresis   [ ] Instruction in HEP     [ ] Will Pluck   [ ] Manual Therapy   [ ] Aquatic Therapy     Other Therapeutic Activities:  None    Home Exercise Program/Education provided to patient: Nothing new      Objective Findings:    Date: 18 Date:  Date:  Date:  Date:    Bed mobility Supine to Sit : Mod I       Sit to stand transfer SBA       Stand to sit transfer SBA       Commode transfer Not performed       Standing tolerance Not performed       Ambulation 150' x2 with RW with CGA and assist for IV pole       Stairs Not performed         Communication with other providers:  Nothing new    Adverse reactions to treatment:  None    Treatment/Activity Tolerance:   [ ] Patient limited by fatigue [ ] Patient limited by pain [ ] Patient limited by other medical complications [x ] Patient tolerated therapy well  [ ] Other:     Post Tx Pain Ratin /10     Safety Precautions:   [ ] left in bed  [x ] left in chair [x ] call light within reach  [ ] bed alarm on  [ ] personal alarm on  [ ] other staff present:    Plan:   [x ] Continue per plan of care [ ] Sharla Damian current plan   [ ] Plan of care initiated [ ] Jennifer Cade pending MD visit [ ] Discharge     Plan for Next Session:     Time In: 1510  Time Out: 1540  Timed Code Treatment Minutes: 30'  Total Treatment Minutes: 27'  Billed Units:  13' TE, 15' Gait    Signed: Dayron Urias PTA 2018, 3:56 PM

## 2018-11-13 LAB — GLUCOSE BLD-MCNC: 116 MG/DL (ref 70–99)

## 2018-11-13 PROCEDURE — 97110 THERAPEUTIC EXERCISES: CPT

## 2018-11-13 PROCEDURE — 6360000002 HC RX W HCPCS: Performed by: HOSPITALIST

## 2018-11-13 PROCEDURE — 6370000000 HC RX 637 (ALT 250 FOR IP): Performed by: HOSPITALIST

## 2018-11-13 PROCEDURE — 1200000002 HC SEMI PRIVATE SWING BED

## 2018-11-13 PROCEDURE — 2700000000 HC OXYGEN THERAPY PER DAY

## 2018-11-13 PROCEDURE — 97530 THERAPEUTIC ACTIVITIES: CPT

## 2018-11-13 PROCEDURE — 82962 GLUCOSE BLOOD TEST: CPT

## 2018-11-13 PROCEDURE — 6370000000 HC RX 637 (ALT 250 FOR IP): Performed by: NURSE PRACTITIONER

## 2018-11-13 PROCEDURE — 2580000003 HC RX 258: Performed by: HOSPITALIST

## 2018-11-13 PROCEDURE — 94640 AIRWAY INHALATION TREATMENT: CPT

## 2018-11-13 PROCEDURE — 97535 SELF CARE MNGMENT TRAINING: CPT

## 2018-11-13 RX ADMIN — MOMETASONE FUROATE AND FORMOTEROL FUMARATE DIHYDRATE 2 PUFF: 200; 5 AEROSOL RESPIRATORY (INHALATION) at 09:14

## 2018-11-13 RX ADMIN — OXYCODONE HYDROCHLORIDE AND ACETAMINOPHEN 500 MG: 500 TABLET ORAL at 08:40

## 2018-11-13 RX ADMIN — IPRATROPIUM BROMIDE AND ALBUTEROL SULFATE 3 ML: .5; 3 SOLUTION RESPIRATORY (INHALATION) at 21:40

## 2018-11-13 RX ADMIN — METOLAZONE 2.5 MG: 2.5 TABLET ORAL at 08:41

## 2018-11-13 RX ADMIN — POTASSIUM CHLORIDE 10 MEQ: 10 TABLET, EXTENDED RELEASE ORAL at 08:41

## 2018-11-13 RX ADMIN — IPRATROPIUM BROMIDE AND ALBUTEROL SULFATE 3 ML: .5; 3 SOLUTION RESPIRATORY (INHALATION) at 14:46

## 2018-11-13 RX ADMIN — OXYCODONE HYDROCHLORIDE AND ACETAMINOPHEN 1 TABLET: 5; 325 TABLET ORAL at 23:02

## 2018-11-13 RX ADMIN — IPRATROPIUM BROMIDE AND ALBUTEROL SULFATE 3 ML: .5; 3 SOLUTION RESPIRATORY (INHALATION) at 12:11

## 2018-11-13 RX ADMIN — TRAZODONE HYDROCHLORIDE 100 MG: 50 TABLET ORAL at 23:02

## 2018-11-13 RX ADMIN — IPRATROPIUM BROMIDE AND ALBUTEROL SULFATE 3 ML: .5; 3 SOLUTION RESPIRATORY (INHALATION) at 07:55

## 2018-11-13 RX ADMIN — GLIPIZIDE 10 MG: 10 TABLET ORAL at 08:40

## 2018-11-13 RX ADMIN — LINAGLIPTIN 5 MG: 5 TABLET, FILM COATED ORAL at 08:41

## 2018-11-13 RX ADMIN — LOSARTAN POTASSIUM 50 MG: 50 TABLET ORAL at 08:40

## 2018-11-13 RX ADMIN — TORSEMIDE 20 MG: 20 TABLET ORAL at 08:41

## 2018-11-13 RX ADMIN — ATENOLOL 25 MG: 25 TABLET ORAL at 21:18

## 2018-11-13 RX ADMIN — METFORMIN HYDROCHLORIDE 1000 MG: 500 TABLET ORAL at 08:41

## 2018-11-13 RX ADMIN — ATENOLOL 25 MG: 25 TABLET ORAL at 08:41

## 2018-11-13 RX ADMIN — PENICILLIN G POTASSIUM: 5000000 INJECTION, POWDER, FOR SOLUTION INTRAMUSCULAR; INTRAVENOUS at 08:40

## 2018-11-13 RX ADMIN — SPIRONOLACTONE 50 MG: 50 TABLET ORAL at 08:40

## 2018-11-13 RX ADMIN — PRAVASTATIN SODIUM 20 MG: 10 TABLET ORAL at 21:17

## 2018-11-13 RX ADMIN — TROSPIUM CHLORIDE 20 MG: 20 TABLET ORAL at 21:17

## 2018-11-13 RX ADMIN — TORSEMIDE 20 MG: 20 TABLET ORAL at 17:53

## 2018-11-13 ASSESSMENT — PAIN SCALES - GENERAL
PAINLEVEL_OUTOF10: 5
PAINLEVEL_OUTOF10: 7

## 2018-11-13 ASSESSMENT — PAIN DESCRIPTION - PROGRESSION
CLINICAL_PROGRESSION: NOT CHANGED

## 2018-11-13 NOTE — FLOWSHEET NOTE
Diet/Swallowing Issues DIET CARB CONTROL; Low Sodium (2 GM); Daily Fluid Restriction: 1500 ml   Communication with other providers: [x] Ok to see per nursing at morning huddle  [] Medical hold and reason  [x] OT co treat    Subjective observations:  Pt presented in gym with OT. She reports she is tired, but doing well and pleased with her progress so far. She reports tomorrow is her last day on IV antibiotics. [x]   Gait belt used during tx session   Pain level/location: Pre-tx 0/10  Post-tx 1/10    Bed Mobility   Not performed     Transfers Sit to stand Mod I  Stand to sit Mod I  Commode not performed   Standing Tolerance Not performed   Amb/Locomotion: AD/Distance/Assist Pt ambulated 160 feet with mod I using RW            Steps (#)/Assist/Rails/AD Not performed   Ramp:AD/Assist Not performed   Curb:height AD/Assist Not performed   Uneven:type AD/Assist Not performed   W/C distance/Assist   Not performed   Strategies that improved performance: Pt required rest breaks. Barriers to progress/participation: none   Alarm placed/where? Fall Risk  [x] left sitting EOB   [ ] left in chair [x] call light within reach  [ ] bed alarm on  [ ] personal alarm on [ ] Anu Brown  [ ] other staff present:   Discharge recommendations  Anticipated discharge date:  TBD  Destination: []home alone   [x]home alone with assist prn  []Continuous supervision  []SNF  [] Assisted living   Continued therapy: [x][facility] PT  []OUTPATIENT  PT   [] No Further PT  Equipment needs: see eval     Therapeutic activities/exercises completed this date:  Pt required several rest breaks today.      Modality/intervention used:   [x] Therapeutic Exercise    [ ] Modalities:   [x] Therapeutic Activity    [ ] Ultrasound   [ ] Elec Stim   [ ] Gait Training     [ ] Cervical Traction  [ ] Lumbar Traction   [ ] Neuromuscular Re-education   [ ] Cold/hotpack  [ ] Iontophoresis   [ ] Instruction in HEP     [ ] Meliton Christos   [ ] Manual Therapy   [ ] Aquatic Therapy

## 2018-11-13 NOTE — PROGRESS NOTES
Assistance: Independent  Homemaking Assistance: Needs assistance  Homemaking Responsibilities: Yes  Meal Prep Responsibility: Primary  Laundry Responsibility: No (pt kids do laundry)  Cleaning Responsibility: Secondary (pt reports she does what she can and gets A with rest)  Shopping Responsibility: No  Ambulation Assistance: Needs assistance (pt ambualtes with RW)  Transfer Assistance: Independent  Active : No  Leisure & Hobbies: Pt reports she likes to work in the [employer], work puzzles, read, and play with granddaughter. IADL Comments: Pt reports I with ADLs including dressing and bathing. Objective                                                                                    Goals:  (Update in navigator)  Short term goals  Time Frame for Short term goals: discharge  Short term goal 1: Pt will perform functional transfers Jennifer w/ RW w/o LOB or falls using good EC and PLB  Short term goal 2: Pt will be MI/I LB adls pacing herself  Short term goal 3: Pt will be able to stand for 5-7 min MI w/o LOB or falls w/o decreased O2 Sat  Short term goal 4: Pt will perform LE bathing/dressing Independent  Short term goal 5: Pt will perform functional task in stand ~10 minutes to increase standing tolerance w/o decrease of 02 saturation:   :        Plan of Care                                                                              Times per week: 5 days per week for a minimum of 60 minutes/day plus group as appropriate for 60 minutes.   Treatment to include Plan  Times per week: 4+  Times per day: Daily  Current Treatment Recommendations: Strengthening, Balance Training, Functional Mobility Training, Endurance Training, Self-Care / ADL, Patient/Caregiver Education & Training, Stair training, Home Management Training, Safety Education & Training, Equipment Evaluation, Education, & procurement, Positioning    Electronically signed by   Ubaldo DAWSON/L [email or web address] 11/13/2018, 3:52 PM

## 2018-11-14 LAB
ANION GAP SERPL CALCULATED.3IONS-SCNC: 14 MMOL/L (ref 4–16)
BUN BLDV-MCNC: 34 MG/DL (ref 6–23)
CALCIUM SERPL-MCNC: 9.2 MG/DL (ref 8.3–10.6)
CHLORIDE BLD-SCNC: 100 MMOL/L (ref 99–110)
CO2: 30 MMOL/L (ref 21–32)
CREAT SERPL-MCNC: 1.2 MG/DL (ref 0.6–1.1)
GFR AFRICAN AMERICAN: 55 ML/MIN/1.73M2
GFR NON-AFRICAN AMERICAN: 46 ML/MIN/1.73M2
GLUCOSE BLD-MCNC: 112 MG/DL (ref 70–99)
GLUCOSE BLD-MCNC: 133 MG/DL (ref 70–99)
HCT VFR BLD CALC: 22 % (ref 37–47)
HEMOGLOBIN: 6.2 GM/DL (ref 12.5–16)
MCH RBC QN AUTO: 28.1 PG (ref 27–31)
MCHC RBC AUTO-ENTMCNC: 28.2 % (ref 32–36)
MCV RBC AUTO: 99.5 FL (ref 78–100)
PDW BLD-RTO: 14.9 % (ref 11.7–14.9)
PLATELET # BLD: 278 K/CU MM (ref 140–440)
PMV BLD AUTO: 8.5 FL (ref 7.5–11.1)
POTASSIUM SERPL-SCNC: 4.3 MMOL/L (ref 3.5–5.1)
RBC # BLD: 2.21 M/CU MM (ref 4.2–5.4)
SODIUM BLD-SCNC: 144 MMOL/L (ref 135–145)
WBC # BLD: 11 K/CU MM (ref 4–10.5)

## 2018-11-14 PROCEDURE — 86922 COMPATIBILITY TEST ANTIGLOB: CPT

## 2018-11-14 PROCEDURE — 36415 COLL VENOUS BLD VENIPUNCTURE: CPT

## 2018-11-14 PROCEDURE — 6370000000 HC RX 637 (ALT 250 FOR IP): Performed by: NURSE PRACTITIONER

## 2018-11-14 PROCEDURE — 99024 POSTOP FOLLOW-UP VISIT: CPT | Performed by: SURGERY

## 2018-11-14 PROCEDURE — 86900 BLOOD TYPING SEROLOGIC ABO: CPT

## 2018-11-14 PROCEDURE — 2580000003 HC RX 258: Performed by: NURSE PRACTITIONER

## 2018-11-14 PROCEDURE — 94640 AIRWAY INHALATION TREATMENT: CPT

## 2018-11-14 PROCEDURE — 85027 COMPLETE CBC AUTOMATED: CPT

## 2018-11-14 PROCEDURE — 82962 GLUCOSE BLOOD TEST: CPT

## 2018-11-14 PROCEDURE — 6360000002 HC RX W HCPCS: Performed by: HOSPITALIST

## 2018-11-14 PROCEDURE — 2580000003 HC RX 258: Performed by: FAMILY MEDICINE

## 2018-11-14 PROCEDURE — 6370000000 HC RX 637 (ALT 250 FOR IP): Performed by: HOSPITALIST

## 2018-11-14 PROCEDURE — 86850 RBC ANTIBODY SCREEN: CPT

## 2018-11-14 PROCEDURE — 1200000002 HC SEMI PRIVATE SWING BED

## 2018-11-14 PROCEDURE — 97110 THERAPEUTIC EXERCISES: CPT

## 2018-11-14 PROCEDURE — P9016 RBC LEUKOCYTES REDUCED: HCPCS

## 2018-11-14 PROCEDURE — 36430 TRANSFUSION BLD/BLD COMPNT: CPT

## 2018-11-14 PROCEDURE — 2700000000 HC OXYGEN THERAPY PER DAY

## 2018-11-14 PROCEDURE — 2580000003 HC RX 258: Performed by: HOSPITALIST

## 2018-11-14 PROCEDURE — 86901 BLOOD TYPING SEROLOGIC RH(D): CPT

## 2018-11-14 PROCEDURE — 80048 BASIC METABOLIC PNL TOTAL CA: CPT

## 2018-11-14 RX ORDER — 0.9 % SODIUM CHLORIDE 0.9 %
250 INTRAVENOUS SOLUTION INTRAVENOUS ONCE
Status: COMPLETED | OUTPATIENT
Start: 2018-11-14 | End: 2018-11-15

## 2018-11-14 RX ADMIN — OXYCODONE HYDROCHLORIDE AND ACETAMINOPHEN 500 MG: 500 TABLET ORAL at 09:12

## 2018-11-14 RX ADMIN — IPRATROPIUM BROMIDE AND ALBUTEROL SULFATE 3 ML: .5; 3 SOLUTION RESPIRATORY (INHALATION) at 07:45

## 2018-11-14 RX ADMIN — IPRATROPIUM BROMIDE AND ALBUTEROL SULFATE 3 ML: .5; 3 SOLUTION RESPIRATORY (INHALATION) at 15:30

## 2018-11-14 RX ADMIN — IPRATROPIUM BROMIDE AND ALBUTEROL SULFATE 3 ML: .5; 3 SOLUTION RESPIRATORY (INHALATION) at 11:10

## 2018-11-14 RX ADMIN — MOMETASONE FUROATE AND FORMOTEROL FUMARATE DIHYDRATE 2 PUFF: 200; 5 AEROSOL RESPIRATORY (INHALATION) at 09:13

## 2018-11-14 RX ADMIN — SODIUM CHLORIDE 250 ML: 9 INJECTION, SOLUTION INTRAVENOUS at 15:00

## 2018-11-14 RX ADMIN — LOSARTAN POTASSIUM 50 MG: 50 TABLET ORAL at 09:12

## 2018-11-14 RX ADMIN — LINAGLIPTIN 5 MG: 5 TABLET, FILM COATED ORAL at 09:12

## 2018-11-14 RX ADMIN — TROSPIUM CHLORIDE 20 MG: 20 TABLET ORAL at 22:59

## 2018-11-14 RX ADMIN — SODIUM CHLORIDE, PRESERVATIVE FREE 10 ML: 5 INJECTION INTRAVENOUS at 09:13

## 2018-11-14 RX ADMIN — ACETAMINOPHEN 1000 MG: 500 TABLET, FILM COATED ORAL at 15:41

## 2018-11-14 RX ADMIN — GLIPIZIDE 10 MG: 10 TABLET ORAL at 07:01

## 2018-11-14 RX ADMIN — PRAVASTATIN SODIUM 20 MG: 10 TABLET ORAL at 22:59

## 2018-11-14 RX ADMIN — MOMETASONE FUROATE AND FORMOTEROL FUMARATE DIHYDRATE 2 PUFF: 200; 5 AEROSOL RESPIRATORY (INHALATION) at 23:04

## 2018-11-14 RX ADMIN — SODIUM CHLORIDE, PRESERVATIVE FREE 10 ML: 5 INJECTION INTRAVENOUS at 23:55

## 2018-11-14 RX ADMIN — ATENOLOL 25 MG: 25 TABLET ORAL at 09:12

## 2018-11-14 RX ADMIN — SPIRONOLACTONE 50 MG: 50 TABLET ORAL at 09:12

## 2018-11-14 RX ADMIN — POTASSIUM CHLORIDE 10 MEQ: 10 TABLET, EXTENDED RELEASE ORAL at 09:11

## 2018-11-14 RX ADMIN — TORSEMIDE 20 MG: 20 TABLET ORAL at 17:57

## 2018-11-14 RX ADMIN — METFORMIN HYDROCHLORIDE 1000 MG: 500 TABLET ORAL at 09:12

## 2018-11-14 RX ADMIN — PENICILLIN G POTASSIUM: 5000000 INJECTION, POWDER, FOR SOLUTION INTRAMUSCULAR; INTRAVENOUS at 09:15

## 2018-11-14 RX ADMIN — ATENOLOL 25 MG: 25 TABLET ORAL at 22:59

## 2018-11-14 RX ADMIN — TRAZODONE HYDROCHLORIDE 100 MG: 50 TABLET ORAL at 23:05

## 2018-11-14 RX ADMIN — TORSEMIDE 20 MG: 20 TABLET ORAL at 09:12

## 2018-11-14 RX ADMIN — METOLAZONE 2.5 MG: 2.5 TABLET ORAL at 09:12

## 2018-11-14 RX ADMIN — IPRATROPIUM BROMIDE AND ALBUTEROL SULFATE 3 ML: .5; 3 SOLUTION RESPIRATORY (INHALATION) at 19:22

## 2018-11-14 ASSESSMENT — PAIN DESCRIPTION - PROGRESSION
CLINICAL_PROGRESSION: NOT CHANGED
CLINICAL_PROGRESSION: GRADUALLY IMPROVING

## 2018-11-14 ASSESSMENT — ENCOUNTER SYMPTOMS
ANAL BLEEDING: 0
CONSTIPATION: 0
STRIDOR: 0
APNEA: 0
RECTAL PAIN: 0
BACK PAIN: 0
EYE REDNESS: 0
PHOTOPHOBIA: 0
SORE THROAT: 0
COLOR CHANGE: 0
EYE ITCHING: 0
CHOKING: 0

## 2018-11-14 ASSESSMENT — PAIN SCALES - GENERAL: PAINLEVEL_OUTOF10: 0

## 2018-11-14 NOTE — PROGRESS NOTES
gina  General Surgery- [facility] Day: 6      Subjective: Skyler Rodriguez is a 65 y.o. female with P/o right hemicolectomy nearly three weeks ago  . Patient denies abdominal pain. Tolerating DIET CARB CONTROL; Low Sodium (2 GM); Daily Fluid Restriction: 1500 ml. + BM. hgb with slow drift now 6.2. Pt with darker stools. ROS:  Review of Systems   Constitutional: Negative for chills and fever. HENT: Negative for ear pain, mouth sores, sore throat and tinnitus. Eyes: Negative for photophobia, redness and itching. Respiratory: Negative for apnea, choking and stridor. Cardiovascular: Negative for chest pain and palpitations. Gastrointestinal: Negative for anal bleeding, constipation and rectal pain. Endocrine: Negative for polydipsia. Genitourinary: Negative for enuresis, flank pain and hematuria. Musculoskeletal: Negative for back pain, joint swelling and myalgias. Skin: Negative for color change and pallor. Allergic/Immunologic: Negative for environmental allergies. Neurological: Negative for syncope and speech difficulty. Psychiatric/Behavioral: Negative for confusion and hallucinations.        Allergies  Tomato          Diagnosis Date    Chronic respiratory failure with hypoxia ([facility] 10/19/2015    COPD (chronic obstructive pulmonary disease) ([facility] 10/11/2013    follow with Dr Mayte Ricardo Diabetes mellitus ([facility] 10/11/2013    \"been diabetic for 7 yrs\"    Enlarged heart     Heart murmur     History of blood transfusion     Hyperlipidemia 10/11/2013    Hypertension 10/11/2013    Insomnia 10/11/2013    Liver dysfunction 12/21/2015    Liver, core biopsies: Steatohepatitis, acute and chronic, grade 2, stage 2.     Nasal congestion 1/20/2016    \"no cough and no fever associated with it\"    Obesity 10/11/2013    On home oxygen therapy     2l/nc 24 hrs per day    Sleep apnea 10/11/2013    had sleep apnea 2013- has cpap and does use it    Steatohepatitis 3/21/2016 Leukocytosis     Tubulovillous adenoma of colon      Plan: Will txf 2 units. If hgb cont to drift down, will need EGD. Holding lovenox for now.    Remove staples from the incisions        Jaida Uribe MD

## 2018-11-14 NOTE — PROGRESS NOTES
risk as above due to complexity of data reviewed and medical management options                   Allergies  Tomato          Diagnosis Date    Chronic respiratory failure with hypoxia ([facility] 10/19/2015    COPD (chronic obstructive pulmonary disease) ([facility] 10/11/2013    follow with Dr Brooke Bell    Diabetes mellitus ([facility] 10/11/2013    \"been diabetic for 7 yrs\"    Enlarged heart     Heart murmur     History of blood transfusion     Hyperlipidemia 10/11/2013    Hypertension 10/11/2013    Insomnia 10/11/2013    Liver dysfunction 2015    Liver, core biopsies: Steatohepatitis, acute and chronic, grade 2, stage 2.     Nasal congestion 2016    \"no cough and no fever associated with it\"    Obesity 10/11/2013    On home oxygen therapy     2l/nc 24 hrs per day    Sleep apnea 10/11/2013    had sleep apnea - has cpap and does use it    Steatohepatitis 3/21/2016    Liver, core biopsies: Steatohepatitis, acute and chronic, grade 2, stage 2.  Systolic murmur 1/3/5902    Tobacco abuse disorder 10/11/2013    Type 2 diabetes mellitus (HCC)     Urinary incontinence, mixed 10/11/2013         Vitals    TEMPERATURE:  Current - Temp: 97 °F (36.1 °C);  Max - Temp  Av °F (36.1 °C)  Min: 97 °F (36.1 °C)  Max: 97 °F (36.1 °C)  RESPIRATIONS RANGE: Resp  Av  Min: 16  Max: 16  PULSE RANGE: Pulse  Av  Min: 91  Max: 91  BLOOD PRESSURE RANGE:  Systolic (55SAI), TTC:522 , Min:130 , KBA:970   ; Diastolic (67VEA), SYQ:65, Min:60, Max:60    PULSE OXIMETRY RANGE: SpO2  Av %  Min: 95 %  Max: 97 %  24HR INTAKE/OUTPUT:    Intake/Output Summary (Last 24 hours) at 18 1342  Last data filed at 18 1255   Gross per 24 hour   Intake              942 ml   Output                0 ml   Net              942 ml                     ROS    Head: no headaches  Ears: no fullness or hearing loss  Eyes: no visual problems  Oral cavity: no dryness or excessive thirst  Neck: no fullness, stiffness  CVS: no chest

## 2018-11-14 NOTE — PROGRESS NOTES
No  Ambulation Assistance: Needs assistance (pt ambualtes with RW)  Transfer Assistance: Independent  Active : No  Leisure & Hobbies: Pt reports she likes to work in the [employer], work puzzles, read, and play with granddaughter. IADL Comments: Pt reports I with ADLs including dressing and bathing. Objective                                                                                    Goals:  (Update in navigator)  Short term goals  Time Frame for Short term goals: discharge  Short term goal 1: Pt will perform functional transfers Jennifer w/ RW w/o LOB or falls using good EC and PLB  Short term goal 2: Pt will be MI/I LB adls pacing herself  Short term goal 3: Pt will be able to stand for 5-7 min MI w/o LOB or falls w/o decreased O2 Sat  Short term goal 4: Pt will perform LE bathing/dressing Independent  Short term goal 5: Pt will perform functional task in stand ~10 minutes to increase standing tolerance w/o decrease of 02 saturation:   :        Plan of Care                                                                              Times per week: 5 days per week for a minimum of 60 minutes/day plus group as appropriate for 60 minutes.   Treatment to include Plan  Times per week: 4+  Times per day: Daily  Current Treatment Recommendations: Strengthening, Balance Training, Functional Mobility Training, Endurance Training, Self-Care / ADL, Patient/Caregiver Education & Training, Stair training, Home Management Training, Safety Education & Training, Equipment Evaluation, Education, & procurement, Positioning    Electronically signed by   Allan DAWSON/L [email or web address] 11/14/2018, 10:44 AM

## 2018-11-14 NOTE — PROGRESS NOTES
Nutrition Assessment    Type and Reason for Visit: Initial (Swing Bed)    Nutrition Recommendations:   *Start Ensure High Protein twice daily at breakfast and dinner meals  *Montior for nutritional adequacy and follow. Nutrition Assessment: Pt swing bed with fair to good oral intakes % of meals, currently with low hgb 6.2 with dark stools, transfusing and waiting for possible scope. She is about 3 weeks post op right hemicolectomy. Malnutrition Assessment:  · Malnutrition Status: Meets the criteria for moderate malnutrition  · Context: Acute illness or injury    Nutrition Risk Level: Moderate    Nutrient Needs:  · Estimated Daily Total Kcal: 3784-0992  · Estimated Daily Protein (g): 71-83  · Estimated Daily Total Fluid (ml/day): 1444-9151    Nutrition Diagnosis:   · Problem:  Moderate malnutrition, In context of acute illness or injury  · Etiology: related to Insufficient energy/nutrient consumption     Signs and symptoms:  as evidenced by Severe loss of subcutaneous fat    Objective Information:  · Nutrition-Focused Physical Findings: David Nutrition score 4  · Wound Type: Surgical Wound  · Current Nutrition Therapies:  · Oral Diet Orders: Carb Control 4 Carbs/Meal, 2gm Sodium, Fluid Restriction   · Oral Diet intake: 26-50%, 51-75%, %  · Oral Nutrition Supplement (ONS) Orders: None  · ONS intake:  (none ordered at this time)  · Anthropometric Measures:  · Ht: 5' 6\" (167.6 cm)   · Current Body Wt: 184 lb (83.5 kg)  · Admission Body Wt: 184 lb (83.5 kg)  · Usual Body Wt: 253 lb (114.8 kg) (stated from previous admits)  · % Weight Change: 70# (27%) in 3 weeks-  Question accuracy of this- per previous notes she did have some fluid changes, but unlikely 70#,  27% for 70#  · Ideal Body Wt: 130 lb (59 kg), % Ideal Body 141%  · BMI Classification: BMI 25.0 - 29.9 Overweight (29.9)    Nutrition Interventions:   Continue current diet, Start ONS  Continued Inpatient Monitoring, Coordination of

## 2018-11-15 LAB
GLUCOSE BLD-MCNC: 143 MG/DL (ref 70–99)
GLUCOSE BLD-MCNC: 91 MG/DL (ref 70–99)
HCT VFR BLD CALC: 27.2 % (ref 37–47)
HEMOGLOBIN: 8.4 GM/DL (ref 12.5–16)
MCH RBC QN AUTO: 29.1 PG (ref 27–31)
MCHC RBC AUTO-ENTMCNC: 30.9 % (ref 32–36)
MCV RBC AUTO: 94.1 FL (ref 78–100)
PDW BLD-RTO: 15.5 % (ref 11.7–14.9)
PLATELET # BLD: 264 K/CU MM (ref 140–440)
PMV BLD AUTO: 8.9 FL (ref 7.5–11.1)
RBC # BLD: 2.89 M/CU MM (ref 4.2–5.4)
WBC # BLD: 11.7 K/CU MM (ref 4–10.5)

## 2018-11-15 PROCEDURE — 6370000000 HC RX 637 (ALT 250 FOR IP): Performed by: NURSE PRACTITIONER

## 2018-11-15 PROCEDURE — 1200000002 HC SEMI PRIVATE SWING BED

## 2018-11-15 PROCEDURE — 94640 AIRWAY INHALATION TREATMENT: CPT

## 2018-11-15 PROCEDURE — 97110 THERAPEUTIC EXERCISES: CPT

## 2018-11-15 PROCEDURE — 6370000000 HC RX 637 (ALT 250 FOR IP): Performed by: HOSPITALIST

## 2018-11-15 PROCEDURE — 85027 COMPLETE CBC AUTOMATED: CPT

## 2018-11-15 PROCEDURE — 6360000002 HC RX W HCPCS: Performed by: FAMILY MEDICINE

## 2018-11-15 PROCEDURE — 82962 GLUCOSE BLOOD TEST: CPT

## 2018-11-15 PROCEDURE — 97530 THERAPEUTIC ACTIVITIES: CPT

## 2018-11-15 PROCEDURE — 2700000000 HC OXYGEN THERAPY PER DAY

## 2018-11-15 PROCEDURE — 36415 COLL VENOUS BLD VENIPUNCTURE: CPT

## 2018-11-15 PROCEDURE — 2580000003 HC RX 258: Performed by: NURSE PRACTITIONER

## 2018-11-15 RX ORDER — FUROSEMIDE 10 MG/ML
20 INJECTION INTRAMUSCULAR; INTRAVENOUS ONCE
Status: COMPLETED | OUTPATIENT
Start: 2018-11-15 | End: 2018-11-15

## 2018-11-15 RX ADMIN — SODIUM CHLORIDE, PRESERVATIVE FREE 10 ML: 5 INJECTION INTRAVENOUS at 08:53

## 2018-11-15 RX ADMIN — TORSEMIDE 20 MG: 20 TABLET ORAL at 17:25

## 2018-11-15 RX ADMIN — PRAVASTATIN SODIUM 20 MG: 10 TABLET ORAL at 21:45

## 2018-11-15 RX ADMIN — TROSPIUM CHLORIDE 20 MG: 20 TABLET ORAL at 21:45

## 2018-11-15 RX ADMIN — IPRATROPIUM BROMIDE AND ALBUTEROL SULFATE 3 ML: .5; 3 SOLUTION RESPIRATORY (INHALATION) at 11:05

## 2018-11-15 RX ADMIN — METOLAZONE 2.5 MG: 2.5 TABLET ORAL at 08:53

## 2018-11-15 RX ADMIN — LOSARTAN POTASSIUM 50 MG: 50 TABLET ORAL at 08:53

## 2018-11-15 RX ADMIN — FUROSEMIDE 20 MG: 10 INJECTION, SOLUTION INTRAMUSCULAR; INTRAVENOUS at 14:43

## 2018-11-15 RX ADMIN — POTASSIUM CHLORIDE 10 MEQ: 10 TABLET, EXTENDED RELEASE ORAL at 08:52

## 2018-11-15 RX ADMIN — LINAGLIPTIN 5 MG: 5 TABLET, FILM COATED ORAL at 08:53

## 2018-11-15 RX ADMIN — TORSEMIDE 20 MG: 20 TABLET ORAL at 08:53

## 2018-11-15 RX ADMIN — SPIRONOLACTONE 50 MG: 50 TABLET ORAL at 08:53

## 2018-11-15 RX ADMIN — GLIPIZIDE 10 MG: 10 TABLET ORAL at 08:56

## 2018-11-15 RX ADMIN — TRAZODONE HYDROCHLORIDE 100 MG: 50 TABLET ORAL at 23:11

## 2018-11-15 RX ADMIN — ATENOLOL 25 MG: 25 TABLET ORAL at 21:45

## 2018-11-15 RX ADMIN — IPRATROPIUM BROMIDE AND ALBUTEROL SULFATE 3 ML: .5; 3 SOLUTION RESPIRATORY (INHALATION) at 15:05

## 2018-11-15 RX ADMIN — MOMETASONE FUROATE AND FORMOTEROL FUMARATE DIHYDRATE 2 PUFF: 200; 5 AEROSOL RESPIRATORY (INHALATION) at 08:53

## 2018-11-15 RX ADMIN — OXYCODONE HYDROCHLORIDE AND ACETAMINOPHEN 500 MG: 500 TABLET ORAL at 08:53

## 2018-11-15 RX ADMIN — METFORMIN HYDROCHLORIDE 1000 MG: 500 TABLET ORAL at 08:52

## 2018-11-15 RX ADMIN — ACETAMINOPHEN 1000 MG: 500 TABLET, FILM COATED ORAL at 12:34

## 2018-11-15 RX ADMIN — SODIUM CHLORIDE, PRESERVATIVE FREE 10 ML: 5 INJECTION INTRAVENOUS at 21:45

## 2018-11-15 RX ADMIN — IPRATROPIUM BROMIDE AND ALBUTEROL SULFATE 3 ML: .5; 3 SOLUTION RESPIRATORY (INHALATION) at 19:38

## 2018-11-15 RX ADMIN — ATENOLOL 25 MG: 25 TABLET ORAL at 08:52

## 2018-11-15 RX ADMIN — IPRATROPIUM BROMIDE AND ALBUTEROL SULFATE 3 ML: .5; 3 SOLUTION RESPIRATORY (INHALATION) at 07:25

## 2018-11-15 ASSESSMENT — PAIN SCALES - GENERAL
PAINLEVEL_OUTOF10: 0
PAINLEVEL_OUTOF10: 7
PAINLEVEL_OUTOF10: 0

## 2018-11-15 NOTE — PROGRESS NOTES
step.  Bathroom Shower/Tub: Tub/Shower unit  Bathroom Toilet: Handicap height  Bathroom Equipment: Tub transfer bench, Grab bars in shower, Hand-held shower  Home Equipment: Rolling walker, Oxygen  ADL Assistance: Independent  Homemaking Assistance: Needs assistance  Homemaking Responsibilities: Yes  Meal Prep Responsibility: Primary  Laundry Responsibility: No (pt kids do laundry)  Cleaning Responsibility: Secondary (pt reports she does what she can and gets A with rest)  Shopping Responsibility: No  Ambulation Assistance: Needs assistance (pt ambualtes with RW)  Transfer Assistance: Independent  Active : No  Leisure & Hobbies: Pt reports she likes to work in the [employer], work puzzles, read, and play with granddaughter. IADL Comments: Pt reports I with ADLs including dressing and bathing. Objective                                                                                    Goals:  (Update in navigator)  Short term goals  Time Frame for Short term goals: discharge  Short term goal 1: Pt will perform functional transfers Jennifer w/ RW w/o LOB or falls using good EC and PLB  Short term goal 2: Pt will be MI/I LB adls pacing herself  Short term goal 3: Pt will be able to stand for 5-7 min MI w/o LOB or falls w/o decreased O2 Sat  Short term goal 4: Pt will perform LE bathing/dressing Independent  Short term goal 5: Pt will perform functional task in stand ~10 minutes to increase standing tolerance w/o decrease of 02 saturation:   :        Plan of Care                                                                              Times per week: 5 days per week for a minimum of 60 minutes/day plus group as appropriate for 60 minutes.   Treatment to include Plan  Times per week: 4+  Times per day: Daily  Current Treatment Recommendations: Strengthening, Balance Training, Functional Mobility Training, Endurance Training, Self-Care / ADL, Patient/Caregiver Education & Training, Stair training, Home

## 2018-11-15 NOTE — PROGRESS NOTES
Hypertension 10/11/2013    Insomnia 10/11/2013    Liver dysfunction 2015    Liver, core biopsies: Steatohepatitis, acute and chronic, grade 2, stage 2.     Nasal congestion 2016    \"no cough and no fever associated with it\"    Obesity 10/11/2013    On home oxygen therapy     2l/nc 24 hrs per day    Sleep apnea 10/11/2013    had sleep apnea - has cpap and does use it    Steatohepatitis 3/21/2016    Liver, core biopsies: Steatohepatitis, acute and chronic, grade 2, stage 2.  Systolic murmur 8383    Tobacco abuse disorder 10/11/2013    Type 2 diabetes mellitus (HCC)     Urinary incontinence, mixed 10/11/2013         Vitals    TEMPERATURE:  Current - Temp: 97.3 °F (36.3 °C);  Max - Temp  Av.6 °F (37 °C)  Min: 97.3 °F (36.3 °C)  Max: 99.4 °F (37.4 °C)  RESPIRATIONS RANGE: Resp  Av.4  Min: 16  Max: 18  PULSE RANGE: Pulse  Av.9  Min: 87  Max: 94  BLOOD PRESSURE RANGE:  Systolic (42UUT), MARIEL:074 , Min:108 , PUH:105   ; Diastolic (72FOR), XYB:80, Min:51, Max:73    PULSE OXIMETRY RANGE: SpO2  Av.7 %  Min: 95 %  Max: 98 %  24HR INTAKE/OUTPUT:      Intake/Output Summary (Last 24 hours) at 11/15/18 1312  Last data filed at 11/15/18 1302   Gross per 24 hour   Intake          1606.25 ml   Output                0 ml   Net          1606.25 ml                     ROS    Head: no headaches  Ears: no fullness or hearing loss  Eyes: no visual problems  Oral cavity: no dryness or excessive thirst  Neck: no fullness, stiffness  CVS: no chest wall tenderness, no CP, or palpitations  Respi: No SOB, Cough  GI: no nausea, vomiting,diarrhea  : no frequency or urgency  CNS: no focal weakness or incoordination  Skin: no rash, itching or bruising  MS: no joint weakness or muscle pain    OBJECTIVE:    General appearance:  conscious, coherent, cooperative in no acute distress   Head: atraumatic & normocephalic  Oral cavity: moist mucous membranes with normal dentition  Neck: supple with no

## 2018-11-15 NOTE — FLOWSHEET NOTE
Physical Therapy DailyTreatment Note   Date: 11/15/2018 Room: [unfilled]   Name: Tonya Bobo : 1955   MRN: [id number]   Admission Date:2018        Rehabilitation Diagnosis: Physical debility [R53.81]    Objective                                                                                    Goals:   Short term goals  Time Frame for Short term goals: 1 week or until discharge:   Short term goal 1: Pt will demonstrate the ability to safely perform bed mobility mod I with HOB flat and no HR. - not met, continue. Short term goal 2: Pt will demontrate the ability to safely ambulate 150 feet with mod I and RW with no rest breaks. - MET, discharge goal.   Short term goal 3: Pt will demonstrate the ability to safely ambulate up and down 2 steps with supervision. - not met, continue. Short term goal 4: Pt will demonstrate the ability to safely stand x10 consecutive minutes while performing dynamic balance activities with no more than 1 hand for support and supervision - not met, continue. Short term goal 5: Pt will demonstrate the ability to tolerate 10 consecutive minutes on the NuStep on at least level 3 in order to improve endurance.  - not met, continue   :        Plan of Care                                                                              Times per week: 4+ days per week for a minimum of 15 minutes/day  Treatment to include Current Treatment Recommendations: Strengthening, Transfer Training, Endurance Training, Neuromuscular Re-education, Patient/Caregiver Education & Training, ROM, ADL/Self-care Training, Equipment Evaluation, Education, & procurement, Balance Training, IADL Training, Gait Training, Home Exercise Program, Functional Mobility Training, Stair training, Safety Education & Training, Positioning (ther ex, ther act, bed mobility.)    Date  11/15/2018   TIMES IN/OUT   11:35 am-12:10 pm   Restrictions/Precautions Restrictions/Precautions: Fall Risk         Current

## 2018-11-16 ENCOUNTER — APPOINTMENT (OUTPATIENT)
Dept: NUCLEAR MEDICINE | Age: 63
DRG: 289 | End: 2018-11-16
Attending: FAMILY MEDICINE
Payer: COMMERCIAL

## 2018-11-16 LAB
ERYTHROCYTE SEDIMENTATION RATE: 95 MM/HR (ref 0–30)
HCT VFR BLD CALC: 23.8 % (ref 37–47)
HEMOGLOBIN: 7.4 GM/DL (ref 12.5–16)
MCH RBC QN AUTO: 28.8 PG (ref 27–31)
MCHC RBC AUTO-ENTMCNC: 31.1 % (ref 32–36)
MCV RBC AUTO: 92.6 FL (ref 78–100)
PDW BLD-RTO: 15.1 % (ref 11.7–14.9)
PLATELET # BLD: 237 K/CU MM (ref 140–440)
PMV BLD AUTO: 9.1 FL (ref 7.5–11.1)
RBC # BLD: 2.57 M/CU MM (ref 4.2–5.4)
WBC # BLD: 9.2 K/CU MM (ref 4–10.5)

## 2018-11-16 PROCEDURE — 97110 THERAPEUTIC EXERCISES: CPT

## 2018-11-16 PROCEDURE — 87073 CULTURE BACTERIA ANAEROBIC: CPT

## 2018-11-16 PROCEDURE — 6370000000 HC RX 637 (ALT 250 FOR IP): Performed by: NURSE PRACTITIONER

## 2018-11-16 PROCEDURE — 97530 THERAPEUTIC ACTIVITIES: CPT

## 2018-11-16 PROCEDURE — 78278 ACUTE GI BLOOD LOSS IMAGING: CPT

## 2018-11-16 PROCEDURE — A9560 TC99M LABELED RBC: HCPCS | Performed by: FAMILY MEDICINE

## 2018-11-16 PROCEDURE — 2700000000 HC OXYGEN THERAPY PER DAY

## 2018-11-16 PROCEDURE — 36415 COLL VENOUS BLD VENIPUNCTURE: CPT

## 2018-11-16 PROCEDURE — 85652 RBC SED RATE AUTOMATED: CPT

## 2018-11-16 PROCEDURE — 85027 COMPLETE CBC AUTOMATED: CPT

## 2018-11-16 PROCEDURE — 1200000002 HC SEMI PRIVATE SWING BED

## 2018-11-16 PROCEDURE — 2580000003 HC RX 258: Performed by: NURSE PRACTITIONER

## 2018-11-16 PROCEDURE — 87071 CULTURE AEROBIC QUANT OTHER: CPT

## 2018-11-16 PROCEDURE — 6370000000 HC RX 637 (ALT 250 FOR IP): Performed by: HOSPITALIST

## 2018-11-16 PROCEDURE — 94640 AIRWAY INHALATION TREATMENT: CPT

## 2018-11-16 PROCEDURE — 3430000000 HC RX DIAGNOSTIC RADIOPHARMACEUTICAL: Performed by: FAMILY MEDICINE

## 2018-11-16 RX ADMIN — SODIUM CHLORIDE, PRESERVATIVE FREE 10 ML: 5 INJECTION INTRAVENOUS at 21:25

## 2018-11-16 RX ADMIN — MOMETASONE FUROATE AND FORMOTEROL FUMARATE DIHYDRATE 2 PUFF: 200; 5 AEROSOL RESPIRATORY (INHALATION) at 21:38

## 2018-11-16 RX ADMIN — METFORMIN HYDROCHLORIDE 1000 MG: 500 TABLET ORAL at 08:11

## 2018-11-16 RX ADMIN — IPRATROPIUM BROMIDE AND ALBUTEROL SULFATE 3 ML: .5; 3 SOLUTION RESPIRATORY (INHALATION) at 07:55

## 2018-11-16 RX ADMIN — IPRATROPIUM BROMIDE AND ALBUTEROL SULFATE 3 ML: .5; 3 SOLUTION RESPIRATORY (INHALATION) at 13:55

## 2018-11-16 RX ADMIN — SODIUM CHLORIDE, PRESERVATIVE FREE 10 ML: 5 INJECTION INTRAVENOUS at 08:11

## 2018-11-16 RX ADMIN — GLIPIZIDE 10 MG: 10 TABLET ORAL at 08:11

## 2018-11-16 RX ADMIN — LINAGLIPTIN 5 MG: 5 TABLET, FILM COATED ORAL at 08:11

## 2018-11-16 RX ADMIN — IPRATROPIUM BROMIDE AND ALBUTEROL SULFATE 3 ML: .5; 3 SOLUTION RESPIRATORY (INHALATION) at 20:29

## 2018-11-16 RX ADMIN — POTASSIUM CHLORIDE 10 MEQ: 10 TABLET, EXTENDED RELEASE ORAL at 08:11

## 2018-11-16 RX ADMIN — TRAZODONE HYDROCHLORIDE 100 MG: 50 TABLET ORAL at 23:20

## 2018-11-16 RX ADMIN — ACETAMINOPHEN 1000 MG: 500 TABLET, FILM COATED ORAL at 04:07

## 2018-11-16 RX ADMIN — MOMETASONE FUROATE AND FORMOTEROL FUMARATE DIHYDRATE 2 PUFF: 200; 5 AEROSOL RESPIRATORY (INHALATION) at 09:33

## 2018-11-16 RX ADMIN — TROSPIUM CHLORIDE 20 MG: 20 TABLET ORAL at 21:25

## 2018-11-16 RX ADMIN — ATENOLOL 25 MG: 25 TABLET ORAL at 08:11

## 2018-11-16 RX ADMIN — TORSEMIDE 20 MG: 20 TABLET ORAL at 08:10

## 2018-11-16 RX ADMIN — LOSARTAN POTASSIUM 50 MG: 50 TABLET ORAL at 08:11

## 2018-11-16 RX ADMIN — TORSEMIDE 20 MG: 20 TABLET ORAL at 21:25

## 2018-11-16 RX ADMIN — OXYCODONE HYDROCHLORIDE AND ACETAMINOPHEN 500 MG: 500 TABLET ORAL at 08:11

## 2018-11-16 RX ADMIN — Medication 25 MILLICURIE: at 17:55

## 2018-11-16 RX ADMIN — METOLAZONE 2.5 MG: 2.5 TABLET ORAL at 08:10

## 2018-11-16 RX ADMIN — SPIRONOLACTONE 50 MG: 50 TABLET ORAL at 08:11

## 2018-11-16 RX ADMIN — ATENOLOL 25 MG: 25 TABLET ORAL at 21:25

## 2018-11-16 RX ADMIN — PRAVASTATIN SODIUM 20 MG: 10 TABLET ORAL at 21:25

## 2018-11-16 RX ADMIN — OXYCODONE HYDROCHLORIDE AND ACETAMINOPHEN 1 TABLET: 5; 325 TABLET ORAL at 23:20

## 2018-11-16 ASSESSMENT — PAIN DESCRIPTION - PAIN TYPE: TYPE: CHRONIC PAIN

## 2018-11-16 ASSESSMENT — PAIN SCALES - GENERAL
PAINLEVEL_OUTOF10: 0
PAINLEVEL_OUTOF10: 1
PAINLEVEL_OUTOF10: 0
PAINLEVEL_OUTOF10: 0
PAINLEVEL_OUTOF10: 8
PAINLEVEL_OUTOF10: 3
PAINLEVEL_OUTOF10: 0
PAINLEVEL_OUTOF10: 0
PAINLEVEL_OUTOF10: 4
PAINLEVEL_OUTOF10: 0

## 2018-11-16 ASSESSMENT — PAIN DESCRIPTION - LOCATION: LOCATION: LEG

## 2018-11-16 ASSESSMENT — PAIN DESCRIPTION - DESCRIPTORS: DESCRIPTORS: ACHING

## 2018-11-16 ASSESSMENT — PAIN DESCRIPTION - ORIENTATION: ORIENTATION: RIGHT;LEFT

## 2018-11-16 NOTE — FLOWSHEET NOTE
Issues DIET CARB CONTROL; Low Sodium (2 GM); Daily Fluid Restriction: 1500 ml  Dietary Nutrition Supplements: Wound Healing Oral Supplement   Communication with other providers: [x] Ok to see per nursing at morning huddle  [] Medical hold and reason  [x] Spoke with RT following session regarding pt de-sating, breathing treatment, and needing a refill on her O2 tank in her room. Subjective observations:  Pt presented in bed. She reports her hemoglobin is dropping again, but she feels better than yesterday. Pt appeared to have increased abdominal swelling. Pt was breathing hard when PT entered the room, but reports she is fine. [x]   Gait belt used during tx session   Pain level/location: Pre-tx 0/10  Post-tx 2/10    Bed Mobility   Supine to sit: mod I HOB elevated. Transfers Sit to stand Mod I  Stand to sit Mod I  Commode not performed   Standing Tolerance Pt stood in the // bars for 10 minutes with 2 HHA and supervision while performing exercises and resting in between. Amb/Locomotion: AD/Distance/Assist Pt ambulated 90 feet with RW and mod I    Pt ambulated 60 feet with RW and mod I. Steps (#)/Assist/Rails/AD 5\" step 4x with mod I and 2 HHA in // bars   Ramp:AD/Assist Pt ambulated up and down the ramp into the// bars with CGA   Curb:height AD/Assist See above   Uneven:type AD/Assist Not performed   W/C distance/Assist   Not performed   Strategies that improved performance: Pt required several rest breaks. She demonstrated increased SOB and her O2 sat was 87% while up and moving, but recovered to 92-93% when sitting. Barriers to progress/participation: Other medical conditions   Alarm placed/where?   Fall Risk  [x] left sitting EOB   [ ] left in chair [x] call light within reach  [ ] bed alarm on  [ ] personal alarm on [ ] Yohannes Carcamo  [ ] other staff present:   Discharge recommendations  Anticipated discharge date:  TBD  Destination: []home alone   [x]home alone with assist prn  []Continuous supervision []SNF  [] Assisted living   Continued therapy: [x][facility] PT  []OUTPATIENT  PT   [] No Further PT  Equipment needs: see eval     Therapeutic activities/exercises completed this date:  Pt required several rest breaks today. Pt did well with therapy and reported decreased pain. Modality/intervention used:   [x] Therapeutic Exercise    [ ] Modalities:   [x] Therapeutic Activity    [ ] Ultrasound   [ ] Elec Stim   [ ] Gait Training     [ ] Cervical Traction  [ ] Lumbar Traction   [ ] Neuromuscular Re-education   [ ] Cold/hotpack  [ ] Iontophoresis   [ ] Instruction in HEP     [ ] Vasopneumatic   [ ] Manual Therapy   [ ] Aquatic Therapy     Patient/Caregiver Education and Training: pt educated on what to expect for therapy over the weekend. Exercise/Equipment/Modalities this date   NuStep: x10' level 2 seat and UE at 7. Standing marching: 1x10 B LE 2 HHA in // bars   Hip abd: 1x10 B LE 2 HHA in // bars   Hip ext: 1x10 B LE 2 HHA in // bars   Toe taps: 1x10 B LE on 5\" step        Treatment Plan for Next Session: continue per pt tolerance.      Assessment / Impression                                                          Treatment/Activity Tolerance:   [] Tolerated Treatment well:     [x] Patient limited by fatigue/pain:       [x] Patient limited by medical complications:    [] Adverse Reaction to Tx:   [] Significant change in status    Plan:   [x] Continue per plan of care [ ] Jarrell Barrera current plan   [ ] Plan of care initiated [ ] Hold pending MD visit [ ] Discharged    Billing:  Billed units: 2 therapeutic exercise, 1 therapeutic activity       Signed: Dina Curtis DPT [id number]  11/16/2018, 1:25 PM

## 2018-11-16 NOTE — PROGRESS NOTES
Neuromuscular Re-ed   []   Nu-step:  Time:        Level:         #Steps:       []   Rebounder:    []  Seated     []  Standing        []   Supine Ther Ex (reps/sets):     [x]   Seated Ther Ex (reps/sets): For BUE strengthening for ADL & functional mobility Indep pt performed BUE strengthening cane ex 10 reps shoulder flex/ext, abd/add, circles, elbow flex/ext, rowing ex, elbow flex/ext, wrist ext/flex   []   Standing Ther Ex (reps/sets):     []   Other:      Comments:      Patient/Caregiver Education and Training:   [x]   Adaptive Equipment Use  [x]   Bed Mobility/Transfer Technique/Safety  []   Energy Conservation Tips  [x]   Family training  [x]   Postural Awareness  [x]   Safety During Functional Activities  []   Reinforced Patient's Precautions   []   Progress was updated and reviewed in Rehabtracker with patient and/or family this         date. Treatment Plan for Next Session: Continue with POC. Treatment/Activity Tolerance:   [x] Tolerated treatment with no adverse effects    [x] Patient limited by fatigue  [] Patient limited by pain   [] Patient limited by medical complications:    [] Adverse reaction to Tx:   [] Significant change in status    Safety:       []  bed alarm set    [x]  chair alarm set    []  Pt refused alarms                []  Telesitter activated      [x]  Gait belt used during tx session      []other:       Number of Minutes/Billable Intervention  Therapeutic Exercise 15   ADL Self-care 0   Neuro Re-Ed 0   Therapeutic Activity 10   Group 0   Other: 0   TOTAL 25       Social History  Social/Functional History  Lives With: Alone  Type of Home: House  Home Layout: Two level, Able to Live on Main level with bedroom/bathroom  Home Access: Stairs to enter with rails  Entrance Stairs - Number of Steps: pt reports 2 steps 1 normal and 1 small step.   Bathroom Shower/Tub: Tub/Shower unit  Bathroom Toilet: Handicap height  Bathroom Equipment: Tub transfer bench, Grab bars in shower, Hand-held OTR/L    11/16/2018, 6:24 PM

## 2018-11-17 LAB
ERYTHROCYTE SEDIMENTATION RATE: 96 MM/HR (ref 0–30)
HCT VFR BLD CALC: 23.6 % (ref 37–47)
HEMOGLOBIN: 7.2 GM/DL (ref 12.5–16)
MCH RBC QN AUTO: 28.6 PG (ref 27–31)
MCHC RBC AUTO-ENTMCNC: 30.5 % (ref 32–36)
MCV RBC AUTO: 93.7 FL (ref 78–100)
PDW BLD-RTO: 14.9 % (ref 11.7–14.9)
PLATELET # BLD: 231 K/CU MM (ref 140–440)
PMV BLD AUTO: 9.1 FL (ref 7.5–11.1)
RBC # BLD: 2.52 M/CU MM (ref 4.2–5.4)
WBC # BLD: 8.2 K/CU MM (ref 4–10.5)

## 2018-11-17 PROCEDURE — 6370000000 HC RX 637 (ALT 250 FOR IP): Performed by: NURSE PRACTITIONER

## 2018-11-17 PROCEDURE — 6370000000 HC RX 637 (ALT 250 FOR IP): Performed by: HOSPITALIST

## 2018-11-17 PROCEDURE — 85027 COMPLETE CBC AUTOMATED: CPT

## 2018-11-17 PROCEDURE — 1200000002 HC SEMI PRIVATE SWING BED

## 2018-11-17 PROCEDURE — 2700000000 HC OXYGEN THERAPY PER DAY

## 2018-11-17 PROCEDURE — 97110 THERAPEUTIC EXERCISES: CPT

## 2018-11-17 PROCEDURE — 97530 THERAPEUTIC ACTIVITIES: CPT

## 2018-11-17 PROCEDURE — 94640 AIRWAY INHALATION TREATMENT: CPT

## 2018-11-17 PROCEDURE — 36415 COLL VENOUS BLD VENIPUNCTURE: CPT

## 2018-11-17 PROCEDURE — 85652 RBC SED RATE AUTOMATED: CPT

## 2018-11-17 PROCEDURE — 2580000003 HC RX 258: Performed by: NURSE PRACTITIONER

## 2018-11-17 RX ADMIN — IPRATROPIUM BROMIDE AND ALBUTEROL SULFATE 3 ML: .5; 3 SOLUTION RESPIRATORY (INHALATION) at 07:35

## 2018-11-17 RX ADMIN — SODIUM CHLORIDE, PRESERVATIVE FREE 10 ML: 5 INJECTION INTRAVENOUS at 17:29

## 2018-11-17 RX ADMIN — GLIPIZIDE 10 MG: 10 TABLET ORAL at 08:51

## 2018-11-17 RX ADMIN — LINAGLIPTIN 5 MG: 5 TABLET, FILM COATED ORAL at 08:51

## 2018-11-17 RX ADMIN — MOMETASONE FUROATE AND FORMOTEROL FUMARATE DIHYDRATE 2 PUFF: 200; 5 AEROSOL RESPIRATORY (INHALATION) at 08:51

## 2018-11-17 RX ADMIN — ACETAMINOPHEN 1000 MG: 500 TABLET, FILM COATED ORAL at 19:17

## 2018-11-17 RX ADMIN — TORSEMIDE 20 MG: 20 TABLET ORAL at 17:29

## 2018-11-17 RX ADMIN — LOSARTAN POTASSIUM 50 MG: 50 TABLET ORAL at 08:51

## 2018-11-17 RX ADMIN — OXYCODONE HYDROCHLORIDE AND ACETAMINOPHEN 1 TABLET: 5; 325 TABLET ORAL at 23:25

## 2018-11-17 RX ADMIN — OXYCODONE HYDROCHLORIDE AND ACETAMINOPHEN 500 MG: 500 TABLET ORAL at 08:50

## 2018-11-17 RX ADMIN — IPRATROPIUM BROMIDE AND ALBUTEROL SULFATE 3 ML: .5; 3 SOLUTION RESPIRATORY (INHALATION) at 20:06

## 2018-11-17 RX ADMIN — TORSEMIDE 20 MG: 20 TABLET ORAL at 08:56

## 2018-11-17 RX ADMIN — POTASSIUM CHLORIDE 10 MEQ: 10 TABLET, EXTENDED RELEASE ORAL at 08:50

## 2018-11-17 RX ADMIN — TROSPIUM CHLORIDE 20 MG: 20 TABLET ORAL at 21:08

## 2018-11-17 RX ADMIN — MOMETASONE FUROATE AND FORMOTEROL FUMARATE DIHYDRATE 2 PUFF: 200; 5 AEROSOL RESPIRATORY (INHALATION) at 21:08

## 2018-11-17 RX ADMIN — ATENOLOL 25 MG: 25 TABLET ORAL at 21:08

## 2018-11-17 RX ADMIN — TRAZODONE HYDROCHLORIDE 100 MG: 50 TABLET ORAL at 23:25

## 2018-11-17 RX ADMIN — IPRATROPIUM BROMIDE AND ALBUTEROL SULFATE 3 ML: .5; 3 SOLUTION RESPIRATORY (INHALATION) at 11:34

## 2018-11-17 RX ADMIN — METFORMIN HYDROCHLORIDE 1000 MG: 500 TABLET ORAL at 08:50

## 2018-11-17 RX ADMIN — PRAVASTATIN SODIUM 20 MG: 10 TABLET ORAL at 21:08

## 2018-11-17 RX ADMIN — METOLAZONE 2.5 MG: 2.5 TABLET ORAL at 08:51

## 2018-11-17 RX ADMIN — SODIUM CHLORIDE, PRESERVATIVE FREE 10 ML: 5 INJECTION INTRAVENOUS at 21:08

## 2018-11-17 RX ADMIN — IPRATROPIUM BROMIDE AND ALBUTEROL SULFATE 3 ML: .5; 3 SOLUTION RESPIRATORY (INHALATION) at 15:22

## 2018-11-17 RX ADMIN — SPIRONOLACTONE 50 MG: 50 TABLET ORAL at 08:51

## 2018-11-17 RX ADMIN — SODIUM CHLORIDE, PRESERVATIVE FREE 10 ML: 5 INJECTION INTRAVENOUS at 08:51

## 2018-11-17 RX ADMIN — ATENOLOL 25 MG: 25 TABLET ORAL at 08:51

## 2018-11-17 ASSESSMENT — PAIN SCALES - GENERAL
PAINLEVEL_OUTOF10: 3
PAINLEVEL_OUTOF10: 0
PAINLEVEL_OUTOF10: 6
PAINLEVEL_OUTOF10: 0
PAINLEVEL_OUTOF10: 5
PAINLEVEL_OUTOF10: 0
PAINLEVEL_OUTOF10: 7
PAINLEVEL_OUTOF10: 0

## 2018-11-17 NOTE — PLAN OF CARE
Problem: Pain:  Goal: Pain level will decrease  Pain level will decrease   Outcome: Ongoing    Goal: Control of acute pain  Control of acute pain   Outcome: Ongoing    Goal: Control of chronic pain  Control of chronic pain   Outcome: Ongoing      Problem: Falls - Risk of:  Goal: Will remain free from falls  Will remain free from falls   Outcome: Ongoing    Goal: Absence of physical injury  Absence of physical injury   Outcome: Ongoing      Problem: Nutrition  Goal: Understanding of nutritional guidelines  Outcome: Ongoing

## 2018-11-17 NOTE — FLOWSHEET NOTE
Issues DIET CARB CONTROL; Low Sodium (2 GM); Daily Fluid Restriction: 1500 ml  Dietary Nutrition Supplements: Wound Healing Oral Supplement   Communication with other providers: [x] Ok to see per nursing at morning huddle  [] Medical hold and reason  []     Subjective observations:  Pt presented sitting at EOB, visiting with her daughter. States she is feeling better today, less short of breath, slept fairly well last night, no c/o pain. [x]   Gait belt used during tx session   Pain level/location: Pre-tx 0/10  Post-tx 0/10    Bed Mobility   N/A      Transfers Sit to stand Mod I  Stand to sit Mod I  Commode not performed   Standing Tolerance Pt stood in the // bars for 12 minutes with 2 HHA and supervision. One sitting rest break between exercises. Amb/Locomotion: AD/Distance/Assist Pt ambulated 90 feet with RW and mod I    Pt ambulated 250 feet with RW and mod I, good posture maintained, one short standing rest break, SaO2 94%     Steps (#)/Assist/Rails/AD 5\" step, up and over, 6x with mod I and 2 HHA in // bars   Ramp:AD/Assist Pt ambulated up and down the ramp into the// bars with supervision   Curb:height AD/Assist As above   Uneven:type AD/Assist Not performed   W/C distance/Assist   Not performed   Strategies that improved performance: Pt required several rest breaks. Her O2 sat was 90% while up and moving, but quickly recovered to 94-95% when sitting. Barriers to progress/participation: Other medical conditions   Alarm placed/where?   Fall Risk  [x] left sitting EOB   [ ] left in chair [x] call light within reach  [ ] bed alarm on  [ ] personal alarm on [ ] Valdene Dub  [ ] other staff present:   Discharge recommendations  Anticipated discharge date:  TBD  Destination: []home alone   [x]home alone with assist prn  []Continuous supervision  []SNF  [] Assisted living   Continued therapy: [x]HHC PT  []OUTPATIENT  PT   [] No Further PT  Equipment needs: see eval     Therapeutic activities/exercises completed this

## 2018-11-18 LAB
ERYTHROCYTE SEDIMENTATION RATE: 92 MM/HR (ref 0–30)
HCT VFR BLD CALC: 23.4 % (ref 37–47)
HEMOGLOBIN: 7.2 GM/DL (ref 12.5–16)
MCH RBC QN AUTO: 29 PG (ref 27–31)
MCHC RBC AUTO-ENTMCNC: 30.8 % (ref 32–36)
MCV RBC AUTO: 94.4 FL (ref 78–100)
PDW BLD-RTO: 14.8 % (ref 11.7–14.9)
PLATELET # BLD: 216 K/CU MM (ref 140–440)
PMV BLD AUTO: 9.3 FL (ref 7.5–11.1)
RBC # BLD: 2.48 M/CU MM (ref 4.2–5.4)
WBC # BLD: 7.6 K/CU MM (ref 4–10.5)

## 2018-11-18 PROCEDURE — 6370000000 HC RX 637 (ALT 250 FOR IP): Performed by: HOSPITALIST

## 2018-11-18 PROCEDURE — 94640 AIRWAY INHALATION TREATMENT: CPT

## 2018-11-18 PROCEDURE — 1200000002 HC SEMI PRIVATE SWING BED

## 2018-11-18 PROCEDURE — 36591 DRAW BLOOD OFF VENOUS DEVICE: CPT

## 2018-11-18 PROCEDURE — 36415 COLL VENOUS BLD VENIPUNCTURE: CPT

## 2018-11-18 PROCEDURE — 2700000000 HC OXYGEN THERAPY PER DAY

## 2018-11-18 PROCEDURE — 85652 RBC SED RATE AUTOMATED: CPT

## 2018-11-18 PROCEDURE — 2580000003 HC RX 258: Performed by: NURSE PRACTITIONER

## 2018-11-18 PROCEDURE — 6370000000 HC RX 637 (ALT 250 FOR IP): Performed by: NURSE PRACTITIONER

## 2018-11-18 PROCEDURE — 85027 COMPLETE CBC AUTOMATED: CPT

## 2018-11-18 RX ADMIN — LOSARTAN POTASSIUM 50 MG: 50 TABLET ORAL at 08:16

## 2018-11-18 RX ADMIN — OXYCODONE HYDROCHLORIDE AND ACETAMINOPHEN 500 MG: 500 TABLET ORAL at 08:16

## 2018-11-18 RX ADMIN — OXYCODONE HYDROCHLORIDE AND ACETAMINOPHEN 1 TABLET: 5; 325 TABLET ORAL at 23:13

## 2018-11-18 RX ADMIN — ATENOLOL 25 MG: 25 TABLET ORAL at 08:16

## 2018-11-18 RX ADMIN — IPRATROPIUM BROMIDE AND ALBUTEROL SULFATE 3 ML: .5; 3 SOLUTION RESPIRATORY (INHALATION) at 19:43

## 2018-11-18 RX ADMIN — LINAGLIPTIN 5 MG: 5 TABLET, FILM COATED ORAL at 08:23

## 2018-11-18 RX ADMIN — IPRATROPIUM BROMIDE AND ALBUTEROL SULFATE 3 ML: .5; 3 SOLUTION RESPIRATORY (INHALATION) at 10:57

## 2018-11-18 RX ADMIN — SODIUM CHLORIDE, PRESERVATIVE FREE 10 ML: 5 INJECTION INTRAVENOUS at 08:16

## 2018-11-18 RX ADMIN — SPIRONOLACTONE 50 MG: 50 TABLET ORAL at 08:16

## 2018-11-18 RX ADMIN — SODIUM CHLORIDE, PRESERVATIVE FREE 10 ML: 5 INJECTION INTRAVENOUS at 22:03

## 2018-11-18 RX ADMIN — TORSEMIDE 20 MG: 20 TABLET ORAL at 08:16

## 2018-11-18 RX ADMIN — TRAZODONE HYDROCHLORIDE 100 MG: 50 TABLET ORAL at 23:13

## 2018-11-18 RX ADMIN — IPRATROPIUM BROMIDE AND ALBUTEROL SULFATE 3 ML: .5; 3 SOLUTION RESPIRATORY (INHALATION) at 07:41

## 2018-11-18 RX ADMIN — METOLAZONE 2.5 MG: 2.5 TABLET ORAL at 08:16

## 2018-11-18 RX ADMIN — MOMETASONE FUROATE AND FORMOTEROL FUMARATE DIHYDRATE 2 PUFF: 200; 5 AEROSOL RESPIRATORY (INHALATION) at 20:32

## 2018-11-18 RX ADMIN — MOMETASONE FUROATE AND FORMOTEROL FUMARATE DIHYDRATE 2 PUFF: 200; 5 AEROSOL RESPIRATORY (INHALATION) at 08:15

## 2018-11-18 RX ADMIN — TORSEMIDE 20 MG: 20 TABLET ORAL at 18:01

## 2018-11-18 RX ADMIN — POTASSIUM CHLORIDE 10 MEQ: 10 TABLET, EXTENDED RELEASE ORAL at 08:16

## 2018-11-18 RX ADMIN — PRAVASTATIN SODIUM 20 MG: 10 TABLET ORAL at 20:32

## 2018-11-18 RX ADMIN — IPRATROPIUM BROMIDE AND ALBUTEROL SULFATE 3 ML: .5; 3 SOLUTION RESPIRATORY (INHALATION) at 16:21

## 2018-11-18 RX ADMIN — GLIPIZIDE 10 MG: 10 TABLET ORAL at 08:16

## 2018-11-18 RX ADMIN — TROSPIUM CHLORIDE 20 MG: 20 TABLET ORAL at 20:32

## 2018-11-18 RX ADMIN — METFORMIN HYDROCHLORIDE 1000 MG: 500 TABLET ORAL at 08:16

## 2018-11-18 RX ADMIN — ATENOLOL 25 MG: 25 TABLET ORAL at 20:32

## 2018-11-18 ASSESSMENT — PAIN SCALES - GENERAL
PAINLEVEL_OUTOF10: 0
PAINLEVEL_OUTOF10: 6
PAINLEVEL_OUTOF10: 0
PAINLEVEL_OUTOF10: 4
PAINLEVEL_OUTOF10: 0

## 2018-11-18 NOTE — PROGRESS NOTES
PCP: Lars Grady MD  Hospital Day: 10    Chief Complaint on Admission: Colonic CA and infective endocarditis    SUBJECTIVE: Doing markedly better now, shortness of breath is also pretty much gone    ASSESSMENT AND PLAN    1. Acute on chronic anemia: Hemoglobin has continued to remain steady at 7.2, we'll continue to monitor this and we'll see how she does     Infective endocarditis: Is been off antibodies for more than 3 days, we'll continue to monitor this    End-stage COPD: Chronically O2 dependent, we'll continue to monitor this and see how she does. As of now no acute flareups, we'll continue with the DuoNeb's and we will see how she does. Next    Diabetes sugars seem to be doing well, we'll continue with the metformin, trajenta, Glucotrol and we'll see how she does.   Next    CAD with hypertension: Seems to doing well, blood pressures in the good control, continue the losartan, atenolol, metolazone, Aldactone and we'll see how she does             Diet  Diabetic    DVT Prophylaxis [] Lovenox, [] Heparin, [] SCDs, [x] Ambulation   GI Prophylaxis [x] PPI, [] H2 Blocker, [] Carafate, [] Diet/Tube Feeds   Code Status Full Code   Disposition Skilled care    [facility] [] Low, [x] Moderate,[] High  Patient's risk as above due to complexity of data reviewed and medical management options                   Allergies  Tomato          Diagnosis Date    [facility] with hypoxia ([facility] 10/19/2015    COPD (chronic obstructive pulmonary disease) ([facility] 10/11/2013    follow with Dr Cinthia Nichols    Diabetes mellitus ([facility] 10/11/2013    \"been diabetic for 7 yrs\"    Enlarged heart     Heart murmur     History of blood transfusion     Hyperlipidemia 10/11/2013    Hypertension 10/11/2013    Insomnia 10/11/2013    Liver dysfunction 12/21/2015    Liver, core biopsies: Steatohepatitis, acute and chronic, grade 2, 7. 4*  7.2*  7.2*   HCT  23.8*  23.6*  23.4*   PLT  237  231  216      No results for input(s): NA, K, CL, CO2, PHOS, BUN, CREATININE in the last 72 hours. Invalid input(s): CA  No results for input(s): AST, ALT, ALB, BILIDIR, BILITOT, ALKPHOS in the last 72 hours. Electronically signed by Blanca Zuleta MD on 11/18/2018 at 9:29 AM      Comment: Please note this report has been produced using speech recognition software and may contain errors related to that system including errors in grammar, punctuation, and spelling, as well as words and phrases that may be inappropriate.  If there are any questions or concerns please feel free to contact the dictating provider for clarification

## 2018-11-19 VITALS
HEIGHT: 66 IN | HEART RATE: 82 BPM | BODY MASS INDEX: 29.67 KG/M2 | SYSTOLIC BLOOD PRESSURE: 107 MMHG | DIASTOLIC BLOOD PRESSURE: 52 MMHG | RESPIRATION RATE: 16 BRPM | WEIGHT: 184.6 LBS | OXYGEN SATURATION: 97 % | TEMPERATURE: 96.6 F

## 2018-11-19 PROBLEM — R53.81 PHYSICAL DEBILITY: Status: RESOLVED | Noted: 2018-09-19 | Resolved: 2018-11-19

## 2018-11-19 LAB
ANION GAP SERPL CALCULATED.3IONS-SCNC: 13 MMOL/L (ref 4–16)
BUN BLDV-MCNC: 45 MG/DL (ref 6–23)
CALCIUM SERPL-MCNC: 8.5 MG/DL (ref 8.3–10.6)
CHLORIDE BLD-SCNC: 97 MMOL/L (ref 99–110)
CO2: 30 MMOL/L (ref 21–32)
CREAT SERPL-MCNC: 1.2 MG/DL (ref 0.6–1.1)
ERYTHROCYTE SEDIMENTATION RATE: 103 MM/HR (ref 0–30)
GFR AFRICAN AMERICAN: 55 ML/MIN/1.73M2
GFR NON-AFRICAN AMERICAN: 46 ML/MIN/1.73M2
GLUCOSE BLD-MCNC: 131 MG/DL (ref 70–99)
HCT VFR BLD CALC: 23.6 % (ref 37–47)
HEMOGLOBIN: 7.2 GM/DL (ref 12.5–16)
HIGH SENSITIVE C-REACTIVE PROTEIN: 37.7 MG/L
MAGNESIUM: 1.4 MG/DL (ref 1.8–2.4)
MCH RBC QN AUTO: 28.7 PG (ref 27–31)
MCHC RBC AUTO-ENTMCNC: 30.5 % (ref 32–36)
MCV RBC AUTO: 94 FL (ref 78–100)
PDW BLD-RTO: 14.6 % (ref 11.7–14.9)
PLATELET # BLD: 241 K/CU MM (ref 140–440)
PMV BLD AUTO: 9.3 FL (ref 7.5–11.1)
POTASSIUM SERPL-SCNC: 3.8 MMOL/L (ref 3.5–5.1)
RBC # BLD: 2.51 M/CU MM (ref 4.2–5.4)
SODIUM BLD-SCNC: 140 MMOL/L (ref 135–145)
WBC # BLD: 8.6 K/CU MM (ref 4–10.5)

## 2018-11-19 PROCEDURE — 86141 C-REACTIVE PROTEIN HS: CPT

## 2018-11-19 PROCEDURE — 85652 RBC SED RATE AUTOMATED: CPT

## 2018-11-19 PROCEDURE — 2580000003 HC RX 258: Performed by: NURSE PRACTITIONER

## 2018-11-19 PROCEDURE — 94640 AIRWAY INHALATION TREATMENT: CPT

## 2018-11-19 PROCEDURE — 83735 ASSAY OF MAGNESIUM: CPT

## 2018-11-19 PROCEDURE — 36415 COLL VENOUS BLD VENIPUNCTURE: CPT

## 2018-11-19 PROCEDURE — 80048 BASIC METABOLIC PNL TOTAL CA: CPT

## 2018-11-19 PROCEDURE — 6370000000 HC RX 637 (ALT 250 FOR IP): Performed by: NURSE PRACTITIONER

## 2018-11-19 PROCEDURE — 2700000000 HC OXYGEN THERAPY PER DAY

## 2018-11-19 PROCEDURE — 6370000000 HC RX 637 (ALT 250 FOR IP): Performed by: HOSPITALIST

## 2018-11-19 PROCEDURE — 85027 COMPLETE CBC AUTOMATED: CPT

## 2018-11-19 PROCEDURE — 97110 THERAPEUTIC EXERCISES: CPT

## 2018-11-19 PROCEDURE — 97530 THERAPEUTIC ACTIVITIES: CPT

## 2018-11-19 RX ORDER — SPIRONOLACTONE 50 MG/1
50 TABLET, FILM COATED ORAL DAILY
Qty: 30 TABLET | Refills: 0 | Status: SHIPPED | OUTPATIENT
Start: 2018-11-20 | End: 2018-12-06 | Stop reason: SDUPTHER

## 2018-11-19 RX ORDER — OXYCODONE HYDROCHLORIDE AND ACETAMINOPHEN 5; 325 MG/1; MG/1
1 TABLET ORAL EVERY 8 HOURS PRN
Qty: 15 TABLET | Refills: 0 | Status: SHIPPED | OUTPATIENT
Start: 2018-11-19 | End: 2018-11-24

## 2018-11-19 RX ADMIN — MOMETASONE FUROATE AND FORMOTEROL FUMARATE DIHYDRATE 2 PUFF: 200; 5 AEROSOL RESPIRATORY (INHALATION) at 10:06

## 2018-11-19 RX ADMIN — POTASSIUM CHLORIDE 10 MEQ: 10 TABLET, EXTENDED RELEASE ORAL at 10:04

## 2018-11-19 RX ADMIN — IPRATROPIUM BROMIDE AND ALBUTEROL SULFATE 3 ML: .5; 3 SOLUTION RESPIRATORY (INHALATION) at 07:46

## 2018-11-19 RX ADMIN — IPRATROPIUM BROMIDE AND ALBUTEROL SULFATE 3 ML: .5; 3 SOLUTION RESPIRATORY (INHALATION) at 11:13

## 2018-11-19 RX ADMIN — SODIUM CHLORIDE, PRESERVATIVE FREE 10 ML: 5 INJECTION INTRAVENOUS at 10:06

## 2018-11-19 RX ADMIN — LOSARTAN POTASSIUM 50 MG: 50 TABLET ORAL at 10:05

## 2018-11-19 RX ADMIN — OXYCODONE HYDROCHLORIDE AND ACETAMINOPHEN 500 MG: 500 TABLET ORAL at 10:04

## 2018-11-19 RX ADMIN — LINAGLIPTIN 5 MG: 5 TABLET, FILM COATED ORAL at 10:05

## 2018-11-19 RX ADMIN — GLIPIZIDE 10 MG: 10 TABLET ORAL at 10:04

## 2018-11-19 RX ADMIN — TORSEMIDE 20 MG: 20 TABLET ORAL at 10:05

## 2018-11-19 RX ADMIN — METFORMIN HYDROCHLORIDE 1000 MG: 500 TABLET ORAL at 10:03

## 2018-11-19 RX ADMIN — ATENOLOL 25 MG: 25 TABLET ORAL at 10:03

## 2018-11-19 RX ADMIN — METOLAZONE 2.5 MG: 2.5 TABLET ORAL at 10:05

## 2018-11-19 RX ADMIN — SPIRONOLACTONE 50 MG: 50 TABLET ORAL at 10:02

## 2018-11-19 ASSESSMENT — PAIN SCALES - GENERAL: PAINLEVEL_OUTOF10: 0

## 2018-11-21 LAB
CULTURE: NORMAL
CULTURE: NORMAL
Lab: NORMAL
REPORT STATUS: NORMAL
SPECIMEN: NORMAL

## 2018-11-26 ENCOUNTER — OFFICE VISIT (OUTPATIENT)
Dept: INTERNAL MEDICINE CLINIC | Age: 63
End: 2018-11-26
Payer: COMMERCIAL

## 2018-11-26 ENCOUNTER — TELEPHONE (OUTPATIENT)
Dept: SURGERY | Age: 63
End: 2018-11-26

## 2018-11-26 VITALS
SYSTOLIC BLOOD PRESSURE: 115 MMHG | RESPIRATION RATE: 16 BRPM | HEIGHT: 66 IN | HEART RATE: 84 BPM | WEIGHT: 245 LBS | DIASTOLIC BLOOD PRESSURE: 60 MMHG | OXYGEN SATURATION: 98 % | BODY MASS INDEX: 39.37 KG/M2

## 2018-11-26 DIAGNOSIS — K75.81 STEATOHEPATITIS: ICD-10-CM

## 2018-11-26 DIAGNOSIS — E66.01 MORBID OBESITY DUE TO EXCESS CALORIES (HCC): ICD-10-CM

## 2018-11-26 DIAGNOSIS — E11.9 TYPE 2 DIABETES MELLITUS WITHOUT COMPLICATION, WITHOUT LONG-TERM CURRENT USE OF INSULIN (HCC): ICD-10-CM

## 2018-11-26 DIAGNOSIS — I33.0 SUBACUTE BACTERIAL ENDOCARDITIS: Primary | ICD-10-CM

## 2018-11-26 DIAGNOSIS — E11.8 TYPE 2 DIABETES MELLITUS WITH COMPLICATION, WITH LONG-TERM CURRENT USE OF INSULIN (HCC): ICD-10-CM

## 2018-11-26 DIAGNOSIS — E78.2 MIXED HYPERLIPIDEMIA: ICD-10-CM

## 2018-11-26 DIAGNOSIS — K76.89 LIVER DYSFUNCTION: ICD-10-CM

## 2018-11-26 DIAGNOSIS — J96.11 CHRONIC RESPIRATORY FAILURE WITH HYPOXIA (HCC): ICD-10-CM

## 2018-11-26 DIAGNOSIS — D50.0 ANEMIA, BLOOD LOSS: ICD-10-CM

## 2018-11-26 DIAGNOSIS — I10 ESSENTIAL HYPERTENSION: ICD-10-CM

## 2018-11-26 DIAGNOSIS — I63.9 CEREBROVASCULAR ACCIDENT (CVA), UNSPECIFIED MECHANISM (HCC): ICD-10-CM

## 2018-11-26 DIAGNOSIS — J44.1 COPD EXACERBATION (HCC): ICD-10-CM

## 2018-11-26 DIAGNOSIS — Z79.4 TYPE 2 DIABETES MELLITUS WITH COMPLICATION, WITH LONG-TERM CURRENT USE OF INSULIN (HCC): ICD-10-CM

## 2018-11-26 DIAGNOSIS — J44.9 COPD, SEVERE (HCC): Primary | Chronic | ICD-10-CM

## 2018-11-26 PROBLEM — E78.5 HYPERLIPIDEMIA: Status: RESOLVED | Noted: 2018-09-11 | Resolved: 2018-11-26

## 2018-11-26 PROCEDURE — 36415 COLL VENOUS BLD VENIPUNCTURE: CPT | Performed by: INTERNAL MEDICINE

## 2018-11-26 PROCEDURE — 99214 OFFICE O/P EST MOD 30 MIN: CPT | Performed by: INTERNAL MEDICINE

## 2018-11-26 RX ORDER — METOLAZONE 2.5 MG/1
2.5 TABLET ORAL DAILY
Qty: 60 TABLET | Refills: 1 | Status: ON HOLD | OUTPATIENT
Start: 2018-11-26 | End: 2019-01-03 | Stop reason: ALTCHOICE

## 2018-11-26 ASSESSMENT — ENCOUNTER SYMPTOMS
COUGH: 0
SHORTNESS OF BREATH: 1
ALLERGIC/IMMUNOLOGIC NEGATIVE: 1
GASTROINTESTINAL NEGATIVE: 1
STRIDOR: 0
EYES NEGATIVE: 1

## 2018-11-27 LAB
ANION GAP SERPL CALCULATED.3IONS-SCNC: 19 MMOL/L (ref 3–16)
BASOPHILS ABSOLUTE: 0.1 K/UL (ref 0–0.2)
BASOPHILS RELATIVE PERCENT: 0.7 %
BUN BLDV-MCNC: 53 MG/DL (ref 7–20)
CALCIUM SERPL-MCNC: 9.3 MG/DL (ref 8.3–10.6)
CHLORIDE BLD-SCNC: 99 MMOL/L (ref 99–110)
CO2: 23 MMOL/L (ref 21–32)
CREAT SERPL-MCNC: 1.5 MG/DL (ref 0.6–1.2)
EOSINOPHILS ABSOLUTE: 0.2 K/UL (ref 0–0.6)
EOSINOPHILS RELATIVE PERCENT: 2.1 %
GFR AFRICAN AMERICAN: 43
GFR NON-AFRICAN AMERICAN: 35
GLUCOSE BLD-MCNC: 129 MG/DL (ref 70–99)
HCT VFR BLD CALC: 22.9 % (ref 36–48)
HEMOGLOBIN: 7.6 G/DL (ref 12–16)
LYMPHOCYTES ABSOLUTE: 1.4 K/UL (ref 1–5.1)
LYMPHOCYTES RELATIVE PERCENT: 17.9 %
MCH RBC QN AUTO: 30 PG (ref 26–34)
MCHC RBC AUTO-ENTMCNC: 33 G/DL (ref 31–36)
MCV RBC AUTO: 91 FL (ref 80–100)
MONOCYTES ABSOLUTE: 0.4 K/UL (ref 0–1.3)
MONOCYTES RELATIVE PERCENT: 4.6 %
NEUTROPHILS ABSOLUTE: 6 K/UL (ref 1.7–7.7)
NEUTROPHILS RELATIVE PERCENT: 74.7 %
PDW BLD-RTO: 15.9 % (ref 12.4–15.4)
PLATELET # BLD: 328 K/UL (ref 135–450)
PMV BLD AUTO: 8.1 FL (ref 5–10.5)
POTASSIUM SERPL-SCNC: 4.9 MMOL/L (ref 3.5–5.1)
RBC # BLD: 2.52 M/UL (ref 4–5.2)
SODIUM BLD-SCNC: 141 MMOL/L (ref 136–145)
WBC # BLD: 8 K/UL (ref 4–11)

## 2018-11-27 NOTE — PLAN OF CARE
Problem: Falls - Risk of:  Goal: Will remain free from falls  Will remain free from falls   Outcome: Ongoing    Goal: Absence of physical injury  Absence of physical injury   Outcome: Ongoing      Problem:  Activity:  Goal: Ability to tolerate increased activity will improve  Ability to tolerate increased activity will improve   Outcome: Ongoing po supplement/change diet to renal/ carbohydrate consistent , DASH/TLC in view of renal disease

## 2018-11-29 ENCOUNTER — HOSPITAL ENCOUNTER (OUTPATIENT)
Age: 63
Discharge: HOME | End: 2018-11-29
Payer: COMMERCIAL

## 2018-11-29 DIAGNOSIS — I33.0 SUBACUTE BACTERIAL ENDOCARDITIS: ICD-10-CM

## 2018-11-29 LAB
ANION GAP SERPL CALCULATED.3IONS-SCNC: 14 MMOL/L (ref 4–16)
BASOPHILS ABSOLUTE: 0 K/CU MM
BASOPHILS RELATIVE PERCENT: 0.5 % (ref 0–1)
BUN BLDV-MCNC: 52 MG/DL (ref 6–23)
CALCIUM SERPL-MCNC: 9.6 MG/DL (ref 8.3–10.6)
CHLORIDE BLD-SCNC: 97 MMOL/L (ref 99–110)
CO2: 29 MMOL/L (ref 21–32)
CREAT SERPL-MCNC: 1.4 MG/DL (ref 0.6–1.1)
DIFFERENTIAL TYPE: ABNORMAL
EOSINOPHILS ABSOLUTE: 0.2 K/CU MM
EOSINOPHILS RELATIVE PERCENT: 2.7 % (ref 0–3)
ERYTHROCYTE SEDIMENTATION RATE: 80 MM/HR (ref 0–30)
GFR AFRICAN AMERICAN: 46 ML/MIN/1.73M2
GFR NON-AFRICAN AMERICAN: 38 ML/MIN/1.73M2
GLUCOSE BLD-MCNC: 140 MG/DL (ref 70–99)
HCT VFR BLD CALC: 25 % (ref 37–47)
HEMOGLOBIN: 7.5 GM/DL (ref 12.5–16)
HIGH SENSITIVE C-REACTIVE PROTEIN: 19.6 MG/L
IMMATURE NEUTROPHIL %: 0.6 % (ref 0–0.43)
LYMPHOCYTES ABSOLUTE: 1.5 K/CU MM
LYMPHOCYTES RELATIVE PERCENT: 18 % (ref 24–44)
MCH RBC QN AUTO: 29.4 PG (ref 27–31)
MCHC RBC AUTO-ENTMCNC: 30 % (ref 32–36)
MCV RBC AUTO: 98 FL (ref 78–100)
MONOCYTES ABSOLUTE: 0.5 K/CU MM
MONOCYTES RELATIVE PERCENT: 5.5 % (ref 0–4)
PDW BLD-RTO: 15.7 % (ref 11.7–14.9)
PLATELET # BLD: 288 K/CU MM (ref 140–440)
PMV BLD AUTO: 8.7 FL (ref 7.5–11.1)
POTASSIUM SERPL-SCNC: 4.5 MMOL/L (ref 3.5–5.1)
RBC # BLD: 2.55 M/CU MM (ref 4.2–5.4)
SEGMENTED NEUTROPHILS ABSOLUTE COUNT: 6.2 K/CU MM
SEGMENTED NEUTROPHILS RELATIVE PERCENT: 72.7 % (ref 36–66)
SODIUM BLD-SCNC: 140 MMOL/L (ref 135–145)
TOTAL IMMATURE NEUTOROPHIL: 0.05 K/CU MM
WBC # BLD: 8.5 K/CU MM (ref 4–10.5)

## 2018-11-29 PROCEDURE — 85025 COMPLETE CBC W/AUTO DIFF WBC: CPT

## 2018-11-29 PROCEDURE — 85652 RBC SED RATE AUTOMATED: CPT

## 2018-11-29 PROCEDURE — 87040 BLOOD CULTURE FOR BACTERIA: CPT

## 2018-11-29 PROCEDURE — 36415 COLL VENOUS BLD VENIPUNCTURE: CPT

## 2018-11-29 PROCEDURE — 86141 C-REACTIVE PROTEIN HS: CPT

## 2018-11-29 PROCEDURE — 80048 BASIC METABOLIC PNL TOTAL CA: CPT

## 2018-12-04 ENCOUNTER — OFFICE VISIT (OUTPATIENT)
Dept: CARDIOLOGY CLINIC | Age: 63
End: 2018-12-04
Payer: COMMERCIAL

## 2018-12-04 ENCOUNTER — TELEPHONE (OUTPATIENT)
Dept: CARDIOLOGY CLINIC | Age: 63
End: 2018-12-04

## 2018-12-04 VITALS
HEIGHT: 66 IN | DIASTOLIC BLOOD PRESSURE: 56 MMHG | BODY MASS INDEX: 39.63 KG/M2 | HEART RATE: 96 BPM | SYSTOLIC BLOOD PRESSURE: 112 MMHG | WEIGHT: 246.6 LBS | OXYGEN SATURATION: 97 %

## 2018-12-04 DIAGNOSIS — D50.0 IRON DEFICIENCY ANEMIA DUE TO CHRONIC BLOOD LOSS: ICD-10-CM

## 2018-12-04 DIAGNOSIS — I39 ENDOCARDITIS DUE TO OTHER ORGANISM, UNSPECIFIED CHRONICITY: Primary | ICD-10-CM

## 2018-12-04 PROBLEM — I38 VALVULAR DISEASE: Status: ACTIVE | Noted: 2018-12-04

## 2018-12-04 PROBLEM — N17.9 ACUTE KIDNEY INJURY (HCC): Status: ACTIVE | Noted: 2018-12-04

## 2018-12-04 PROBLEM — D63.1 ANEMIA IN CHRONIC KIDNEY DISEASE: Status: ACTIVE | Noted: 2018-12-04

## 2018-12-04 PROBLEM — N18.9 ANEMIA IN CHRONIC KIDNEY DISEASE: Status: ACTIVE | Noted: 2018-12-04

## 2018-12-04 PROBLEM — I38 ENDOCARDITIS: Status: ACTIVE | Noted: 2018-12-04

## 2018-12-04 LAB
CULTURE: NORMAL
CULTURE: NORMAL
Lab: NORMAL
Lab: NORMAL
REPORT STATUS: NORMAL
REPORT STATUS: NORMAL
SPECIMEN: NORMAL
SPECIMEN: NORMAL

## 2018-12-04 PROCEDURE — 99214 OFFICE O/P EST MOD 30 MIN: CPT | Performed by: INTERNAL MEDICINE

## 2018-12-04 NOTE — TELEPHONE ENCOUNTER
Referral and Records faxed to Ally [facility]jo office to call patient   With appointment Iron Deficiency due to blood clots

## 2018-12-04 NOTE — PROGRESS NOTES
(TRADJENTA) 5 MG tablet Take 1 tablet by mouth daily 90 tablet 1    budesonide-formoterol (SYMBICORT) 160-4.5 MCG/ACT AERO Inhale 2 puffs into the lungs 2 times daily 30.6 g 3    albuterol sulfate  (90 Base) MCG/ACT inhaler Inhale 2 puffs into the lungs every 4 hours as needed for Wheezing      atenolol (TENORMIN) 25 MG tablet TAKE 1 TABLET TWICE A  tablet 1    solifenacin (VESICARE) 10 MG tablet TAKE 1 TABLET DAILY 90 tablet 1    traZODone (DESYREL) 100 MG tablet TAKE 1 TABLET NIGHTLY 90 tablet 1    glipiZIDE (GLUCOTROL XL) 10 MG extended release tablet TAKE 1 TABLET TWICE A  tablet 1    cloNIDine (CATAPRES) 0.1 MG tablet TAKE 1 TABLET TWICE A DAY (Patient taking differently: TAKE 1 TABLET TWICE A DAY. Patient will discontinue after 5 days from 12/4/2018) 180 tablet 1    OXYGEN Inhale 2 L/min into the lungs continuous.  MULTIPLE VITAMIN PO Take 1 tablet by mouth daily.  ipratropium-albuterol (DUONEB) 0.5-2.5 (3) MG/3ML SOLN nebulizer solution Take 3 mLs by nebulization 4 times daily 360 mL 0     No current facility-administered medications for this visit.       Allergies: Tomato  Past Medical History:   Diagnosis Date    Chronic respiratory failure with hypoxia ([facility] 10/19/2015    COPD (chronic obstructive pulmonary disease) ([facility] 10/11/2013    follow with Dr Qasim Shelton    Diabetes mellitus ([facility] 10/11/2013    \"been diabetic for 7 yrs\"    Enlarged heart     Heart murmur     History of blood transfusion     Hyperlipidemia 10/11/2013    Hypertension 10/11/2013    Insomnia 10/11/2013    Liver dysfunction 12/21/2015    Liver, core biopsies: Steatohepatitis, acute and chronic, grade 2, stage 2.     Nasal congestion 1/20/2016    \"no cough and no fever associated with it\"    Obesity 10/11/2013    On home oxygen therapy     2l/nc 24 hrs per day    Sleep apnea 10/11/2013    had sleep apnea 2013- has cpap and does use it    Steatohepatitis 3/21/2016    Liver, core biopsies: Steatohepatitis, acute and chronic, grade 2, stage 2.      Systolic murmur 4/4/8036    Tobacco abuse disorder 10/11/2013    Type 2 diabetes mellitus ([address]     Urinary incontinence, mixed 10/11/2013     Past Surgical History:   Procedure Laterality Date    BREAST SURGERY      right breast bx 2012    CHOLECYSTECTOMY  2010    \"done with hyster    COLONOSCOPY N/A 10/24/2018    COLONOSCOPY POLYPECTOMY ABLATION/BIOPSY OF CECAL MASS performed by Mert Frye MD at [place] [address]  2010    [address] both ovaries\"    KNEE SURGERY Left 2000    [place] LAP,SURG,COLECTOMY, PARTIAL, W/ANAST N/A 10/25/2018    ROBOTIC ASSISTED  LAPAROSCOPIC RIGHT  HEMICOLECTOMY performed by Bhavani Horne MD at [facility]  as a kid     Family History   Problem Relation Age of Onset    Cancer Mother         lung ca , liver and in lymph nodes    Arthritis Mother     High Blood Pressure Mother     Obesity Mother     Osteoporosis Mother     Cancer Father         lung ca    Arthritis Father     High Blood Pressure Father     Early Death Father     High Blood Pressure Sister     Heart Disease Brother     Alcohol Abuse Brother     High Blood Pressure Brother     Diabetes Brother     Depression Brother     High Cholesterol Brother     Obesity Brother     Substance Abuse Brother     Arthritis Maternal Uncle     Osteoporosis Maternal Uncle     High Blood Pressure Maternal Grandmother     Heart Attack Maternal Grandfather     High Blood Pressure Maternal Grandfather     Prostate Cancer Maternal Grandfather      Social History   Substance Use Topics    Smoking status: Former Smoker     Packs/day: 0.50     Years: 30.00     Types: Cigarettes     Quit date: 9/10/2018    Smokeless tobacco: Never Used      Comment: Does have nicotine patch    Alcohol use No      @NÉSTOR@  Review of Systems:   · Constitutional: No Fever or Weight Loss   · Eyes: No Decreased Vision  · ENT: No Headaches, Hearing Loss or pulses are normal  Femoral pulses have normal amplitude, no bruits   Extremities - No cyanosis, clubbing, or significant edema, no varicose veins    Abdomen - No masses, tenderness, or organomegaly, no hepato-splenomegally, no bruits  Musculoskeletal - No significant edema, no kyphosis or scoliosis, no deformity in any extremity noted, muscle strength and tone are normal  Skin: no ulcer,no scar,no stasis dermatitis   Neurologic - alert oriented times 3,Cranial nerves II through XII are grossly intact. There were no gross focal neurologic abnormalities. All sensory and motor nerves examined and were normal  Psychiatric: normal mood and affect    Lab Results   Component Value Date    CKTOTAL 86 09/10/2018     BNP:  No results found for: BNP  PT/INR:  No results found for: PTINR  Lab Results   Component Value Date    LABA1C 6.9 08/02/2018    LABA1C 6.1 02/01/2018     Lab Results   Component Value Date    CHOL 166 03/08/2017    TRIG 265 (H) 03/08/2017    HDL 37 (L) 07/06/2018    LDLDIRECT 78 07/06/2018     Lab Results   Component Value Date    ALT 9 (L) 11/06/2018    AST 13 (L) 11/06/2018     TSH:  No results found for: TSH    Impression:  Ashley Nichols is a 65 y. o.year old who  has a past medical history of Chronic respiratory failure with hypoxia ([address] COPD (chronic obstructive pulmonary disease) ([address] Diabetes mellitus ([address] Enlarged heart; Heart murmur; History of blood transfusion; Hyperlipidemia; Hypertension; Insomnia; Liver dysfunction; Nasal congestion; Obesity; On home oxygen therapy; Sleep apnea; Steatohepatitis; Systolic murmur; Tobacco abuse disorder; Type 2 diabetes mellitus ([address] and Urinary incontinence, mixed. and presents with     Plan:  1. Shortness of breath:\" multfactorial, primary with anemia and COPD, however she had endocaridtis, will repeat echo  2. Endocarditis: will repeat echo  3. Anemia: mixed picture, high ferritin, low iron, low TIBC, will refer to hematology  4.  DM: continue glipizide and

## 2018-12-05 ENCOUNTER — PROCEDURE VISIT (OUTPATIENT)
Dept: CARDIOLOGY CLINIC | Age: 63
End: 2018-12-05
Payer: COMMERCIAL

## 2018-12-05 DIAGNOSIS — I39 ENDOCARDITIS DUE TO OTHER ORGANISM, UNSPECIFIED CHRONICITY: Primary | ICD-10-CM

## 2018-12-05 PROCEDURE — 93308 TTE F-UP OR LMTD: CPT | Performed by: INTERNAL MEDICINE

## 2018-12-06 ENCOUNTER — OFFICE VISIT (OUTPATIENT)
Dept: SURGERY | Age: 63
End: 2018-12-06

## 2018-12-06 VITALS
HEIGHT: 66 IN | HEART RATE: 85 BPM | RESPIRATION RATE: 16 BRPM | SYSTOLIC BLOOD PRESSURE: 110 MMHG | DIASTOLIC BLOOD PRESSURE: 50 MMHG | WEIGHT: 240 LBS | BODY MASS INDEX: 38.57 KG/M2

## 2018-12-06 DIAGNOSIS — K63.89 CECUM MASS: Primary | ICD-10-CM

## 2018-12-06 PROCEDURE — 99024 POSTOP FOLLOW-UP VISIT: CPT | Performed by: SURGERY

## 2018-12-09 ENCOUNTER — HOSPITAL ENCOUNTER (EMERGENCY)
Age: 63
Discharge: ANOTHER HOSPITAL | End: 2018-12-10
Attending: EMERGENCY MEDICINE
Payer: COMMERCIAL

## 2018-12-09 ENCOUNTER — APPOINTMENT (OUTPATIENT)
Dept: GENERAL RADIOLOGY | Age: 63
End: 2018-12-09
Payer: COMMERCIAL

## 2018-12-09 DIAGNOSIS — I27.20 PULMONARY HYPERTENSION (HCC): ICD-10-CM

## 2018-12-09 DIAGNOSIS — Z86.79 HISTORY OF BACTERIAL ENDOCARDITIS: ICD-10-CM

## 2018-12-09 DIAGNOSIS — R77.8 ELEVATED TROPONIN: ICD-10-CM

## 2018-12-09 DIAGNOSIS — R50.9 ACUTE FEBRILE ILLNESS: Primary | ICD-10-CM

## 2018-12-09 LAB
BASOPHILS ABSOLUTE: 0 K/CU MM
BASOPHILS RELATIVE PERCENT: 0.2 % (ref 0–1)
DIFFERENTIAL TYPE: ABNORMAL
EOSINOPHILS ABSOLUTE: 0 K/CU MM
EOSINOPHILS RELATIVE PERCENT: 0 % (ref 0–3)
HCT VFR BLD CALC: 22.9 % (ref 37–47)
HEMOGLOBIN: 7 GM/DL (ref 12.5–16)
IMMATURE NEUTROPHIL %: 0.5 % (ref 0–0.43)
LYMPHOCYTES ABSOLUTE: 0.8 K/CU MM
LYMPHOCYTES RELATIVE PERCENT: 9.4 % (ref 24–44)
MCH RBC QN AUTO: 30 PG (ref 27–31)
MCHC RBC AUTO-ENTMCNC: 30.6 % (ref 32–36)
MCV RBC AUTO: 98.3 FL (ref 78–100)
MONOCYTES ABSOLUTE: 0.4 K/CU MM
MONOCYTES RELATIVE PERCENT: 4.4 % (ref 0–4)
PDW BLD-RTO: 16.4 % (ref 11.7–14.9)
PLATELET # BLD: 189 K/CU MM (ref 140–440)
PMV BLD AUTO: 9.4 FL (ref 7.5–11.1)
RBC # BLD: 2.33 M/CU MM (ref 4.2–5.4)
SEGMENTED NEUTROPHILS ABSOLUTE COUNT: 7.2 K/CU MM
SEGMENTED NEUTROPHILS RELATIVE PERCENT: 85.5 % (ref 36–66)
TOTAL IMMATURE NEUTOROPHIL: 0.04 K/CU MM
WBC # BLD: 8.4 K/CU MM (ref 4–10.5)

## 2018-12-09 PROCEDURE — 84484 ASSAY OF TROPONIN QUANT: CPT

## 2018-12-09 PROCEDURE — 93005 ELECTROCARDIOGRAM TRACING: CPT | Performed by: EMERGENCY MEDICINE

## 2018-12-09 PROCEDURE — 99284 EMERGENCY DEPT VISIT MOD MDM: CPT

## 2018-12-09 PROCEDURE — 83880 ASSAY OF NATRIURETIC PEPTIDE: CPT

## 2018-12-09 PROCEDURE — 87186 SC STD MICRODIL/AGAR DIL: CPT

## 2018-12-09 PROCEDURE — 71045 X-RAY EXAM CHEST 1 VIEW: CPT

## 2018-12-09 PROCEDURE — 87040 BLOOD CULTURE FOR BACTERIA: CPT

## 2018-12-09 PROCEDURE — 6370000000 HC RX 637 (ALT 250 FOR IP): Performed by: EMERGENCY MEDICINE

## 2018-12-09 PROCEDURE — 83605 ASSAY OF LACTIC ACID: CPT

## 2018-12-09 PROCEDURE — 80053 COMPREHEN METABOLIC PANEL: CPT

## 2018-12-09 PROCEDURE — 85025 COMPLETE CBC W/AUTO DIFF WBC: CPT

## 2018-12-09 PROCEDURE — 94640 AIRWAY INHALATION TREATMENT: CPT

## 2018-12-09 PROCEDURE — 87798 DETECT AGENT NOS DNA AMP: CPT

## 2018-12-09 RX ORDER — ACETAMINOPHEN 500 MG
1000 TABLET ORAL ONCE
Status: COMPLETED | OUTPATIENT
Start: 2018-12-09 | End: 2018-12-09

## 2018-12-09 RX ORDER — IPRATROPIUM BROMIDE AND ALBUTEROL SULFATE 2.5; .5 MG/3ML; MG/3ML
1 SOLUTION RESPIRATORY (INHALATION) ONCE
Status: COMPLETED | OUTPATIENT
Start: 2018-12-09 | End: 2018-12-09

## 2018-12-09 RX ORDER — ACETAMINOPHEN 500 MG
TABLET ORAL
Status: DISCONTINUED
Start: 2018-12-09 | End: 2018-12-10 | Stop reason: HOSPADM

## 2018-12-09 RX ADMIN — IPRATROPIUM BROMIDE AND ALBUTEROL SULFATE 1 AMPULE: .5; 3 SOLUTION RESPIRATORY (INHALATION) at 22:48

## 2018-12-09 RX ADMIN — ACETAMINOPHEN 1000 MG: 500 TABLET, FILM COATED ORAL at 23:12

## 2018-12-09 ASSESSMENT — PAIN DESCRIPTION - PAIN TYPE: TYPE: ACUTE PAIN

## 2018-12-09 ASSESSMENT — PAIN DESCRIPTION - LOCATION: LOCATION: HEAD

## 2018-12-09 ASSESSMENT — PAIN SCALES - GENERAL: PAINLEVEL_OUTOF10: 4

## 2018-12-10 ENCOUNTER — HOSPITAL ENCOUNTER (INPATIENT)
Age: 63
LOS: 10 days | Discharge: NURSING, REHAB OR LONG-TERM CARE | DRG: 288 | End: 2018-12-20
Attending: HOSPITALIST | Admitting: HOSPITALIST
Payer: COMMERCIAL

## 2018-12-10 ENCOUNTER — APPOINTMENT (OUTPATIENT)
Dept: CT IMAGING | Age: 63
DRG: 288 | End: 2018-12-10
Attending: HOSPITALIST
Payer: COMMERCIAL

## 2018-12-10 VITALS
TEMPERATURE: 100 F | HEIGHT: 66 IN | SYSTOLIC BLOOD PRESSURE: 120 MMHG | WEIGHT: 240 LBS | DIASTOLIC BLOOD PRESSURE: 41 MMHG | BODY MASS INDEX: 38.57 KG/M2 | HEART RATE: 80 BPM | OXYGEN SATURATION: 96 % | RESPIRATION RATE: 24 BRPM

## 2018-12-10 PROBLEM — R50.9 FEVER: Status: ACTIVE | Noted: 2018-12-10

## 2018-12-10 LAB
ADENOVIRUS DETECTION BY PCR: NOT DETECTED
ALBUMIN SERPL-MCNC: 4.1 GM/DL (ref 3.4–5)
ALP BLD-CCNC: 41 IU/L (ref 40–129)
ALT SERPL-CCNC: 9 U/L (ref 10–40)
ANION GAP SERPL CALCULATED.3IONS-SCNC: 17 MMOL/L (ref 4–16)
AST SERPL-CCNC: 12 IU/L (ref 15–37)
BACTERIA: ABNORMAL /HPF
BILIRUB SERPL-MCNC: 0.6 MG/DL (ref 0–1)
BILIRUBIN URINE: NEGATIVE MG/DL
BLOOD, URINE: NEGATIVE
BORDETELLA PERTUSSIS PCR: NOT DETECTED
BUN BLDV-MCNC: 47 MG/DL (ref 6–23)
CALCIUM SERPL-MCNC: 9.6 MG/DL (ref 8.3–10.6)
CHLAMYDOPHILA PNEUMONIA PCR: NOT DETECTED
CHLORIDE BLD-SCNC: 94 MMOL/L (ref 99–110)
CLARITY: CLEAR
CO2: 25 MMOL/L (ref 21–32)
COLOR: YELLOW
CORONAVIRUS 229E PCR: NOT DETECTED
CORONAVIRUS HKU1 PCR: NOT DETECTED
CORONAVIRUS NL63 PCR: NOT DETECTED
CORONAVIRUS OC43 PCR: NOT DETECTED
CREAT SERPL-MCNC: 1.7 MG/DL (ref 0.6–1.1)
D DIMER: 978 NG/ML(DDU)
ERYTHROCYTE SEDIMENTATION RATE: 92 MM/HR (ref 0–30)
ESTIMATED AVERAGE GLUCOSE: 97 MG/DL
GFR AFRICAN AMERICAN: 37 ML/MIN/1.73M2
GFR NON-AFRICAN AMERICAN: 30 ML/MIN/1.73M2
GLUCOSE BLD-MCNC: 110 MG/DL (ref 70–99)
GLUCOSE BLD-MCNC: 128 MG/DL (ref 70–99)
GLUCOSE BLD-MCNC: 77 MG/DL (ref 70–99)
GLUCOSE BLD-MCNC: 89 MG/DL (ref 70–99)
GLUCOSE BLD-MCNC: 93 MG/DL (ref 70–99)
GLUCOSE, URINE: NEGATIVE MG/DL
HBA1C MFR BLD: 5 % (ref 4.2–6.3)
HIGH SENSITIVE C-REACTIVE PROTEIN: 90.4 MG/L
HUMAN METAPNEUMOVIRUS PCR: NOT DETECTED
HYALINE CASTS: 2 /LPF
INFLUENZA A BY PCR: NOT DETECTED
INFLUENZA A H1 (2009) PCR: NOT DETECTED
INFLUENZA A H1 PANDEMIC PCR: NOT DETECTED
INFLUENZA A H3 PCR: NOT DETECTED
INFLUENZA B BY PCR: NOT DETECTED
KETONES, URINE: NEGATIVE MG/DL
LACTATE: 0.9 MMOL/L (ref 0.4–2)
LEUKOCYTE ESTERASE, URINE: NEGATIVE
MUCUS: ABNORMAL HPF
MYCOPLASMA PNEUMONIAE PCR: NOT DETECTED
NITRITE URINE, QUANTITATIVE: NEGATIVE
PARAINFLUENZA 1 PCR: NOT DETECTED
PARAINFLUENZA 2 PCR: NOT DETECTED
PARAINFLUENZA 3 PCR: NOT DETECTED
PARAINFLUENZA 4 PCR: NOT DETECTED
PH, URINE: 5 (ref 5–8)
POTASSIUM SERPL-SCNC: 4.1 MMOL/L (ref 3.5–5.1)
PRO-BNP: 7358 PG/ML
PROCALCITONIN: 1.58
PROTEIN UA: NEGATIVE MG/DL
RBC URINE: 1 /HPF (ref 0–6)
RHINOVIRUS ENTEROVIRUS PCR: NOT DETECTED
RSV PCR: NOT DETECTED
SODIUM BLD-SCNC: 136 MMOL/L (ref 135–145)
SPECIFIC GRAVITY UA: 1.01 (ref 1–1.03)
SQUAMOUS EPITHELIAL: 1 /HPF
TOTAL PROTEIN: 6.9 GM/DL (ref 6.4–8.2)
TRICHOMONAS: ABNORMAL /HPF
TROPONIN T: 0.05 NG/ML
TROPONIN T: 0.06 NG/ML
TROPONIN T: 0.08 NG/ML
UROBILINOGEN, URINE: NORMAL MG/DL (ref 0.2–1)
WBC UA: 1 /HPF (ref 0–5)

## 2018-12-10 PROCEDURE — 6360000002 HC RX W HCPCS: Performed by: INTERNAL MEDICINE

## 2018-12-10 PROCEDURE — 86900 BLOOD TYPING SEROLOGIC ABO: CPT

## 2018-12-10 PROCEDURE — 2580000003 HC RX 258: Performed by: HOSPITALIST

## 2018-12-10 PROCEDURE — 86922 COMPATIBILITY TEST ANTIGLOB: CPT

## 2018-12-10 PROCEDURE — 99233 SBSQ HOSP IP/OBS HIGH 50: CPT | Performed by: INTERNAL MEDICINE

## 2018-12-10 PROCEDURE — 6360000002 HC RX W HCPCS: Performed by: EMERGENCY MEDICINE

## 2018-12-10 PROCEDURE — 71250 CT THORAX DX C-: CPT

## 2018-12-10 PROCEDURE — 2580000003 HC RX 258: Performed by: INTERNAL MEDICINE

## 2018-12-10 PROCEDURE — 86141 C-REACTIVE PROTEIN HS: CPT

## 2018-12-10 PROCEDURE — 81001 URINALYSIS AUTO W/SCOPE: CPT

## 2018-12-10 PROCEDURE — 85379 FIBRIN DEGRADATION QUANT: CPT

## 2018-12-10 PROCEDURE — 36415 COLL VENOUS BLD VENIPUNCTURE: CPT

## 2018-12-10 PROCEDURE — 6370000000 HC RX 637 (ALT 250 FOR IP): Performed by: HOSPITALIST

## 2018-12-10 PROCEDURE — 84484 ASSAY OF TROPONIN QUANT: CPT

## 2018-12-10 PROCEDURE — 74176 CT ABD & PELVIS W/O CONTRAST: CPT

## 2018-12-10 PROCEDURE — 93010 ELECTROCARDIOGRAM REPORT: CPT | Performed by: INTERNAL MEDICINE

## 2018-12-10 PROCEDURE — 2140000000 HC CCU INTERMEDIATE R&B

## 2018-12-10 PROCEDURE — 87040 BLOOD CULTURE FOR BACTERIA: CPT

## 2018-12-10 PROCEDURE — 82962 GLUCOSE BLOOD TEST: CPT

## 2018-12-10 PROCEDURE — 84145 PROCALCITONIN (PCT): CPT

## 2018-12-10 PROCEDURE — 87449 NOS EACH ORGANISM AG IA: CPT

## 2018-12-10 PROCEDURE — 87798 DETECT AGENT NOS DNA AMP: CPT

## 2018-12-10 PROCEDURE — 85652 RBC SED RATE AUTOMATED: CPT

## 2018-12-10 PROCEDURE — 2500000003 HC RX 250 WO HCPCS: Performed by: INTERNAL MEDICINE

## 2018-12-10 PROCEDURE — 2580000003 HC RX 258: Performed by: EMERGENCY MEDICINE

## 2018-12-10 PROCEDURE — 86901 BLOOD TYPING SEROLOGIC RH(D): CPT

## 2018-12-10 PROCEDURE — 83036 HEMOGLOBIN GLYCOSYLATED A1C: CPT

## 2018-12-10 PROCEDURE — 99254 IP/OBS CNSLTJ NEW/EST MOD 60: CPT | Performed by: INTERNAL MEDICINE

## 2018-12-10 PROCEDURE — 94640 AIRWAY INHALATION TREATMENT: CPT

## 2018-12-10 PROCEDURE — 2700000000 HC OXYGEN THERAPY PER DAY

## 2018-12-10 PROCEDURE — 87899 AGENT NOS ASSAY W/OPTIC: CPT

## 2018-12-10 PROCEDURE — 94761 N-INVAS EAR/PLS OXIMETRY MLT: CPT

## 2018-12-10 PROCEDURE — P9016 RBC LEUKOCYTES REDUCED: HCPCS

## 2018-12-10 PROCEDURE — 6360000002 HC RX W HCPCS: Performed by: HOSPITALIST

## 2018-12-10 PROCEDURE — 96365 THER/PROPH/DIAG IV INF INIT: CPT

## 2018-12-10 PROCEDURE — 93308 TTE F-UP OR LMTD: CPT

## 2018-12-10 PROCEDURE — 86850 RBC ANTIBODY SCREEN: CPT

## 2018-12-10 RX ORDER — DEXTROSE MONOHYDRATE 25 G/50ML
12.5 INJECTION, SOLUTION INTRAVENOUS PRN
Status: DISCONTINUED | OUTPATIENT
Start: 2018-12-10 | End: 2018-12-20 | Stop reason: HOSPADM

## 2018-12-10 RX ORDER — SODIUM CHLORIDE 0.9 % (FLUSH) 0.9 %
10 SYRINGE (ML) INJECTION EVERY 12 HOURS SCHEDULED
Status: DISCONTINUED | OUTPATIENT
Start: 2018-12-10 | End: 2018-12-12

## 2018-12-10 RX ORDER — 0.9 % SODIUM CHLORIDE 0.9 %
250 INTRAVENOUS SOLUTION INTRAVENOUS ONCE
Status: DISCONTINUED | OUTPATIENT
Start: 2018-12-10 | End: 2018-12-14

## 2018-12-10 RX ORDER — BUDESONIDE AND FORMOTEROL FUMARATE DIHYDRATE 160; 4.5 UG/1; UG/1
2 AEROSOL RESPIRATORY (INHALATION) 2 TIMES DAILY
Status: DISCONTINUED | OUTPATIENT
Start: 2018-12-10 | End: 2018-12-10 | Stop reason: CLARIF

## 2018-12-10 RX ORDER — LIDOCAINE HYDROCHLORIDE 10 MG/ML
5 INJECTION, SOLUTION EPIDURAL; INFILTRATION; INTRACAUDAL; PERINEURAL ONCE
Status: DISCONTINUED | OUTPATIENT
Start: 2018-12-10 | End: 2018-12-20 | Stop reason: HOSPADM

## 2018-12-10 RX ORDER — ACETAMINOPHEN 500 MG
1000 TABLET ORAL EVERY 6 HOURS PRN
Status: DISCONTINUED | OUTPATIENT
Start: 2018-12-10 | End: 2018-12-20 | Stop reason: HOSPADM

## 2018-12-10 RX ORDER — PRAVASTATIN SODIUM 20 MG
20 TABLET ORAL DAILY
Status: DISCONTINUED | OUTPATIENT
Start: 2018-12-10 | End: 2018-12-20 | Stop reason: HOSPADM

## 2018-12-10 RX ORDER — ALBUTEROL SULFATE 90 UG/1
2 AEROSOL, METERED RESPIRATORY (INHALATION) EVERY 4 HOURS PRN
Status: DISCONTINUED | OUTPATIENT
Start: 2018-12-10 | End: 2018-12-20 | Stop reason: HOSPADM

## 2018-12-10 RX ORDER — ATENOLOL 25 MG/1
25 TABLET ORAL 2 TIMES DAILY
Status: DISCONTINUED | OUTPATIENT
Start: 2018-12-10 | End: 2018-12-20 | Stop reason: HOSPADM

## 2018-12-10 RX ORDER — ONDANSETRON 2 MG/ML
4 INJECTION INTRAMUSCULAR; INTRAVENOUS EVERY 6 HOURS PRN
Status: DISCONTINUED | OUTPATIENT
Start: 2018-12-10 | End: 2018-12-20 | Stop reason: HOSPADM

## 2018-12-10 RX ORDER — CLONIDINE HYDROCHLORIDE 0.1 MG/1
0.1 TABLET ORAL 2 TIMES DAILY
Status: DISCONTINUED | OUTPATIENT
Start: 2018-12-10 | End: 2018-12-11

## 2018-12-10 RX ORDER — NICOTINE POLACRILEX 4 MG
15 LOZENGE BUCCAL PRN
Status: DISCONTINUED | OUTPATIENT
Start: 2018-12-10 | End: 2018-12-20 | Stop reason: HOSPADM

## 2018-12-10 RX ORDER — SODIUM CHLORIDE 0.9 % (FLUSH) 0.9 %
10 SYRINGE (ML) INJECTION PRN
Status: DISCONTINUED | OUTPATIENT
Start: 2018-12-10 | End: 2018-12-12

## 2018-12-10 RX ORDER — FUROSEMIDE 10 MG/ML
20 INJECTION INTRAMUSCULAR; INTRAVENOUS SEE ADMIN INSTRUCTIONS
Status: COMPLETED | OUTPATIENT
Start: 2018-12-10 | End: 2018-12-10

## 2018-12-10 RX ORDER — DEXTROSE MONOHYDRATE 50 MG/ML
100 INJECTION, SOLUTION INTRAVENOUS PRN
Status: DISCONTINUED | OUTPATIENT
Start: 2018-12-10 | End: 2018-12-20 | Stop reason: HOSPADM

## 2018-12-10 RX ORDER — IPRATROPIUM BROMIDE AND ALBUTEROL SULFATE 2.5; .5 MG/3ML; MG/3ML
1 SOLUTION RESPIRATORY (INHALATION) 4 TIMES DAILY
Status: DISCONTINUED | OUTPATIENT
Start: 2018-12-10 | End: 2018-12-20 | Stop reason: HOSPADM

## 2018-12-10 RX ORDER — 0.9 % SODIUM CHLORIDE 0.9 %
250 INTRAVENOUS SOLUTION INTRAVENOUS ONCE
Status: DISCONTINUED | OUTPATIENT
Start: 2018-12-10 | End: 2018-12-10

## 2018-12-10 RX ORDER — SODIUM CHLORIDE 0.9 % (FLUSH) 0.9 %
10 SYRINGE (ML) INJECTION EVERY 12 HOURS SCHEDULED
Status: DISCONTINUED | OUTPATIENT
Start: 2018-12-10 | End: 2018-12-20 | Stop reason: HOSPADM

## 2018-12-10 RX ORDER — SODIUM CHLORIDE 0.9 % (FLUSH) 0.9 %
10 SYRINGE (ML) INJECTION PRN
Status: DISCONTINUED | OUTPATIENT
Start: 2018-12-10 | End: 2018-12-20 | Stop reason: HOSPADM

## 2018-12-10 RX ADMIN — ATENOLOL 25 MG: 25 TABLET ORAL at 12:10

## 2018-12-10 RX ADMIN — Medication 2 PUFF: at 20:15

## 2018-12-10 RX ADMIN — METRONIDAZOLE 500 MG: 500 INJECTION, SOLUTION INTRAVENOUS at 20:19

## 2018-12-10 RX ADMIN — CEFEPIME HYDROCHLORIDE 2 G: 2 INJECTION, POWDER, FOR SOLUTION INTRAVENOUS at 18:36

## 2018-12-10 RX ADMIN — IPRATROPIUM BROMIDE AND ALBUTEROL SULFATE 3 ML: .5; 3 SOLUTION RESPIRATORY (INHALATION) at 08:03

## 2018-12-10 RX ADMIN — VANCOMYCIN HYDROCHLORIDE 2000 MG: 1 INJECTION, POWDER, LYOPHILIZED, FOR SOLUTION INTRAVENOUS at 22:00

## 2018-12-10 RX ADMIN — Medication 2 PUFF: at 08:03

## 2018-12-10 RX ADMIN — ACETAMINOPHEN 1000 MG: 500 TABLET, FILM COATED ORAL at 20:28

## 2018-12-10 RX ADMIN — CLONIDINE HYDROCHLORIDE 0.1 MG: 0.1 TABLET ORAL at 12:10

## 2018-12-10 RX ADMIN — CLONIDINE HYDROCHLORIDE 0.1 MG: 0.1 TABLET ORAL at 20:19

## 2018-12-10 RX ADMIN — ATENOLOL 25 MG: 25 TABLET ORAL at 20:19

## 2018-12-10 RX ADMIN — CEFTRIAXONE SODIUM 1 G: 1 INJECTION, POWDER, FOR SOLUTION INTRAMUSCULAR; INTRAVENOUS at 00:46

## 2018-12-10 RX ADMIN — FUROSEMIDE 20 MG: 10 INJECTION, SOLUTION INTRAVENOUS at 06:46

## 2018-12-10 RX ADMIN — SODIUM CHLORIDE, PRESERVATIVE FREE 10 ML: 5 INJECTION INTRAVENOUS at 20:19

## 2018-12-10 RX ADMIN — IPRATROPIUM BROMIDE AND ALBUTEROL SULFATE 3 ML: .5; 3 SOLUTION RESPIRATORY (INHALATION) at 15:45

## 2018-12-10 RX ADMIN — ENOXAPARIN SODIUM 40 MG: 40 INJECTION SUBCUTANEOUS at 12:09

## 2018-12-10 RX ADMIN — SODIUM CHLORIDE, PRESERVATIVE FREE 10 ML: 5 INJECTION INTRAVENOUS at 21:00

## 2018-12-10 RX ADMIN — IPRATROPIUM BROMIDE AND ALBUTEROL SULFATE 3 ML: .5; 3 SOLUTION RESPIRATORY (INHALATION) at 11:47

## 2018-12-10 RX ADMIN — ACETAMINOPHEN 1000 MG: 500 TABLET, FILM COATED ORAL at 12:10

## 2018-12-10 RX ADMIN — IPRATROPIUM BROMIDE AND ALBUTEROL SULFATE 3 ML: .5; 3 SOLUTION RESPIRATORY (INHALATION) at 20:15

## 2018-12-10 RX ADMIN — PRAVASTATIN SODIUM 20 MG: 20 TABLET ORAL at 12:10

## 2018-12-10 RX ADMIN — TAZOBACTAM SODIUM AND PIPERACILLIN SODIUM 3.38 G: 375; 3 INJECTION, SOLUTION INTRAVENOUS at 06:46

## 2018-12-10 ASSESSMENT — HEART SCORE: ECG: 1

## 2018-12-10 ASSESSMENT — PAIN DESCRIPTION - PROGRESSION: CLINICAL_PROGRESSION: GRADUALLY IMPROVING

## 2018-12-10 ASSESSMENT — PAIN DESCRIPTION - LOCATION: LOCATION: HEAD

## 2018-12-10 ASSESSMENT — PAIN SCALES - GENERAL
PAINLEVEL_OUTOF10: 5
PAINLEVEL_OUTOF10: 5
PAINLEVEL_OUTOF10: 6
PAINLEVEL_OUTOF10: 2

## 2018-12-10 ASSESSMENT — PAIN DESCRIPTION - PAIN TYPE: TYPE: ACUTE PAIN

## 2018-12-10 NOTE — CONSULTS
CARDIOLOGY CONSULT NOTE   Reason for consultation:  Endocarditis/ fever    Referring physician:  Анна Wilson MD     Primary care physician: Eddie Hayes MD      Dear  Dr. Анна Wilson MD   Thanks for the consult. Chief Complaints :No chief complaint on file. History of present illness:Kathleen is a 59 y. o.year old who presents with fevers and chills. 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 She was treated with antibiotics  She underwent right hemicolectomy for cecal mass stuck to be the source of strep bovis. She underwent antibiotics treatment. He completed an antibiotic course 2 weeks ago. Daughter started noticing that she was more short of breath and declining over the last one week or so  She continues to have fevers and chills. She is very short of breath  Past medical history:    has a past medical history of Chronic respiratory failure with hypoxia ([address] COPD (chronic obstructive pulmonary disease) ([address] Diabetes mellitus ([address] Enlarged heart; Heart murmur; History of blood transfusion; Hyperlipidemia; Hypertension; Insomnia; Liver dysfunction; Nasal congestion; Obesity; On home oxygen therapy; Sleep apnea; Steatohepatitis; Systolic murmur; Tobacco abuse disorder; Type 2 diabetes mellitus ([address] and Urinary incontinence, mixed. Past surgical history:   has a past surgical history that includes Hysterectomy (2010); Cholecystectomy (2010); Breast surgery; Tonsillectomy (as a kid); knee surgery (Left, 2000); Colonoscopy (N/A, 10/24/2018); and pr lap,surg,colectomy, partial, w/anast (N/A, 10/25/2018). Social History:   reports that she quit smoking about 2 months ago. Her smoking use included Cigarettes. She has a 15.00 pack-year smoking history.  She has never used smokeless tobacco. She reports that she does not drink alcohol

## 2018-12-10 NOTE — H&P
Brother     High Cholesterol Brother     Obesity Brother     Substance Abuse Brother     Arthritis Maternal Uncle     Osteoporosis Maternal Uncle     High Blood Pressure Maternal Grandmother     Heart Attack Maternal Grandfather     High Blood Pressure Maternal Grandfather     Prostate Cancer Maternal Grandfather         HOME MEDICATIONS    Prior to Admission medications    Medication Sig Start Date End Date Taking?  Authorizing Provider   spironolactone (ALDACTONE) 50 MG tablet Take 1 tablet by mouth daily 12/7/18   Brooks Rivas MD   metolazone (ZAROXOLYN) 2.5 MG tablet Take 1 tablet by mouth daily  Patient taking differently: Take 5 mg by mouth daily  11/26/18   Rai Navarro MD   losartan (COZAAR) 50 MG tablet TAKE 1 TABLET DAILY  Patient taking differently: TAKE 2 TABLET DAILY 11/8/18   Rai Navarro MD   pravastatin (PRAVACHOL) 20 MG tablet TAKE 1 TABLET DAILY 11/8/18   Rai Navarro MD   metFORMIN (GLUCOPHAGE) 1000 MG tablet TAKE 1 TABLET TWICE A DAY WITH MEALS 11/8/18   Rai Navarro MD   torsemide (DEMADEX) 20 MG tablet Take 1 tablet by mouth 2 times daily  Patient taking differently: Take 20 mg by mouth 2 times daily 40 mg in the AM. 20 mg in PM 11/8/18   Raymond Mabry MD   vitamin C (ASCORBIC ACID) 500 MG tablet Take 500 mg by mouth daily    Historical Provider, MD   CPAP Machine MISC by Does not apply route    Historical Provider, MD   acetaminophen (TYLENOL) 500 MG tablet Take 1,000 mg by mouth every 6 hours as needed for Pain    Historical Provider, MD   linagliptin (TRADJENTA) 5 MG tablet Take 1 tablet by mouth daily 10/8/18   Rai Navarro MD   budesonide-formoterol (SYMBICORT) 160-4.5 MCG/ACT AERO Inhale 2 puffs into the lungs 2 times daily 9/26/18   Rai Navarro MD   ipratropium-albuterol (DUONEB) 0.5-2.5 (3) MG/3ML SOLN nebulizer solution Take 3 mLs by nebulization 4 times daily 9/22/18   Blanca Zuleta MD   albuterol sulfate  (90 Base) MCG/ACT inhaler Inhale 2 puffs into the petechiae or ecchymoses. MSK Spontaneous movement of all extremities. No gross joint deformities. SKIN Normal coloration, warm, dry. NEURO Cranial nerves appear grossly intact, normal speech, no lateralizing weakness. PSYCH Awake, alert, oriented x 4. Affect appropriate. LABS  Recent Labs      12/09/18 2245   WBC  8.4   HGB  7.0*   HCT  22.9*   PLT  189      Recent Labs      12/09/18 2245   NA  136   K  4.1   CL  94*   CO2  25   BUN  47*   CREATININE  1.7*     Recent Labs      12/09/18 2245   AST  12*   ALT  9*   BILITOT  0.6   ALKPHOS  41     No results for input(s): INR in the last 72 hours. Recent Labs      12/09/18 2245   TROPONINT  0.080*        IMAGING      MEDS  Scheduled Meds:   atenolol  1 tablet Oral BID    budesonide-formoterol  2 puff Inhalation BID    cloNIDine  1 tablet Oral BID    ipratropium-albuterol  1 vial Nebulization 4x Daily    pravastatin  20 mg Oral Daily    sodium chloride flush  10 mL Intravenous 2 times per day    enoxaparin  40 mg Subcutaneous Daily    insulin lispro  0-6 Units Subcutaneous TID WC    insulin lispro  0-3 Units Subcutaneous Nightly    sodium chloride  250 mL Intravenous Once    furosemide  20 mg Intravenous Once    piperacillin-tazobactam  3.375 g Intravenous Q8H     Continuous Infusions:   dextrose       PRN Meds:acetaminophen, albuterol sulfate HFA, sodium chloride flush, magnesium hydroxide, ondansetron, glucose, dextrose, glucagon (rDNA), dextrose    ASSESSMENT and PLAN  Hospital Day: 1    1-Acute on chronic respiratory failure-mainly hypoxic and symptomatic with shortness of breath- CXR inconclusive- will get CT chest, with underlying severe COPD/with severe pulm HTN and valvular HD- consult pulm. Given fever- will empirically treat with abx. With elevated troponin, cannot do CTA due to elevated creatinine, will get V/Q scan. 2-Fever- need to rule out sepsis- blood culture, procalcitonin,  respiratory disease panel and UA ordered. Empiric abx ordered- and ID can be consulted depending on the culture data or progress. - will get CT abd/pelvis  given recent abdominal surgery.  Will get limited echo to assess recent valvular vegetations too  3-Elevated troponin, likely due to #1- trend troponin, if rising- start heparin drip, with significant valvular HD- consult cardio  4-STORM on CKD- instead of giving IVF, will just hold diuretics for now  5-Anemia of chronic disease-hemoglobin is 7- and likely symptomatic from this, FOBT ordered, transfuse one unit of prbc- will give lasix after transfusion    Other chronic conditions  -Chronic respiratory failure/DAVID- on home oxygen, nightly CPAP  -T2DM  -HTN  -Pulm HTN-cor pulmonale  -valvular HD    Diet DIET CARDIAC;   DVT Prophylaxis [x] Lovenox, []  Heparin, [] SCDs, [] Ambulation   GI Prophylaxis [] PPI,  [] H2 Blocker,  [] Carafate,  [] Diet/Tube Feeds   Code Status Full Code   Disposition Patient requires continued admission due to acute on chronic respiratory failure   CMS Level of Risk [] Low, [] Moderate,[x]  High  Patient's risk as above due to acute on chronic respiratory failure       Dr. Brant Momin MD 12/10/2018 6:03 AM

## 2018-12-10 NOTE — CONSULTS
Steatohepatitis 03/21/2016    Mixed hyperlipidemia 03/21/2016    Morbid obesity due to excess calories ([address] 03/21/2016    Liver dysfunction 12/21/2015    Microalbuminuria due to type 2 diabetes mellitus ([address] 11/16/2015    Chronic respiratory failure with hypoxia ([address] 03/31/6952    Systolic murmur 08/44/8654    Obstructive sleep apnea 02/17/2014    Pulmonary hypertension ([address] 02/17/2014    Type 2 diabetes mellitus without complication ([address] 82/11/5462    Essential hypertension 10/11/2013    Urinary incontinence, mixed 10/11/2013    Tobacco abuse disorder 10/11/2013     Past Medical History:   Diagnosis Date    Chronic respiratory failure with hypoxia ([address] 10/19/2015    COPD (chronic obstructive pulmonary disease) ([address] 10/11/2013    follow with Dr Esteban Carl    [facility] 10/11/2013    \"been diabetic for 7 yrs\"    Enlarged heart     Heart murmur     History of blood transfusion     Hyperlipidemia 10/11/2013    Hypertension 10/11/2013    Insomnia 10/11/2013    Liver dysfunction 12/21/2015    Liver, core biopsies: Steatohepatitis, acute and chronic, grade 2, stage 2.     Nasal congestion 1/20/2016    \"no cough and no fever associated with it\"    Obesity 10/11/2013    On home oxygen therapy     2l/nc 24 hrs per day    Sleep apnea 10/11/2013    had sleep apnea 2013- has cpap and does use it    Steatohepatitis 3/21/2016    Liver, core biopsies: Steatohepatitis, acute and chronic, grade 2, stage 2.      Systolic murmur 6/4/1888    Tobacco abuse disorder 10/11/2013    Type 2 diabetes mellitus ([address]     Urinary incontinence, mixed 10/11/2013      Past Surgical History:   Procedure Laterality Date    BREAST SURGERY      right breast bx 2012    CHOLECYSTECTOMY  2010    \"done with hyster    COLONOSCOPY N/A 10/24/2018    COLONOSCOPY POLYPECTOMY ABLATION/BIOPSY OF CECAL MASS performed by Jonathan Davison MD at [facility]  2010    [address] both ovaries\"   [facility]

## 2018-12-10 NOTE — CONSULTS
Prostate Cancer Maternal Grandfather          Physical Exam:    Vitals: BP (!) 116/42   Pulse 79   Temp 99 °F (37.2 °C) (Oral)   Resp 17   Ht 5' 6\" (1.676 m)   Wt 245 lb (111.1 kg)   SpO2 95%   BMI 39.54 kg/m²     General appearance: awake and lucidly conversant; visible SOB  HEENT: Head: Normal, normocephalic, atraumatic. Neck: supple, symmetrical, trachea midline  Cardiovascular: S1 and S2: normal / no rub  Pulmonary: diminished lung sounds bilaterally  Abdomen:  soft / non-tender   Extremities: + edema to lower extremities     CBC:   Recent Labs      12/09/18   2245   WBC  8.4   HGB  7.0*   PLT  189     BMP:    Recent Labs      12/09/18   2245   NA  136   K  4.1   CL  94*   CO2  25   BUN  47*   CREATININE  1.7*   GLUCOSE  93     Hepatic:   Recent Labs      12/09/18   2245   AST  12*   ALT  9*   BILITOT  0.6   ALKPHOS  41     Troponin: No results for input(s): TROPONINI in the last 72 hours. Mg, Phos: No results for input(s): MG, PHOS in the last 72 hours. ABGs:   Lab Results   Component Value Date    PO2ART 62 10/25/2018    VXS2SOQ 47.0 10/25/2018     INR: No results for input(s): INR in the last 72 hours.   -----------------------------------------------------------------  Patient Active Problem List   Diagnosis Code    Type 2 diabetes mellitus without complication (HCC) M79.9    Essential hypertension I10    Urinary incontinence, mixed N39.46    Tobacco abuse disorder Z72.0    Obstructive sleep apnea G47.33    Pulmonary hypertension (HCC) V14.03    Systolic murmur O39.7    Chronic respiratory failure with hypoxia ([facility]) J96.11    Microalbuminuria due to type 2 diabetes mellitus [facility] E11.29, R80.9    Liver dysfunction K76.89    Steatohepatitis K75.81    Mixed hyperlipidemia E78.2    Morbid obesity due to excess calories ([facility]) E66.01    COPD, severe (HCC) J44.9    COPD exacerbation (HCC) J44.1    Type 2 diabetes mellitus (HCC) E11.9    Tobacco abuse Z72.0    Obesity due to excess

## 2018-12-10 NOTE — ED PROVIDER NOTES
Emergency Department Encounter  Location: [facility] [address] [place] [address]    Patient: Michel Hall  MRN: [id number]  : 1955  Date of evaluation: 2018  ED Provider: Mortimer Siren, DO, FACEP    Chief Complaint:    Fever (Reports of recently being diagnosed with endocarditis. Coming back due to having a fever up to 101.1 at home that started today. Having nausea, but denies any vomiting. )    [place]:  Michel Hall is a 59 y.o. female that presents to the emergency department with complaints of fever. Patient has a recent diagnosis of endocarditis in mid-October. She was hospitalized for approximately a month. She's been home for 2 weeks after being on IV antibiotics. Patient did suffer valvular damage to her heart and is under the care of Dr. Haily Parra, Dr. Emilie Crain, and Dr. Ludivina Wheeler. She states her fever started today. She presents with a temperature 100.9. Patient also is complaining of shortness of breath. She's having nausea with no vomiting. She had a recent colon resection which was thought to be the source of her bacterial endocarditis. This was performed at [facility] by Dr. Osmar Ace:  At least 10 systems reviewed and otherwise acutely negative except as in the [address]     Past Medical History:   Diagnosis Date    Chronic respiratory failure with hypoxia ([facility] 10/19/2015    COPD (chronic obstructive pulmonary disease) ([facility] 10/11/2013    follow with Dr Marina Christensen Diabetes mellitus ([facility] 10/11/2013    \"been diabetic for 7 yrs\"    Enlarged heart     Heart murmur     History of blood transfusion     Hyperlipidemia 10/11/2013    Hypertension 10/11/2013    Insomnia 10/11/2013    Liver dysfunction 2015    Liver, core biopsies: Steatohepatitis, acute and chronic, grade 2, stage 2.     Nasal congestion 2016    \"no cough and no fever associated with it\"    Obesity 10/11/2013    On home oxygen therapy     2l/nc 24 hrs per day    Sleep apnea 10/11/2013    had puffs into the lungs every 4 hours as needed for Wheezing      atenolol (TENORMIN) 25 MG tablet TAKE 1 TABLET TWICE A  tablet 1    solifenacin (VESICARE) 10 MG tablet TAKE 1 TABLET DAILY 90 tablet 1    traZODone (DESYREL) 100 MG tablet TAKE 1 TABLET NIGHTLY 90 tablet 1    glipiZIDE (GLUCOTROL XL) 10 MG extended release tablet TAKE 1 TABLET TWICE A  tablet 1    cloNIDine (CATAPRES) 0.1 MG tablet TAKE 1 TABLET TWICE A DAY (Patient taking differently: TAKE 1 TABLET TWICE A DAY. Patient will discontinue after 5 days from 12/4/2018) 180 tablet 1    OXYGEN Inhale 2 L/min into the lungs continuous.  MULTIPLE VITAMIN PO Take 1 tablet by mouth daily. Allergies   Allergen Reactions    Tomato Anaphylaxis     Raw tomatoes only        Nursing Notes Reviewed    Physical Exam:  ED Triage Vitals [12/09/18 2226]   Enc Vitals Group      BP (!) 126/52      Pulse 90      Resp 26      Temp 100.9 °F (38.3 °C)      Temp Source Oral      SpO2 90 %      Weight 240 lb (108.9 kg)      Height 5' 6\" (1.676 m)      Head Circumference       Peak Flow       Pain Score       Pain Loc       Pain Edu? Excl. in [address]? GENERAL APPEARANCE: Awake and alert. Cooperative. Patient appears ill. She is sitting up in the bed and is expressing slide air hunger  HEAD: Normocephalic. Atraumatic. EYES: EOM's grossly intact. Sclera anicteric. ENT: Tolerates saliva. No trismus. NECK: Supple. Trachea midline. CARDIO: RRR. Radial pulse 2+. LUNGS: Respirations unlabored. Decreased breath sounds bilaterally  ABDOMEN: Soft. Non-distended. Non-tender. EXTREMITIES: No acute deformities. SKIN: Warm and dry. Patient feels extremely hot  NEUROLOGICAL: No gross facial drooping. Moves all 4 extremities spontaneously. PSYCHIATRIC: Normal mood.      Labs:  Results for orders placed or performed during the hospital encounter of 12/09/18   Brain Natriuretic Peptide   Result Value Ref Range    Pro-BNP 7,358 (H) <300 PG/ML found         EKG (if obtained): (All EKG's are interpreted by myself in the absence of a cardiologist)  The Ekg interpreted by me in the absence of a cardiologist shows. normal sinus rhythm with a rate of 88  Axis is   Normal  QTc is  474  Intervals and Durations are unremarkable. No specific ST-T wave changes appreciated. No evidence of acute ischemia. No significant change from prior EKG dated 15 October 2018      Radiographs (if obtained):  [] The following radiograph was interpreted by myself in the absence of a radiologist:  [x] Radiologist's Report reviewed at time of ED visit:  XR CHEST PORTABLE   Final Result   Prominent/enlarged central pulmonary arteries suggesting pulmonary   hypertension. Mild pulmonary edema suspected, improved from 11/01/2018. ED Course and MDM:  This patient presents with a convoluted medical history of recent. She's had a history of endocarditis and was in the hospital for about a month. She finished her IV antibiotics approximate 2 weeks ago. She developed a fever today. Her laboratory data shows that she has a troponin that is site elevated at 0.080 and a B-type natriuretic peptide is elevated. Her chest x-ray shows pulmonary hypertension and this may be secondary to her valvular dysfunction caused by the endocarditis. She has received Rocephin IV here in the emergency department. I feel this patient needs to be admitted and observed. I'm recommending that we transfer her to [facility] so her cardiologist can be involved. She has seen Dr. Saulo Romeo. I discussed her care with Dr. Sundeep Ceja who is on-call for the [facility] hospitalist service [facility] and he has agreed to accept this patient transfer. She'll be transferred stable condition for continued evaluation and treatment. She has a hemoglobin of 7.0 and this is slightly lower than her previous draw of 7.5. She may be a candidate for a blood transfusion soon. Final Impression:  1.  Acute

## 2018-12-10 NOTE — PROGRESS NOTES
Pt familiar with me   I have seen her in my office on 12/4/18  Loop was increased as she had more sob/SANTOYO  likely will need intensification of diuretics   Sh has recent AV endocarditis but to make things complicated - also has COPD/TV and MV regirg- combination of her cardiac valvular / intrinsic and pulmo dz put her in high risk for fluid overload   Full consult to come

## 2018-12-10 NOTE — CONSULTS
Oral, Q6H PRN  albuterol sulfate  (90 Base) MCG/ACT inhaler 2 puff, 2 puff, Inhalation, Q4H PRN  atenolol (TENORMIN) tablet 25 mg, 25 mg, Oral, BID  cloNIDine (CATAPRES) tablet 0.1 mg, 0.1 mg, Oral, BID  ipratropium-albuterol (DUONEB) nebulizer solution 3 mL, 1 vial, Nebulization, 4x Daily  pravastatin (PRAVACHOL) tablet 20 mg, 20 mg, Oral, Daily  sodium chloride flush 0.9 % injection 10 mL, 10 mL, Intravenous, 2 times per day  sodium chloride flush 0.9 % injection 10 mL, 10 mL, Intravenous, PRN  magnesium hydroxide (MILK OF MAGNESIA) 400 MG/5ML suspension 30 mL, 30 mL, Oral, Daily PRN  ondansetron (ZOFRAN) injection 4 mg, 4 mg, Intravenous, Q6H PRN  enoxaparin (LOVENOX) injection 40 mg, 40 mg, Subcutaneous, Daily  glucose (GLUTOSE) 40 % oral gel 15 g, 15 g, Oral, PRN  dextrose 50 % solution 12.5 g, 12.5 g, Intravenous, PRN  glucagon (rDNA) injection 1 mg, 1 mg, Intramuscular, PRN  dextrose 5 % solution, 100 mL/hr, Intravenous, PRN  insulin lispro (HUMALOG) injection vial 0-6 Units, 0-6 Units, Subcutaneous, TID WC  insulin lispro (HUMALOG) injection vial 0-3 Units, 0-3 Units, Subcutaneous, Nightly  0.9 % sodium chloride bolus, 250 mL, Intravenous, Once  mometasone-formoterol (DULERA) 200-5 MCG/ACT inhaler 2 puff, 2 puff, Inhalation, BID  vancomycin (VANCOCIN) 2,000 mg in dextrose 5 % 500 mL IVPB, 2,000 mg, Intravenous, Once  [START ON 12/11/2018] vancomycin (VANCOCIN) 1,750 mg in dextrose 5 % 500 mL IVPB, 1,750 mg, Intravenous, Q24H  cefepime (MAXIPIME) 2 g in dextrose 5 % 50 mL IVPB, 2 g, Intravenous, Q12H  metronidazole (FLAGYL) 500 mg in NaCl 100 mL IVPB premix, 500 mg, Intravenous, Q8H    Allergies   Allergen Reactions    Tomato Anaphylaxis     Raw tomatoes only        Social History:    Social History     Social History    Marital status:      Spouse name: N/A    Number of children: N/A    Years of education: N/A     Social History Main Topics    Smoking status: Former Smoker     Packs/day:

## 2018-12-11 ENCOUNTER — APPOINTMENT (OUTPATIENT)
Dept: NUCLEAR MEDICINE | Age: 63
DRG: 288 | End: 2018-12-11
Attending: HOSPITALIST
Payer: COMMERCIAL

## 2018-12-11 ENCOUNTER — APPOINTMENT (OUTPATIENT)
Dept: GENERAL RADIOLOGY | Age: 63
DRG: 288 | End: 2018-12-11
Attending: HOSPITALIST
Payer: COMMERCIAL

## 2018-12-11 LAB
ALBUMIN SERPL-MCNC: 3.7 GM/DL (ref 3.4–5)
ALP BLD-CCNC: 35 IU/L (ref 40–129)
ALT SERPL-CCNC: 10 U/L (ref 10–40)
ANION GAP SERPL CALCULATED.3IONS-SCNC: 15 MMOL/L (ref 4–16)
AST SERPL-CCNC: 12 IU/L (ref 15–37)
BILIRUB SERPL-MCNC: 0.5 MG/DL (ref 0–1)
BUN BLDV-MCNC: 54 MG/DL (ref 6–23)
CALCIUM SERPL-MCNC: 8.3 MG/DL (ref 8.3–10.6)
CHLORIDE BLD-SCNC: 92 MMOL/L (ref 99–110)
CHLORIDE URINE RANDOM: 10 MMOL/L (ref 43–210)
CO2: 27 MMOL/L (ref 21–32)
CREAT SERPL-MCNC: 1.7 MG/DL (ref 0.6–1.1)
CREATININE URINE: 164.2 MG/DL (ref 28–217)
GFR AFRICAN AMERICAN: 37 ML/MIN/1.73M2
GFR NON-AFRICAN AMERICAN: 30 ML/MIN/1.73M2
GLUCOSE BLD-MCNC: 119 MG/DL (ref 70–99)
GLUCOSE BLD-MCNC: 144 MG/DL (ref 70–99)
GLUCOSE BLD-MCNC: 152 MG/DL (ref 70–99)
GLUCOSE BLD-MCNC: 157 MG/DL (ref 70–99)
GLUCOSE BLD-MCNC: 209 MG/DL (ref 70–99)
HCT VFR BLD CALC: 22.8 % (ref 37–47)
HEMOGLOBIN: 7 GM/DL (ref 12.5–16)
LEGIONELLA URINARY AG: NEGATIVE
MCH RBC QN AUTO: 30.2 PG (ref 27–31)
MCHC RBC AUTO-ENTMCNC: 30.7 % (ref 32–36)
MCV RBC AUTO: 98.3 FL (ref 78–100)
PDW BLD-RTO: 16.1 % (ref 11.7–14.9)
PLATELET # BLD: 147 K/CU MM (ref 140–440)
PMV BLD AUTO: 9.2 FL (ref 7.5–11.1)
POTASSIUM SERPL-SCNC: 4.2 MMOL/L (ref 3.5–5.1)
POTASSIUM, UR: 65.1 MMOL/L (ref 22–119)
PROCALCITONIN: 1.66
PROCALCITONIN: 1.68
PROT/CREAT RATIO, UR: 0.1
RBC # BLD: 2.32 M/CU MM (ref 4.2–5.4)
SODIUM BLD-SCNC: 134 MMOL/L (ref 135–145)
SODIUM URINE: 15 MMOL/L (ref 35–167)
STREP PNEUMONIAE ANTIGEN: NORMAL
TOTAL PROTEIN: 6.4 GM/DL (ref 6.4–8.2)
URINE TOTAL PROTEIN: 19.8 MG/DL
VANCOMYCIN RANDOM: 22.2 UG/ML
WBC # BLD: 7.8 K/CU MM (ref 4–10.5)

## 2018-12-11 PROCEDURE — 99232 SBSQ HOSP IP/OBS MODERATE 35: CPT | Performed by: INTERNAL MEDICINE

## 2018-12-11 PROCEDURE — 6370000000 HC RX 637 (ALT 250 FOR IP): Performed by: NURSE PRACTITIONER

## 2018-12-11 PROCEDURE — 6370000000 HC RX 637 (ALT 250 FOR IP): Performed by: INTERNAL MEDICINE

## 2018-12-11 PROCEDURE — 2500000003 HC RX 250 WO HCPCS: Performed by: INTERNAL MEDICINE

## 2018-12-11 PROCEDURE — 2700000000 HC OXYGEN THERAPY PER DAY

## 2018-12-11 PROCEDURE — 6370000000 HC RX 637 (ALT 250 FOR IP): Performed by: HOSPITALIST

## 2018-12-11 PROCEDURE — 78582 LUNG VENTILAT&PERFUS IMAGING: CPT

## 2018-12-11 PROCEDURE — 87449 NOS EACH ORGANISM AG IA: CPT

## 2018-12-11 PROCEDURE — 99233 SBSQ HOSP IP/OBS HIGH 50: CPT | Performed by: INTERNAL MEDICINE

## 2018-12-11 PROCEDURE — 36415 COLL VENOUS BLD VENIPUNCTURE: CPT

## 2018-12-11 PROCEDURE — 3430000000 HC RX DIAGNOSTIC RADIOPHARMACEUTICAL: Performed by: INTERNAL MEDICINE

## 2018-12-11 PROCEDURE — 6360000002 HC RX W HCPCS: Performed by: INTERNAL MEDICINE

## 2018-12-11 PROCEDURE — 85027 COMPLETE CBC AUTOMATED: CPT

## 2018-12-11 PROCEDURE — 84133 ASSAY OF URINE POTASSIUM: CPT

## 2018-12-11 PROCEDURE — A9540 TC99M MAA: HCPCS | Performed by: INTERNAL MEDICINE

## 2018-12-11 PROCEDURE — 84156 ASSAY OF PROTEIN URINE: CPT

## 2018-12-11 PROCEDURE — 6360000002 HC RX W HCPCS: Performed by: HOSPITALIST

## 2018-12-11 PROCEDURE — 82570 ASSAY OF URINE CREATININE: CPT

## 2018-12-11 PROCEDURE — 2580000003 HC RX 258: Performed by: INTERNAL MEDICINE

## 2018-12-11 PROCEDURE — 80202 ASSAY OF VANCOMYCIN: CPT

## 2018-12-11 PROCEDURE — 94640 AIRWAY INHALATION TREATMENT: CPT

## 2018-12-11 PROCEDURE — 82436 ASSAY OF URINE CHLORIDE: CPT

## 2018-12-11 PROCEDURE — A9539 TC99M PENTETATE: HCPCS | Performed by: INTERNAL MEDICINE

## 2018-12-11 PROCEDURE — 2140000000 HC CCU INTERMEDIATE R&B

## 2018-12-11 PROCEDURE — 84300 ASSAY OF URINE SODIUM: CPT

## 2018-12-11 PROCEDURE — 84145 PROCALCITONIN (PCT): CPT

## 2018-12-11 PROCEDURE — 94761 N-INVAS EAR/PLS OXIMETRY MLT: CPT

## 2018-12-11 PROCEDURE — 80053 COMPREHEN METABOLIC PANEL: CPT

## 2018-12-11 PROCEDURE — 82962 GLUCOSE BLOOD TEST: CPT

## 2018-12-11 PROCEDURE — 71045 X-RAY EXAM CHEST 1 VIEW: CPT

## 2018-12-11 RX ORDER — SODIUM CHLORIDE 9 MG/ML
INJECTION, SOLUTION INTRAVENOUS CONTINUOUS
Status: DISCONTINUED | OUTPATIENT
Start: 2018-12-11 | End: 2018-12-12

## 2018-12-11 RX ORDER — KIT FOR THE PREPARATION OF TECHNETIUM TC 99M PENTETATE 20 MG/1
3 INJECTION, POWDER, LYOPHILIZED, FOR SOLUTION INTRAVENOUS; RESPIRATORY (INHALATION)
Status: COMPLETED | OUTPATIENT
Start: 2018-12-11 | End: 2018-12-11

## 2018-12-11 RX ORDER — TRAZODONE HYDROCHLORIDE 50 MG/1
100 TABLET ORAL NIGHTLY
Status: DISCONTINUED | OUTPATIENT
Start: 2018-12-11 | End: 2018-12-20 | Stop reason: HOSPADM

## 2018-12-11 RX ORDER — SODIUM CHLORIDE 9 MG/ML
INJECTION, SOLUTION INTRAVENOUS
Status: DISPENSED
Start: 2018-12-11 | End: 2018-12-11

## 2018-12-11 RX ADMIN — PRAVASTATIN SODIUM 20 MG: 20 TABLET ORAL at 08:35

## 2018-12-11 RX ADMIN — IPRATROPIUM BROMIDE AND ALBUTEROL SULFATE 3 ML: .5; 3 SOLUTION RESPIRATORY (INHALATION) at 08:54

## 2018-12-11 RX ADMIN — INSULIN LISPRO 1 UNITS: 100 INJECTION, SOLUTION INTRAVENOUS; SUBCUTANEOUS at 08:36

## 2018-12-11 RX ADMIN — TOBRAMYCIN 200 MG: 40 INJECTION INTRAMUSCULAR; INTRAVENOUS at 14:13

## 2018-12-11 RX ADMIN — ACETAMINOPHEN 1000 MG: 500 TABLET, FILM COATED ORAL at 08:35

## 2018-12-11 RX ADMIN — Medication 5 MILLICURIE: at 12:15

## 2018-12-11 RX ADMIN — Medication 2 PUFF: at 08:58

## 2018-12-11 RX ADMIN — INSULIN LISPRO 1 UNITS: 100 INJECTION, SOLUTION INTRAVENOUS; SUBCUTANEOUS at 16:39

## 2018-12-11 RX ADMIN — TRAZODONE HYDROCHLORIDE 100 MG: 50 TABLET ORAL at 23:30

## 2018-12-11 RX ADMIN — Medication 2 PUFF: at 20:51

## 2018-12-11 RX ADMIN — ENOXAPARIN SODIUM 40 MG: 40 INJECTION SUBCUTANEOUS at 08:35

## 2018-12-11 RX ADMIN — ACETAMINOPHEN 1000 MG: 500 TABLET, FILM COATED ORAL at 18:09

## 2018-12-11 RX ADMIN — SODIUM CHLORIDE, PRESERVATIVE FREE 10 ML: 5 INJECTION INTRAVENOUS at 21:40

## 2018-12-11 RX ADMIN — IPRATROPIUM BROMIDE AND ALBUTEROL SULFATE 3 ML: .5; 3 SOLUTION RESPIRATORY (INHALATION) at 15:31

## 2018-12-11 RX ADMIN — ACETAMINOPHEN 1000 MG: 500 TABLET, FILM COATED ORAL at 23:30

## 2018-12-11 RX ADMIN — SODIUM CHLORIDE, PRESERVATIVE FREE 10 ML: 5 INJECTION INTRAVENOUS at 08:35

## 2018-12-11 RX ADMIN — INSULIN LISPRO 1 UNITS: 100 INJECTION, SOLUTION INTRAVENOUS; SUBCUTANEOUS at 21:41

## 2018-12-11 RX ADMIN — KIT FOR THE PREPARATION OF TECHNETIUM TC 99M PENTETATE 3 MILLICURIE: 20 INJECTION, POWDER, LYOPHILIZED, FOR SOLUTION INTRAVENOUS; RESPIRATORY (INHALATION) at 11:55

## 2018-12-11 RX ADMIN — SODIUM CHLORIDE: 9 INJECTION, SOLUTION INTRAVENOUS at 08:35

## 2018-12-11 RX ADMIN — METRONIDAZOLE 500 MG: 500 INJECTION, SOLUTION INTRAVENOUS at 05:22

## 2018-12-11 RX ADMIN — IPRATROPIUM BROMIDE AND ALBUTEROL SULFATE 3 ML: .5; 3 SOLUTION RESPIRATORY (INHALATION) at 20:51

## 2018-12-11 RX ADMIN — CEFEPIME HYDROCHLORIDE 2 G: 2 INJECTION, POWDER, FOR SOLUTION INTRAVENOUS at 18:03

## 2018-12-11 RX ADMIN — CEFEPIME HYDROCHLORIDE 2 G: 2 INJECTION, POWDER, FOR SOLUTION INTRAVENOUS at 06:26

## 2018-12-11 RX ADMIN — INSULIN LISPRO 1 UNITS: 100 INJECTION, SOLUTION INTRAVENOUS; SUBCUTANEOUS at 12:50

## 2018-12-11 RX ADMIN — ATENOLOL 25 MG: 25 TABLET ORAL at 21:40

## 2018-12-11 ASSESSMENT — PAIN DESCRIPTION - LOCATION: LOCATION: BACK

## 2018-12-11 ASSESSMENT — PAIN SCALES - GENERAL
PAINLEVEL_OUTOF10: 7
PAINLEVEL_OUTOF10: 6
PAINLEVEL_OUTOF10: 0
PAINLEVEL_OUTOF10: 0
PAINLEVEL_OUTOF10: 2
PAINLEVEL_OUTOF10: 6
PAINLEVEL_OUTOF10: 0

## 2018-12-11 ASSESSMENT — PAIN DESCRIPTION - DESCRIPTORS: DESCRIPTORS: ACHING

## 2018-12-11 ASSESSMENT — PAIN DESCRIPTION - PAIN TYPE: TYPE: CHRONIC PAIN

## 2018-12-11 NOTE — PROGRESS NOTES
Hospitalist Progress Note      Name:  Stanford Russ /Age/Sex: 1955  (65 y.o. female)   MRN & CSN:  [id number] & [id number] Admission Date/Time: 12/10/2018  3:15 AM   Location:  [address] [place] [address] PCP: Claudia Hansen MD         Hospital Day: 2    Assessment and Plan:   Stanford Russ is a 59 y.o.  female  who presents with SOB    Acute on chronic respiratory failure with hypoxia  HCAP secondary to pseudomonas   Gram negative bacteremia  Evaluated by ID and pulmonology  BCx+ve for pseudomonas  CT chest reviewed ?pneumonitis vs pulm edema  Cont cefepime +tobramycin  Follow ID and sens  Repeat BCx negative  Patient may need PICC line for IV antibiotics      Recent Infectious endocarditis with severe AR, MR, TR and NV  Cardiology and CT surgery following  Patient is high risk for surgical intervention also infection has to be treated first.    STORM on CKD  Nephrology following    Diet DIET CARDIAC; Dietary Nutrition Supplements: Low Calorie High Protein Supplement   DVT Prophylaxis [x] Lovenox, []  Heparin, [] SCDs, []No VTE prophylaxis, patient ambulating   GI Prophylaxis [] PPI, [] H2 Blocker, [x] No GI prophylaxis, patient is receiving diet/Tube Feeds   Code Status Full Code             History of Present Illness:     Pt S&E. Feeling better today, afebrile. /34  Complaining of dizziness when standing up. Ten point ROS reviewed negative, unless as noted above    Objective: Intake/Output Summary (Last 24 hours) at 18 1535  Last data filed at 18 9894   Gross per 24 hour   Intake              250 ml   Output             1000 ml   Net             -750 ml      Vitals:   Vitals:    18 0858   BP:    Pulse:    Resp:    Temp:    SpO2: 98%     Physical Exam:    GEN Awake female, in mild resp distress  EYES Pupils are equally round. No scleral erythema, discharge, or conjunctivitis. HENT Mucous membranes are moist.   NECK No apparent thyromegaly or masses.   RESP Bilateral basal

## 2018-12-11 NOTE — PROGRESS NOTES
the aortic area  Abd: soft, non-distended, no tenderness, no hepatomegaly. Normoactive bowel sounds. Ext: no clubbing, cyanosis, or edema  Catheter Site: without erythema or tenderness  Neuro: Mental status intact.  CN 2-12 intact and no focal sensory or motor deficits     Radiologic / Imaging / TESTING  CT chest reviewed     Labs:    Recent Results (from the past 24 hour(s))   POCT Glucose    Collection Time: 12/10/18  5:31 PM   Result Value Ref Range    POC Glucose 110 (H) 70 - 99 MG/DL   POCT Glucose    Collection Time: 12/10/18  8:34 PM   Result Value Ref Range    POC Glucose 128 (H) 70 - 99 MG/DL   Comprehensive Metabolic Panel w/ Reflex to MG    Collection Time: 12/11/18  4:50 AM   Result Value Ref Range    Sodium 134 (L) 135 - 145 MMOL/L    Potassium 4.2 3.5 - 5.1 MMOL/L    Chloride 92 (L) 99 - 110 mMol/L    CO2 27 21 - 32 MMOL/L    BUN 54 (H) 6 - 23 MG/DL    CREATININE 1.7 (H) 0.6 - 1.1 MG/DL    Glucose 119 (H) 70 - 99 MG/DL    Calcium 8.3 8.3 - 10.6 MG/DL    Alb 3.7 3.4 - 5.0 GM/DL    Total Protein 6.4 6.4 - 8.2 GM/DL    Total Bilirubin 0.5 0.0 - 1.0 MG/DL    ALT 10 10 - 40 U/L    AST 12 (L) 15 - 37 IU/L    Alkaline Phosphatase 35 (L) 40 - 129 IU/L    GFR Non-African American 30 (L) >60 mL/min/1.73m2    GFR  37 (L) >60 mL/min/1.73m2    Anion Gap 15 4 - 16   CBC    Collection Time: 12/11/18  4:50 AM   Result Value Ref Range    WBC 7.8 4.0 - 10.5 K/CU MM    RBC 2.32 (L) 4.2 - 5.4 M/CU MM    Hemoglobin 7.0 (L) 12.5 - 16.0 GM/DL    Hematocrit 22.8 (L) 37 - 47 %    MCV 98.3 78 - 100 FL    MCH 30.2 27 - 31 PG    MCHC 30.7 (L) 32.0 - 36.0 %    RDW 16.1 (H) 11.7 - 14.9 %    Platelets 830 920 - 682 K/CU MM    MPV 9.2 7.5 - 11.1 FL   Vancomycin, Random    Collection Time: 12/11/18  4:50 AM   Result Value Ref Range    Vancomycin Rm 22.2 UG/ML   Procalcitonin    Collection Time: 12/11/18  4:50 AM   Result Value Ref Range    Procalcitonin 1.68    Electrolytes urine random    Collection Time: 12/11/18

## 2018-12-11 NOTE — PROGRESS NOTES
history that includes Hysterectomy (2010); Cholecystectomy (2010); Breast surgery; Tonsillectomy (as a kid); knee surgery (Left, 2000); Colonoscopy (N/A, 10/24/2018); and pr lap,surg,colectomy, partial, w/anast (N/A, 10/25/2018). Social History:   reports that she quit smoking about 3 months ago. Her smoking use included Cigarettes. She has a 15.00 pack-year smoking history. She has never used smokeless tobacco. She reports that she does not drink alcohol or use drugs. Family history:  family history includes Alcohol Abuse in her brother; Arthritis in her father, maternal uncle, and mother; Cancer in her father and mother; Depression in her brother; Diabetes in her brother; Early Death in her father; Heart Attack in her maternal grandfather; Heart Disease in her brother; High Blood Pressure in her brother, father, maternal grandfather, maternal grandmother, mother, and sister; High Cholesterol in her brother; Obesity in her brother and mother; Osteoporosis in her maternal uncle and mother; Prostate Cancer in her maternal grandfather; Substance Abuse in her brother. Allergies   Allergen Reactions    Tomato Anaphylaxis     Raw tomatoes only        Review of Systems:   All 14 systems were reviewed and are negative  Except for the positive findings  which as documented     BP (!) 106/34   Pulse 84   Temp 99.7 °F (37.6 °C) (Oral)   Resp 18   Ht 5' 6\" (1.676 m)   Wt 245 lb (111.1 kg)   SpO2 98%   BMI 39.54 kg/m²       Intake/Output Summary (Last 24 hours) at 12/11/18 1528  Last data filed at 12/11/18 8597   Gross per 24 hour   Intake              250 ml   Output             1000 ml   Net             -750 ml       Physical Exam:  Constitutional:  Well developed, Well nourished, No acute distress  HENT:  Normocephalic, Atraumatic, Bilateral external ears normal, Oropharynx moist, No oral exudates, Nose normal.   Neck-  No tenderness, Supple, No stridor. Eyes:  PERRL, EOMI, Conjunctiva normal, No discharge.

## 2018-12-12 ENCOUNTER — APPOINTMENT (OUTPATIENT)
Dept: GENERAL RADIOLOGY | Age: 63
DRG: 288 | End: 2018-12-12
Attending: HOSPITALIST
Payer: COMMERCIAL

## 2018-12-12 LAB
ALBUMIN SERPL-MCNC: 3.7 GM/DL (ref 3.4–5)
ANION GAP SERPL CALCULATED.3IONS-SCNC: 15 MMOL/L (ref 4–16)
BUN BLDV-MCNC: 61 MG/DL (ref 6–23)
CALCIUM SERPL-MCNC: 7.9 MG/DL (ref 8.3–10.6)
CHLORIDE BLD-SCNC: 95 MMOL/L (ref 99–110)
CO2: 25 MMOL/L (ref 21–32)
CREAT SERPL-MCNC: 1.8 MG/DL (ref 0.6–1.1)
EKG ATRIAL RATE: 88 BPM
EKG DIAGNOSIS: NORMAL
EKG P AXIS: 102 DEGREES
EKG P-R INTERVAL: 142 MS
EKG Q-T INTERVAL: 392 MS
EKG QRS DURATION: 88 MS
EKG QTC CALCULATION (BAZETT): 474 MS
EKG R AXIS: -12 DEGREES
EKG T AXIS: 44 DEGREES
EKG VENTRICULAR RATE: 88 BPM
GFR AFRICAN AMERICAN: 34 ML/MIN/1.73M2
GFR NON-AFRICAN AMERICAN: 29 ML/MIN/1.73M2
GLUCOSE BLD-MCNC: 135 MG/DL (ref 70–99)
GLUCOSE BLD-MCNC: 175 MG/DL (ref 70–99)
GLUCOSE BLD-MCNC: 179 MG/DL (ref 70–99)
GLUCOSE BLD-MCNC: 187 MG/DL (ref 70–99)
GLUCOSE BLD-MCNC: 201 MG/DL (ref 70–99)
HCT VFR BLD CALC: 20.2 % (ref 37–47)
HEMOGLOBIN: 6.1 GM/DL (ref 12.5–16)
MCH RBC QN AUTO: 29.6 PG (ref 27–31)
MCHC RBC AUTO-ENTMCNC: 30.2 % (ref 32–36)
MCV RBC AUTO: 98.1 FL (ref 78–100)
PDW BLD-RTO: 15.8 % (ref 11.7–14.9)
PHOSPHORUS: 4.6 MG/DL (ref 2.5–4.9)
PLATELET # BLD: 152 K/CU MM (ref 140–440)
PMV BLD AUTO: 9.7 FL (ref 7.5–11.1)
POTASSIUM SERPL-SCNC: 4 MMOL/L (ref 3.5–5.1)
PROCALCITONIN: 1.39
RBC # BLD: 2.06 M/CU MM (ref 4.2–5.4)
SODIUM BLD-SCNC: 135 MMOL/L (ref 135–145)
TOBRAMYCIN PEAK: 6.8 UG/ML (ref 5–10)
WBC # BLD: 4.5 K/CU MM (ref 4–10.5)

## 2018-12-12 PROCEDURE — 05H533Z INSERTION OF INFUSION DEVICE INTO RIGHT SUBCLAVIAN VEIN, PERCUTANEOUS APPROACH: ICD-10-PCS | Performed by: INTERNAL MEDICINE

## 2018-12-12 PROCEDURE — 85027 COMPLETE CBC AUTOMATED: CPT

## 2018-12-12 PROCEDURE — 94640 AIRWAY INHALATION TREATMENT: CPT

## 2018-12-12 PROCEDURE — 2580000003 HC RX 258: Performed by: INTERNAL MEDICINE

## 2018-12-12 PROCEDURE — 82962 GLUCOSE BLOOD TEST: CPT

## 2018-12-12 PROCEDURE — 36569 INSJ PICC 5 YR+ W/O IMAGING: CPT

## 2018-12-12 PROCEDURE — 6370000000 HC RX 637 (ALT 250 FOR IP): Performed by: NURSE PRACTITIONER

## 2018-12-12 PROCEDURE — C1751 CATH, INF, PER/CENT/MIDLINE: HCPCS

## 2018-12-12 PROCEDURE — 36415 COLL VENOUS BLD VENIPUNCTURE: CPT

## 2018-12-12 PROCEDURE — 76937 US GUIDE VASCULAR ACCESS: CPT

## 2018-12-12 PROCEDURE — 99232 SBSQ HOSP IP/OBS MODERATE 35: CPT | Performed by: INTERNAL MEDICINE

## 2018-12-12 PROCEDURE — 2140000000 HC CCU INTERMEDIATE R&B

## 2018-12-12 PROCEDURE — 6370000000 HC RX 637 (ALT 250 FOR IP): Performed by: HOSPITALIST

## 2018-12-12 PROCEDURE — 80069 RENAL FUNCTION PANEL: CPT

## 2018-12-12 PROCEDURE — 84145 PROCALCITONIN (PCT): CPT

## 2018-12-12 PROCEDURE — 80200 ASSAY OF TOBRAMYCIN: CPT

## 2018-12-12 PROCEDURE — 6360000002 HC RX W HCPCS: Performed by: INTERNAL MEDICINE

## 2018-12-12 PROCEDURE — 6360000002 HC RX W HCPCS: Performed by: HOSPITALIST

## 2018-12-12 PROCEDURE — 94761 N-INVAS EAR/PLS OXIMETRY MLT: CPT

## 2018-12-12 PROCEDURE — 6370000000 HC RX 637 (ALT 250 FOR IP): Performed by: INTERNAL MEDICINE

## 2018-12-12 PROCEDURE — 71046 X-RAY EXAM CHEST 2 VIEWS: CPT

## 2018-12-12 PROCEDURE — 87070 CULTURE OTHR SPECIMN AEROBIC: CPT

## 2018-12-12 PROCEDURE — 2700000000 HC OXYGEN THERAPY PER DAY

## 2018-12-12 PROCEDURE — 36430 TRANSFUSION BLD/BLD COMPNT: CPT

## 2018-12-12 RX ORDER — SODIUM CHLORIDE 0.9 % (FLUSH) 0.9 %
10 SYRINGE (ML) INJECTION PRN
Status: DISCONTINUED | OUTPATIENT
Start: 2018-12-12 | End: 2018-12-20 | Stop reason: HOSPADM

## 2018-12-12 RX ORDER — FUROSEMIDE 10 MG/ML
20 INJECTION INTRAMUSCULAR; INTRAVENOUS ONCE
Status: COMPLETED | OUTPATIENT
Start: 2018-12-12 | End: 2018-12-12

## 2018-12-12 RX ORDER — 0.9 % SODIUM CHLORIDE 0.9 %
250 INTRAVENOUS SOLUTION INTRAVENOUS ONCE
Status: DISCONTINUED | OUTPATIENT
Start: 2018-12-12 | End: 2018-12-14

## 2018-12-12 RX ADMIN — IPRATROPIUM BROMIDE AND ALBUTEROL SULFATE 3 ML: .5; 3 SOLUTION RESPIRATORY (INHALATION) at 12:31

## 2018-12-12 RX ADMIN — INSULIN LISPRO 1 UNITS: 100 INJECTION, SOLUTION INTRAVENOUS; SUBCUTANEOUS at 21:28

## 2018-12-12 RX ADMIN — IPRATROPIUM BROMIDE AND ALBUTEROL SULFATE 3 ML: .5; 3 SOLUTION RESPIRATORY (INHALATION) at 15:43

## 2018-12-12 RX ADMIN — IPRATROPIUM BROMIDE AND ALBUTEROL SULFATE 3 ML: .5; 3 SOLUTION RESPIRATORY (INHALATION) at 08:13

## 2018-12-12 RX ADMIN — SODIUM CHLORIDE, PRESERVATIVE FREE 10 ML: 5 INJECTION INTRAVENOUS at 22:28

## 2018-12-12 RX ADMIN — CEFEPIME HYDROCHLORIDE 2 G: 2 INJECTION, POWDER, FOR SOLUTION INTRAVENOUS at 18:05

## 2018-12-12 RX ADMIN — INSULIN LISPRO 1 UNITS: 100 INJECTION, SOLUTION INTRAVENOUS; SUBCUTANEOUS at 12:47

## 2018-12-12 RX ADMIN — Medication 2 PUFF: at 21:01

## 2018-12-12 RX ADMIN — PRAVASTATIN SODIUM 20 MG: 20 TABLET ORAL at 09:22

## 2018-12-12 RX ADMIN — FUROSEMIDE 20 MG: 10 INJECTION, SOLUTION INTRAVENOUS at 18:05

## 2018-12-12 RX ADMIN — IPRATROPIUM BROMIDE AND ALBUTEROL SULFATE 3 ML: .5; 3 SOLUTION RESPIRATORY (INHALATION) at 20:58

## 2018-12-12 RX ADMIN — ATENOLOL 25 MG: 25 TABLET ORAL at 22:27

## 2018-12-12 RX ADMIN — ATENOLOL 25 MG: 25 TABLET ORAL at 09:22

## 2018-12-12 RX ADMIN — INSULIN LISPRO 1 UNITS: 100 INJECTION, SOLUTION INTRAVENOUS; SUBCUTANEOUS at 17:02

## 2018-12-12 RX ADMIN — SODIUM CHLORIDE: 9 INJECTION, SOLUTION INTRAVENOUS at 06:13

## 2018-12-12 RX ADMIN — TOBRAMYCIN 200 MG: 40 INJECTION INTRAMUSCULAR; INTRAVENOUS at 16:00

## 2018-12-12 RX ADMIN — ACETAMINOPHEN 1000 MG: 500 TABLET, FILM COATED ORAL at 09:22

## 2018-12-12 RX ADMIN — IPRATROPIUM BROMIDE AND ALBUTEROL SULFATE 3 ML: .5; 3 SOLUTION RESPIRATORY (INHALATION) at 20:59

## 2018-12-12 RX ADMIN — TRAZODONE HYDROCHLORIDE 100 MG: 50 TABLET ORAL at 22:27

## 2018-12-12 RX ADMIN — CEFEPIME HYDROCHLORIDE 2 G: 2 INJECTION, POWDER, FOR SOLUTION INTRAVENOUS at 06:13

## 2018-12-12 RX ADMIN — Medication 2 PUFF: at 08:14

## 2018-12-12 RX ADMIN — INSULIN LISPRO 1 UNITS: 100 INJECTION, SOLUTION INTRAVENOUS; SUBCUTANEOUS at 09:25

## 2018-12-12 ASSESSMENT — PAIN SCALES - GENERAL
PAINLEVEL_OUTOF10: 0
PAINLEVEL_OUTOF10: 6
PAINLEVEL_OUTOF10: 0
PAINLEVEL_OUTOF10: 6
PAINLEVEL_OUTOF10: 0

## 2018-12-12 ASSESSMENT — PAIN DESCRIPTION - LOCATION: LOCATION: HEAD

## 2018-12-12 NOTE — CONSULTS
biopsies: Steatohepatitis, acute and chronic, grade 2, stage 2.      Systolic murmur 8/1/2777    Tobacco abuse disorder 10/11/2013    Type 2 diabetes mellitus ([address]     Urinary incontinence, mixed 10/11/2013     Past Surgical History:        Procedure Laterality Date    BREAST SURGERY      right breast bx 2012    CHOLECYSTECTOMY  2010    \"done with hyster    COLONOSCOPY N/A 10/24/2018    COLONOSCOPY POLYPECTOMY ABLATION/BIOPSY OF CECAL MASS performed by Mehnaz Mccord MD at [facility]  2010    [address] both ovaries\"    KNEE SURGERY Left 2000    TN LAP,SURG,COLECTOMY, PARTIAL, W/ANAST N/A 10/25/2018    ROBOTIC ASSISTED  LAPAROSCOPIC RIGHT  HEMICOLECTOMY performed by Amanda Lovell MD at [facility]  as a kid       Current Medications:    Current Facility-Administered Medications: 0.9 % sodium chloride bolus, 250 mL, Intravenous, Once  furosemide (LASIX) injection 20 mg, 20 mg, Intravenous, Once  0.9 % sodium chloride infusion, , Intravenous, Continuous  tobramycin (NEBCIN) 200 mg in dextrose 5 % 100 mL IVPB, 200 mg, Intravenous, Q24H  traZODone (DESYREL) tablet 100 mg, 100 mg, Oral, Nightly  acetaminophen (TYLENOL) tablet 1,000 mg, 1,000 mg, Oral, Q6H PRN  albuterol sulfate  (90 Base) MCG/ACT inhaler 2 puff, 2 puff, Inhalation, Q4H PRN  atenolol (TENORMIN) tablet 25 mg, 25 mg, Oral, BID  ipratropium-albuterol (DUONEB) nebulizer solution 3 mL, 1 vial, Nebulization, 4x Daily  pravastatin (PRAVACHOL) tablet 20 mg, 20 mg, Oral, Daily  sodium chloride flush 0.9 % injection 10 mL, 10 mL, Intravenous, 2 times per day  sodium chloride flush 0.9 % injection 10 mL, 10 mL, Intravenous, PRN  magnesium hydroxide (MILK OF MAGNESIA) 400 MG/5ML suspension 30 mL, 30 mL, Oral, Daily PRN  ondansetron (ZOFRAN) injection 4 mg, 4 mg, Intravenous, Q6H PRN  enoxaparin (LOVENOX) injection 40 mg, 40 mg, Subcutaneous, Daily  glucose (GLUTOSE) 40 % oral gel 15 g, 15 g, Oral, PRN  dextrose 50 % solution REVIEW OF SYSTEMS:    Feels OK today. She was sitting comfortably. Denied chest pain or SOB. No NVD. The remainder of ROS is unremarkable. She feels tired. PHYSICAL EXAM:      Vitals:    /65   Pulse 88   Temp 97.4 °F (36.3 °C) (Oral)   Resp 25   Ht 5' 6\" (1.676 m)   Wt 254 lb 6.6 oz (115.4 kg)   SpO2 96%   BMI 41.06 kg/m²     CONSTITUTIONAL:  awake, alert, cooperative and no apparent distress  EYES:  extra-ocular muscles intact and sclera clear  NECK:  supple, symmetrical, trachea midline and no lymphadenopathy  LUNGS:  Fair air entry bilaterally. CARDIOVASCULAR:  Murmur noted and normal S1 and S2  ABDOMEN:  obese, normal bowel sounds, soft, non-distended and non-tender  MUSCULOSKELETAL:  there is no redness, warmth, or swelling of the joints. No edema or cyanosis.   NEUROLOGIC:  Cranial Nerves:  cranial nerves II-XII are grossly intact  SKIN:  no jaundice  DATA:    Labs:  General Labs:    CBC with Differential:    Lab Results   Component Value Date    WBC 4.5 12/12/2018    RBC 2.06 12/12/2018    HGB 6.1 12/12/2018    HCT 20.2 12/12/2018     12/12/2018    MCV 98.1 12/12/2018    MCH 29.6 12/12/2018    MCHC 30.2 12/12/2018    RDW 15.8 12/12/2018    SEGSPCT 85.5 12/09/2018    BANDSPCT 1 09/14/2018    LYMPHOPCT 9.4 12/09/2018    MONOPCT 4.4 12/09/2018    BASOPCT 0.2 12/09/2018    MONOSABS 0.4 12/09/2018    LYMPHSABS 0.8 12/09/2018    EOSABS 0.0 12/09/2018    BASOSABS 0.0 12/09/2018    DIFFTYPE AUTOMATED DIFFERENTIAL 12/09/2018     CMP:    Lab Results   Component Value Date     12/12/2018    K 4.0 12/12/2018    CL 95 12/12/2018    CO2 25 12/12/2018    BUN 61 12/12/2018    CREATININE 1.8 12/12/2018    GFRAA 34 12/12/2018    LABGLOM 29 12/12/2018    GLUCOSE 135 12/12/2018    PROT 6.4 12/11/2018    PROT 7.4 12/05/2012    LABALBU 3.7 12/12/2018    CALCIUM 7.9 12/12/2018    BILITOT 0.5 12/11/2018    ALKPHOS 35 12/11/2018    AST 12 12/11/2018    ALT 10 12/11/2018     Results for [employer]

## 2018-12-12 NOTE — PROGRESS NOTES
adjust therapy as indicated    Thank you for the consult. Charanjit White, Pharm. D., [facility]   12/12/2018 11:17 AM  Phone: [phone]

## 2018-12-12 NOTE — CARE COORDINATION
Spoke with pt about her request for a BSC. She states that she receives her O2 from [address] [place] [address] Marlene Gold Referral made to [employer] and he states that they will ship the [facility] to her home after they receive the needed info. Requested info faxed to [address] [place] [address] Marlene Gold Notified pt that it will be shipped to her home via [employer].   TE

## 2018-12-12 NOTE — PROGRESS NOTES
Recent Labs      12/09/18   2245  12/11/18   0450   AST  12*  12*   ALT  9*  10   BILITOT  0.6  0.5   ALKPHOS  41  35*     Troponin: No results for input(s): TROPONINI in the last 72 hours. BNP: No results for input(s): BNP in the last 72 hours. Lipids: No results for input(s): CHOL, HDL in the last 72 hours. Invalid input(s): LDLCALCU  ABGs:   Lab Results   Component Value Date    PO2ART 62 10/25/2018    UYN7RYY 47.0 10/25/2018     INR: No results for input(s): INR in the last 72 hours. Renal Labs  Albumin:    Lab Results   Component Value Date    LABALBU 3.7 12/11/2018     Calcium:    Lab Results   Component Value Date    CALCIUM 8.3 12/11/2018     Phosphorus:    Lab Results   Component Value Date    PHOS 2.5 11/07/2018     U/A:    Lab Results   Component Value Date    NITRU NEGATIVE 12/10/2018    COLORU YELLOW 12/10/2018    WBCUA 1 12/10/2018    RBCUA 1 12/10/2018    MUCUS RARE 12/10/2018    TRICHOMONAS NONE SEEN 12/10/2018    BACTERIA RARE 12/10/2018    CLARITYU CLEAR 12/10/2018    SPECGRAV 1.012 12/10/2018    UROBILINOGEN NORMAL 12/10/2018    BILIRUBINUR NEGATIVE 12/10/2018    BLOODU NEGATIVE 12/10/2018    KETUA NEGATIVE 12/10/2018     ABG:    Lab Results   Component Value Date    HHW5HHG 47.0 10/25/2018    PO2ART 62 10/25/2018    BIT3HTN 25.9 10/25/2018     HgBA1c:    Lab Results   Component Value Date    LABA1C 5.0 12/10/2018     Microalbumen/Creatinine ratio:  No components found for: RUCREAT          Objective:   Vitals: BP (!) 118/56   Pulse 59   Temp 97.4 °F (36.3 °C) (Oral)   Resp 21   Ht 5' 6\" (1.676 m)   Wt 254 lb 6.6 oz (115.4 kg)   SpO2 97%   BMI 41.06 kg/m²      General appearance: awake and lucidly conversant, weak  HEENT: Head: Normal, normocephalic, atraumatic.   Neck: supple, symmetrical, trachea midline  Cardiovascular: S1 and S2: normal / no rub  Pulmonary: diminished lung sounds bilaterally   Abdomen:  soft / non-tender   Extremities: edema trace      Patient Active Problem

## 2018-12-12 NOTE — PROGRESS NOTES
Hospitalist Progress Note      Name:  Vance Katz /Age/Sex: 1955  (65 y.o. female)   MRN & CSN:  [id number] & [id number] Admission Date/Time: 12/10/2018  3:15 AM   Location:  [address] [place] [address] PCP: Fabienne Banks MD         Hospital Day: 3    Assessment and Plan:   Vance Katz is a 59 y.o.  female  who presents with SOB    Acute on chronic respiratory failure with hypoxia  HCAP secondary to pseudomonas   Gram negative bacteremia  Evaluated by ID and pulmonology  BCx+ve for pseudomonas  CT chest reviewed ?pneumonitis vs pulm edema  Cont cefepime +tobramycin  Follow ID and sens  Repeat BCx negative  Patient may need PICC line for IV antibiotics    Recent Infectious endocarditis with severe AR, MR, TR and IN  Cardiology and CT surgery following  Patient is high risk for surgical intervention also infection has to be treated first.    STORM on CKD  Nephrology following  Monitor Cr  Renally dose meds    Acute on chronic anemia  R/o GI bleeding  Hgb 6.1 this am, down from 7  Occult blood in stool test  Transfuse 1 unit  Consult oncology  Hold lovenox    Diet DIET CARDIAC; Dietary Nutrition Supplements: Low Calorie High Protein Supplement   DVT Prophylaxis [x] Lovenox, []  Heparin, [] SCDs, []No VTE prophylaxis, patient ambulating   GI Prophylaxis [] PPI, [] H2 Blocker, [x] No GI prophylaxis, patient is receiving diet/Tube Feeds   Code Status Full Code             History of Present Illness:     Pt S&E. Feeling more tired and SOB this am, Hgb dropped to 6.1, no apparent source of bleeding     Ten point ROS reviewed negative, unless as noted above    Objective:        Intake/Output Summary (Last 24 hours) at 18 1637  Last data filed at 18 1515   Gross per 24 hour   Intake              540 ml   Output              600 ml   Net              -60 ml      Vitals:   Vitals:    18 1545   BP:    Pulse:    Resp:    Temp:    SpO2: 93%     Physical Exam:    GEN Awake female, in mild resp

## 2018-12-12 NOTE — PROGRESS NOTES
pink, sclera anicteric. Neck: Supple. Trachea midline. No LAD. Chest: Bilateral rhonchi  Heart: RRR and diastolic murmur in the aortic area  Abd: soft, non-distended, no tenderness, no hepatomegaly. Normoactive bowel sounds. Ext: no clubbing, cyanosis, or edema  Catheter Site: without erythema or tenderness  Neuro: Mental status intact.  CN 2-12 intact and no focal sensory or motor deficits     Radiologic / Imaging / TESTING  CT chest reviewed     Labs:    Recent Results (from the past 24 hour(s))   POCT Glucose    Collection Time: 12/11/18 12:48 PM   Result Value Ref Range    POC Glucose 152 (H) 70 - 99 MG/DL   POCT Glucose    Collection Time: 12/11/18  4:27 PM   Result Value Ref Range    POC Glucose 144 (H) 70 - 99 MG/DL   POCT Glucose    Collection Time: 12/11/18  9:04 PM   Result Value Ref Range    POC Glucose 209 (H) 70 - 99 MG/DL   Renal Function Panel    Collection Time: 12/12/18  5:07 AM   Result Value Ref Range    Sodium 135 135 - 145 MMOL/L    Potassium 4.0 3.5 - 5.1 MMOL/L    Chloride 95 (L) 99 - 110 mMol/L    CO2 25 21 - 32 MMOL/L    Anion Gap 15 4 - 16    BUN 61 (H) 6 - 23 MG/DL    CREATININE 1.8 (H) 0.6 - 1.1 MG/DL    Glucose 135 (H) 70 - 99 MG/DL    Calcium 7.9 (L) 8.3 - 10.6 MG/DL    GFR Non- 29 (L) >60 mL/min/1.73m2    GFR  34 (L) >60 mL/min/1.73m2    Alb 3.7 3.4 - 5.0 GM/DL    Phosphorus 4.6 2.5 - 4.9 MG/DL   Procalcitonin    Collection Time: 12/12/18  5:07 AM   Result Value Ref Range    Procalcitonin 1.39    CBC    Collection Time: 12/12/18  5:07 AM   Result Value Ref Range    WBC 4.5 4.0 - 10.5 K/CU MM    RBC 2.06 (L) 4.2 - 5.4 M/CU MM    Hemoglobin 6.1 (LL) 12.5 - 16.0 GM/DL    Hematocrit 20.2 (L) 37 - 47 %    MCV 98.1 78 - 100 FL    MCH 29.6 27 - 31 PG    MCHC 30.2 (L) 32.0 - 36.0 %    RDW 15.8 (H) 11.7 - 14.9 %    Platelets 759 831 - 502 K/CU MM    MPV 9.7 7.5 - 11.1 FL   POCT Glucose    Collection Time: 12/12/18  8:08 AM   Result Value Ref Range    POC

## 2018-12-12 NOTE — PROGRESS NOTES
Hysterectomy (2010); Cholecystectomy (2010); Breast surgery; Tonsillectomy (as a kid); knee surgery (Left, 2000); Colonoscopy (N/A, 10/24/2018); and pr lap,surg,colectomy, partial, w/anast (N/A, 10/25/2018). Social History:   reports that she quit smoking about 3 months ago. Her smoking use included Cigarettes. She has a 15.00 pack-year smoking history. She has never used smokeless tobacco. She reports that she does not drink alcohol or use drugs. Family history:  family history includes Alcohol Abuse in her brother; Arthritis in her father, maternal uncle, and mother; Cancer in her father and mother; Depression in her brother; Diabetes in her brother; Early Death in her father; Heart Attack in her maternal grandfather; Heart Disease in her brother; High Blood Pressure in her brother, father, maternal grandfather, maternal grandmother, mother, and sister; High Cholesterol in her brother; Obesity in her brother and mother; Osteoporosis in her maternal uncle and mother; Prostate Cancer in her maternal grandfather; Substance Abuse in her brother. Allergies   Allergen Reactions    Tomato Anaphylaxis     Raw tomatoes only        Review of Systems:   All 14 systems were reviewed and are negative  Except for the positive findings  which as documented     /65   Pulse 87   Temp 97.4 °F (36.3 °C) (Oral)   Resp 25   Ht 5' 6\" (1.676 m)   Wt 254 lb 6.6 oz (115.4 kg)   SpO2 97%   BMI 41.06 kg/m²       Intake/Output Summary (Last 24 hours) at 12/12/18 1159  Last data filed at 12/12/18 0600   Gross per 24 hour   Intake              240 ml   Output              600 ml   Net             -360 ml       Physical Exam:  Constitutional:  Well developed, Well nourished, No acute distress  HENT:  Normocephalic, Atraumatic, Bilateral external ears normal, Oropharynx moist, No oral exudates, Nose normal.   Neck-  No tenderness, Supple, No stridor. Eyes:  PERRL, EOMI, Conjunctiva normal, No discharge.    Respiratory: 2245  12/11/18   0450  12/12/18   0507   NA  136  134*  135   K  4.1  4.2  4.0   CL  94*  92*  95*   CO2  25  27  25   PHOS   --    --   4.6   BUN  47*  54*  61*   CREATININE  1.7*  1.7*  1.8*     PT/INR: No results for input(s): PROTIME, INR in the last 72 hours. BNP:    Recent Labs      12/09/18   2245   PROBNP  7,358*     TROPONIN:   Recent Labs      12/09/18   2245  12/10/18   0637  12/10/18   1153   TROPONINT  0.080*  0.055*  0.048*        ECHO :   Echocardiogram 12/10/2018    Left ventricular systolic function is normal.   Ejection fraction is visually estimated at 50-55%.   Mildly dilated atrium bilaterally.   Echo density /vegetation noted on the non-coronary cusp of the aortic valve.   Severe aortic regurgitation is present.   Possible small vegetation noted attached to the mitral valve leaflet.   Posterior leaflet of the mitral valve appears slightly restricted; mean PG   is 8 mmHg.   Severe mitral regurgitation is present.   Severe tricuspid regurgitation is present without obvious vegetation. RVSP   is 73 mmHg.   Pulmonic valve appears slightly thickened with severe regurgitation-a   vegetation cannot be excluded.   No evidence of any pericardial effusion.     Impression:  Active Problems:    Fever  Resolved Problems:    * No resolved hospital problems. *       All labs, medications and tests reviewed by myself, continue all other medications of all above medical condition listed as is except for changes mentioned above. Thank you   Please call with questions. Electronically signed by JOSÉ MANUEL Sandoval CNP on 12/12/2018 at 11:58 AM       I have seen ,spoken to  and examined this patient personally, independently of the nurse practitioner. I have reviewed the hospital care given to date and reviewed all pertinent labs and imaging. The plan was developed mutually at the time of the visit with the patient,  Mendoza Chandlerfernandez  and myself.  I have spoken with patient, nursing staff and provided written

## 2018-12-13 LAB
ALBUMIN SERPL-MCNC: 3.6 GM/DL (ref 3.4–5)
ANION GAP SERPL CALCULATED.3IONS-SCNC: 14 MMOL/L (ref 4–16)
BUN BLDV-MCNC: 57 MG/DL (ref 6–23)
CALCIUM SERPL-MCNC: 8.2 MG/DL (ref 8.3–10.6)
CHLORIDE BLD-SCNC: 98 MMOL/L (ref 99–110)
CO2: 24 MMOL/L (ref 21–32)
CREAT SERPL-MCNC: 1.4 MG/DL (ref 0.6–1.1)
GFR AFRICAN AMERICAN: 46 ML/MIN/1.73M2
GFR NON-AFRICAN AMERICAN: 38 ML/MIN/1.73M2
GLUCOSE BLD-MCNC: 152 MG/DL (ref 70–99)
GLUCOSE BLD-MCNC: 160 MG/DL (ref 70–99)
GLUCOSE BLD-MCNC: 176 MG/DL (ref 70–99)
GLUCOSE BLD-MCNC: 196 MG/DL (ref 70–99)
GLUCOSE BLD-MCNC: 238 MG/DL (ref 70–99)
HCT VFR BLD CALC: 25.3 % (ref 37–47)
HEMOCCULT SP1 STL QL: NEGATIVE
HEMOGLOBIN: 7.7 GM/DL (ref 12.5–16)
MCH RBC QN AUTO: 29.7 PG (ref 27–31)
MCHC RBC AUTO-ENTMCNC: 30.4 % (ref 32–36)
MCV RBC AUTO: 97.7 FL (ref 78–100)
PDW BLD-RTO: 15.9 % (ref 11.7–14.9)
PHOSPHORUS: 3.8 MG/DL (ref 2.5–4.9)
PLATELET # BLD: 188 K/CU MM (ref 140–440)
PMV BLD AUTO: 9.5 FL (ref 7.5–11.1)
POTASSIUM SERPL-SCNC: 3.8 MMOL/L (ref 3.5–5.1)
RBC # BLD: 2.59 M/CU MM (ref 4.2–5.4)
SODIUM BLD-SCNC: 136 MMOL/L (ref 135–145)
TOBRAMYCIN TROUGH: 2.3 UG/ML
WBC # BLD: 7.1 K/CU MM (ref 4–10.5)

## 2018-12-13 PROCEDURE — 2700000000 HC OXYGEN THERAPY PER DAY

## 2018-12-13 PROCEDURE — 6370000000 HC RX 637 (ALT 250 FOR IP): Performed by: NURSE PRACTITIONER

## 2018-12-13 PROCEDURE — 6360000002 HC RX W HCPCS: Performed by: INTERNAL MEDICINE

## 2018-12-13 PROCEDURE — 85027 COMPLETE CBC AUTOMATED: CPT

## 2018-12-13 PROCEDURE — G0328 FECAL BLOOD SCRN IMMUNOASSAY: HCPCS

## 2018-12-13 PROCEDURE — 94640 AIRWAY INHALATION TREATMENT: CPT

## 2018-12-13 PROCEDURE — 99232 SBSQ HOSP IP/OBS MODERATE 35: CPT | Performed by: INTERNAL MEDICINE

## 2018-12-13 PROCEDURE — 6370000000 HC RX 637 (ALT 250 FOR IP): Performed by: HOSPITALIST

## 2018-12-13 PROCEDURE — 2580000003 HC RX 258: Performed by: INTERNAL MEDICINE

## 2018-12-13 PROCEDURE — 80200 ASSAY OF TOBRAMYCIN: CPT

## 2018-12-13 PROCEDURE — 36591 DRAW BLOOD OFF VENOUS DEVICE: CPT

## 2018-12-13 PROCEDURE — 6370000000 HC RX 637 (ALT 250 FOR IP): Performed by: INTERNAL MEDICINE

## 2018-12-13 PROCEDURE — 94761 N-INVAS EAR/PLS OXIMETRY MLT: CPT

## 2018-12-13 PROCEDURE — 2140000000 HC CCU INTERMEDIATE R&B

## 2018-12-13 PROCEDURE — 82962 GLUCOSE BLOOD TEST: CPT

## 2018-12-13 PROCEDURE — 80069 RENAL FUNCTION PANEL: CPT

## 2018-12-13 PROCEDURE — 36592 COLLECT BLOOD FROM PICC: CPT

## 2018-12-13 RX ORDER — CIPROFLOXACIN 2 MG/ML
400 INJECTION, SOLUTION INTRAVENOUS EVERY 12 HOURS
Status: DISCONTINUED | OUTPATIENT
Start: 2018-12-13 | End: 2018-12-14

## 2018-12-13 RX ADMIN — CEFEPIME HYDROCHLORIDE 2 G: 2 INJECTION, POWDER, FOR SOLUTION INTRAVENOUS at 06:06

## 2018-12-13 RX ADMIN — CEFEPIME HYDROCHLORIDE 2 G: 2 INJECTION, POWDER, FOR SOLUTION INTRAVENOUS at 18:19

## 2018-12-13 RX ADMIN — INSULIN LISPRO 1 UNITS: 100 INJECTION, SOLUTION INTRAVENOUS; SUBCUTANEOUS at 22:22

## 2018-12-13 RX ADMIN — CIPROFLOXACIN 400 MG: 2 INJECTION, SOLUTION INTRAVENOUS at 14:44

## 2018-12-13 RX ADMIN — INSULIN LISPRO 1 UNITS: 100 INJECTION, SOLUTION INTRAVENOUS; SUBCUTANEOUS at 12:14

## 2018-12-13 RX ADMIN — Medication 2 PUFF: at 07:49

## 2018-12-13 RX ADMIN — SODIUM CHLORIDE, PRESERVATIVE FREE 10 ML: 5 INJECTION INTRAVENOUS at 22:22

## 2018-12-13 RX ADMIN — IPRATROPIUM BROMIDE AND ALBUTEROL SULFATE 3 ML: .5; 3 SOLUTION RESPIRATORY (INHALATION) at 11:54

## 2018-12-13 RX ADMIN — ATENOLOL 25 MG: 25 TABLET ORAL at 22:20

## 2018-12-13 RX ADMIN — IPRATROPIUM BROMIDE AND ALBUTEROL SULFATE 3 ML: .5; 3 SOLUTION RESPIRATORY (INHALATION) at 15:47

## 2018-12-13 RX ADMIN — IPRATROPIUM BROMIDE AND ALBUTEROL SULFATE 3 ML: .5; 3 SOLUTION RESPIRATORY (INHALATION) at 07:48

## 2018-12-13 RX ADMIN — IPRATROPIUM BROMIDE AND ALBUTEROL SULFATE 3 ML: .5; 3 SOLUTION RESPIRATORY (INHALATION) at 22:05

## 2018-12-13 RX ADMIN — ATENOLOL 25 MG: 25 TABLET ORAL at 10:15

## 2018-12-13 RX ADMIN — ACETAMINOPHEN 1000 MG: 500 TABLET, FILM COATED ORAL at 02:17

## 2018-12-13 RX ADMIN — INSULIN LISPRO 1 UNITS: 100 INJECTION, SOLUTION INTRAVENOUS; SUBCUTANEOUS at 10:07

## 2018-12-13 RX ADMIN — ACETAMINOPHEN 1000 MG: 500 TABLET, FILM COATED ORAL at 10:14

## 2018-12-13 RX ADMIN — TRAZODONE HYDROCHLORIDE 100 MG: 50 TABLET ORAL at 22:20

## 2018-12-13 RX ADMIN — INSULIN LISPRO 1 UNITS: 100 INJECTION, SOLUTION INTRAVENOUS; SUBCUTANEOUS at 16:51

## 2018-12-13 RX ADMIN — PRAVASTATIN SODIUM 20 MG: 20 TABLET ORAL at 10:15

## 2018-12-13 RX ADMIN — SODIUM CHLORIDE, PRESERVATIVE FREE 10 ML: 5 INJECTION INTRAVENOUS at 10:17

## 2018-12-13 RX ADMIN — Medication 2 PUFF: at 22:05

## 2018-12-13 RX ADMIN — ACETAMINOPHEN 1000 MG: 500 TABLET, FILM COATED ORAL at 22:20

## 2018-12-13 ASSESSMENT — PAIN SCALES - GENERAL
PAINLEVEL_OUTOF10: 10
PAINLEVEL_OUTOF10: 0
PAINLEVEL_OUTOF10: 2
PAINLEVEL_OUTOF10: 10
PAINLEVEL_OUTOF10: 0
PAINLEVEL_OUTOF10: 0

## 2018-12-13 ASSESSMENT — PAIN DESCRIPTION - PAIN TYPE: TYPE: CHRONIC PAIN

## 2018-12-13 ASSESSMENT — PAIN DESCRIPTION - LOCATION
LOCATION: HEAD
LOCATION: HEAD

## 2018-12-13 ASSESSMENT — PAIN DESCRIPTION - ORIENTATION: ORIENTATION: RIGHT;LEFT

## 2018-12-13 NOTE — PROGRESS NOTES
pulmonary      SUBJECTIVE:  Doing better today     OBJECTIVE    VITALS:  BP (!) 142/45   Pulse 79   Temp 98 °F (36.7 °C)   Resp (!) 36   Ht 5' 6\" (1.676 m)   Wt 254 lb 6.6 oz (115.4 kg)   SpO2 91%   BMI 41.06 kg/m²   HEAD AND FACE EXAM:  No throat injection, no active exudate,no thrush  NECK EXAM;No JVD, no masses, symmetrical  CHEST EXAM; Expansion equal and symmetrical, no masses  LUNG EXAM; Good breath sounds bilaterally. There are expiratory wheezes both lungs, there are crackles at both lung bases  CARDIOVASCULAR EXAM: Positive S1 and S2, no S3 or S4, no clicks ,no murmurs  RIGHT AND LEFT LOWER EXTRIMITY EXAM: No edema, no swelling, no inflamation  CNS EXAM: Alert and oriented X3          LABS   Lab Results   Component Value Date    WBC 7.1 12/13/2018    HGB 7.7 (L) 12/13/2018    HCT 25.3 (L) 12/13/2018    MCV 97.7 12/13/2018     12/13/2018     Lab Results   Component Value Date    CREATININE 1.4 (H) 12/13/2018    BUN 57 (H) 12/13/2018     12/13/2018    K 3.8 12/13/2018    CL 98 (L) 12/13/2018    CO2 24 12/13/2018     Lab Results   Component Value Date    INR 1.43 11/01/2018    PROTIME 16.2 (H) 11/01/2018          Lab Results   Component Value Date    PHOS 3.8 12/13/2018    PHOS 4.6 12/12/2018    PHOS 2.5 11/07/2018      No results for input(s): PH, PO2ART, GNT5LHQ, HCO3, BEART, O2SAT in the last 72 hours. Wt Readings from Last 3 Encounters:   12/12/18 254 lb 6.6 oz (115.4 kg)   12/09/18 240 lb (108.9 kg)   12/06/18 240 lb (108.9 kg)               ASSESMENT  Ac on ch resp failure  Ac copd  valv heart disease   pseudomonas sepsis        PLAN  1. cpm  2. D/w pt and daughter  3.  Dr John Pollard covering till 12/21 7 am if needed    12/13/2018  Jennifer Barron M.D.

## 2018-12-13 NOTE — PROGRESS NOTES
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 Repeat blood cultures were negative at   § 1 of 2 blood cultures pseudomonas aeruginosa   § Blood Cultures 12/10 NGTD  § Short of breath, with lightheadedness. § On further review of CT chest and following a  discussion with colleagues this is  unlikely to be pneumonia. Previous antibiotic use and health care contact increased risk of pseudomonas infection. 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 Being managed by  hematology, s/p 1 unit of PRBC  · Multi-morbidity: per PMHx DAVID, Obesity, Pulmonary HTN, Carotid stenosis  Plan:  ? Continue cefepime   ? Discontinue tobramycin  ? Start ciprofloxacin   ? Serial monitoring of procalcitonin  ? f/uSputum culture  ? Follow-up blood culture  ? May require midline catheter for now. Once decision is made on home antibiotics will then decided the patient needs a tunneled PICC line. ? D/w with Dr. William Reid, ok with tunneled PICC line  ? D/w Dr. Angle Taylor      Ongoing Antimicrobial Therapy  Cefepime 12/10  Ciprofloxacin   ? Completed Antimicrobial Therapy  Vancomycin 12/10  Ceftriaxone 12/10   Piperacillin-tazobactam 12/10  Tobramycin -? History:? Interval history noted  Short of breath.   Denies n/v/d/f or untoward effects of antibiotics  Physical Exam:  Vital Signs: BP (!) 142/45   Pulse 79   Temp 98 °F (36.7 °C)   Resp (!) 36   Ht 5' 6\" (1.676 m)   Wt 254 lb 6.6 oz (115.4 kg)   SpO2 95%   BMI 41.06 kg/m²     Gen: alert and oriented X3, no distress  Skin: no stigmata of endocarditis  Wounds: C/D/I  HEMT: AT/NC Oropharynx pink, moist, and without lesions or exudates; dentition in poor 32.0 - 36.0 %    RDW 15.9 (H) 11.7 - 14.9 %    Platelets 377 349 - 700 K/CU MM    MPV 9.5 7.5 - 11.1 FL   POCT Glucose    Collection Time: 12/13/18 11:47 AM   Result Value Ref Range    POC Glucose 196 (H) 70 - 99 MG/DL     CULTURE results: Invalid input(s): BLOOD CULTURE,  URINE CULTURE, SURGICAL CULTURE    Diagnosis:  Patient Active Problem List   Diagnosis    Type 2 diabetes mellitus without complication ([address]    Essential hypertension    Urinary incontinence, mixed    Tobacco abuse disorder    Obstructive sleep apnea    Pulmonary hypertension (HCC)    Systolic murmur    Chronic respiratory failure with hypoxia (HCC)    Microalbuminuria due to type 2 diabetes mellitus ([address]    Liver dysfunction    Steatohepatitis    Mixed hyperlipidemia    Morbid obesity due to excess calories (HCC)    COPD, severe ([address]    COPD exacerbation (HCC)    Type 2 diabetes mellitus ([address]    Tobacco abuse    Obesity due to excess calories    CHF (congestive heart failure) ([address]    Bilateral carotid artery stenosis    Cerebrovascular accident (CVA) ([address]    COPD exacerbation ([address]    Type 2 diabetes mellitus ([address]    Hypertension    Tobacco abuse    Hyperlipidemia    Pneumonia    Bacteremia    Streptococcal bacteremia    Subacute bacterial endocarditis    Cecum mass    Tubulovillous adenoma of colon    Acute kidney injury ([address]    Endocarditis    Valvular disease    Anemia in chronic kidney disease    Fever       Active Problems  Active Problems:    Fever  Resolved Problems:    * No resolved hospital problems.  *    Electronically signed by: Electronically signed by Lou Stover MD on 12/13/2018 at 1:55 PM

## 2018-12-13 NOTE — CONSULTS
Consult to IV team for IV access. Dr Cal Anaya OK midline. 4 fr Power Midline inserted into right basilic vessel on first attempt. Patient tolerated well.  Lesia Bernheim

## 2018-12-13 NOTE — PROGRESS NOTES
PHARMACY AMINOGLYCOSIDE MONITORING SERVICE     Mike Welch is a 59 y.o. female started on tobramycin for pseudomonal bacteremia and possible endocarditis. Pharmacy consulted by Dr. Alexa Byers for monitoring and adjustment. Indication for treatment: Bacteremia, possible endocarditis  Target peak: 8-10 mg/L  Target trough: <1.5 mg/L    Other antimicrobials: cefepime 2g q12h IVPB     Height: 66 in   Wt Readings from Last 3 Encounters:   12/12/18 254 lb 6.6 oz (115.4 kg)   12/09/18 240 lb (108.9 kg)   12/06/18 240 lb (108.9 kg)      IBW: 63 kg  AdjBW: 83 kg  Aminoglycoside dosing weight: 83 kg    Pertinent Laboratory Values:   Recent Labs      12/11/18   0450  12/12/18   0507  12/13/18   0434  12/13/18   1021   WBC  7.8  4.5   --   7.1   BUN  54*  61*  57*   --    CREATININE  1.7*  1.8*  1.4*   --      Baseline SCr: 1.2   Estimated Creatinine Clearance: 54 mL/min (A) (based on SCr of 1.4 mg/dL (H)). Urine Output:    Intake/Output Summary (Last 24 hours) at 12/13/18 1205  Last data filed at 12/13/18 7597   Gross per 24 hour   Intake              300 ml   Output              950 ml   Net             -650 ml       Pertinent Cultures:  Date    Source    Result  12/9   Blood    Pseudomonas    Pharmacokinetics: drawn after 200 mg dose hung on 12/12 at 1600  First serum concentration: 6.8 mg/L on 12/12 at 1800  Second serum concentration: 2.3 mg/L on 12/13 at 1021  Elimination rate constant (ke): 0.0657 1/hr  Half-life (t1/2): 10.54 hours  Calculated peak concentration: 8.56 mg/L  Time to undetectable: 32 hours    Assessment/Plan:  · Ms. Conor Donald is a 59 yoF continuing on tobramycin for double coverage of pseudomonal bacteremia and possible endocarditis   · Collected two random levels to calculate patient-specific clearance   · Receiving tobramycin 200 mg q24h IVPB (2.4 mg/kg AdjBW)   · Peak appropriate at 8.5 (goal 8-10)  · Trough level calculated to be <1 @32h   · Will increase dosing interval to allow for clearance · Order adjusted to tobramycin 200 mg q36h IVPB   · Pharmacy will continue to monitor patient and adjust therapy as indicated    Thank you for the consult. Jesu Medeiros, Pharm. D., [facility]   12/13/2018 12:05 PM  Phone: [phone]

## 2018-12-14 LAB
ALBUMIN SERPL-MCNC: 3.6 GM/DL (ref 3.4–5)
ANION GAP SERPL CALCULATED.3IONS-SCNC: 13 MMOL/L (ref 4–16)
BUN BLDV-MCNC: 52 MG/DL (ref 6–23)
CALCIUM SERPL-MCNC: 8.4 MG/DL (ref 8.3–10.6)
CHLORIDE BLD-SCNC: 98 MMOL/L (ref 99–110)
CO2: 25 MMOL/L (ref 21–32)
CREAT SERPL-MCNC: 1.2 MG/DL (ref 0.6–1.1)
GFR AFRICAN AMERICAN: 55 ML/MIN/1.73M2
GFR NON-AFRICAN AMERICAN: 46 ML/MIN/1.73M2
GLUCOSE BLD-MCNC: 152 MG/DL (ref 70–99)
GLUCOSE BLD-MCNC: 168 MG/DL (ref 70–99)
GLUCOSE BLD-MCNC: 190 MG/DL (ref 70–99)
GLUCOSE BLD-MCNC: 226 MG/DL (ref 70–99)
GLUCOSE BLD-MCNC: 263 MG/DL (ref 70–99)
PHOSPHORUS: 3.7 MG/DL (ref 2.5–4.9)
POTASSIUM SERPL-SCNC: 4 MMOL/L (ref 3.5–5.1)
PROCALCITONIN: 0.58
SODIUM BLD-SCNC: 136 MMOL/L (ref 135–145)

## 2018-12-14 PROCEDURE — 6370000000 HC RX 637 (ALT 250 FOR IP): Performed by: INTERNAL MEDICINE

## 2018-12-14 PROCEDURE — 94761 N-INVAS EAR/PLS OXIMETRY MLT: CPT

## 2018-12-14 PROCEDURE — C1751 CATH, INF, PER/CENT/MIDLINE: HCPCS

## 2018-12-14 PROCEDURE — 02HV33Z INSERTION OF INFUSION DEVICE INTO SUPERIOR VENA CAVA, PERCUTANEOUS APPROACH: ICD-10-PCS | Performed by: HOSPITALIST

## 2018-12-14 PROCEDURE — 6370000000 HC RX 637 (ALT 250 FOR IP): Performed by: NURSE PRACTITIONER

## 2018-12-14 PROCEDURE — 84145 PROCALCITONIN (PCT): CPT

## 2018-12-14 PROCEDURE — 2580000003 HC RX 258: Performed by: HOSPITALIST

## 2018-12-14 PROCEDURE — 80069 RENAL FUNCTION PANEL: CPT

## 2018-12-14 PROCEDURE — 99232 SBSQ HOSP IP/OBS MODERATE 35: CPT | Performed by: INTERNAL MEDICINE

## 2018-12-14 PROCEDURE — 2140000000 HC CCU INTERMEDIATE R&B

## 2018-12-14 PROCEDURE — 6370000000 HC RX 637 (ALT 250 FOR IP): Performed by: HOSPITALIST

## 2018-12-14 PROCEDURE — 82962 GLUCOSE BLOOD TEST: CPT

## 2018-12-14 PROCEDURE — 2580000003 HC RX 258: Performed by: INTERNAL MEDICINE

## 2018-12-14 PROCEDURE — 76937 US GUIDE VASCULAR ACCESS: CPT

## 2018-12-14 PROCEDURE — 94640 AIRWAY INHALATION TREATMENT: CPT

## 2018-12-14 PROCEDURE — 36569 INSJ PICC 5 YR+ W/O IMAGING: CPT

## 2018-12-14 PROCEDURE — 2700000000 HC OXYGEN THERAPY PER DAY

## 2018-12-14 PROCEDURE — 6360000002 HC RX W HCPCS: Performed by: INTERNAL MEDICINE

## 2018-12-14 RX ORDER — TORSEMIDE 20 MG/1
20 TABLET ORAL DAILY
Status: DISCONTINUED | OUTPATIENT
Start: 2018-12-14 | End: 2018-12-17

## 2018-12-14 RX ORDER — SODIUM CHLORIDE 0.9 % (FLUSH) 0.9 %
10 SYRINGE (ML) INJECTION PRN
Status: DISCONTINUED | OUTPATIENT
Start: 2018-12-14 | End: 2018-12-20 | Stop reason: HOSPADM

## 2018-12-14 RX ORDER — CIPROFLOXACIN 2 MG/ML
400 INJECTION, SOLUTION INTRAVENOUS EVERY 8 HOURS
Status: DISCONTINUED | OUTPATIENT
Start: 2018-12-14 | End: 2018-12-17

## 2018-12-14 RX ORDER — LIDOCAINE HYDROCHLORIDE 10 MG/ML
5 INJECTION, SOLUTION EPIDURAL; INFILTRATION; INTRACAUDAL; PERINEURAL ONCE
Status: DISCONTINUED | OUTPATIENT
Start: 2018-12-14 | End: 2018-12-20 | Stop reason: HOSPADM

## 2018-12-14 RX ORDER — TRAMADOL HYDROCHLORIDE 50 MG/1
50 TABLET ORAL EVERY 6 HOURS PRN
Status: DISPENSED | OUTPATIENT
Start: 2018-12-14 | End: 2018-12-15

## 2018-12-14 RX ORDER — SODIUM CHLORIDE 0.9 % (FLUSH) 0.9 %
10 SYRINGE (ML) INJECTION EVERY 12 HOURS SCHEDULED
Status: DISCONTINUED | OUTPATIENT
Start: 2018-12-14 | End: 2018-12-14

## 2018-12-14 RX ADMIN — IPRATROPIUM BROMIDE AND ALBUTEROL SULFATE 3 ML: .5; 3 SOLUTION RESPIRATORY (INHALATION) at 15:54

## 2018-12-14 RX ADMIN — INSULIN LISPRO 1 UNITS: 100 INJECTION, SOLUTION INTRAVENOUS; SUBCUTANEOUS at 18:21

## 2018-12-14 RX ADMIN — Medication 2 PUFF: at 08:13

## 2018-12-14 RX ADMIN — TRAZODONE HYDROCHLORIDE 100 MG: 50 TABLET ORAL at 22:32

## 2018-12-14 RX ADMIN — PRAVASTATIN SODIUM 20 MG: 20 TABLET ORAL at 08:05

## 2018-12-14 RX ADMIN — CIPROFLOXACIN 400 MG: 2 INJECTION, SOLUTION INTRAVENOUS at 02:04

## 2018-12-14 RX ADMIN — INSULIN LISPRO 3 UNITS: 100 INJECTION, SOLUTION INTRAVENOUS; SUBCUTANEOUS at 11:54

## 2018-12-14 RX ADMIN — SODIUM CHLORIDE, PRESERVATIVE FREE 10 ML: 5 INJECTION INTRAVENOUS at 21:12

## 2018-12-14 RX ADMIN — ATENOLOL 25 MG: 25 TABLET ORAL at 08:05

## 2018-12-14 RX ADMIN — INSULIN LISPRO 1 UNITS: 100 INJECTION, SOLUTION INTRAVENOUS; SUBCUTANEOUS at 08:06

## 2018-12-14 RX ADMIN — TORSEMIDE 20 MG: 20 TABLET ORAL at 11:04

## 2018-12-14 RX ADMIN — Medication 2 PUFF: at 20:31

## 2018-12-14 RX ADMIN — INSULIN LISPRO 1 UNITS: 100 INJECTION, SOLUTION INTRAVENOUS; SUBCUTANEOUS at 21:08

## 2018-12-14 RX ADMIN — ATENOLOL 25 MG: 25 TABLET ORAL at 22:32

## 2018-12-14 RX ADMIN — SODIUM CHLORIDE, PRESERVATIVE FREE 10 ML: 5 INJECTION INTRAVENOUS at 08:05

## 2018-12-14 RX ADMIN — CEFEPIME HYDROCHLORIDE 2 G: 2 INJECTION, POWDER, FOR SOLUTION INTRAVENOUS at 05:45

## 2018-12-14 RX ADMIN — CEFEPIME HYDROCHLORIDE 2 G: 2 INJECTION, POWDER, FOR SOLUTION INTRAVENOUS at 22:28

## 2018-12-14 RX ADMIN — IPRATROPIUM BROMIDE AND ALBUTEROL SULFATE 3 ML: .5; 3 SOLUTION RESPIRATORY (INHALATION) at 12:21

## 2018-12-14 RX ADMIN — IPRATROPIUM BROMIDE AND ALBUTEROL SULFATE 3 ML: .5; 3 SOLUTION RESPIRATORY (INHALATION) at 08:13

## 2018-12-14 RX ADMIN — CIPROFLOXACIN 400 MG: 2 INJECTION, SOLUTION INTRAVENOUS at 13:35

## 2018-12-14 RX ADMIN — CIPROFLOXACIN 400 MG: 2 INJECTION, SOLUTION INTRAVENOUS at 21:08

## 2018-12-14 RX ADMIN — TRAMADOL HYDROCHLORIDE 50 MG: 50 TABLET, FILM COATED ORAL at 22:32

## 2018-12-14 RX ADMIN — IPRATROPIUM BROMIDE AND ALBUTEROL SULFATE 3 ML: .5; 3 SOLUTION RESPIRATORY (INHALATION) at 20:16

## 2018-12-14 ASSESSMENT — PAIN SCALES - GENERAL
PAINLEVEL_OUTOF10: 6
PAINLEVEL_OUTOF10: 0
PAINLEVEL_OUTOF10: 7

## 2018-12-14 ASSESSMENT — PAIN DESCRIPTION - PAIN TYPE: TYPE: CHRONIC PAIN

## 2018-12-14 NOTE — PROGRESS NOTES
glucagon (rDNA) 1 mg PRN   dextrose 100 mL/hr PRN   sodium chloride flush 10 mL PRN   iohexol 50 mL ONCE PRN         Electronically signed by Malini Doshi MD on 12/14/2018 at 3:08 PM

## 2018-12-14 NOTE — PLAN OF CARE
Problem: Pain:  Goal: Pain level will decrease  Pain level will decrease   Outcome: Ongoing    Goal: Control of acute pain  Control of acute pain   Outcome: Ongoing    Goal: Control of chronic pain  Control of chronic pain   Outcome: Ongoing      Problem: Falls - Risk of:  Goal: Will remain free from falls  Will remain free from falls   Outcome: Ongoing    Goal: Absence of physical injury  Absence of physical injury   Outcome: Ongoing      Problem: Breathing Pattern - Ineffective:  Goal: Ability to achieve and maintain a regular respiratory rate will improve  Ability to achieve and maintain a regular respiratory rate will improve   Outcome: Ongoing

## 2018-12-14 NOTE — PROGRESS NOTES
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 Improved renal function  · Anemia of chronic disease  ? Being managed by  hematology, s/p 1 unit of PRBC  · Multi-morbidity: per PMHx DAVID, Obesity, Pulmonary HTN, Carotid stenosis  Plan:  ? Continue cefepime IV 2g q 8h and Ciprofloxacin 750 mg po q 12 h tentative end date 19 (6 weeks)  ? Cipro should not be taken with multivitamins. Weekly labs drawn on Monday during the course of treatment  CBC, CMP, ESR, CRP  Fax results to Attn: [facility]  ? # [phone]  · Both drugs can lead to CNS side effects: Seizures, confusion. Monitor closely. Also watch for muscle pain as there is a risk for tendinopathy. Patient educated  ? Serial monitoring of procalcitonin  ? Follow-up blood culture   ? D/w with Dr. Polly Suazo, ok with tunneled PICC line        Ongoing Antimicrobial Therapy  Cefepime 12/10  Ciprofloxacin   ? Completed Antimicrobial Therapy  Vancomycin 12/10  Ceftriaxone 12/10   Piperacillin-tazobactam 12/10  Tobramycin -? History:? Interval history noted  Feels better.   Denies n/v/d/f or untoward effects of antibiotics  Physical Exam:  Vital Signs: BP (!) 135/43   Pulse 71   Temp 97.9 °F (36.6 °C) (Oral)   Resp 22   Ht 5' 6\" (1.676 m)   Wt 254 lb 6.6 oz (115.4 kg)   SpO2 97%   BMI 41.06 kg/m²     Gen: alert and oriented X3, no distress  Skin: no stigmata

## 2018-12-15 LAB
ALBUMIN SERPL-MCNC: 3.7 GM/DL (ref 3.4–5)
ANION GAP SERPL CALCULATED.3IONS-SCNC: 12 MMOL/L (ref 4–16)
BUN BLDV-MCNC: 52 MG/DL (ref 6–23)
CALCIUM SERPL-MCNC: 8.6 MG/DL (ref 8.3–10.6)
CHLORIDE BLD-SCNC: 100 MMOL/L (ref 99–110)
CO2: 26 MMOL/L (ref 21–32)
CREAT SERPL-MCNC: 1.1 MG/DL (ref 0.6–1.1)
CULTURE: NORMAL
GFR AFRICAN AMERICAN: >60 ML/MIN/1.73M2
GFR NON-AFRICAN AMERICAN: 50 ML/MIN/1.73M2
GLUCOSE BLD-MCNC: 150 MG/DL (ref 70–99)
GLUCOSE BLD-MCNC: 180 MG/DL (ref 70–99)
GLUCOSE BLD-MCNC: 203 MG/DL (ref 70–99)
GLUCOSE BLD-MCNC: 270 MG/DL (ref 70–99)
GLUCOSE BLD-MCNC: 278 MG/DL (ref 70–99)
GRAM SMEAR: NORMAL
HCT VFR BLD CALC: 24.2 % (ref 37–47)
HEMOGLOBIN: 7.4 GM/DL (ref 12.5–16)
Lab: NORMAL
MAGNESIUM: 2.3 MG/DL (ref 1.8–2.4)
MCH RBC QN AUTO: 30.3 PG (ref 27–31)
MCHC RBC AUTO-ENTMCNC: 30.6 % (ref 32–36)
MCV RBC AUTO: 99.2 FL (ref 78–100)
ORGANISM: NORMAL
PDW BLD-RTO: 15.8 % (ref 11.7–14.9)
PHOSPHORUS: 3.5 MG/DL (ref 2.5–4.9)
PLATELET # BLD: 231 K/CU MM (ref 140–440)
PMV BLD AUTO: 9.4 FL (ref 7.5–11.1)
POTASSIUM SERPL-SCNC: 3.9 MMOL/L (ref 3.5–5.1)
RBC # BLD: 2.44 M/CU MM (ref 4.2–5.4)
REPORT STATUS: NORMAL
SODIUM BLD-SCNC: 138 MMOL/L (ref 135–145)
SPECIMEN: NORMAL
WBC # BLD: 7.3 K/CU MM (ref 4–10.5)

## 2018-12-15 PROCEDURE — 2580000003 HC RX 258: Performed by: INTERNAL MEDICINE

## 2018-12-15 PROCEDURE — 80069 RENAL FUNCTION PANEL: CPT

## 2018-12-15 PROCEDURE — 83735 ASSAY OF MAGNESIUM: CPT

## 2018-12-15 PROCEDURE — 82962 GLUCOSE BLOOD TEST: CPT

## 2018-12-15 PROCEDURE — 85027 COMPLETE CBC AUTOMATED: CPT

## 2018-12-15 PROCEDURE — 2140000000 HC CCU INTERMEDIATE R&B

## 2018-12-15 PROCEDURE — 6370000000 HC RX 637 (ALT 250 FOR IP): Performed by: INTERNAL MEDICINE

## 2018-12-15 PROCEDURE — 6360000002 HC RX W HCPCS: Performed by: NURSE PRACTITIONER

## 2018-12-15 PROCEDURE — 6360000002 HC RX W HCPCS: Performed by: INTERNAL MEDICINE

## 2018-12-15 PROCEDURE — 94761 N-INVAS EAR/PLS OXIMETRY MLT: CPT

## 2018-12-15 PROCEDURE — 2700000000 HC OXYGEN THERAPY PER DAY

## 2018-12-15 PROCEDURE — 6370000000 HC RX 637 (ALT 250 FOR IP): Performed by: HOSPITALIST

## 2018-12-15 PROCEDURE — 94640 AIRWAY INHALATION TREATMENT: CPT

## 2018-12-15 PROCEDURE — 6370000000 HC RX 637 (ALT 250 FOR IP): Performed by: NURSE PRACTITIONER

## 2018-12-15 PROCEDURE — 80048 BASIC METABOLIC PNL TOTAL CA: CPT

## 2018-12-15 RX ORDER — LORAZEPAM 2 MG/ML
1 INJECTION INTRAMUSCULAR ONCE
Status: COMPLETED | OUTPATIENT
Start: 2018-12-15 | End: 2018-12-15

## 2018-12-15 RX ORDER — GLIPIZIDE 10 MG/1
10 TABLET ORAL
Status: DISCONTINUED | OUTPATIENT
Start: 2018-12-16 | End: 2018-12-20 | Stop reason: HOSPADM

## 2018-12-15 RX ADMIN — INSULIN LISPRO 1 UNITS: 100 INJECTION, SOLUTION INTRAVENOUS; SUBCUTANEOUS at 17:58

## 2018-12-15 RX ADMIN — TORSEMIDE 20 MG: 20 TABLET ORAL at 08:40

## 2018-12-15 RX ADMIN — IPRATROPIUM BROMIDE AND ALBUTEROL SULFATE 3 ML: .5; 3 SOLUTION RESPIRATORY (INHALATION) at 15:52

## 2018-12-15 RX ADMIN — TRAZODONE HYDROCHLORIDE 100 MG: 50 TABLET ORAL at 23:37

## 2018-12-15 RX ADMIN — SODIUM CHLORIDE, PRESERVATIVE FREE 10 ML: 5 INJECTION INTRAVENOUS at 14:45

## 2018-12-15 RX ADMIN — LORAZEPAM 1 MG: 2 INJECTION, SOLUTION INTRAMUSCULAR; INTRAVENOUS at 22:21

## 2018-12-15 RX ADMIN — IPRATROPIUM BROMIDE AND ALBUTEROL SULFATE 3 ML: .5; 3 SOLUTION RESPIRATORY (INHALATION) at 20:32

## 2018-12-15 RX ADMIN — ATENOLOL 25 MG: 25 TABLET ORAL at 22:19

## 2018-12-15 RX ADMIN — IPRATROPIUM BROMIDE AND ALBUTEROL SULFATE 3 ML: .5; 3 SOLUTION RESPIRATORY (INHALATION) at 08:24

## 2018-12-15 RX ADMIN — Medication 2 PUFF: at 20:33

## 2018-12-15 RX ADMIN — CEFEPIME HYDROCHLORIDE 2 G: 2 INJECTION, POWDER, FOR SOLUTION INTRAVENOUS at 22:18

## 2018-12-15 RX ADMIN — Medication 2 PUFF: at 08:26

## 2018-12-15 RX ADMIN — SODIUM CHLORIDE, PRESERVATIVE FREE 10 ML: 5 INJECTION INTRAVENOUS at 22:20

## 2018-12-15 RX ADMIN — CEFEPIME HYDROCHLORIDE 2 G: 2 INJECTION, POWDER, FOR SOLUTION INTRAVENOUS at 06:36

## 2018-12-15 RX ADMIN — CEFEPIME HYDROCHLORIDE 2 G: 2 INJECTION, POWDER, FOR SOLUTION INTRAVENOUS at 15:57

## 2018-12-15 RX ADMIN — PRAVASTATIN SODIUM 20 MG: 20 TABLET ORAL at 08:40

## 2018-12-15 RX ADMIN — INSULIN LISPRO 2 UNITS: 100 INJECTION, SOLUTION INTRAVENOUS; SUBCUTANEOUS at 08:42

## 2018-12-15 RX ADMIN — CIPROFLOXACIN 400 MG: 2 INJECTION, SOLUTION INTRAVENOUS at 14:41

## 2018-12-15 RX ADMIN — INSULIN LISPRO 2 UNITS: 100 INJECTION, SOLUTION INTRAVENOUS; SUBCUTANEOUS at 12:34

## 2018-12-15 RX ADMIN — ATENOLOL 25 MG: 25 TABLET ORAL at 08:40

## 2018-12-15 RX ADMIN — IPRATROPIUM BROMIDE AND ALBUTEROL SULFATE 3 ML: .5; 3 SOLUTION RESPIRATORY (INHALATION) at 11:15

## 2018-12-15 RX ADMIN — INSULIN LISPRO 2 UNITS: 100 INJECTION, SOLUTION INTRAVENOUS; SUBCUTANEOUS at 22:19

## 2018-12-15 RX ADMIN — ACETAMINOPHEN 1000 MG: 500 TABLET, FILM COATED ORAL at 23:36

## 2018-12-15 RX ADMIN — CIPROFLOXACIN 400 MG: 2 INJECTION, SOLUTION INTRAVENOUS at 05:24

## 2018-12-15 RX ADMIN — CIPROFLOXACIN 400 MG: 2 INJECTION, SOLUTION INTRAVENOUS at 22:18

## 2018-12-15 ASSESSMENT — PAIN SCALES - GENERAL
PAINLEVEL_OUTOF10: 3
PAINLEVEL_OUTOF10: 0
PAINLEVEL_OUTOF10: 2
PAINLEVEL_OUTOF10: 10
PAINLEVEL_OUTOF10: 0
PAINLEVEL_OUTOF10: 5

## 2018-12-15 ASSESSMENT — PAIN DESCRIPTION - PROGRESSION: CLINICAL_PROGRESSION: GRADUALLY IMPROVING

## 2018-12-15 ASSESSMENT — PAIN DESCRIPTION - LOCATION
LOCATION: GENERALIZED
LOCATION: GENERALIZED

## 2018-12-15 ASSESSMENT — PAIN DESCRIPTION - PAIN TYPE
TYPE: CHRONIC PAIN
TYPE: CHRONIC PAIN

## 2018-12-15 ASSESSMENT — PAIN DESCRIPTION - DESCRIPTORS: DESCRIPTORS: ACHING

## 2018-12-15 ASSESSMENT — PAIN DESCRIPTION - ORIENTATION: ORIENTATION: RIGHT;LEFT

## 2018-12-15 NOTE — PROGRESS NOTES
Nephrology Progress Note  12/15/2018 10:44 AM  Subjective:   Admit Date: 12/10/2018  PCP: Shazia Cao MD  Interval History: pt state doing ok and still drop o2 as moves around    Diet: DIET CARDIAC; Dietary Nutrition Supplements: Low Calorie High Protein Supplement  Pain is:Mild      Data:   Scheduled Meds:   torsemide  20 mg Oral Daily    cefepime  2 g Intravenous Q8H    ciprofloxacin  400 mg Intravenous Q8H    lidocaine PF  5 mL Intradermal Once    traZODone  100 mg Oral Nightly    atenolol  25 mg Oral BID    ipratropium-albuterol  1 vial Nebulization 4x Daily    pravastatin  20 mg Oral Daily    insulin lispro  0-6 Units Subcutaneous TID WC    insulin lispro  0-3 Units Subcutaneous Nightly    mometasone-formoterol  2 puff Inhalation BID    lidocaine PF  5 mL Intradermal Once    sodium chloride flush  10 mL Intravenous 2 times per day     Continuous Infusions:   dextrose       PRN Meds:sodium chloride flush, traMADol, sodium chloride flush, acetaminophen, albuterol sulfate HFA, magnesium hydroxide, ondansetron, glucose, dextrose, glucagon (rDNA), dextrose, sodium chloride flush, iohexol  I/O last 3 completed shifts: In: 120 [P.O.:120]  Out: -   I/O this shift: In: 480 [P.O.:480]  Out: -     Intake/Output Summary (Last 24 hours) at 12/15/18 1044  Last data filed at 12/15/18 0845   Gross per 24 hour   Intake              600 ml   Output                0 ml   Net              600 ml     CBC:   Recent Labs      12/13/18   1021  12/15/18   0525   WBC  7.1  7.3   HGB  7.7*  7.4*   PLT  188  231     BMP:  Recent Labs      12/13/18   0434  12/14/18   0545  12/15/18   0525   NA  136  136  138   K  3.8  4.0  3.9   CL  98*  98*  100   CO2  24  25  26   BUN  57*  52*  52*   CREATININE  1.4*  1.2*  1.1   GLUCOSE  152*  152*  150*     Hepatic: No results for input(s): AST, ALT, ALB, BILITOT, ALKPHOS in the last 72 hours. Troponin: No results for input(s): TROPONINI in the last 72 hours.   BNP: No results for

## 2018-12-15 NOTE — PLAN OF CARE
Problem: Pain:  Goal: Pain level will decrease  Pain level will decrease   Outcome: Ongoing    Goal: Control of acute pain  Control of acute pain   Outcome: Ongoing    Goal: Control of chronic pain  Control of chronic pain   Outcome: Ongoing      Problem: Falls - Risk of:  Goal: Will remain free from falls  Will remain free from falls   Outcome: Ongoing      Problem: Breathing Pattern - Ineffective:  Goal: Ability to achieve and maintain a regular respiratory rate will improve  Ability to achieve and maintain a regular respiratory rate will improve   Outcome: Ongoing

## 2018-12-15 NOTE — FLOWSHEET NOTE
Ambulated pt in hallway, pt became SOB with 02 sats dropping into the upper 80's on multiple occasions.

## 2018-12-16 ENCOUNTER — APPOINTMENT (OUTPATIENT)
Dept: CT IMAGING | Age: 63
DRG: 288 | End: 2018-12-16
Attending: HOSPITALIST
Payer: COMMERCIAL

## 2018-12-16 LAB
ALBUMIN SERPL-MCNC: 3.6 GM/DL (ref 3.4–5)
ANION GAP SERPL CALCULATED.3IONS-SCNC: 11 MMOL/L (ref 4–16)
BUN BLDV-MCNC: 50 MG/DL (ref 6–23)
CALCIUM SERPL-MCNC: 8.6 MG/DL (ref 8.3–10.6)
CHLORIDE BLD-SCNC: 100 MMOL/L (ref 99–110)
CO2: 26 MMOL/L (ref 21–32)
CREAT SERPL-MCNC: 1.2 MG/DL (ref 0.6–1.1)
GFR AFRICAN AMERICAN: 55 ML/MIN/1.73M2
GFR NON-AFRICAN AMERICAN: 46 ML/MIN/1.73M2
GLUCOSE BLD-MCNC: 139 MG/DL (ref 70–99)
GLUCOSE BLD-MCNC: 158 MG/DL (ref 70–99)
GLUCOSE BLD-MCNC: 182 MG/DL (ref 70–99)
GLUCOSE BLD-MCNC: 197 MG/DL (ref 70–99)
GLUCOSE BLD-MCNC: 301 MG/DL (ref 70–99)
PHOSPHORUS: 4 MG/DL (ref 2.5–4.9)
POTASSIUM SERPL-SCNC: 3.8 MMOL/L (ref 3.5–5.1)
SODIUM BLD-SCNC: 137 MMOL/L (ref 135–145)

## 2018-12-16 PROCEDURE — 80069 RENAL FUNCTION PANEL: CPT

## 2018-12-16 PROCEDURE — 6370000000 HC RX 637 (ALT 250 FOR IP): Performed by: INTERNAL MEDICINE

## 2018-12-16 PROCEDURE — 6370000000 HC RX 637 (ALT 250 FOR IP): Performed by: NURSE PRACTITIONER

## 2018-12-16 PROCEDURE — 82962 GLUCOSE BLOOD TEST: CPT

## 2018-12-16 PROCEDURE — 2580000003 HC RX 258: Performed by: INTERNAL MEDICINE

## 2018-12-16 PROCEDURE — 6370000000 HC RX 637 (ALT 250 FOR IP): Performed by: HOSPITALIST

## 2018-12-16 PROCEDURE — 6360000002 HC RX W HCPCS: Performed by: INTERNAL MEDICINE

## 2018-12-16 PROCEDURE — 94640 AIRWAY INHALATION TREATMENT: CPT

## 2018-12-16 PROCEDURE — 2140000000 HC CCU INTERMEDIATE R&B

## 2018-12-16 PROCEDURE — 94761 N-INVAS EAR/PLS OXIMETRY MLT: CPT

## 2018-12-16 PROCEDURE — 70460 CT HEAD/BRAIN W/DYE: CPT

## 2018-12-16 PROCEDURE — 2580000003 HC RX 258: Performed by: HOSPITALIST

## 2018-12-16 PROCEDURE — 6360000004 HC RX CONTRAST MEDICATION: Performed by: HOSPITALIST

## 2018-12-16 PROCEDURE — 2700000000 HC OXYGEN THERAPY PER DAY

## 2018-12-16 RX ORDER — LORAZEPAM 2 MG/ML
1 INJECTION INTRAMUSCULAR NIGHTLY PRN
Status: DISCONTINUED | OUTPATIENT
Start: 2018-12-16 | End: 2018-12-20 | Stop reason: HOSPADM

## 2018-12-16 RX ORDER — 0.9 % SODIUM CHLORIDE 0.9 %
10 VIAL (ML) INJECTION
Status: COMPLETED | OUTPATIENT
Start: 2018-12-16 | End: 2018-12-16

## 2018-12-16 RX ADMIN — Medication 2 PUFF: at 08:27

## 2018-12-16 RX ADMIN — CIPROFLOXACIN 400 MG: 2 INJECTION, SOLUTION INTRAVENOUS at 22:23

## 2018-12-16 RX ADMIN — IOPAMIDOL 80 ML: 755 INJECTION, SOLUTION INTRAVENOUS at 12:50

## 2018-12-16 RX ADMIN — INSULIN LISPRO 2 UNITS: 100 INJECTION, SOLUTION INTRAVENOUS; SUBCUTANEOUS at 22:24

## 2018-12-16 RX ADMIN — CEFEPIME HYDROCHLORIDE 2 G: 2 INJECTION, POWDER, FOR SOLUTION INTRAVENOUS at 15:57

## 2018-12-16 RX ADMIN — SODIUM CHLORIDE, PRESERVATIVE FREE 10 ML: 5 INJECTION INTRAVENOUS at 12:50

## 2018-12-16 RX ADMIN — Medication 2 PUFF: at 22:54

## 2018-12-16 RX ADMIN — IPRATROPIUM BROMIDE AND ALBUTEROL SULFATE 3 ML: .5; 3 SOLUTION RESPIRATORY (INHALATION) at 11:40

## 2018-12-16 RX ADMIN — ATENOLOL 25 MG: 25 TABLET ORAL at 08:25

## 2018-12-16 RX ADMIN — SODIUM CHLORIDE, PRESERVATIVE FREE 10 ML: 5 INJECTION INTRAVENOUS at 08:15

## 2018-12-16 RX ADMIN — INSULIN LISPRO 1 UNITS: 100 INJECTION, SOLUTION INTRAVENOUS; SUBCUTANEOUS at 17:58

## 2018-12-16 RX ADMIN — IPRATROPIUM BROMIDE AND ALBUTEROL SULFATE 3 ML: .5; 3 SOLUTION RESPIRATORY (INHALATION) at 08:27

## 2018-12-16 RX ADMIN — ACETAMINOPHEN 1000 MG: 500 TABLET, FILM COATED ORAL at 16:59

## 2018-12-16 RX ADMIN — CIPROFLOXACIN 400 MG: 2 INJECTION, SOLUTION INTRAVENOUS at 06:05

## 2018-12-16 RX ADMIN — TORSEMIDE 20 MG: 20 TABLET ORAL at 08:25

## 2018-12-16 RX ADMIN — IPRATROPIUM BROMIDE AND ALBUTEROL SULFATE 3 ML: .5; 3 SOLUTION RESPIRATORY (INHALATION) at 16:10

## 2018-12-16 RX ADMIN — TRAZODONE HYDROCHLORIDE 100 MG: 50 TABLET ORAL at 22:23

## 2018-12-16 RX ADMIN — GLIPIZIDE 10 MG: 10 TABLET ORAL at 06:04

## 2018-12-16 RX ADMIN — CEFEPIME HYDROCHLORIDE 2 G: 2 INJECTION, POWDER, FOR SOLUTION INTRAVENOUS at 06:04

## 2018-12-16 RX ADMIN — IPRATROPIUM BROMIDE AND ALBUTEROL SULFATE 3 ML: .5; 3 SOLUTION RESPIRATORY (INHALATION) at 22:53

## 2018-12-16 RX ADMIN — CEFEPIME HYDROCHLORIDE 2 G: 2 INJECTION, POWDER, FOR SOLUTION INTRAVENOUS at 22:23

## 2018-12-16 RX ADMIN — INSULIN LISPRO 1 UNITS: 100 INJECTION, SOLUTION INTRAVENOUS; SUBCUTANEOUS at 08:25

## 2018-12-16 RX ADMIN — CIPROFLOXACIN 400 MG: 2 INJECTION, SOLUTION INTRAVENOUS at 14:02

## 2018-12-16 RX ADMIN — PRAVASTATIN SODIUM 20 MG: 20 TABLET ORAL at 08:25

## 2018-12-16 ASSESSMENT — PAIN DESCRIPTION - PROGRESSION

## 2018-12-16 ASSESSMENT — PAIN SCALES - GENERAL: PAINLEVEL_OUTOF10: 10

## 2018-12-16 NOTE — PROGRESS NOTES
Hospitalist Progress Note      Name:  Stanford Russ /Age/Sex: 1955  (65 y.o. female)   MRN & CSN:  [id number] & [id number] Admission Date/Time: 12/10/2018  3:15 AM   Location:  [address] [place] [address] PCP: Claudia Hansen MD         Hospital Day: 7    Assessment and Plan:   Stanford Russ is a 59 y.o.  female  who presents with SOB    Acute on chronic respiratory failure with hypoxia  HCAP secondary to pseudomonas   Gram negative bacteremia  Evaluated by ID and pulmonology  BCx+ve for pseudomonas sens to all  CT chest reviewed ?pneumonitis vs pulm edema  Repeat BCx negative  Now on cefepime and cipro  Discussed with ID, she will need total of 6 weeks of antibiotics  PICC line placed, she wants to go to SNF for IV antbiotics    Recent Infectious endocarditis with severe AR, MR, TR and VT  Cardiology and CT surgery following  Patient is high risk for surgical intervention also infection has to be treated first.  She has wide pulse pressure and is symptomatic. Follow up with CT surgery as outpatient after infection is treated    STORM on CKD  Nephrology following  Creatinine improving  Nephrology agrees with PICC    Acute on chronic anemia  Likely anemia of chronic disease secondary to infection and CKD  Transfused 1 unit for Hgb 6.1  Occult blood in stool test is negative  Hgb 7.7  Consult oncology, will need further work up as outpatient including possible BM biopsy  Hold lovenox    Diet DIET CARDIAC; Dietary Nutrition Supplements: Low Calorie High Protein Supplement   DVT Prophylaxis [] Lovenox, []  Heparin, [x] SCDs, []No VTE prophylaxis, patient ambulating   GI Prophylaxis [] PPI, [] H2 Blocker, [x] No GI prophylaxis, patient is receiving diet/Tube Feeds   Code Status Full Code             History of Present Illness:     Pt S&E. Denies dizziness, SOB at baseline. Awaiting placement. Ten point ROS reviewed negative, unless as noted above    Objective:        Intake/Output Summary (Last 24 hours) at 18 PRN         Electronically signed by Salomon Banuelos MD on 12/16/2018 at 2:55 PM

## 2018-12-17 ENCOUNTER — APPOINTMENT (OUTPATIENT)
Dept: GENERAL RADIOLOGY | Age: 63
DRG: 288 | End: 2018-12-17
Attending: HOSPITALIST
Payer: COMMERCIAL

## 2018-12-17 LAB
ALBUMIN SERPL-MCNC: 3.6 GM/DL (ref 3.4–5)
ANION GAP SERPL CALCULATED.3IONS-SCNC: 11 MMOL/L (ref 4–16)
BUN BLDV-MCNC: 48 MG/DL (ref 6–23)
CALCIUM SERPL-MCNC: 8.6 MG/DL (ref 8.3–10.6)
CHLORIDE BLD-SCNC: 101 MMOL/L (ref 99–110)
CO2: 26 MMOL/L (ref 21–32)
CREAT SERPL-MCNC: 1.3 MG/DL (ref 0.6–1.1)
FERRITIN: 284 NG/ML (ref 15–150)
GFR AFRICAN AMERICAN: 50 ML/MIN/1.73M2
GFR NON-AFRICAN AMERICAN: 42 ML/MIN/1.73M2
GLUCOSE BLD-MCNC: 151 MG/DL (ref 70–99)
GLUCOSE BLD-MCNC: 156 MG/DL (ref 70–99)
GLUCOSE BLD-MCNC: 207 MG/DL (ref 70–99)
GLUCOSE BLD-MCNC: 212 MG/DL (ref 70–99)
GLUCOSE BLD-MCNC: 213 MG/DL (ref 70–99)
HCT VFR BLD CALC: 24.8 % (ref 37–47)
HEMOGLOBIN: 7.5 GM/DL (ref 12.5–16)
IRON: 47 UG/DL (ref 37–145)
PCT TRANSFERRIN: 18 % (ref 10–44)
PHOSPHORUS: 3.6 MG/DL (ref 2.5–4.9)
POTASSIUM SERPL-SCNC: 3.7 MMOL/L (ref 3.5–5.1)
SODIUM BLD-SCNC: 138 MMOL/L (ref 135–145)
TOTAL IRON BINDING CAPACITY: 266 UG/DL (ref 250–450)
UNSATURATED IRON BINDING CAPACITY: 219 UG/DL (ref 110–370)

## 2018-12-17 PROCEDURE — 6370000000 HC RX 637 (ALT 250 FOR IP): Performed by: INTERNAL MEDICINE

## 2018-12-17 PROCEDURE — 80069 RENAL FUNCTION PANEL: CPT

## 2018-12-17 PROCEDURE — 82728 ASSAY OF FERRITIN: CPT

## 2018-12-17 PROCEDURE — 6370000000 HC RX 637 (ALT 250 FOR IP): Performed by: HOSPITALIST

## 2018-12-17 PROCEDURE — 6360000002 HC RX W HCPCS: Performed by: HOSPITALIST

## 2018-12-17 PROCEDURE — 83550 IRON BINDING TEST: CPT

## 2018-12-17 PROCEDURE — 6370000000 HC RX 637 (ALT 250 FOR IP): Performed by: NURSE PRACTITIONER

## 2018-12-17 PROCEDURE — 2580000003 HC RX 258: Performed by: INTERNAL MEDICINE

## 2018-12-17 PROCEDURE — 94640 AIRWAY INHALATION TREATMENT: CPT

## 2018-12-17 PROCEDURE — 97530 THERAPEUTIC ACTIVITIES: CPT

## 2018-12-17 PROCEDURE — G8988 SELF CARE GOAL STATUS: HCPCS

## 2018-12-17 PROCEDURE — 6360000002 HC RX W HCPCS: Performed by: INTERNAL MEDICINE

## 2018-12-17 PROCEDURE — G8979 MOBILITY GOAL STATUS: HCPCS

## 2018-12-17 PROCEDURE — 83540 ASSAY OF IRON: CPT

## 2018-12-17 PROCEDURE — 99232 SBSQ HOSP IP/OBS MODERATE 35: CPT | Performed by: INTERNAL MEDICINE

## 2018-12-17 PROCEDURE — 97162 PT EVAL MOD COMPLEX 30 MIN: CPT

## 2018-12-17 PROCEDURE — G8978 MOBILITY CURRENT STATUS: HCPCS

## 2018-12-17 PROCEDURE — 85018 HEMOGLOBIN: CPT

## 2018-12-17 PROCEDURE — 94761 N-INVAS EAR/PLS OXIMETRY MLT: CPT

## 2018-12-17 PROCEDURE — 82962 GLUCOSE BLOOD TEST: CPT

## 2018-12-17 PROCEDURE — 97116 GAIT TRAINING THERAPY: CPT

## 2018-12-17 PROCEDURE — 2140000000 HC CCU INTERMEDIATE R&B

## 2018-12-17 PROCEDURE — G0328 FECAL BLOOD SCRN IMMUNOASSAY: HCPCS

## 2018-12-17 PROCEDURE — G8987 SELF CARE CURRENT STATUS: HCPCS

## 2018-12-17 PROCEDURE — 36591 DRAW BLOOD OFF VENOUS DEVICE: CPT

## 2018-12-17 PROCEDURE — 71046 X-RAY EXAM CHEST 2 VIEWS: CPT

## 2018-12-17 PROCEDURE — 2700000000 HC OXYGEN THERAPY PER DAY

## 2018-12-17 PROCEDURE — 97165 OT EVAL LOW COMPLEX 30 MIN: CPT

## 2018-12-17 PROCEDURE — 85014 HEMATOCRIT: CPT

## 2018-12-17 RX ORDER — SPIRONOLACTONE 25 MG/1
25 TABLET ORAL DAILY
Status: DISCONTINUED | OUTPATIENT
Start: 2018-12-17 | End: 2018-12-20 | Stop reason: HOSPADM

## 2018-12-17 RX ORDER — FUROSEMIDE 10 MG/ML
40 INJECTION INTRAMUSCULAR; INTRAVENOUS ONCE
Status: COMPLETED | OUTPATIENT
Start: 2018-12-17 | End: 2018-12-17

## 2018-12-17 RX ORDER — TORSEMIDE 20 MG/1
20 TABLET ORAL 2 TIMES DAILY
Status: DISCONTINUED | OUTPATIENT
Start: 2018-12-17 | End: 2018-12-20 | Stop reason: HOSPADM

## 2018-12-17 RX ADMIN — Medication 2 PUFF: at 07:50

## 2018-12-17 RX ADMIN — SPIRONOLACTONE 25 MG: 25 TABLET ORAL at 14:06

## 2018-12-17 RX ADMIN — CEFEPIME HYDROCHLORIDE 2 G: 2 INJECTION, POWDER, FOR SOLUTION INTRAVENOUS at 13:13

## 2018-12-17 RX ADMIN — FUROSEMIDE 40 MG: 10 INJECTION, SOLUTION INTRAMUSCULAR; INTRAVENOUS at 14:07

## 2018-12-17 RX ADMIN — PRAVASTATIN SODIUM 20 MG: 20 TABLET ORAL at 09:15

## 2018-12-17 RX ADMIN — ATENOLOL 25 MG: 25 TABLET ORAL at 21:33

## 2018-12-17 RX ADMIN — CIPROFLOXACIN HYDROCHLORIDE 750 MG: 500 TABLET, FILM COATED ORAL at 21:33

## 2018-12-17 RX ADMIN — CIPROFLOXACIN 400 MG: 2 INJECTION, SOLUTION INTRAVENOUS at 14:07

## 2018-12-17 RX ADMIN — INSULIN LISPRO 2 UNITS: 100 INJECTION, SOLUTION INTRAVENOUS; SUBCUTANEOUS at 09:12

## 2018-12-17 RX ADMIN — ATENOLOL 25 MG: 25 TABLET ORAL at 09:15

## 2018-12-17 RX ADMIN — INSULIN LISPRO 1 UNITS: 100 INJECTION, SOLUTION INTRAVENOUS; SUBCUTANEOUS at 17:13

## 2018-12-17 RX ADMIN — SODIUM CHLORIDE, PRESERVATIVE FREE 10 ML: 5 INJECTION INTRAVENOUS at 09:15

## 2018-12-17 RX ADMIN — CIPROFLOXACIN 400 MG: 2 INJECTION, SOLUTION INTRAVENOUS at 06:22

## 2018-12-17 RX ADMIN — TRAZODONE HYDROCHLORIDE 100 MG: 50 TABLET ORAL at 22:33

## 2018-12-17 RX ADMIN — IPRATROPIUM BROMIDE AND ALBUTEROL SULFATE 3 ML: .5; 3 SOLUTION RESPIRATORY (INHALATION) at 22:33

## 2018-12-17 RX ADMIN — INSULIN LISPRO 2 UNITS: 100 INJECTION, SOLUTION INTRAVENOUS; SUBCUTANEOUS at 12:07

## 2018-12-17 RX ADMIN — SODIUM CHLORIDE, PRESERVATIVE FREE 10 ML: 5 INJECTION INTRAVENOUS at 21:34

## 2018-12-17 RX ADMIN — TORSEMIDE 20 MG: 20 TABLET ORAL at 09:15

## 2018-12-17 RX ADMIN — Medication 2 PUFF: at 22:33

## 2018-12-17 RX ADMIN — INSULIN LISPRO 1 UNITS: 100 INJECTION, SOLUTION INTRAVENOUS; SUBCUTANEOUS at 21:39

## 2018-12-17 RX ADMIN — TORSEMIDE 20 MG: 20 TABLET ORAL at 21:33

## 2018-12-17 RX ADMIN — CEFEPIME HYDROCHLORIDE 2 G: 2 INJECTION, POWDER, FOR SOLUTION INTRAVENOUS at 06:22

## 2018-12-17 RX ADMIN — GLIPIZIDE 10 MG: 10 TABLET ORAL at 09:15

## 2018-12-17 RX ADMIN — IPRATROPIUM BROMIDE AND ALBUTEROL SULFATE 3 ML: .5; 3 SOLUTION RESPIRATORY (INHALATION) at 07:48

## 2018-12-17 RX ADMIN — IPRATROPIUM BROMIDE AND ALBUTEROL SULFATE 3 ML: .5; 3 SOLUTION RESPIRATORY (INHALATION) at 11:57

## 2018-12-17 RX ADMIN — IPRATROPIUM BROMIDE AND ALBUTEROL SULFATE 3 ML: .5; 3 SOLUTION RESPIRATORY (INHALATION) at 15:56

## 2018-12-17 RX ADMIN — ACETAMINOPHEN 1000 MG: 500 TABLET, FILM COATED ORAL at 21:33

## 2018-12-17 RX ADMIN — CEFEPIME HYDROCHLORIDE 2 G: 2 INJECTION, POWDER, FOR SOLUTION INTRAVENOUS at 21:33

## 2018-12-17 ASSESSMENT — PAIN DESCRIPTION - PROGRESSION
CLINICAL_PROGRESSION: GRADUALLY IMPROVING

## 2018-12-17 ASSESSMENT — PAIN SCALES - GENERAL
PAINLEVEL_OUTOF10: 4
PAINLEVEL_OUTOF10: 0
PAINLEVEL_OUTOF10: 6
PAINLEVEL_OUTOF10: 0

## 2018-12-17 ASSESSMENT — PAIN DESCRIPTION - LOCATION
LOCATION: GENERALIZED
LOCATION: GENERALIZED

## 2018-12-17 ASSESSMENT — PAIN DESCRIPTION - PAIN TYPE
TYPE: CHRONIC PAIN
TYPE: CHRONIC PAIN

## 2018-12-17 ASSESSMENT — PAIN DESCRIPTION - DESCRIPTORS
DESCRIPTORS: ACHING
DESCRIPTORS: ACHING

## 2018-12-17 NOTE — PROGRESS NOTES
Hospitalist Progress Note      Name:  Skyler Rodriguez /Age/Sex: 1955  (65 y.o. female)   MRN & CSN:  [id number] & [id number] Admission Date/Time: 12/10/2018  3:15 AM   Location:  [address] [place] [address] PCP: Lg Pritchett MD         Hospital Day: 8    Assessment and Plan:   Skyler Rodriguez is a 59 y.o.  female  who presents with SOB    Acute on chronic respiratory failure with hypoxia  HCAP secondary to pseudomonas   Gram negative bacteremia  Evaluated by ID and pulmonology  BCx+ve for pseudomonas sens to all  CT chest reviewed ?pneumonitis vs pulm edema  Repeat BCx negative  Now on cefepime and cipro  Discussed with ID, she will need total of 6 weeks of antibiotics  PICC line placed, she wants to go to SNF for IV antbiotics    Recent Infectious endocarditis with severe AR, MR, TR and MS  Cardiology and CT surgery following  Patient is high risk for surgical intervention also infection has to be treated first.  She has wide pulse pressure and is symptomatic. Follow up with CT surgery as outpatient after infection is treated  CT brain unremarkable    STORM on CKD  Nephrology following  Creatinine improving  Nephrology agrees with PICC    Acute on chronic anemia  Likely anemia of chronic disease secondary to infection and CKD  Transfused 1 unit for Hgb 6.1  Occult blood in stool test is negative  Hgb 7.5  Consult oncology, will need further work up as outpatient including possible BM biopsy  Hold lovenox    Diet DIET CARDIAC; Dietary Nutrition Supplements: Low Calorie High Protein Supplement   DVT Prophylaxis [] Lovenox, []  Heparin, [x] SCDs, []No VTE prophylaxis, patient ambulating   GI Prophylaxis [] PPI, [] H2 Blocker, [x] No GI prophylaxis, patient is receiving diet/Tube Feeds   Code Status Full Code             History of Present Illness:       Pt S&E. Complaining of SOB this am, CXR shows some worsening congestion, LEs are more swollen today, Cr slightly increased.     Ten point ROS reviewed negative, unless as

## 2018-12-17 NOTE — PROGRESS NOTES
Nephrology Progress Note  12/17/2018 10:02 AM        Subjective:   Admit Date: 12/10/2018  PCP: Deysi Diaz MD    Interval History: some SANTOYO    Diet: some    ROS:  Wt gain / Giancarlo Bah,     Data:     Current meds:    glipiZIDE  10 mg Oral QAM AC    torsemide  20 mg Oral Daily    cefepime  2 g Intravenous Q8H    ciprofloxacin  400 mg Intravenous Q8H    lidocaine PF  5 mL Intradermal Once    traZODone  100 mg Oral Nightly    atenolol  25 mg Oral BID    ipratropium-albuterol  1 vial Nebulization 4x Daily    pravastatin  20 mg Oral Daily    insulin lispro  0-6 Units Subcutaneous TID WC    insulin lispro  0-3 Units Subcutaneous Nightly    mometasone-formoterol  2 puff Inhalation BID    lidocaine PF  5 mL Intradermal Once    sodium chloride flush  10 mL Intravenous 2 times per day      dextrose           I/O last 3 completed shifts: In: 10 [I.V.:10]  Out: 1100 [Urine:1100]    CBC:   Recent Labs      12/15/18   0525   WBC  7.3   HGB  7.4*   PLT  231          Recent Labs      12/15/18   0525  12/16/18   0614  12/17/18   0415   NA  138  137  138   K  3.9  3.8  3.7   CL  100  100  101   CO2  26  26  26   BUN  52*  50*  48*   CREATININE  1.1  1.2*  1.3*   GLUCOSE  150*  182*  156*       Lab Results   Component Value Date    CALCIUM 8.6 12/17/2018    PHOS 3.6 12/17/2018       Objective:     Vitals: BP (!) 153/45   Pulse 89   Temp 97.8 °F (36.6 °C) (Oral)   Resp 25   Ht 5' 6\" (1.676 m)   Wt 262 lb 12.6 oz (119.2 kg)   SpO2 92%   BMI 42.42 kg/m²     General appearance:  No acute distress  HEENT:  +pallor  Neck:  supple  Lungs:  + adv Bs  Heart:  irregular  Abdomen: soft  Extremities:  ++ edema LE       Problem List :         Impression :     1. STORM/CKD stage 3 - her cr may go up with diuretics- acceptable   2. Fluid overload mainly RHF from cor pulmonale / valvular dz  3. endocarditis- multiple valves   4. DM    Recommendation/Plan  :     1. increase torsemide 20 BID and add aldactone 25/d   2. Low salt  3.  Good

## 2018-12-17 NOTE — PROGRESS NOTES
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 Repeat blood cultures were negative at   § 1 of 2 blood cultures pseudomonas aeruginosa   § Blood Cultures 12/10 NGTD  § Some personality changes over the weekend- short temper but that has resolved  § Bad dentition and recent admission makes the mouth a possible portal of entry  § PCT 1.58>1.68>1.66>1.39>0.53  · Hyperglycemia  ? Hemoglobin A1c 5% (12/10/18) indicates in controlled diabetes. Hyperglycemia may be secondary to dextrose infusion, ciprofloxacin, stress. 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 Resolved  · Anemia of chronic disease  ? Being managed by  hematology, s/p 1 unit of PRBC  · Multi-morbidity: per PMHx DAVID, Obesity, Pulmonary HTN, Carotid stenosis  Plan:  ? Continue cefepime IV 2g q 8h and Ciprofloxacin 750 mg po q 12 h tentative end date 19 (6 weeks)  ? Discontinue intravenous ciprofloxacin and commence ciprofloxacin tablets  ? Cipro should not be taken with multivitamins. Weekly labs drawn on Monday during the course of treatment  CBC, CMP, ESR, CRP  Fax results to Attn: [facility]  ? # [phone]  · Both drugs can lead to CNS side effects: Seizures, confusion. Monitor closely. Also watch for muscle pain as there is a risk for tendinopathy. Patient educated  ? Serial monitoring of procalcitonin  ? Follow-up blood culture         Ongoing Antimicrobial Therapy  Cefepime 12/10  Ciprofloxacin   ? Completed Antimicrobial Therapy  Vancomycin 12/10  Ceftriaxone 12/10   Piperacillin-tazobactam 12/10  Tobramycin -? History:? Interval history noted  Feels better.   Denies n/v/d/f or untoward effects of antibiotics  Physical

## 2018-12-17 NOTE — PROGRESS NOTES
bed mobility mod I. Short term goal 2: Patient will ambulate 100ft without rest break while maintaining O2 at 90 or above mod I with rollator.         Therapy Time   Individual Concurrent Group Co-treatment   Time In 1333         Time Out 1357         Minutes 24         Timed Code Treatment Minutes: 100 [facility] [place], PT

## 2018-12-17 NOTE — PROGRESS NOTES
level IADLs, ad is Jennifer w/ functional mobility/transfer w/ [id number]. Pt currently presents w/ above deficits and would benefit from continued acute care OT therapy w/ discharge to swing bed w/ OT due to pt's recent hospitalization and re-hospitalization  Patient Education: ot role, discharge rec, proper hand placement for safe functional transfer, pursed lip breathing   Barriers to Learning: none  REQUIRES OT FOLLOW UP: Yes  Activity Tolerance  Activity Tolerance: Treatment limited secondary to medical complications (free text)  Activity Tolerance: pt w/ decreased 02 saturation, w/ pursed lip breathing intervention increased 02 sauration  Safety Devices  Safety Devices in place: Yes  Type of devices: All fall risk precautions in place;Nurse notified;Gait belt;Call light within reach; Chair alarm in place; Left in chair;Patient at risk for falls (daughter in room at end of session)  Restraints  Initially in place: No         Plan   Plan  Times per week: 2x+  Times per day: Daily  Current Treatment Recommendations: Endurance Training, Neuromuscular Re-education, Patient/Caregiver Education & Training, Equipment Evaluation, Education, & procurement, Self-Care / ADL, Balance Training, Gait Training, Pain Management, Home Management Training, Functional Mobility Training, Stair training, Safety Education & Training, Positioning    G-Code  OT G-codes  Functional Limitation: Self care  Self Care Current Status (): At least 20 percent but less than 40 percent impaired, limited or restricted  Self Care Goal Status ():  At least 1 percent but less than 20 percent impaired, limited or restricted                                             AM-PAC Score        AM-PAC Inpatient Daily Activity Raw Score: 20  AM-PAC Inpatient ADL T-Scale Score : 42.03  ADL Inpatient CMS 0-100% Score: 38.32  ADL Inpatient CMS G-Code Modifier : CJ    Goals  Short term goals  Time Frame for Short term goals: discharge  Short term goal 1: Pt will

## 2018-12-18 PROBLEM — I33.0 ACUTE AND SUBACUTE BACTERIAL ENDOCARDITIS: Status: ACTIVE | Noted: 2018-12-18

## 2018-12-18 LAB
ANION GAP SERPL CALCULATED.3IONS-SCNC: 12 MMOL/L (ref 4–16)
BUN BLDV-MCNC: 49 MG/DL (ref 6–23)
CALCIUM SERPL-MCNC: 8.7 MG/DL (ref 8.3–10.6)
CHLORIDE BLD-SCNC: 100 MMOL/L (ref 99–110)
CO2: 27 MMOL/L (ref 21–32)
CREAT SERPL-MCNC: 1.4 MG/DL (ref 0.6–1.1)
GFR AFRICAN AMERICAN: 46 ML/MIN/1.73M2
GFR NON-AFRICAN AMERICAN: 38 ML/MIN/1.73M2
GLUCOSE BLD-MCNC: 148 MG/DL (ref 70–99)
GLUCOSE BLD-MCNC: 153 MG/DL (ref 70–99)
GLUCOSE BLD-MCNC: 201 MG/DL (ref 70–99)
GLUCOSE BLD-MCNC: 234 MG/DL (ref 70–99)
GLUCOSE BLD-MCNC: 269 MG/DL (ref 70–99)
HEMOCCULT SP1 STL QL: NEGATIVE
MAGNESIUM: 1.7 MG/DL (ref 1.8–2.4)
POTASSIUM SERPL-SCNC: 3.8 MMOL/L (ref 3.5–5.1)
SODIUM BLD-SCNC: 139 MMOL/L (ref 135–145)

## 2018-12-18 PROCEDURE — 83735 ASSAY OF MAGNESIUM: CPT

## 2018-12-18 PROCEDURE — 6370000000 HC RX 637 (ALT 250 FOR IP): Performed by: HOSPITALIST

## 2018-12-18 PROCEDURE — 6370000000 HC RX 637 (ALT 250 FOR IP): Performed by: INTERNAL MEDICINE

## 2018-12-18 PROCEDURE — 2580000003 HC RX 258: Performed by: INTERNAL MEDICINE

## 2018-12-18 PROCEDURE — 94761 N-INVAS EAR/PLS OXIMETRY MLT: CPT

## 2018-12-18 PROCEDURE — 82962 GLUCOSE BLOOD TEST: CPT

## 2018-12-18 PROCEDURE — 6360000002 HC RX W HCPCS: Performed by: INTERNAL MEDICINE

## 2018-12-18 PROCEDURE — 2140000000 HC CCU INTERMEDIATE R&B

## 2018-12-18 PROCEDURE — 2700000000 HC OXYGEN THERAPY PER DAY

## 2018-12-18 PROCEDURE — 94640 AIRWAY INHALATION TREATMENT: CPT

## 2018-12-18 PROCEDURE — 99232 SBSQ HOSP IP/OBS MODERATE 35: CPT | Performed by: INTERNAL MEDICINE

## 2018-12-18 PROCEDURE — 80048 BASIC METABOLIC PNL TOTAL CA: CPT

## 2018-12-18 PROCEDURE — 6370000000 HC RX 637 (ALT 250 FOR IP): Performed by: NURSE PRACTITIONER

## 2018-12-18 RX ORDER — MAGNESIUM SULFATE 4 G/50ML
4 INJECTION INTRAVENOUS ONCE
Status: COMPLETED | OUTPATIENT
Start: 2018-12-18 | End: 2018-12-18

## 2018-12-18 RX ADMIN — TORSEMIDE 20 MG: 20 TABLET ORAL at 21:22

## 2018-12-18 RX ADMIN — CIPROFLOXACIN HYDROCHLORIDE 750 MG: 500 TABLET, FILM COATED ORAL at 21:20

## 2018-12-18 RX ADMIN — SODIUM CHLORIDE, PRESERVATIVE FREE 10 ML: 5 INJECTION INTRAVENOUS at 08:23

## 2018-12-18 RX ADMIN — SODIUM CHLORIDE, PRESERVATIVE FREE 10 ML: 5 INJECTION INTRAVENOUS at 21:22

## 2018-12-18 RX ADMIN — Medication 2 PUFF: at 08:02

## 2018-12-18 RX ADMIN — CIPROFLOXACIN HYDROCHLORIDE 750 MG: 500 TABLET, FILM COATED ORAL at 12:24

## 2018-12-18 RX ADMIN — INSULIN LISPRO 1 UNITS: 100 INJECTION, SOLUTION INTRAVENOUS; SUBCUTANEOUS at 21:22

## 2018-12-18 RX ADMIN — TORSEMIDE 20 MG: 20 TABLET ORAL at 08:23

## 2018-12-18 RX ADMIN — IPRATROPIUM BROMIDE AND ALBUTEROL SULFATE 3 ML: .5; 3 SOLUTION RESPIRATORY (INHALATION) at 11:35

## 2018-12-18 RX ADMIN — INSULIN LISPRO 1 UNITS: 100 INJECTION, SOLUTION INTRAVENOUS; SUBCUTANEOUS at 17:54

## 2018-12-18 RX ADMIN — Medication 2 PUFF: at 21:07

## 2018-12-18 RX ADMIN — ACETAMINOPHEN 1000 MG: 500 TABLET, FILM COATED ORAL at 04:28

## 2018-12-18 RX ADMIN — ATENOLOL 25 MG: 25 TABLET ORAL at 08:23

## 2018-12-18 RX ADMIN — PRAVASTATIN SODIUM 20 MG: 20 TABLET ORAL at 08:23

## 2018-12-18 RX ADMIN — ATENOLOL 25 MG: 25 TABLET ORAL at 21:20

## 2018-12-18 RX ADMIN — ACETAMINOPHEN 1000 MG: 500 TABLET, FILM COATED ORAL at 22:54

## 2018-12-18 RX ADMIN — TRAZODONE HYDROCHLORIDE 100 MG: 50 TABLET ORAL at 22:54

## 2018-12-18 RX ADMIN — CEFEPIME HYDROCHLORIDE 2 G: 2 INJECTION, POWDER, FOR SOLUTION INTRAVENOUS at 06:08

## 2018-12-18 RX ADMIN — IPRATROPIUM BROMIDE AND ALBUTEROL SULFATE 3 ML: .5; 3 SOLUTION RESPIRATORY (INHALATION) at 21:07

## 2018-12-18 RX ADMIN — CEFEPIME HYDROCHLORIDE 2 G: 2 INJECTION, POWDER, FOR SOLUTION INTRAVENOUS at 17:44

## 2018-12-18 RX ADMIN — IPRATROPIUM BROMIDE AND ALBUTEROL SULFATE 3 ML: .5; 3 SOLUTION RESPIRATORY (INHALATION) at 15:32

## 2018-12-18 RX ADMIN — MAGNESIUM SULFATE IN WATER 4 G: 80 INJECTION, SOLUTION INTRAVENOUS at 12:30

## 2018-12-18 RX ADMIN — SPIRONOLACTONE 25 MG: 25 TABLET ORAL at 08:23

## 2018-12-18 RX ADMIN — GLIPIZIDE 10 MG: 10 TABLET ORAL at 07:10

## 2018-12-18 RX ADMIN — INSULIN LISPRO 2 UNITS: 100 INJECTION, SOLUTION INTRAVENOUS; SUBCUTANEOUS at 08:29

## 2018-12-18 RX ADMIN — IPRATROPIUM BROMIDE AND ALBUTEROL SULFATE 3 ML: .5; 3 SOLUTION RESPIRATORY (INHALATION) at 08:02

## 2018-12-18 RX ADMIN — INSULIN LISPRO 3 UNITS: 100 INJECTION, SOLUTION INTRAVENOUS; SUBCUTANEOUS at 12:24

## 2018-12-18 ASSESSMENT — PAIN DESCRIPTION - PAIN TYPE
TYPE: CHRONIC PAIN

## 2018-12-18 ASSESSMENT — PAIN DESCRIPTION - DESCRIPTORS
DESCRIPTORS: ACHING

## 2018-12-18 ASSESSMENT — PAIN DESCRIPTION - LOCATION
LOCATION: GENERALIZED

## 2018-12-18 ASSESSMENT — PAIN SCALES - GENERAL
PAINLEVEL_OUTOF10: 0
PAINLEVEL_OUTOF10: 8
PAINLEVEL_OUTOF10: 0
PAINLEVEL_OUTOF10: 0
PAINLEVEL_OUTOF10: 6
PAINLEVEL_OUTOF10: 7

## 2018-12-18 NOTE — PROGRESS NOTES
magnesium hydroxide 30 mL Daily PRN   ondansetron 4 mg Q6H PRN   glucose 15 g PRN   dextrose 12.5 g PRN   glucagon (rDNA) 1 mg PRN   dextrose 100 mL/hr PRN   sodium chloride flush 10 mL PRN   iohexol 50 mL ONCE PRN         Electronically signed by Black Daniels MD on 12/18/2018 at 3:27 PM    Rounding Hospitalist

## 2018-12-19 LAB
ANION GAP SERPL CALCULATED.3IONS-SCNC: 12 MMOL/L (ref 4–16)
BUN BLDV-MCNC: 48 MG/DL (ref 6–23)
CALCIUM SERPL-MCNC: 8.6 MG/DL (ref 8.3–10.6)
CHLORIDE BLD-SCNC: 101 MMOL/L (ref 99–110)
CO2: 28 MMOL/L (ref 21–32)
CREAT SERPL-MCNC: 1.3 MG/DL (ref 0.6–1.1)
GFR AFRICAN AMERICAN: 50 ML/MIN/1.73M2
GFR NON-AFRICAN AMERICAN: 42 ML/MIN/1.73M2
GLUCOSE BLD-MCNC: 118 MG/DL (ref 70–99)
GLUCOSE BLD-MCNC: 177 MG/DL (ref 70–99)
GLUCOSE BLD-MCNC: 215 MG/DL (ref 70–99)
GLUCOSE BLD-MCNC: 215 MG/DL (ref 70–99)
GLUCOSE BLD-MCNC: 241 MG/DL (ref 70–99)
HCT VFR BLD CALC: 24.8 % (ref 37–47)
HEMOGLOBIN: 7.6 GM/DL (ref 12.5–16)
POTASSIUM SERPL-SCNC: 4 MMOL/L (ref 3.5–5.1)
SODIUM BLD-SCNC: 141 MMOL/L (ref 135–145)

## 2018-12-19 PROCEDURE — 94761 N-INVAS EAR/PLS OXIMETRY MLT: CPT

## 2018-12-19 PROCEDURE — 6370000000 HC RX 637 (ALT 250 FOR IP): Performed by: HOSPITALIST

## 2018-12-19 PROCEDURE — 6370000000 HC RX 637 (ALT 250 FOR IP): Performed by: NURSE PRACTITIONER

## 2018-12-19 PROCEDURE — 6360000002 HC RX W HCPCS: Performed by: INTERNAL MEDICINE

## 2018-12-19 PROCEDURE — 85018 HEMOGLOBIN: CPT

## 2018-12-19 PROCEDURE — 94640 AIRWAY INHALATION TREATMENT: CPT

## 2018-12-19 PROCEDURE — 2700000000 HC OXYGEN THERAPY PER DAY

## 2018-12-19 PROCEDURE — 2580000003 HC RX 258: Performed by: INTERNAL MEDICINE

## 2018-12-19 PROCEDURE — 80048 BASIC METABOLIC PNL TOTAL CA: CPT

## 2018-12-19 PROCEDURE — 85014 HEMATOCRIT: CPT

## 2018-12-19 PROCEDURE — 99232 SBSQ HOSP IP/OBS MODERATE 35: CPT | Performed by: INTERNAL MEDICINE

## 2018-12-19 PROCEDURE — 6370000000 HC RX 637 (ALT 250 FOR IP): Performed by: INTERNAL MEDICINE

## 2018-12-19 PROCEDURE — 2140000000 HC CCU INTERMEDIATE R&B

## 2018-12-19 PROCEDURE — G0328 FECAL BLOOD SCRN IMMUNOASSAY: HCPCS

## 2018-12-19 PROCEDURE — 82962 GLUCOSE BLOOD TEST: CPT

## 2018-12-19 RX ORDER — LACTOBACILLUS RHAMNOSUS GG 10B CELL
1 CAPSULE ORAL
Status: DISCONTINUED | OUTPATIENT
Start: 2018-12-20 | End: 2018-12-20 | Stop reason: HOSPADM

## 2018-12-19 RX ADMIN — CEFEPIME HYDROCHLORIDE 2 G: 2 INJECTION, POWDER, FOR SOLUTION INTRAVENOUS at 18:29

## 2018-12-19 RX ADMIN — TORSEMIDE 20 MG: 20 TABLET ORAL at 21:03

## 2018-12-19 RX ADMIN — ACETAMINOPHEN 1000 MG: 500 TABLET, FILM COATED ORAL at 23:39

## 2018-12-19 RX ADMIN — Medication 2 PUFF: at 07:30

## 2018-12-19 RX ADMIN — CEFEPIME HYDROCHLORIDE 2 G: 2 INJECTION, POWDER, FOR SOLUTION INTRAVENOUS at 06:00

## 2018-12-19 RX ADMIN — PRAVASTATIN SODIUM 20 MG: 20 TABLET ORAL at 09:47

## 2018-12-19 RX ADMIN — INSULIN LISPRO 2 UNITS: 100 INJECTION, SOLUTION INTRAVENOUS; SUBCUTANEOUS at 09:47

## 2018-12-19 RX ADMIN — GLIPIZIDE 10 MG: 10 TABLET ORAL at 06:32

## 2018-12-19 RX ADMIN — CIPROFLOXACIN HYDROCHLORIDE 750 MG: 500 TABLET, FILM COATED ORAL at 21:02

## 2018-12-19 RX ADMIN — INSULIN LISPRO 2 UNITS: 100 INJECTION, SOLUTION INTRAVENOUS; SUBCUTANEOUS at 11:29

## 2018-12-19 RX ADMIN — ACETAMINOPHEN 1000 MG: 500 TABLET, FILM COATED ORAL at 13:54

## 2018-12-19 RX ADMIN — TORSEMIDE 20 MG: 20 TABLET ORAL at 09:46

## 2018-12-19 RX ADMIN — ATENOLOL 25 MG: 25 TABLET ORAL at 21:03

## 2018-12-19 RX ADMIN — Medication 2 PUFF: at 21:55

## 2018-12-19 RX ADMIN — ATENOLOL 25 MG: 25 TABLET ORAL at 09:46

## 2018-12-19 RX ADMIN — SODIUM CHLORIDE, PRESERVATIVE FREE 10 ML: 5 INJECTION INTRAVENOUS at 21:03

## 2018-12-19 RX ADMIN — IPRATROPIUM BROMIDE AND ALBUTEROL SULFATE 3 ML: .5; 3 SOLUTION RESPIRATORY (INHALATION) at 11:58

## 2018-12-19 RX ADMIN — SPIRONOLACTONE 25 MG: 25 TABLET ORAL at 09:45

## 2018-12-19 RX ADMIN — IPRATROPIUM BROMIDE AND ALBUTEROL SULFATE 3 ML: .5; 3 SOLUTION RESPIRATORY (INHALATION) at 15:54

## 2018-12-19 RX ADMIN — INSULIN LISPRO 1 UNITS: 100 INJECTION, SOLUTION INTRAVENOUS; SUBCUTANEOUS at 20:59

## 2018-12-19 RX ADMIN — TRAZODONE HYDROCHLORIDE 100 MG: 50 TABLET ORAL at 23:39

## 2018-12-19 RX ADMIN — IPRATROPIUM BROMIDE AND ALBUTEROL SULFATE 3 ML: .5; 3 SOLUTION RESPIRATORY (INHALATION) at 07:29

## 2018-12-19 RX ADMIN — IPRATROPIUM BROMIDE AND ALBUTEROL SULFATE 3 ML: .5; 3 SOLUTION RESPIRATORY (INHALATION) at 21:54

## 2018-12-19 RX ADMIN — CIPROFLOXACIN HYDROCHLORIDE 750 MG: 500 TABLET, FILM COATED ORAL at 09:46

## 2018-12-19 ASSESSMENT — PAIN SCALES - GENERAL
PAINLEVEL_OUTOF10: 6
PAINLEVEL_OUTOF10: 6
PAINLEVEL_OUTOF10: 2
PAINLEVEL_OUTOF10: 6

## 2018-12-19 NOTE — CARE COORDINATION
Dr. Vicente Meyers accepted patient for swing bed, Sammy landaverde for one week, want updated clinicals by 12/24/2018. Fax to [phone], phone [phone].   TS

## 2018-12-19 NOTE — PROGRESS NOTES
Therapy  Vancomycin 12/10  Ceftriaxone 12/10   Piperacillin-tazobactam 12/10  Tobramycin 12/11-13? History:? Interval history noted. Being treated for sepsis secondary to Pseudomonas aeruginosa bacteremia. Feels better. Denies n/v/d/f or untoward effects of antibiotics  Physical Exam:  Vital Signs: BP (!) 140/41   Pulse 96   Temp 98.4 °F (36.9 °C) (Oral)   Resp 26   Ht 5' 6\" (1.676 m)   Wt 258 lb 13.1 oz (117.4 kg)   SpO2 96%   BMI 41.77 kg/m²     Gen: alert and oriented X3, no distress  Skin: no stigmata of endocarditis  Wounds: C/D/I  HEMT: AT/NC Oropharynx pink, moist, and without lesions or exudates; dentition in poor state of repair  Eyes: PERRLA, EOMI, conjunctiva pink, sclera anicteric. Neck: Supple. Trachea midline. No LAD. Chest: Posterior lower lung field crackles. Heart: RRR and diastolic murmur in the aortic area  Abd: soft, non-distended, no tenderness, no hepatomegaly. Normoactive bowel sounds. Ext: no clubbing, cyanosis, or edema  Catheter Site: Right basilic vein PICC line without erythema or tenderness  Neuro: Mental status intact.  CN 2-12 intact and no focal sensory or motor deficits     Radiologic / Imaging / TESTING       Labs:    Recent Results (from the past 24 hour(s))   POCT Glucose    Collection Time: 12/18/18  4:44 PM   Result Value Ref Range    POC Glucose 148 (H) 70 - 99 MG/DL   POCT Glucose    Collection Time: 12/18/18  9:11 PM   Result Value Ref Range    POC Glucose 234 (H) 70 - 99 MG/DL   Basic Metabolic Panel    Collection Time: 12/19/18  6:00 AM   Result Value Ref Range    Sodium 141 135 - 145 MMOL/L    Potassium 4.0 3.5 - 5.1 MMOL/L    Chloride 101 99 - 110 mMol/L    CO2 28 21 - 32 MMOL/L    Anion Gap 12 4 - 16    BUN 48 (H) 6 - 23 MG/DL    CREATININE 1.3 (H) 0.6 - 1.1 MG/DL    Glucose 177 (H) 70 - 99 MG/DL    Calcium 8.6 8.3 - 10.6 MG/DL    GFR Non- 42 (L) >60 mL/min/1.73m2    GFR  50 (L) >60 mL/min/1.73m2   POCT Glucose Collection Time: 12/19/18  6:35 AM   Result Value Ref Range    POC Glucose 215 (H) 70 - 99 MG/DL   POCT Glucose    Collection Time: 12/19/18 11:28 AM   Result Value Ref Range    POC Glucose 241 (H) 70 - 99 MG/DL   Hemoglobin and Hematocrit, Blood    Collection Time: 12/19/18  1:55 PM   Result Value Ref Range    Hemoglobin 7.6 (L) 12.5 - 16.0 GM/DL    Hematocrit 24.8 (L) 37 - 47 %     CULTURE results: Invalid input(s): BLOOD CULTURE,  URINE CULTURE, SURGICAL CULTURE    Diagnosis:  Patient Active Problem List   Diagnosis    Type 2 diabetes mellitus without complication (HCC)    Essential hypertension    Urinary incontinence, mixed    Tobacco abuse disorder    Obstructive sleep apnea    Pulmonary hypertension (HCC)    Systolic murmur    Chronic respiratory failure with hypoxia (HCC)    Microalbuminuria due to type 2 diabetes mellitus ([address]    Liver dysfunction    Steatohepatitis    Mixed hyperlipidemia    Morbid obesity due to excess calories ([address]    COPD, severe ([address]    COPD exacerbation (HCC)    Type 2 diabetes mellitus ([address]    Tobacco abuse    Obesity due to excess calories    CHF (congestive heart failure) ([address]    Bilateral carotid artery stenosis    Cerebrovascular accident (CVA) ([address]    COPD exacerbation ([address]    Type 2 diabetes mellitus ([address]    Hypertension    Tobacco abuse    Hyperlipidemia    Pneumonia    Bacteremia    Streptococcal bacteremia    Subacute bacterial endocarditis    Cecum mass    Tubulovillous adenoma of colon    Acute kidney injury ([address]    Endocarditis    Valvular disease    Anemia in chronic kidney disease    Fever    Acute and subacute bacterial endocarditis       Active Problems  Principal Problem:    Acute and subacute bacterial endocarditis  Active Problems:    Fever  Resolved Problems:    * No resolved hospital problems.  *    Electronically signed by: Electronically signed by Franny Peñaloza MD on 12/19/2018 at 2:15 PM

## 2018-12-19 NOTE — CARE COORDINATION
Pre-cert initiated, ref # [id number]. Fax clincals to [phone]. Waiting on admission approval from Dr. Lio Claudio.   TS

## 2018-12-20 ENCOUNTER — HOSPITAL ENCOUNTER (INPATIENT)
Age: 63
LOS: 32 days | Discharge: HOME HEALTH | DRG: 947 | End: 2019-01-21
Attending: FAMILY MEDICINE | Admitting: FAMILY MEDICINE
Payer: COMMERCIAL

## 2018-12-20 VITALS
HEART RATE: 87 BPM | BODY MASS INDEX: 41.45 KG/M2 | RESPIRATION RATE: 17 BRPM | TEMPERATURE: 98 F | OXYGEN SATURATION: 93 % | WEIGHT: 257.94 LBS | DIASTOLIC BLOOD PRESSURE: 45 MMHG | HEIGHT: 66 IN | SYSTOLIC BLOOD PRESSURE: 130 MMHG

## 2018-12-20 DIAGNOSIS — I33.0 ACUTE AND SUBACUTE BACTERIAL ENDOCARDITIS: Primary | ICD-10-CM

## 2018-12-20 DIAGNOSIS — N17.9 ACUTE KIDNEY INJURY (HCC): ICD-10-CM

## 2018-12-20 LAB
ALBUMIN SERPL-MCNC: 4.1 GM/DL (ref 3.4–5)
ALP BLD-CCNC: 46 IU/L (ref 40–129)
ALT SERPL-CCNC: 13 U/L (ref 10–40)
ANION GAP SERPL CALCULATED.3IONS-SCNC: 15 MMOL/L (ref 4–16)
AST SERPL-CCNC: 11 IU/L (ref 15–37)
BILIRUB SERPL-MCNC: 0.4 MG/DL (ref 0–1)
BUN BLDV-MCNC: 54 MG/DL (ref 6–23)
CALCIUM SERPL-MCNC: 10.3 MG/DL (ref 8.3–10.6)
CHLORIDE BLD-SCNC: 103 MMOL/L (ref 99–110)
CO2: 27 MMOL/L (ref 21–32)
CREAT SERPL-MCNC: 1.4 MG/DL (ref 0.6–1.1)
ERYTHROCYTE SEDIMENTATION RATE: 60 MM/HR (ref 0–30)
GFR AFRICAN AMERICAN: 46 ML/MIN/1.73M2
GFR NON-AFRICAN AMERICAN: 38 ML/MIN/1.73M2
GLUCOSE BLD-MCNC: 138 MG/DL (ref 70–99)
GLUCOSE BLD-MCNC: 153 MG/DL (ref 70–99)
GLUCOSE BLD-MCNC: 191 MG/DL (ref 70–99)
GLUCOSE BLD-MCNC: 250 MG/DL (ref 70–99)
GLUCOSE BLD-MCNC: 261 MG/DL (ref 70–99)
HCT VFR BLD CALC: 25.8 % (ref 37–47)
HEMOCCULT SP1 STL QL: NEGATIVE
HEMOGLOBIN: 7.8 GM/DL (ref 12.5–16)
HIGH SENSITIVE C-REACTIVE PROTEIN: 17 MG/L
MCH RBC QN AUTO: 29.3 PG (ref 27–31)
MCHC RBC AUTO-ENTMCNC: 30.2 % (ref 32–36)
MCV RBC AUTO: 97 FL (ref 78–100)
PDW BLD-RTO: 15.9 % (ref 11.7–14.9)
PLATELET # BLD: 256 K/CU MM (ref 140–440)
PMV BLD AUTO: 8.8 FL (ref 7.5–11.1)
POTASSIUM SERPL-SCNC: 3.9 MMOL/L (ref 3.5–5.1)
RBC # BLD: 2.66 M/CU MM (ref 4.2–5.4)
SODIUM BLD-SCNC: 145 MMOL/L (ref 135–145)
TOTAL PROTEIN: 7.1 GM/DL (ref 6.4–8.2)
WBC # BLD: 7.1 K/CU MM (ref 4–10.5)

## 2018-12-20 PROCEDURE — 2580000003 HC RX 258: Performed by: INTERNAL MEDICINE

## 2018-12-20 PROCEDURE — 6370000000 HC RX 637 (ALT 250 FOR IP): Performed by: HOSPITALIST

## 2018-12-20 PROCEDURE — 6360000002 HC RX W HCPCS: Performed by: INTERNAL MEDICINE

## 2018-12-20 PROCEDURE — 6370000000 HC RX 637 (ALT 250 FOR IP): Performed by: INTERNAL MEDICINE

## 2018-12-20 PROCEDURE — 36415 COLL VENOUS BLD VENIPUNCTURE: CPT

## 2018-12-20 PROCEDURE — 1200000000 HC SEMI PRIVATE

## 2018-12-20 PROCEDURE — 1200000002 HC SEMI PRIVATE SWING BED

## 2018-12-20 PROCEDURE — 6370000000 HC RX 637 (ALT 250 FOR IP): Performed by: NURSE PRACTITIONER

## 2018-12-20 PROCEDURE — 94640 AIRWAY INHALATION TREATMENT: CPT

## 2018-12-20 PROCEDURE — 80053 COMPREHEN METABOLIC PANEL: CPT

## 2018-12-20 PROCEDURE — 82962 GLUCOSE BLOOD TEST: CPT

## 2018-12-20 PROCEDURE — 2700000000 HC OXYGEN THERAPY PER DAY

## 2018-12-20 PROCEDURE — 94761 N-INVAS EAR/PLS OXIMETRY MLT: CPT

## 2018-12-20 PROCEDURE — 85652 RBC SED RATE AUTOMATED: CPT

## 2018-12-20 PROCEDURE — 85027 COMPLETE CBC AUTOMATED: CPT

## 2018-12-20 PROCEDURE — 86141 C-REACTIVE PROTEIN HS: CPT

## 2018-12-20 RX ORDER — DEXTROSE MONOHYDRATE 50 MG/ML
100 INJECTION, SOLUTION INTRAVENOUS PRN
Status: DISCONTINUED | OUTPATIENT
Start: 2018-12-20 | End: 2019-01-13 | Stop reason: SDUPTHER

## 2018-12-20 RX ORDER — LACTOBACILLUS RHAMNOSUS GG 10B CELL
1 CAPSULE ORAL
Status: CANCELLED | OUTPATIENT
Start: 2018-12-21

## 2018-12-20 RX ORDER — LACTOBACILLUS RHAMNOSUS GG 10B CELL
1 CAPSULE ORAL
Status: DISCONTINUED | OUTPATIENT
Start: 2018-12-21 | End: 2019-01-21 | Stop reason: HOSPADM

## 2018-12-20 RX ORDER — GLIPIZIDE 10 MG/1
10 TABLET ORAL
Status: CANCELLED | OUTPATIENT
Start: 2018-12-21

## 2018-12-20 RX ORDER — ALBUTEROL SULFATE 90 UG/1
2 AEROSOL, METERED RESPIRATORY (INHALATION) EVERY 4 HOURS PRN
Status: CANCELLED | OUTPATIENT
Start: 2018-12-20

## 2018-12-20 RX ORDER — SPIRONOLACTONE 25 MG/1
25 TABLET ORAL DAILY
Status: CANCELLED | OUTPATIENT
Start: 2018-12-21

## 2018-12-20 RX ORDER — ATENOLOL 25 MG/1
25 TABLET ORAL 2 TIMES DAILY
Status: DISCONTINUED | OUTPATIENT
Start: 2018-12-20 | End: 2019-01-21 | Stop reason: HOSPADM

## 2018-12-20 RX ORDER — GLIPIZIDE 10 MG/1
10 TABLET ORAL
Status: DISCONTINUED | OUTPATIENT
Start: 2018-12-21 | End: 2019-01-21 | Stop reason: HOSPADM

## 2018-12-20 RX ORDER — IPRATROPIUM BROMIDE AND ALBUTEROL SULFATE 2.5; .5 MG/3ML; MG/3ML
SOLUTION RESPIRATORY (INHALATION)
Status: DISPENSED
Start: 2018-12-20 | End: 2018-12-21

## 2018-12-20 RX ORDER — ACETAMINOPHEN 500 MG
1000 TABLET ORAL EVERY 6 HOURS PRN
Status: CANCELLED | OUTPATIENT
Start: 2018-12-20

## 2018-12-20 RX ORDER — ONDANSETRON 2 MG/ML
4 INJECTION INTRAMUSCULAR; INTRAVENOUS EVERY 6 HOURS PRN
Status: CANCELLED | OUTPATIENT
Start: 2018-12-20

## 2018-12-20 RX ORDER — IPRATROPIUM BROMIDE AND ALBUTEROL SULFATE 2.5; .5 MG/3ML; MG/3ML
1 SOLUTION RESPIRATORY (INHALATION) 4 TIMES DAILY
Status: CANCELLED | OUTPATIENT
Start: 2018-12-20

## 2018-12-20 RX ORDER — ONDANSETRON 2 MG/ML
4 INJECTION INTRAMUSCULAR; INTRAVENOUS EVERY 6 HOURS PRN
Status: DISCONTINUED | OUTPATIENT
Start: 2018-12-20 | End: 2019-01-21 | Stop reason: HOSPADM

## 2018-12-20 RX ORDER — ATENOLOL 25 MG/1
25 TABLET ORAL 2 TIMES DAILY
Status: CANCELLED | OUTPATIENT
Start: 2018-12-20

## 2018-12-20 RX ORDER — DEXTROSE MONOHYDRATE 25 G/50ML
12.5 INJECTION, SOLUTION INTRAVENOUS PRN
Status: DISCONTINUED | OUTPATIENT
Start: 2018-12-20 | End: 2019-01-13 | Stop reason: SDUPTHER

## 2018-12-20 RX ORDER — PRAVASTATIN SODIUM 10 MG
20 TABLET ORAL DAILY
Status: DISCONTINUED | OUTPATIENT
Start: 2018-12-21 | End: 2019-01-21 | Stop reason: HOSPADM

## 2018-12-20 RX ORDER — SPIRONOLACTONE 25 MG/1
25 TABLET ORAL DAILY
Status: DISCONTINUED | OUTPATIENT
Start: 2018-12-21 | End: 2019-01-21 | Stop reason: HOSPADM

## 2018-12-20 RX ORDER — ACETAMINOPHEN 325 MG/1
650 TABLET ORAL EVERY 6 HOURS PRN
Status: DISCONTINUED | OUTPATIENT
Start: 2018-12-20 | End: 2019-01-21 | Stop reason: HOSPADM

## 2018-12-20 RX ORDER — PRAVASTATIN SODIUM 20 MG
20 TABLET ORAL DAILY
Status: CANCELLED | OUTPATIENT
Start: 2018-12-21

## 2018-12-20 RX ORDER — ALBUTEROL SULFATE 90 UG/1
2 AEROSOL, METERED RESPIRATORY (INHALATION) EVERY 4 HOURS PRN
Status: DISCONTINUED | OUTPATIENT
Start: 2018-12-20 | End: 2019-01-21 | Stop reason: HOSPADM

## 2018-12-20 RX ORDER — CIPROFLOXACIN 500 MG/1
750 TABLET, FILM COATED ORAL EVERY 12 HOURS SCHEDULED
Status: COMPLETED | OUTPATIENT
Start: 2018-12-20 | End: 2019-01-20

## 2018-12-20 RX ORDER — NICOTINE POLACRILEX 4 MG
15 LOZENGE BUCCAL PRN
Status: DISCONTINUED | OUTPATIENT
Start: 2018-12-20 | End: 2019-01-13 | Stop reason: SDUPTHER

## 2018-12-20 RX ORDER — TRAZODONE HYDROCHLORIDE 50 MG/1
100 TABLET ORAL NIGHTLY
Status: DISCONTINUED | OUTPATIENT
Start: 2018-12-20 | End: 2019-01-21 | Stop reason: HOSPADM

## 2018-12-20 RX ORDER — IPRATROPIUM BROMIDE AND ALBUTEROL SULFATE 2.5; .5 MG/3ML; MG/3ML
1 SOLUTION RESPIRATORY (INHALATION) 4 TIMES DAILY
Status: DISCONTINUED | OUTPATIENT
Start: 2018-12-20 | End: 2019-01-21 | Stop reason: HOSPADM

## 2018-12-20 RX ORDER — TORSEMIDE 20 MG/1
20 TABLET ORAL 2 TIMES DAILY
Status: CANCELLED | OUTPATIENT
Start: 2018-12-20

## 2018-12-20 RX ORDER — TORSEMIDE 20 MG/1
20 TABLET ORAL 2 TIMES DAILY
Status: DISCONTINUED | OUTPATIENT
Start: 2018-12-20 | End: 2019-01-03

## 2018-12-20 RX ORDER — TRAZODONE HYDROCHLORIDE 50 MG/1
100 TABLET ORAL NIGHTLY
Status: CANCELLED | OUTPATIENT
Start: 2018-12-20

## 2018-12-20 RX ORDER — ACETAMINOPHEN 500 MG
1000 TABLET ORAL EVERY 6 HOURS PRN
Status: DISCONTINUED | OUTPATIENT
Start: 2018-12-20 | End: 2018-12-20 | Stop reason: SDUPTHER

## 2018-12-20 RX ADMIN — PRAVASTATIN SODIUM 20 MG: 20 TABLET ORAL at 09:01

## 2018-12-20 RX ADMIN — ACETAMINOPHEN 650 MG: 325 TABLET ORAL at 23:10

## 2018-12-20 RX ADMIN — ATENOLOL 25 MG: 25 TABLET ORAL at 20:59

## 2018-12-20 RX ADMIN — TORSEMIDE 20 MG: 20 TABLET ORAL at 09:01

## 2018-12-20 RX ADMIN — INSULIN LISPRO 3 UNITS: 100 INJECTION, SOLUTION INTRAVENOUS; SUBCUTANEOUS at 12:38

## 2018-12-20 RX ADMIN — TRAZODONE HYDROCHLORIDE 100 MG: 50 TABLET ORAL at 23:10

## 2018-12-20 RX ADMIN — INSULIN LISPRO 2 UNITS: 100 INJECTION, SOLUTION INTRAVENOUS; SUBCUTANEOUS at 21:04

## 2018-12-20 RX ADMIN — Medication 2 PUFF: at 08:41

## 2018-12-20 RX ADMIN — IPRATROPIUM BROMIDE AND ALBUTEROL SULFATE 3 ML: .5; 3 SOLUTION RESPIRATORY (INHALATION) at 19:32

## 2018-12-20 RX ADMIN — CIPROFLOXACIN HYDROCHLORIDE 750 MG: 500 TABLET, FILM COATED ORAL at 12:32

## 2018-12-20 RX ADMIN — IPRATROPIUM BROMIDE AND ALBUTEROL SULFATE 3 ML: .5; 3 SOLUTION RESPIRATORY (INHALATION) at 11:23

## 2018-12-20 RX ADMIN — CEFEPIME HYDROCHLORIDE 2 G: 2 INJECTION, POWDER, FOR SOLUTION INTRAVENOUS at 06:45

## 2018-12-20 RX ADMIN — MOMETASONE FUROATE AND FORMOTEROL FUMARATE DIHYDRATE 2 PUFF: 200; 5 AEROSOL RESPIRATORY (INHALATION) at 21:20

## 2018-12-20 RX ADMIN — CIPROFLOXACIN HYDROCHLORIDE 750 MG: 500 TABLET, FILM COATED ORAL at 23:10

## 2018-12-20 RX ADMIN — Medication 1 CAPSULE: at 08:30

## 2018-12-20 RX ADMIN — TORSEMIDE 20 MG: 20 TABLET ORAL at 20:59

## 2018-12-20 RX ADMIN — ATENOLOL 25 MG: 25 TABLET ORAL at 09:01

## 2018-12-20 RX ADMIN — ACETAMINOPHEN 1000 MG: 500 TABLET, FILM COATED ORAL at 12:32

## 2018-12-20 RX ADMIN — SPIRONOLACTONE 25 MG: 25 TABLET ORAL at 09:01

## 2018-12-20 RX ADMIN — CEFEPIME HYDROCHLORIDE 2 G: 2 INJECTION, POWDER, FOR SOLUTION INTRAVENOUS at 17:58

## 2018-12-20 RX ADMIN — IPRATROPIUM BROMIDE AND ALBUTEROL SULFATE 3 ML: .5; 3 SOLUTION RESPIRATORY (INHALATION) at 15:40

## 2018-12-20 RX ADMIN — IPRATROPIUM BROMIDE AND ALBUTEROL SULFATE 3 ML: .5; 3 SOLUTION RESPIRATORY (INHALATION) at 08:41

## 2018-12-20 RX ADMIN — INSULIN LISPRO 1 UNITS: 100 INJECTION, SOLUTION INTRAVENOUS; SUBCUTANEOUS at 09:00

## 2018-12-20 RX ADMIN — GLIPIZIDE 10 MG: 10 TABLET ORAL at 09:01

## 2018-12-20 ASSESSMENT — PAIN SCALES - GENERAL
PAINLEVEL_OUTOF10: 0
PAINLEVEL_OUTOF10: 0
PAINLEVEL_OUTOF10: 2
PAINLEVEL_OUTOF10: 0
PAINLEVEL_OUTOF10: 0
PAINLEVEL_OUTOF10: 7
PAINLEVEL_OUTOF10: 0

## 2018-12-20 ASSESSMENT — PAIN DESCRIPTION - ORIENTATION: ORIENTATION: RIGHT;LEFT

## 2018-12-20 ASSESSMENT — PAIN DESCRIPTION - PAIN TYPE: TYPE: CHRONIC PAIN

## 2018-12-20 ASSESSMENT — PAIN DESCRIPTION - LOCATION: LOCATION: LEG

## 2018-12-20 NOTE — CARE COORDINATION
ADIELW spoke with Omari Palacios with Swing bed and she stated they can get pt orders form Epic. [facility] called and they will  pt at 1:30. ADIELW attempted to call RN but she was busy with another pt. ADIELW spoke with the charge RN Ethan Mcdonald and she was informed of  time and the number to call report.

## 2018-12-21 LAB
FINAL RESULT: NORMAL
GLUCOSE BLD-MCNC: 158 MG/DL (ref 70–99)
GLUCOSE BLD-MCNC: 187 MG/DL (ref 70–99)
GLUCOSE BLD-MCNC: 204 MG/DL (ref 70–99)
GLUCOSE BLD-MCNC: 231 MG/DL (ref 70–99)
PRELIMINARY: NORMAL
SPECIMEN SOURCE: NORMAL

## 2018-12-21 PROCEDURE — 97166 OT EVAL MOD COMPLEX 45 MIN: CPT

## 2018-12-21 PROCEDURE — 97110 THERAPEUTIC EXERCISES: CPT

## 2018-12-21 PROCEDURE — 97530 THERAPEUTIC ACTIVITIES: CPT

## 2018-12-21 PROCEDURE — 6370000000 HC RX 637 (ALT 250 FOR IP): Performed by: INTERNAL MEDICINE

## 2018-12-21 PROCEDURE — 94640 AIRWAY INHALATION TREATMENT: CPT

## 2018-12-21 PROCEDURE — 97162 PT EVAL MOD COMPLEX 30 MIN: CPT

## 2018-12-21 PROCEDURE — 6360000002 HC RX W HCPCS: Performed by: INTERNAL MEDICINE

## 2018-12-21 PROCEDURE — G8988 SELF CARE GOAL STATUS: HCPCS

## 2018-12-21 PROCEDURE — G8987 SELF CARE CURRENT STATUS: HCPCS

## 2018-12-21 PROCEDURE — 1200000002 HC SEMI PRIVATE SWING BED

## 2018-12-21 PROCEDURE — 82962 GLUCOSE BLOOD TEST: CPT

## 2018-12-21 PROCEDURE — G8978 MOBILITY CURRENT STATUS: HCPCS

## 2018-12-21 PROCEDURE — 6370000000 HC RX 637 (ALT 250 FOR IP): Performed by: NURSE PRACTITIONER

## 2018-12-21 PROCEDURE — 2700000000 HC OXYGEN THERAPY PER DAY

## 2018-12-21 PROCEDURE — 2580000003 HC RX 258: Performed by: INTERNAL MEDICINE

## 2018-12-21 PROCEDURE — G8979 MOBILITY GOAL STATUS: HCPCS

## 2018-12-21 RX ADMIN — IPRATROPIUM BROMIDE AND ALBUTEROL SULFATE 3 ML: .5; 3 SOLUTION RESPIRATORY (INHALATION) at 11:10

## 2018-12-21 RX ADMIN — INSULIN LISPRO 1 UNITS: 100 INJECTION, SOLUTION INTRAVENOUS; SUBCUTANEOUS at 17:33

## 2018-12-21 RX ADMIN — TRAZODONE HYDROCHLORIDE 100 MG: 50 TABLET ORAL at 23:02

## 2018-12-21 RX ADMIN — TORSEMIDE 20 MG: 20 TABLET ORAL at 09:00

## 2018-12-21 RX ADMIN — IPRATROPIUM BROMIDE AND ALBUTEROL SULFATE 3 ML: .5; 3 SOLUTION RESPIRATORY (INHALATION) at 07:25

## 2018-12-21 RX ADMIN — INSULIN LISPRO 2 UNITS: 100 INJECTION, SOLUTION INTRAVENOUS; SUBCUTANEOUS at 15:07

## 2018-12-21 RX ADMIN — MOMETASONE FUROATE AND FORMOTEROL FUMARATE DIHYDRATE 2 PUFF: 200; 5 AEROSOL RESPIRATORY (INHALATION) at 20:33

## 2018-12-21 RX ADMIN — INSULIN LISPRO 1 UNITS: 100 INJECTION, SOLUTION INTRAVENOUS; SUBCUTANEOUS at 09:01

## 2018-12-21 RX ADMIN — TORSEMIDE 20 MG: 20 TABLET ORAL at 20:34

## 2018-12-21 RX ADMIN — ACETAMINOPHEN 650 MG: 325 TABLET ORAL at 23:03

## 2018-12-21 RX ADMIN — INSULIN LISPRO 1 UNITS: 100 INJECTION, SOLUTION INTRAVENOUS; SUBCUTANEOUS at 20:33

## 2018-12-21 RX ADMIN — IPRATROPIUM BROMIDE AND ALBUTEROL SULFATE 3 ML: .5; 3 SOLUTION RESPIRATORY (INHALATION) at 15:30

## 2018-12-21 RX ADMIN — ATENOLOL 25 MG: 25 TABLET ORAL at 09:00

## 2018-12-21 RX ADMIN — Medication 1 CAPSULE: at 09:00

## 2018-12-21 RX ADMIN — GLIPIZIDE 10 MG: 10 TABLET ORAL at 09:00

## 2018-12-21 RX ADMIN — IPRATROPIUM BROMIDE AND ALBUTEROL SULFATE 3 ML: .5; 3 SOLUTION RESPIRATORY (INHALATION) at 19:18

## 2018-12-21 RX ADMIN — ATENOLOL 25 MG: 25 TABLET ORAL at 20:34

## 2018-12-21 RX ADMIN — CIPROFLOXACIN HYDROCHLORIDE 750 MG: 500 TABLET, FILM COATED ORAL at 10:23

## 2018-12-21 RX ADMIN — CEFEPIME HYDROCHLORIDE 2 G: 2 INJECTION, POWDER, FOR SOLUTION INTRAVENOUS at 06:00

## 2018-12-21 RX ADMIN — CIPROFLOXACIN HYDROCHLORIDE 750 MG: 500 TABLET, FILM COATED ORAL at 20:33

## 2018-12-21 RX ADMIN — SPIRONOLACTONE 25 MG: 25 TABLET ORAL at 09:00

## 2018-12-21 RX ADMIN — CEFEPIME HYDROCHLORIDE 2 G: 2 INJECTION, POWDER, FOR SOLUTION INTRAVENOUS at 17:33

## 2018-12-21 RX ADMIN — PRAVASTATIN SODIUM 20 MG: 10 TABLET ORAL at 20:34

## 2018-12-21 RX ADMIN — MOMETASONE FUROATE AND FORMOTEROL FUMARATE DIHYDRATE 2 PUFF: 200; 5 AEROSOL RESPIRATORY (INHALATION) at 09:01

## 2018-12-21 ASSESSMENT — PAIN DESCRIPTION - LOCATION: LOCATION: BACK

## 2018-12-21 ASSESSMENT — PAIN DESCRIPTION - DESCRIPTORS: DESCRIPTORS: ACHING

## 2018-12-21 ASSESSMENT — PAIN SCALES - GENERAL
PAINLEVEL_OUTOF10: 0
PAINLEVEL_OUTOF10: 3
PAINLEVEL_OUTOF10: 0

## 2018-12-21 ASSESSMENT — PAIN DESCRIPTION - PAIN TYPE: TYPE: CHRONIC PAIN

## 2018-12-21 NOTE — DISCHARGE SUMMARY
Discharge Summary    Name:  Nelsy Chaney /Age/Sex: 1955  (59 y.o. female)   MRN & CSN:  [id number] & [id number] Admission Date/Time: 12/10/2018  3:15 AM   Attending:  No att. providers found Discharging Physician: Rox Levy MD     Hospital Course:   Nelsy Chaney is a 59 y.o.  female  who presents with Acute and subacute bacterial endocarditis requiring extended duration antibiotics for which she is being discharged to a Swing bed at [facility].  She also has concomitant acute heart failure from valvular involvement and would need surgical review after infection has been treated. The patient expressed appropriate understanding of and agreement with the discharge recommendations, medications, and plan.      Consults this admission:  IP CONSULT TO CARDIOLOGY  IP CONSULT TO PULMONOLOGY  IP CONSULT TO IV TEAM  IP CONSULT TO IV TEAM    Discharge Instruction:   Follow up appointments: ID, Cardiovascular Surgery, Pulmonary  Primary care physician:  within 2 weeks    Diet:  cardiac diet   Activity: activity as tolerated  Disposition: Discharged to:   []Home, []C, []SNF, [x]Acute Rehab, []Hospice   Condition on discharge: Stable    Discharge Medications:      Marcos Socks   Home Medication Instructions [id number]    Printed on:18 [id number]   Medication Information                      acetaminophen (TYLENOL) 500 MG tablet  Take 1,000 mg by mouth every 6 hours as needed for Pain             albuterol sulfate  (90 Base) MCG/ACT inhaler  Inhale 2 puffs into the lungs every 4 hours as needed for Wheezing             atenolol (TENORMIN) 25 MG tablet  TAKE 1 TABLET TWICE A DAY             budesonide-formoterol (SYMBICORT) 160-4.5 MCG/ACT AERO  Inhale 2 puffs into the lungs 2 times daily             cloNIDine (CATAPRES) 0.1 MG tablet  TAKE 1 TABLET TWICE A DAY             CPAP Machine MISC  by Does not apply route             glipiZIDE (GLUCOTROL XL) 10 MG extended release sounds are normoactive. Rectal exam deferred.        No costovertebral angle tenderness. Normal appearing external genitalia. Costello catheter is not present. HEME/LYMPH            No palpable cervical lymphadenopathy and no hepatosplenomegaly. No petechiae or ecchymoses. MSK    No gross joint deformities. 2+ bipedal edema. SKIN    Normal coloration, warm, dry. NEURO           Awake, alert, oriented x 4. Cranial nerves appear grossly intact, normal speech, no lateralizing weakness. PSYCH            Affect appropriate.     BMP/CBC  Recent Labs      12/19/18   0600  12/19/18   1355  12/20/18   1800   NA  141   --   145   K  4.0   --   3.9   CL  101   --   103   CO2  28   --   27   BUN  48*   --   54*   CREATININE  1.3*   --   1.4*   WBC   --    --   7.1   HCT   --   24.8*  25.8*   PLT   --    --   256       IMAGING:  See chart    Discharge Time of 35 minutes    Electronically signed by Keisha Gotti MD on 12/21/2018 at 6:54 AM

## 2018-12-22 LAB
GLUCOSE BLD-MCNC: 191 MG/DL (ref 70–99)
GLUCOSE BLD-MCNC: 216 MG/DL (ref 70–99)
GLUCOSE BLD-MCNC: 218 MG/DL (ref 70–99)
GLUCOSE BLD-MCNC: 252 MG/DL (ref 70–99)

## 2018-12-22 PROCEDURE — 6360000002 HC RX W HCPCS: Performed by: INTERNAL MEDICINE

## 2018-12-22 PROCEDURE — 6370000000 HC RX 637 (ALT 250 FOR IP): Performed by: INTERNAL MEDICINE

## 2018-12-22 PROCEDURE — 94640 AIRWAY INHALATION TREATMENT: CPT

## 2018-12-22 PROCEDURE — 2700000000 HC OXYGEN THERAPY PER DAY

## 2018-12-22 PROCEDURE — 1200000002 HC SEMI PRIVATE SWING BED

## 2018-12-22 PROCEDURE — 82962 GLUCOSE BLOOD TEST: CPT

## 2018-12-22 PROCEDURE — 36591 DRAW BLOOD OFF VENOUS DEVICE: CPT

## 2018-12-22 PROCEDURE — 2580000003 HC RX 258: Performed by: INTERNAL MEDICINE

## 2018-12-22 PROCEDURE — 97116 GAIT TRAINING THERAPY: CPT

## 2018-12-22 PROCEDURE — 6370000000 HC RX 637 (ALT 250 FOR IP): Performed by: NURSE PRACTITIONER

## 2018-12-22 RX ORDER — ACETAMINOPHEN 80 MG
TABLET,CHEWABLE ORAL
Status: DISPENSED
Start: 2018-12-22 | End: 2018-12-22

## 2018-12-22 RX ADMIN — IPRATROPIUM BROMIDE AND ALBUTEROL SULFATE 3 ML: .5; 3 SOLUTION RESPIRATORY (INHALATION) at 11:20

## 2018-12-22 RX ADMIN — SPIRONOLACTONE 25 MG: 25 TABLET ORAL at 10:04

## 2018-12-22 RX ADMIN — CIPROFLOXACIN HYDROCHLORIDE 750 MG: 500 TABLET, FILM COATED ORAL at 20:35

## 2018-12-22 RX ADMIN — TORSEMIDE 20 MG: 20 TABLET ORAL at 10:05

## 2018-12-22 RX ADMIN — TORSEMIDE 20 MG: 20 TABLET ORAL at 20:35

## 2018-12-22 RX ADMIN — INSULIN LISPRO 2 UNITS: 100 INJECTION, SOLUTION INTRAVENOUS; SUBCUTANEOUS at 18:24

## 2018-12-22 RX ADMIN — IPRATROPIUM BROMIDE AND ALBUTEROL SULFATE 3 ML: .5; 3 SOLUTION RESPIRATORY (INHALATION) at 07:30

## 2018-12-22 RX ADMIN — INSULIN LISPRO 1 UNITS: 100 INJECTION, SOLUTION INTRAVENOUS; SUBCUTANEOUS at 10:10

## 2018-12-22 RX ADMIN — CIPROFLOXACIN HYDROCHLORIDE 750 MG: 500 TABLET, FILM COATED ORAL at 10:04

## 2018-12-22 RX ADMIN — IPRATROPIUM BROMIDE AND ALBUTEROL SULFATE 3 ML: .5; 3 SOLUTION RESPIRATORY (INHALATION) at 20:06

## 2018-12-22 RX ADMIN — MOMETASONE FUROATE AND FORMOTEROL FUMARATE DIHYDRATE 2 PUFF: 200; 5 AEROSOL RESPIRATORY (INHALATION) at 10:05

## 2018-12-22 RX ADMIN — GLIPIZIDE 10 MG: 10 TABLET ORAL at 10:04

## 2018-12-22 RX ADMIN — TRAZODONE HYDROCHLORIDE 100 MG: 50 TABLET ORAL at 23:16

## 2018-12-22 RX ADMIN — ACETAMINOPHEN 650 MG: 325 TABLET ORAL at 18:22

## 2018-12-22 RX ADMIN — INSULIN LISPRO 3 UNITS: 100 INJECTION, SOLUTION INTRAVENOUS; SUBCUTANEOUS at 13:11

## 2018-12-22 RX ADMIN — PRAVASTATIN SODIUM 20 MG: 10 TABLET ORAL at 20:35

## 2018-12-22 RX ADMIN — ACETAMINOPHEN 650 MG: 325 TABLET ORAL at 23:16

## 2018-12-22 RX ADMIN — MOMETASONE FUROATE AND FORMOTEROL FUMARATE DIHYDRATE 2 PUFF: 200; 5 AEROSOL RESPIRATORY (INHALATION) at 20:35

## 2018-12-22 RX ADMIN — ATENOLOL 25 MG: 25 TABLET ORAL at 10:05

## 2018-12-22 RX ADMIN — CEFEPIME HYDROCHLORIDE 2 G: 2 INJECTION, POWDER, FOR SOLUTION INTRAVENOUS at 18:25

## 2018-12-22 RX ADMIN — INSULIN LISPRO 1 UNITS: 100 INJECTION, SOLUTION INTRAVENOUS; SUBCUTANEOUS at 20:36

## 2018-12-22 RX ADMIN — IPRATROPIUM BROMIDE AND ALBUTEROL SULFATE 3 ML: .5; 3 SOLUTION RESPIRATORY (INHALATION) at 15:10

## 2018-12-22 RX ADMIN — CEFEPIME HYDROCHLORIDE 2 G: 2 INJECTION, POWDER, FOR SOLUTION INTRAVENOUS at 05:43

## 2018-12-22 RX ADMIN — ATENOLOL 25 MG: 25 TABLET ORAL at 20:35

## 2018-12-22 RX ADMIN — Medication 1 CAPSULE: at 10:04

## 2018-12-22 ASSESSMENT — PAIN DESCRIPTION - DESCRIPTORS: DESCRIPTORS: ACHING

## 2018-12-22 ASSESSMENT — PAIN SCALES - GENERAL
PAINLEVEL_OUTOF10: 7
PAINLEVEL_OUTOF10: 3
PAINLEVEL_OUTOF10: 0

## 2018-12-22 ASSESSMENT — PAIN DESCRIPTION - ORIENTATION: ORIENTATION: RIGHT;LEFT

## 2018-12-22 ASSESSMENT — PAIN DESCRIPTION - LOCATION: LOCATION: LEG

## 2018-12-22 ASSESSMENT — PAIN DESCRIPTION - PAIN TYPE: TYPE: CHRONIC PAIN

## 2018-12-23 LAB
GLUCOSE BLD-MCNC: 146 MG/DL (ref 70–99)
GLUCOSE BLD-MCNC: 157 MG/DL (ref 70–99)
GLUCOSE BLD-MCNC: 195 MG/DL (ref 70–99)
GLUCOSE BLD-MCNC: 201 MG/DL (ref 70–99)

## 2018-12-23 PROCEDURE — 6360000002 HC RX W HCPCS: Performed by: INTERNAL MEDICINE

## 2018-12-23 PROCEDURE — 6370000000 HC RX 637 (ALT 250 FOR IP): Performed by: INTERNAL MEDICINE

## 2018-12-23 PROCEDURE — 94640 AIRWAY INHALATION TREATMENT: CPT

## 2018-12-23 PROCEDURE — 2580000003 HC RX 258: Performed by: INTERNAL MEDICINE

## 2018-12-23 PROCEDURE — 1200000002 HC SEMI PRIVATE SWING BED

## 2018-12-23 PROCEDURE — 6370000000 HC RX 637 (ALT 250 FOR IP): Performed by: NURSE PRACTITIONER

## 2018-12-23 PROCEDURE — 2700000000 HC OXYGEN THERAPY PER DAY

## 2018-12-23 PROCEDURE — 82962 GLUCOSE BLOOD TEST: CPT

## 2018-12-23 RX ADMIN — CIPROFLOXACIN HYDROCHLORIDE 750 MG: 500 TABLET, FILM COATED ORAL at 08:45

## 2018-12-23 RX ADMIN — GLIPIZIDE 10 MG: 10 TABLET ORAL at 08:46

## 2018-12-23 RX ADMIN — MOMETASONE FUROATE AND FORMOTEROL FUMARATE DIHYDRATE 2 PUFF: 200; 5 AEROSOL RESPIRATORY (INHALATION) at 21:43

## 2018-12-23 RX ADMIN — ATENOLOL 25 MG: 25 TABLET ORAL at 08:46

## 2018-12-23 RX ADMIN — INSULIN LISPRO 2 UNITS: 100 INJECTION, SOLUTION INTRAVENOUS; SUBCUTANEOUS at 08:47

## 2018-12-23 RX ADMIN — CIPROFLOXACIN HYDROCHLORIDE 750 MG: 500 TABLET, FILM COATED ORAL at 22:00

## 2018-12-23 RX ADMIN — TORSEMIDE 20 MG: 20 TABLET ORAL at 08:45

## 2018-12-23 RX ADMIN — IPRATROPIUM BROMIDE AND ALBUTEROL SULFATE 3 ML: .5; 3 SOLUTION RESPIRATORY (INHALATION) at 19:15

## 2018-12-23 RX ADMIN — IPRATROPIUM BROMIDE AND ALBUTEROL SULFATE 3 ML: .5; 3 SOLUTION RESPIRATORY (INHALATION) at 16:08

## 2018-12-23 RX ADMIN — TRAZODONE HYDROCHLORIDE 100 MG: 50 TABLET ORAL at 23:39

## 2018-12-23 RX ADMIN — CEFEPIME HYDROCHLORIDE 2 G: 2 INJECTION, POWDER, FOR SOLUTION INTRAVENOUS at 17:52

## 2018-12-23 RX ADMIN — TORSEMIDE 20 MG: 20 TABLET ORAL at 19:49

## 2018-12-23 RX ADMIN — ATENOLOL 25 MG: 25 TABLET ORAL at 22:01

## 2018-12-23 RX ADMIN — SPIRONOLACTONE 25 MG: 25 TABLET ORAL at 08:46

## 2018-12-23 RX ADMIN — IPRATROPIUM BROMIDE AND ALBUTEROL SULFATE 3 ML: .5; 3 SOLUTION RESPIRATORY (INHALATION) at 11:59

## 2018-12-23 RX ADMIN — PRAVASTATIN SODIUM 20 MG: 10 TABLET ORAL at 22:01

## 2018-12-23 RX ADMIN — IPRATROPIUM BROMIDE AND ALBUTEROL SULFATE 3 ML: .5; 3 SOLUTION RESPIRATORY (INHALATION) at 07:50

## 2018-12-23 RX ADMIN — CEFEPIME HYDROCHLORIDE 2 G: 2 INJECTION, POWDER, FOR SOLUTION INTRAVENOUS at 06:30

## 2018-12-23 RX ADMIN — INSULIN LISPRO 1 UNITS: 100 INJECTION, SOLUTION INTRAVENOUS; SUBCUTANEOUS at 21:43

## 2018-12-23 RX ADMIN — INSULIN LISPRO 1 UNITS: 100 INJECTION, SOLUTION INTRAVENOUS; SUBCUTANEOUS at 16:45

## 2018-12-23 RX ADMIN — MOMETASONE FUROATE AND FORMOTEROL FUMARATE DIHYDRATE 2 PUFF: 200; 5 AEROSOL RESPIRATORY (INHALATION) at 08:46

## 2018-12-23 RX ADMIN — INSULIN LISPRO 1 UNITS: 100 INJECTION, SOLUTION INTRAVENOUS; SUBCUTANEOUS at 12:32

## 2018-12-23 RX ADMIN — ACETAMINOPHEN 650 MG: 325 TABLET ORAL at 23:39

## 2018-12-23 RX ADMIN — Medication 1 CAPSULE: at 08:46

## 2018-12-23 ASSESSMENT — PAIN SCALES - GENERAL
PAINLEVEL_OUTOF10: 3
PAINLEVEL_OUTOF10: 0

## 2018-12-23 ASSESSMENT — PAIN DESCRIPTION - PAIN TYPE: TYPE: CHRONIC PAIN

## 2018-12-23 ASSESSMENT — PAIN DESCRIPTION - ORIENTATION: ORIENTATION: RIGHT;LEFT

## 2018-12-23 ASSESSMENT — PAIN DESCRIPTION - ONSET: ONSET: ON-GOING

## 2018-12-23 ASSESSMENT — PAIN DESCRIPTION - FREQUENCY: FREQUENCY: CONTINUOUS

## 2018-12-23 ASSESSMENT — PAIN DESCRIPTION - LOCATION: LOCATION: LEG

## 2018-12-23 ASSESSMENT — PAIN DESCRIPTION - PROGRESSION: CLINICAL_PROGRESSION: NOT CHANGED

## 2018-12-23 ASSESSMENT — PAIN DESCRIPTION - DESCRIPTORS: DESCRIPTORS: CONSTANT;DISCOMFORT

## 2018-12-24 LAB
GLUCOSE BLD-MCNC: 178 MG/DL (ref 70–99)
GLUCOSE BLD-MCNC: 200 MG/DL (ref 70–99)
GLUCOSE BLD-MCNC: 210 MG/DL (ref 70–99)
GLUCOSE BLD-MCNC: 278 MG/DL (ref 70–99)

## 2018-12-24 PROCEDURE — 6370000000 HC RX 637 (ALT 250 FOR IP): Performed by: NURSE PRACTITIONER

## 2018-12-24 PROCEDURE — 97530 THERAPEUTIC ACTIVITIES: CPT

## 2018-12-24 PROCEDURE — 2700000000 HC OXYGEN THERAPY PER DAY

## 2018-12-24 PROCEDURE — 6360000002 HC RX W HCPCS: Performed by: INTERNAL MEDICINE

## 2018-12-24 PROCEDURE — 97110 THERAPEUTIC EXERCISES: CPT

## 2018-12-24 PROCEDURE — 97116 GAIT TRAINING THERAPY: CPT

## 2018-12-24 PROCEDURE — 6370000000 HC RX 637 (ALT 250 FOR IP): Performed by: INTERNAL MEDICINE

## 2018-12-24 PROCEDURE — 94640 AIRWAY INHALATION TREATMENT: CPT

## 2018-12-24 PROCEDURE — 1200000002 HC SEMI PRIVATE SWING BED

## 2018-12-24 PROCEDURE — 2580000003 HC RX 258: Performed by: INTERNAL MEDICINE

## 2018-12-24 PROCEDURE — 36591 DRAW BLOOD OFF VENOUS DEVICE: CPT

## 2018-12-24 PROCEDURE — 82962 GLUCOSE BLOOD TEST: CPT

## 2018-12-24 RX ADMIN — CEFEPIME HYDROCHLORIDE 2 G: 2 INJECTION, POWDER, FOR SOLUTION INTRAVENOUS at 17:45

## 2018-12-24 RX ADMIN — SPIRONOLACTONE 25 MG: 25 TABLET ORAL at 08:51

## 2018-12-24 RX ADMIN — MOMETASONE FUROATE AND FORMOTEROL FUMARATE DIHYDRATE 2 PUFF: 200; 5 AEROSOL RESPIRATORY (INHALATION) at 08:50

## 2018-12-24 RX ADMIN — INSULIN LISPRO 2 UNITS: 100 INJECTION, SOLUTION INTRAVENOUS; SUBCUTANEOUS at 17:44

## 2018-12-24 RX ADMIN — IPRATROPIUM BROMIDE AND ALBUTEROL SULFATE 3 ML: .5; 3 SOLUTION RESPIRATORY (INHALATION) at 10:56

## 2018-12-24 RX ADMIN — ACETAMINOPHEN 650 MG: 325 TABLET ORAL at 22:43

## 2018-12-24 RX ADMIN — GLIPIZIDE 10 MG: 10 TABLET ORAL at 08:51

## 2018-12-24 RX ADMIN — MOMETASONE FUROATE AND FORMOTEROL FUMARATE DIHYDRATE 2 PUFF: 200; 5 AEROSOL RESPIRATORY (INHALATION) at 20:17

## 2018-12-24 RX ADMIN — Medication 1 CAPSULE: at 08:51

## 2018-12-24 RX ADMIN — CIPROFLOXACIN HYDROCHLORIDE 750 MG: 500 TABLET, FILM COATED ORAL at 23:23

## 2018-12-24 RX ADMIN — ACETAMINOPHEN 650 MG: 325 TABLET ORAL at 15:33

## 2018-12-24 RX ADMIN — INSULIN LISPRO 1 UNITS: 100 INJECTION, SOLUTION INTRAVENOUS; SUBCUTANEOUS at 08:50

## 2018-12-24 RX ADMIN — TORSEMIDE 20 MG: 20 TABLET ORAL at 17:45

## 2018-12-24 RX ADMIN — IPRATROPIUM BROMIDE AND ALBUTEROL SULFATE 3 ML: .5; 3 SOLUTION RESPIRATORY (INHALATION) at 07:59

## 2018-12-24 RX ADMIN — PRAVASTATIN SODIUM 20 MG: 10 TABLET ORAL at 20:17

## 2018-12-24 RX ADMIN — INSULIN LISPRO 2 UNITS: 100 INJECTION, SOLUTION INTRAVENOUS; SUBCUTANEOUS at 12:35

## 2018-12-24 RX ADMIN — TRAZODONE HYDROCHLORIDE 100 MG: 50 TABLET ORAL at 22:43

## 2018-12-24 RX ADMIN — IPRATROPIUM BROMIDE AND ALBUTEROL SULFATE 3 ML: .5; 3 SOLUTION RESPIRATORY (INHALATION) at 15:00

## 2018-12-24 RX ADMIN — CEFEPIME HYDROCHLORIDE 2 G: 2 INJECTION, POWDER, FOR SOLUTION INTRAVENOUS at 05:38

## 2018-12-24 RX ADMIN — IPRATROPIUM BROMIDE AND ALBUTEROL SULFATE 3 ML: .5; 3 SOLUTION RESPIRATORY (INHALATION) at 20:43

## 2018-12-24 RX ADMIN — ATENOLOL 25 MG: 25 TABLET ORAL at 20:17

## 2018-12-24 RX ADMIN — TORSEMIDE 20 MG: 20 TABLET ORAL at 08:51

## 2018-12-24 RX ADMIN — CIPROFLOXACIN HYDROCHLORIDE 750 MG: 500 TABLET, FILM COATED ORAL at 08:51

## 2018-12-24 RX ADMIN — INSULIN LISPRO 2 UNITS: 100 INJECTION, SOLUTION INTRAVENOUS; SUBCUTANEOUS at 20:20

## 2018-12-24 RX ADMIN — ACETAMINOPHEN 650 MG: 325 TABLET ORAL at 09:00

## 2018-12-24 RX ADMIN — ATENOLOL 25 MG: 25 TABLET ORAL at 08:51

## 2018-12-24 ASSESSMENT — PAIN DESCRIPTION - PROGRESSION
CLINICAL_PROGRESSION: GRADUALLY IMPROVING

## 2018-12-24 ASSESSMENT — PAIN SCALES - GENERAL
PAINLEVEL_OUTOF10: 0
PAINLEVEL_OUTOF10: 2
PAINLEVEL_OUTOF10: 0
PAINLEVEL_OUTOF10: 0
PAINLEVEL_OUTOF10: 6
PAINLEVEL_OUTOF10: 0
PAINLEVEL_OUTOF10: 0
PAINLEVEL_OUTOF10: 3
PAINLEVEL_OUTOF10: 0
PAINLEVEL_OUTOF10: 0

## 2018-12-24 ASSESSMENT — PAIN DESCRIPTION - LOCATION
LOCATION: LEG
LOCATION: LEG

## 2018-12-24 ASSESSMENT — PAIN DESCRIPTION - ORIENTATION
ORIENTATION: RIGHT;LEFT
ORIENTATION: RIGHT;LEFT

## 2018-12-24 ASSESSMENT — PAIN DESCRIPTION - PAIN TYPE
TYPE: CHRONIC PAIN
TYPE: ACUTE PAIN

## 2018-12-24 ASSESSMENT — PAIN DESCRIPTION - FREQUENCY: FREQUENCY: INTERMITTENT

## 2018-12-24 ASSESSMENT — PAIN DESCRIPTION - DESCRIPTORS: DESCRIPTORS: CRAMPING;DISCOMFORT

## 2018-12-24 ASSESSMENT — PAIN DESCRIPTION - ONSET: ONSET: ON-GOING

## 2018-12-25 LAB
GLUCOSE BLD-MCNC: 148 MG/DL (ref 70–99)
GLUCOSE BLD-MCNC: 175 MG/DL (ref 70–99)
GLUCOSE BLD-MCNC: 197 MG/DL (ref 70–99)
GLUCOSE BLD-MCNC: 214 MG/DL (ref 70–99)

## 2018-12-25 PROCEDURE — 2580000003 HC RX 258: Performed by: INTERNAL MEDICINE

## 2018-12-25 PROCEDURE — 36592 COLLECT BLOOD FROM PICC: CPT

## 2018-12-25 PROCEDURE — 6360000002 HC RX W HCPCS: Performed by: FAMILY MEDICINE

## 2018-12-25 PROCEDURE — 97110 THERAPEUTIC EXERCISES: CPT

## 2018-12-25 PROCEDURE — 97116 GAIT TRAINING THERAPY: CPT

## 2018-12-25 PROCEDURE — 2580000003 HC RX 258: Performed by: FAMILY MEDICINE

## 2018-12-25 PROCEDURE — 82962 GLUCOSE BLOOD TEST: CPT

## 2018-12-25 PROCEDURE — 94640 AIRWAY INHALATION TREATMENT: CPT

## 2018-12-25 PROCEDURE — 1200000002 HC SEMI PRIVATE SWING BED

## 2018-12-25 PROCEDURE — 6370000000 HC RX 637 (ALT 250 FOR IP): Performed by: NURSE PRACTITIONER

## 2018-12-25 PROCEDURE — 6370000000 HC RX 637 (ALT 250 FOR IP): Performed by: INTERNAL MEDICINE

## 2018-12-25 PROCEDURE — 2700000000 HC OXYGEN THERAPY PER DAY

## 2018-12-25 PROCEDURE — 6370000000 HC RX 637 (ALT 250 FOR IP): Performed by: FAMILY MEDICINE

## 2018-12-25 PROCEDURE — 6360000002 HC RX W HCPCS: Performed by: INTERNAL MEDICINE

## 2018-12-25 RX ORDER — POLYETHYLENE GLYCOL 3350 17 G/17G
17 POWDER, FOR SOLUTION ORAL 2 TIMES DAILY
Status: DISCONTINUED | OUTPATIENT
Start: 2018-12-25 | End: 2019-01-21 | Stop reason: HOSPADM

## 2018-12-25 RX ADMIN — ATENOLOL 25 MG: 25 TABLET ORAL at 07:59

## 2018-12-25 RX ADMIN — CEFEPIME HYDROCHLORIDE 2 G: 2 INJECTION, POWDER, FOR SOLUTION INTRAVENOUS at 05:30

## 2018-12-25 RX ADMIN — CEFEPIME HYDROCHLORIDE 2 G: 2 INJECTION, POWDER, FOR SOLUTION INTRAVENOUS at 18:10

## 2018-12-25 RX ADMIN — MOMETASONE FUROATE AND FORMOTEROL FUMARATE DIHYDRATE 2 PUFF: 200; 5 AEROSOL RESPIRATORY (INHALATION) at 07:58

## 2018-12-25 RX ADMIN — POLYETHYLENE GLYCOL 3350 17 G: 17 POWDER, FOR SOLUTION ORAL at 18:11

## 2018-12-25 RX ADMIN — IPRATROPIUM BROMIDE AND ALBUTEROL SULFATE 3 ML: .5; 3 SOLUTION RESPIRATORY (INHALATION) at 11:15

## 2018-12-25 RX ADMIN — PRAVASTATIN SODIUM 20 MG: 10 TABLET ORAL at 22:31

## 2018-12-25 RX ADMIN — TORSEMIDE 20 MG: 20 TABLET ORAL at 18:11

## 2018-12-25 RX ADMIN — INSULIN LISPRO 1 UNITS: 100 INJECTION, SOLUTION INTRAVENOUS; SUBCUTANEOUS at 20:08

## 2018-12-25 RX ADMIN — ATENOLOL 25 MG: 25 TABLET ORAL at 22:31

## 2018-12-25 RX ADMIN — TRAZODONE HYDROCHLORIDE 100 MG: 50 TABLET ORAL at 22:31

## 2018-12-25 RX ADMIN — TORSEMIDE 20 MG: 20 TABLET ORAL at 07:59

## 2018-12-25 RX ADMIN — INSULIN LISPRO 2 UNITS: 100 INJECTION, SOLUTION INTRAVENOUS; SUBCUTANEOUS at 07:59

## 2018-12-25 RX ADMIN — CIPROFLOXACIN HYDROCHLORIDE 750 MG: 500 TABLET, FILM COATED ORAL at 22:31

## 2018-12-25 RX ADMIN — SPIRONOLACTONE 25 MG: 25 TABLET ORAL at 07:59

## 2018-12-25 RX ADMIN — IPRATROPIUM BROMIDE AND ALBUTEROL SULFATE 3 ML: .5; 3 SOLUTION RESPIRATORY (INHALATION) at 19:39

## 2018-12-25 RX ADMIN — Medication 1 CAPSULE: at 07:58

## 2018-12-25 RX ADMIN — GLIPIZIDE 10 MG: 10 TABLET ORAL at 07:59

## 2018-12-25 RX ADMIN — INSULIN LISPRO 1 UNITS: 100 INJECTION, SOLUTION INTRAVENOUS; SUBCUTANEOUS at 18:20

## 2018-12-25 RX ADMIN — INSULIN LISPRO 1 UNITS: 100 INJECTION, SOLUTION INTRAVENOUS; SUBCUTANEOUS at 12:09

## 2018-12-25 RX ADMIN — CIPROFLOXACIN HYDROCHLORIDE 750 MG: 500 TABLET, FILM COATED ORAL at 10:24

## 2018-12-25 RX ADMIN — MOMETASONE FUROATE AND FORMOTEROL FUMARATE DIHYDRATE 2 PUFF: 200; 5 AEROSOL RESPIRATORY (INHALATION) at 22:30

## 2018-12-25 RX ADMIN — IPRATROPIUM BROMIDE AND ALBUTEROL SULFATE 3 ML: .5; 3 SOLUTION RESPIRATORY (INHALATION) at 07:40

## 2018-12-25 RX ADMIN — ACETAMINOPHEN 650 MG: 325 TABLET ORAL at 22:31

## 2018-12-25 ASSESSMENT — PAIN SCALES - GENERAL
PAINLEVEL_OUTOF10: 1
PAINLEVEL_OUTOF10: 0
PAINLEVEL_OUTOF10: 5
PAINLEVEL_OUTOF10: 0
PAINLEVEL_OUTOF10: 3
PAINLEVEL_OUTOF10: 0

## 2018-12-25 ASSESSMENT — PAIN DESCRIPTION - ONSET: ONSET: ON-GOING

## 2018-12-25 ASSESSMENT — PAIN DESCRIPTION - LOCATION: LOCATION: GENERALIZED

## 2018-12-25 ASSESSMENT — PAIN DESCRIPTION - FREQUENCY: FREQUENCY: CONTINUOUS

## 2018-12-25 ASSESSMENT — PAIN DESCRIPTION - PAIN TYPE: TYPE: CHRONIC PAIN

## 2018-12-25 ASSESSMENT — PAIN DESCRIPTION - PROGRESSION: CLINICAL_PROGRESSION: NOT CHANGED

## 2018-12-25 ASSESSMENT — PAIN DESCRIPTION - DESCRIPTORS: DESCRIPTORS: ACHING

## 2018-12-26 ENCOUNTER — TELEPHONE (OUTPATIENT)
Dept: INFECTIOUS DISEASES | Age: 63
End: 2018-12-26

## 2018-12-26 LAB
GLUCOSE BLD-MCNC: 187 MG/DL (ref 70–99)
GLUCOSE BLD-MCNC: 193 MG/DL (ref 70–99)
GLUCOSE BLD-MCNC: 210 MG/DL (ref 70–99)
GLUCOSE BLD-MCNC: 212 MG/DL (ref 70–99)

## 2018-12-26 PROCEDURE — 2580000003 HC RX 258: Performed by: FAMILY MEDICINE

## 2018-12-26 PROCEDURE — 97110 THERAPEUTIC EXERCISES: CPT

## 2018-12-26 PROCEDURE — 82962 GLUCOSE BLOOD TEST: CPT

## 2018-12-26 PROCEDURE — 2700000000 HC OXYGEN THERAPY PER DAY

## 2018-12-26 PROCEDURE — 94640 AIRWAY INHALATION TREATMENT: CPT

## 2018-12-26 PROCEDURE — 6370000000 HC RX 637 (ALT 250 FOR IP): Performed by: INTERNAL MEDICINE

## 2018-12-26 PROCEDURE — 1200000002 HC SEMI PRIVATE SWING BED

## 2018-12-26 PROCEDURE — 6360000002 HC RX W HCPCS: Performed by: FAMILY MEDICINE

## 2018-12-26 PROCEDURE — 6370000000 HC RX 637 (ALT 250 FOR IP): Performed by: FAMILY MEDICINE

## 2018-12-26 PROCEDURE — 6370000000 HC RX 637 (ALT 250 FOR IP): Performed by: NURSE PRACTITIONER

## 2018-12-26 RX ADMIN — CEFEPIME HYDROCHLORIDE 2 G: 2 INJECTION, POWDER, FOR SOLUTION INTRAVENOUS at 05:45

## 2018-12-26 RX ADMIN — Medication 1 CAPSULE: at 08:09

## 2018-12-26 RX ADMIN — ATENOLOL 25 MG: 25 TABLET ORAL at 08:09

## 2018-12-26 RX ADMIN — TORSEMIDE 20 MG: 20 TABLET ORAL at 08:09

## 2018-12-26 RX ADMIN — INSULIN LISPRO 1 UNITS: 100 INJECTION, SOLUTION INTRAVENOUS; SUBCUTANEOUS at 12:34

## 2018-12-26 RX ADMIN — IPRATROPIUM BROMIDE AND ALBUTEROL SULFATE 3 ML: .5; 3 SOLUTION RESPIRATORY (INHALATION) at 11:15

## 2018-12-26 RX ADMIN — TORSEMIDE 20 MG: 20 TABLET ORAL at 17:21

## 2018-12-26 RX ADMIN — CIPROFLOXACIN HYDROCHLORIDE 750 MG: 500 TABLET, FILM COATED ORAL at 10:45

## 2018-12-26 RX ADMIN — IPRATROPIUM BROMIDE AND ALBUTEROL SULFATE 3 ML: .5; 3 SOLUTION RESPIRATORY (INHALATION) at 07:20

## 2018-12-26 RX ADMIN — CEFEPIME HYDROCHLORIDE 2 G: 2 INJECTION, POWDER, FOR SOLUTION INTRAVENOUS at 17:21

## 2018-12-26 RX ADMIN — PRAVASTATIN SODIUM 20 MG: 10 TABLET ORAL at 20:33

## 2018-12-26 RX ADMIN — POLYETHYLENE GLYCOL 3350 17 G: 17 POWDER, FOR SOLUTION ORAL at 20:34

## 2018-12-26 RX ADMIN — SPIRONOLACTONE 25 MG: 25 TABLET ORAL at 08:09

## 2018-12-26 RX ADMIN — INSULIN LISPRO 2 UNITS: 100 INJECTION, SOLUTION INTRAVENOUS; SUBCUTANEOUS at 08:08

## 2018-12-26 RX ADMIN — IPRATROPIUM BROMIDE AND ALBUTEROL SULFATE 3 ML: .5; 3 SOLUTION RESPIRATORY (INHALATION) at 15:59

## 2018-12-26 RX ADMIN — ATENOLOL 25 MG: 25 TABLET ORAL at 20:33

## 2018-12-26 RX ADMIN — IPRATROPIUM BROMIDE AND ALBUTEROL SULFATE 3 ML: .5; 3 SOLUTION RESPIRATORY (INHALATION) at 19:19

## 2018-12-26 RX ADMIN — MOMETASONE FUROATE AND FORMOTEROL FUMARATE DIHYDRATE 2 PUFF: 200; 5 AEROSOL RESPIRATORY (INHALATION) at 20:33

## 2018-12-26 RX ADMIN — INSULIN LISPRO 1 UNITS: 100 INJECTION, SOLUTION INTRAVENOUS; SUBCUTANEOUS at 20:34

## 2018-12-26 RX ADMIN — INSULIN LISPRO 1 UNITS: 100 INJECTION, SOLUTION INTRAVENOUS; SUBCUTANEOUS at 17:21

## 2018-12-26 RX ADMIN — GLIPIZIDE 10 MG: 10 TABLET ORAL at 08:09

## 2018-12-26 RX ADMIN — MOMETASONE FUROATE AND FORMOTEROL FUMARATE DIHYDRATE 2 PUFF: 200; 5 AEROSOL RESPIRATORY (INHALATION) at 08:09

## 2018-12-26 RX ADMIN — POLYETHYLENE GLYCOL 3350 17 G: 17 POWDER, FOR SOLUTION ORAL at 08:09

## 2018-12-26 RX ADMIN — ACETAMINOPHEN 650 MG: 325 TABLET ORAL at 22:40

## 2018-12-26 RX ADMIN — TRAZODONE HYDROCHLORIDE 100 MG: 50 TABLET ORAL at 22:40

## 2018-12-26 RX ADMIN — CIPROFLOXACIN HYDROCHLORIDE 750 MG: 500 TABLET, FILM COATED ORAL at 22:40

## 2018-12-26 ASSESSMENT — PAIN SCALES - GENERAL
PAINLEVEL_OUTOF10: 0
PAINLEVEL_OUTOF10: 1
PAINLEVEL_OUTOF10: 0

## 2018-12-27 LAB
ALBUMIN SERPL-MCNC: 4 GM/DL (ref 3.4–5)
ALP BLD-CCNC: 48 IU/L (ref 40–129)
ALT SERPL-CCNC: 22 U/L (ref 10–40)
ANION GAP SERPL CALCULATED.3IONS-SCNC: 11 MMOL/L (ref 4–16)
AST SERPL-CCNC: 20 IU/L (ref 15–37)
BILIRUB SERPL-MCNC: 0.4 MG/DL (ref 0–1)
BUN BLDV-MCNC: 62 MG/DL (ref 6–23)
CALCIUM SERPL-MCNC: 7.9 MG/DL (ref 8.3–10.6)
CHLORIDE BLD-SCNC: 99 MMOL/L (ref 99–110)
CO2: 31 MMOL/L (ref 21–32)
CREAT SERPL-MCNC: 1.5 MG/DL (ref 0.6–1.1)
ERYTHROCYTE SEDIMENTATION RATE: 79 MM/HR (ref 0–30)
GFR AFRICAN AMERICAN: 43 ML/MIN/1.73M2
GFR NON-AFRICAN AMERICAN: 35 ML/MIN/1.73M2
GLUCOSE BLD-MCNC: 197 MG/DL (ref 70–99)
GLUCOSE BLD-MCNC: 204 MG/DL (ref 70–99)
GLUCOSE BLD-MCNC: 208 MG/DL (ref 70–99)
GLUCOSE BLD-MCNC: 210 MG/DL (ref 70–99)
GLUCOSE BLD-MCNC: 228 MG/DL (ref 70–99)
HCT VFR BLD CALC: 24.7 % (ref 37–47)
HEMOGLOBIN: 7.4 GM/DL (ref 12.5–16)
HIGH SENSITIVE C-REACTIVE PROTEIN: 16.1 MG/L
MCH RBC QN AUTO: 29.8 PG (ref 27–31)
MCHC RBC AUTO-ENTMCNC: 30 % (ref 32–36)
MCV RBC AUTO: 99.6 FL (ref 78–100)
PDW BLD-RTO: 15.4 % (ref 11.7–14.9)
PLATELET # BLD: 186 K/CU MM (ref 140–440)
PMV BLD AUTO: 9.8 FL (ref 7.5–11.1)
POTASSIUM SERPL-SCNC: 4.3 MMOL/L (ref 3.5–5.1)
RBC # BLD: 2.48 M/CU MM (ref 4.2–5.4)
SODIUM BLD-SCNC: 141 MMOL/L (ref 135–145)
TOTAL PROTEIN: 6.9 GM/DL (ref 6.4–8.2)
WBC # BLD: 7.1 K/CU MM (ref 4–10.5)

## 2018-12-27 PROCEDURE — 6370000000 HC RX 637 (ALT 250 FOR IP): Performed by: FAMILY MEDICINE

## 2018-12-27 PROCEDURE — 6370000000 HC RX 637 (ALT 250 FOR IP): Performed by: INTERNAL MEDICINE

## 2018-12-27 PROCEDURE — 97116 GAIT TRAINING THERAPY: CPT

## 2018-12-27 PROCEDURE — 6370000000 HC RX 637 (ALT 250 FOR IP): Performed by: NURSE PRACTITIONER

## 2018-12-27 PROCEDURE — 86141 C-REACTIVE PROTEIN HS: CPT

## 2018-12-27 PROCEDURE — 97110 THERAPEUTIC EXERCISES: CPT

## 2018-12-27 PROCEDURE — 2700000000 HC OXYGEN THERAPY PER DAY

## 2018-12-27 PROCEDURE — 82962 GLUCOSE BLOOD TEST: CPT

## 2018-12-27 PROCEDURE — 2580000003 HC RX 258: Performed by: FAMILY MEDICINE

## 2018-12-27 PROCEDURE — 1200000002 HC SEMI PRIVATE SWING BED

## 2018-12-27 PROCEDURE — 6360000002 HC RX W HCPCS: Performed by: FAMILY MEDICINE

## 2018-12-27 PROCEDURE — 80053 COMPREHEN METABOLIC PANEL: CPT

## 2018-12-27 PROCEDURE — 85652 RBC SED RATE AUTOMATED: CPT

## 2018-12-27 PROCEDURE — 97530 THERAPEUTIC ACTIVITIES: CPT

## 2018-12-27 PROCEDURE — 36415 COLL VENOUS BLD VENIPUNCTURE: CPT

## 2018-12-27 PROCEDURE — 94640 AIRWAY INHALATION TREATMENT: CPT

## 2018-12-27 PROCEDURE — 85027 COMPLETE CBC AUTOMATED: CPT

## 2018-12-27 RX ORDER — CALCIUM CARBONATE 200(500)MG
500 TABLET,CHEWABLE ORAL 2 TIMES DAILY
Status: DISCONTINUED | OUTPATIENT
Start: 2018-12-27 | End: 2019-01-21 | Stop reason: HOSPADM

## 2018-12-27 RX ADMIN — POLYETHYLENE GLYCOL 3350 17 G: 17 POWDER, FOR SOLUTION ORAL at 08:00

## 2018-12-27 RX ADMIN — CIPROFLOXACIN HYDROCHLORIDE 750 MG: 500 TABLET, FILM COATED ORAL at 21:16

## 2018-12-27 RX ADMIN — IPRATROPIUM BROMIDE AND ALBUTEROL SULFATE 3 ML: .5; 3 SOLUTION RESPIRATORY (INHALATION) at 07:10

## 2018-12-27 RX ADMIN — INSULIN LISPRO 1 UNITS: 100 INJECTION, SOLUTION INTRAVENOUS; SUBCUTANEOUS at 21:17

## 2018-12-27 RX ADMIN — ATENOLOL 25 MG: 25 TABLET ORAL at 21:16

## 2018-12-27 RX ADMIN — INSULIN LISPRO 2 UNITS: 100 INJECTION, SOLUTION INTRAVENOUS; SUBCUTANEOUS at 08:01

## 2018-12-27 RX ADMIN — CALCIUM CARBONATE 500 MG: 500 TABLET, CHEWABLE ORAL at 17:40

## 2018-12-27 RX ADMIN — GLIPIZIDE 10 MG: 10 TABLET ORAL at 08:02

## 2018-12-27 RX ADMIN — IPRATROPIUM BROMIDE AND ALBUTEROL SULFATE 3 ML: .5; 3 SOLUTION RESPIRATORY (INHALATION) at 11:30

## 2018-12-27 RX ADMIN — TORSEMIDE 20 MG: 20 TABLET ORAL at 17:38

## 2018-12-27 RX ADMIN — Medication 1 CAPSULE: at 08:02

## 2018-12-27 RX ADMIN — ACETAMINOPHEN 650 MG: 325 TABLET ORAL at 22:38

## 2018-12-27 RX ADMIN — TORSEMIDE 20 MG: 20 TABLET ORAL at 08:02

## 2018-12-27 RX ADMIN — INSULIN LISPRO 2 UNITS: 100 INJECTION, SOLUTION INTRAVENOUS; SUBCUTANEOUS at 12:20

## 2018-12-27 RX ADMIN — PRAVASTATIN SODIUM 20 MG: 10 TABLET ORAL at 21:16

## 2018-12-27 RX ADMIN — INSULIN LISPRO 1 UNITS: 100 INJECTION, SOLUTION INTRAVENOUS; SUBCUTANEOUS at 17:37

## 2018-12-27 RX ADMIN — CIPROFLOXACIN HYDROCHLORIDE 750 MG: 500 TABLET, FILM COATED ORAL at 10:13

## 2018-12-27 RX ADMIN — SPIRONOLACTONE 25 MG: 25 TABLET ORAL at 08:02

## 2018-12-27 RX ADMIN — TRAZODONE HYDROCHLORIDE 100 MG: 50 TABLET ORAL at 22:38

## 2018-12-27 RX ADMIN — CEFEPIME HYDROCHLORIDE 2 G: 2 INJECTION, POWDER, FOR SOLUTION INTRAVENOUS at 06:10

## 2018-12-27 RX ADMIN — ATENOLOL 25 MG: 25 TABLET ORAL at 08:02

## 2018-12-27 RX ADMIN — IPRATROPIUM BROMIDE AND ALBUTEROL SULFATE 3 ML: .5; 3 SOLUTION RESPIRATORY (INHALATION) at 19:28

## 2018-12-27 RX ADMIN — POLYETHYLENE GLYCOL 3350 17 G: 17 POWDER, FOR SOLUTION ORAL at 21:15

## 2018-12-27 RX ADMIN — MOMETASONE FUROATE AND FORMOTEROL FUMARATE DIHYDRATE 2 PUFF: 200; 5 AEROSOL RESPIRATORY (INHALATION) at 21:15

## 2018-12-27 RX ADMIN — MOMETASONE FUROATE AND FORMOTEROL FUMARATE DIHYDRATE 2 PUFF: 200; 5 AEROSOL RESPIRATORY (INHALATION) at 08:01

## 2018-12-27 RX ADMIN — CEFEPIME HYDROCHLORIDE 2 G: 2 INJECTION, POWDER, FOR SOLUTION INTRAVENOUS at 17:38

## 2018-12-27 RX ADMIN — IPRATROPIUM BROMIDE AND ALBUTEROL SULFATE 3 ML: .5; 3 SOLUTION RESPIRATORY (INHALATION) at 15:19

## 2018-12-27 ASSESSMENT — PAIN SCALES - GENERAL
PAINLEVEL_OUTOF10: 0
PAINLEVEL_OUTOF10: 1
PAINLEVEL_OUTOF10: 0

## 2018-12-27 ASSESSMENT — PAIN DESCRIPTION - ORIENTATION: ORIENTATION: RIGHT;LEFT

## 2018-12-27 ASSESSMENT — PAIN DESCRIPTION - ONSET: ONSET: ON-GOING

## 2018-12-27 ASSESSMENT — PAIN DESCRIPTION - PROGRESSION: CLINICAL_PROGRESSION: NOT CHANGED

## 2018-12-27 ASSESSMENT — PAIN DESCRIPTION - PAIN TYPE: TYPE: CHRONIC PAIN

## 2018-12-27 ASSESSMENT — PAIN DESCRIPTION - DESCRIPTORS: DESCRIPTORS: ACHING

## 2018-12-27 ASSESSMENT — PAIN DESCRIPTION - FREQUENCY: FREQUENCY: CONTINUOUS

## 2018-12-27 ASSESSMENT — PAIN DESCRIPTION - LOCATION: LOCATION: GENERALIZED

## 2018-12-28 LAB
GLUCOSE BLD-MCNC: 137 MG/DL (ref 70–99)
GLUCOSE BLD-MCNC: 184 MG/DL (ref 70–99)
GLUCOSE BLD-MCNC: 198 MG/DL (ref 70–99)
GLUCOSE BLD-MCNC: 223 MG/DL (ref 70–99)

## 2018-12-28 PROCEDURE — 6370000000 HC RX 637 (ALT 250 FOR IP): Performed by: FAMILY MEDICINE

## 2018-12-28 PROCEDURE — 1200000002 HC SEMI PRIVATE SWING BED

## 2018-12-28 PROCEDURE — 6370000000 HC RX 637 (ALT 250 FOR IP): Performed by: NURSE PRACTITIONER

## 2018-12-28 PROCEDURE — 2700000000 HC OXYGEN THERAPY PER DAY

## 2018-12-28 PROCEDURE — 6360000002 HC RX W HCPCS: Performed by: FAMILY MEDICINE

## 2018-12-28 PROCEDURE — 2580000003 HC RX 258: Performed by: FAMILY MEDICINE

## 2018-12-28 PROCEDURE — 82962 GLUCOSE BLOOD TEST: CPT

## 2018-12-28 PROCEDURE — 94640 AIRWAY INHALATION TREATMENT: CPT

## 2018-12-28 PROCEDURE — 97530 THERAPEUTIC ACTIVITIES: CPT

## 2018-12-28 PROCEDURE — 6370000000 HC RX 637 (ALT 250 FOR IP): Performed by: INTERNAL MEDICINE

## 2018-12-28 PROCEDURE — 97110 THERAPEUTIC EXERCISES: CPT

## 2018-12-28 RX ADMIN — INSULIN LISPRO 1 UNITS: 100 INJECTION, SOLUTION INTRAVENOUS; SUBCUTANEOUS at 12:27

## 2018-12-28 RX ADMIN — CIPROFLOXACIN HYDROCHLORIDE 750 MG: 500 TABLET, FILM COATED ORAL at 09:04

## 2018-12-28 RX ADMIN — INSULIN LISPRO 1 UNITS: 100 INJECTION, SOLUTION INTRAVENOUS; SUBCUTANEOUS at 09:06

## 2018-12-28 RX ADMIN — TRAZODONE HYDROCHLORIDE 100 MG: 50 TABLET ORAL at 23:21

## 2018-12-28 RX ADMIN — CALCIUM CARBONATE 500 MG: 500 TABLET, CHEWABLE ORAL at 09:04

## 2018-12-28 RX ADMIN — GLIPIZIDE 10 MG: 10 TABLET ORAL at 09:04

## 2018-12-28 RX ADMIN — ATENOLOL 25 MG: 25 TABLET ORAL at 23:21

## 2018-12-28 RX ADMIN — IPRATROPIUM BROMIDE AND ALBUTEROL SULFATE 3 ML: .5; 3 SOLUTION RESPIRATORY (INHALATION) at 07:40

## 2018-12-28 RX ADMIN — TORSEMIDE 20 MG: 20 TABLET ORAL at 09:03

## 2018-12-28 RX ADMIN — IPRATROPIUM BROMIDE AND ALBUTEROL SULFATE 3 ML: .5; 3 SOLUTION RESPIRATORY (INHALATION) at 11:15

## 2018-12-28 RX ADMIN — ACETAMINOPHEN 650 MG: 325 TABLET ORAL at 09:03

## 2018-12-28 RX ADMIN — POLYETHYLENE GLYCOL 3350 17 G: 17 POWDER, FOR SOLUTION ORAL at 09:03

## 2018-12-28 RX ADMIN — TORSEMIDE 20 MG: 20 TABLET ORAL at 17:24

## 2018-12-28 RX ADMIN — SPIRONOLACTONE 25 MG: 25 TABLET ORAL at 09:04

## 2018-12-28 RX ADMIN — Medication 1 CAPSULE: at 09:04

## 2018-12-28 RX ADMIN — PRAVASTATIN SODIUM 20 MG: 10 TABLET ORAL at 23:21

## 2018-12-28 RX ADMIN — ACETAMINOPHEN 650 MG: 325 TABLET ORAL at 23:20

## 2018-12-28 RX ADMIN — IPRATROPIUM BROMIDE AND ALBUTEROL SULFATE 3 ML: .5; 3 SOLUTION RESPIRATORY (INHALATION) at 20:04

## 2018-12-28 RX ADMIN — CEFEPIME HYDROCHLORIDE 2 G: 2 INJECTION, POWDER, FOR SOLUTION INTRAVENOUS at 05:59

## 2018-12-28 RX ADMIN — IPRATROPIUM BROMIDE AND ALBUTEROL SULFATE 3 ML: .5; 3 SOLUTION RESPIRATORY (INHALATION) at 16:00

## 2018-12-28 RX ADMIN — ACETAMINOPHEN 650 MG: 325 TABLET ORAL at 14:57

## 2018-12-28 RX ADMIN — CEFEPIME HYDROCHLORIDE 2 G: 2 INJECTION, POWDER, FOR SOLUTION INTRAVENOUS at 17:24

## 2018-12-28 RX ADMIN — ATENOLOL 25 MG: 25 TABLET ORAL at 09:04

## 2018-12-28 RX ADMIN — CALCIUM CARBONATE 500 MG: 500 TABLET, CHEWABLE ORAL at 23:20

## 2018-12-28 RX ADMIN — CIPROFLOXACIN HYDROCHLORIDE 750 MG: 500 TABLET, FILM COATED ORAL at 23:20

## 2018-12-28 RX ADMIN — MOMETASONE FUROATE AND FORMOTEROL FUMARATE DIHYDRATE 2 PUFF: 200; 5 AEROSOL RESPIRATORY (INHALATION) at 09:05

## 2018-12-28 RX ADMIN — INSULIN LISPRO 1 UNITS: 100 INJECTION, SOLUTION INTRAVENOUS; SUBCUTANEOUS at 23:43

## 2018-12-28 RX ADMIN — MOMETASONE FUROATE AND FORMOTEROL FUMARATE DIHYDRATE 2 PUFF: 200; 5 AEROSOL RESPIRATORY (INHALATION) at 21:00

## 2018-12-28 ASSESSMENT — PAIN SCALES - GENERAL
PAINLEVEL_OUTOF10: 3
PAINLEVEL_OUTOF10: 0
PAINLEVEL_OUTOF10: 3
PAINLEVEL_OUTOF10: 9

## 2018-12-28 ASSESSMENT — PAIN DESCRIPTION - FREQUENCY: FREQUENCY: CONTINUOUS

## 2018-12-28 ASSESSMENT — PAIN DESCRIPTION - ONSET: ONSET: ON-GOING

## 2018-12-28 ASSESSMENT — PAIN DESCRIPTION - ORIENTATION: ORIENTATION: RIGHT;LEFT

## 2018-12-28 ASSESSMENT — PAIN DESCRIPTION - LOCATION: LOCATION: HEAD;NECK

## 2018-12-28 ASSESSMENT — PAIN DESCRIPTION - DESCRIPTORS: DESCRIPTORS: CONSTANT;DISCOMFORT

## 2018-12-28 ASSESSMENT — PAIN DESCRIPTION - PAIN TYPE: TYPE: ACUTE PAIN

## 2018-12-28 ASSESSMENT — PAIN DESCRIPTION - PROGRESSION: CLINICAL_PROGRESSION: NOT CHANGED

## 2018-12-29 LAB
GLUCOSE BLD-MCNC: 182 MG/DL (ref 70–99)
GLUCOSE BLD-MCNC: 207 MG/DL (ref 70–99)
GLUCOSE BLD-MCNC: 220 MG/DL (ref 70–99)
GLUCOSE BLD-MCNC: 265 MG/DL (ref 70–99)

## 2018-12-29 PROCEDURE — 97530 THERAPEUTIC ACTIVITIES: CPT

## 2018-12-29 PROCEDURE — 6370000000 HC RX 637 (ALT 250 FOR IP): Performed by: FAMILY MEDICINE

## 2018-12-29 PROCEDURE — 6370000000 HC RX 637 (ALT 250 FOR IP): Performed by: NURSE PRACTITIONER

## 2018-12-29 PROCEDURE — 2580000003 HC RX 258: Performed by: FAMILY MEDICINE

## 2018-12-29 PROCEDURE — 6360000002 HC RX W HCPCS: Performed by: FAMILY MEDICINE

## 2018-12-29 PROCEDURE — 94640 AIRWAY INHALATION TREATMENT: CPT

## 2018-12-29 PROCEDURE — 1200000002 HC SEMI PRIVATE SWING BED

## 2018-12-29 PROCEDURE — 6370000000 HC RX 637 (ALT 250 FOR IP): Performed by: INTERNAL MEDICINE

## 2018-12-29 PROCEDURE — 2700000000 HC OXYGEN THERAPY PER DAY

## 2018-12-29 PROCEDURE — 97110 THERAPEUTIC EXERCISES: CPT

## 2018-12-29 PROCEDURE — 82962 GLUCOSE BLOOD TEST: CPT

## 2018-12-29 RX ADMIN — MOMETASONE FUROATE AND FORMOTEROL FUMARATE DIHYDRATE 2 PUFF: 200; 5 AEROSOL RESPIRATORY (INHALATION) at 21:06

## 2018-12-29 RX ADMIN — CEFEPIME HYDROCHLORIDE 2 G: 2 INJECTION, POWDER, FOR SOLUTION INTRAVENOUS at 05:57

## 2018-12-29 RX ADMIN — INSULIN LISPRO 2 UNITS: 100 INJECTION, SOLUTION INTRAVENOUS; SUBCUTANEOUS at 12:29

## 2018-12-29 RX ADMIN — CALCIUM CARBONATE 500 MG: 500 TABLET, CHEWABLE ORAL at 21:05

## 2018-12-29 RX ADMIN — IPRATROPIUM BROMIDE AND ALBUTEROL SULFATE 3 ML: .5; 3 SOLUTION RESPIRATORY (INHALATION) at 20:34

## 2018-12-29 RX ADMIN — SPIRONOLACTONE 25 MG: 25 TABLET ORAL at 08:26

## 2018-12-29 RX ADMIN — ATENOLOL 25 MG: 25 TABLET ORAL at 08:26

## 2018-12-29 RX ADMIN — PRAVASTATIN SODIUM 20 MG: 10 TABLET ORAL at 21:05

## 2018-12-29 RX ADMIN — IPRATROPIUM BROMIDE AND ALBUTEROL SULFATE 3 ML: .5; 3 SOLUTION RESPIRATORY (INHALATION) at 11:24

## 2018-12-29 RX ADMIN — POLYETHYLENE GLYCOL 3350 17 G: 17 POWDER, FOR SOLUTION ORAL at 08:27

## 2018-12-29 RX ADMIN — INSULIN LISPRO 2 UNITS: 100 INJECTION, SOLUTION INTRAVENOUS; SUBCUTANEOUS at 08:25

## 2018-12-29 RX ADMIN — Medication 1 CAPSULE: at 08:25

## 2018-12-29 RX ADMIN — ACETAMINOPHEN 650 MG: 325 TABLET ORAL at 15:57

## 2018-12-29 RX ADMIN — INSULIN LISPRO 2 UNITS: 100 INJECTION, SOLUTION INTRAVENOUS; SUBCUTANEOUS at 21:10

## 2018-12-29 RX ADMIN — CALCIUM CARBONATE 500 MG: 500 TABLET, CHEWABLE ORAL at 08:25

## 2018-12-29 RX ADMIN — ATENOLOL 25 MG: 25 TABLET ORAL at 21:05

## 2018-12-29 RX ADMIN — CEFEPIME HYDROCHLORIDE 2 G: 2 INJECTION, POWDER, FOR SOLUTION INTRAVENOUS at 18:11

## 2018-12-29 RX ADMIN — TORSEMIDE 20 MG: 20 TABLET ORAL at 08:26

## 2018-12-29 RX ADMIN — CIPROFLOXACIN HYDROCHLORIDE 750 MG: 500 TABLET, FILM COATED ORAL at 11:31

## 2018-12-29 RX ADMIN — IPRATROPIUM BROMIDE AND ALBUTEROL SULFATE 3 ML: .5; 3 SOLUTION RESPIRATORY (INHALATION) at 15:11

## 2018-12-29 RX ADMIN — POLYETHYLENE GLYCOL 3350 17 G: 17 POWDER, FOR SOLUTION ORAL at 21:05

## 2018-12-29 RX ADMIN — CIPROFLOXACIN HYDROCHLORIDE 750 MG: 500 TABLET, FILM COATED ORAL at 23:12

## 2018-12-29 RX ADMIN — MOMETASONE FUROATE AND FORMOTEROL FUMARATE DIHYDRATE 2 PUFF: 200; 5 AEROSOL RESPIRATORY (INHALATION) at 08:27

## 2018-12-29 RX ADMIN — IPRATROPIUM BROMIDE AND ALBUTEROL SULFATE 3 ML: .5; 3 SOLUTION RESPIRATORY (INHALATION) at 07:25

## 2018-12-29 RX ADMIN — TRAZODONE HYDROCHLORIDE 100 MG: 50 TABLET ORAL at 23:12

## 2018-12-29 RX ADMIN — INSULIN LISPRO 1 UNITS: 100 INJECTION, SOLUTION INTRAVENOUS; SUBCUTANEOUS at 18:10

## 2018-12-29 RX ADMIN — ACETAMINOPHEN 650 MG: 325 TABLET ORAL at 23:12

## 2018-12-29 RX ADMIN — GLIPIZIDE 10 MG: 10 TABLET ORAL at 08:26

## 2018-12-29 RX ADMIN — TORSEMIDE 20 MG: 20 TABLET ORAL at 18:10

## 2018-12-29 ASSESSMENT — PAIN DESCRIPTION - LOCATION
LOCATION: BACK
LOCATION: LEG
LOCATION: BACK

## 2018-12-29 ASSESSMENT — PAIN DESCRIPTION - ORIENTATION
ORIENTATION: UPPER
ORIENTATION: RIGHT;LEFT
ORIENTATION: UPPER
ORIENTATION: UPPER

## 2018-12-29 ASSESSMENT — PAIN DESCRIPTION - DESCRIPTORS
DESCRIPTORS: ACHING;NAGGING
DESCRIPTORS: ACHING

## 2018-12-29 ASSESSMENT — PAIN SCALES - GENERAL
PAINLEVEL_OUTOF10: 5
PAINLEVEL_OUTOF10: 3
PAINLEVEL_OUTOF10: 4
PAINLEVEL_OUTOF10: 2
PAINLEVEL_OUTOF10: 5
PAINLEVEL_OUTOF10: 4
PAINLEVEL_OUTOF10: 3
PAINLEVEL_OUTOF10: 4
PAINLEVEL_OUTOF10: 4

## 2018-12-29 ASSESSMENT — PAIN DESCRIPTION - PAIN TYPE
TYPE: ACUTE PAIN

## 2018-12-29 ASSESSMENT — PAIN DESCRIPTION - ONSET: ONSET: ON-GOING

## 2018-12-29 ASSESSMENT — PAIN DESCRIPTION - FREQUENCY: FREQUENCY: CONTINUOUS

## 2018-12-29 ASSESSMENT — PAIN DESCRIPTION - PROGRESSION: CLINICAL_PROGRESSION: NOT CHANGED

## 2018-12-30 LAB
GLUCOSE BLD-MCNC: 181 MG/DL (ref 70–99)
GLUCOSE BLD-MCNC: 185 MG/DL (ref 70–99)
GLUCOSE BLD-MCNC: 200 MG/DL (ref 70–99)
GLUCOSE BLD-MCNC: 205 MG/DL (ref 70–99)
HCT VFR BLD CALC: 25.8 % (ref 37–47)
HCT VFR BLD CALC: 29.4 % (ref 37–47)
HEMOGLOBIN: 7.8 GM/DL (ref 12.5–16)
HEMOGLOBIN: 9.2 GM/DL (ref 12.5–16)
MCH RBC QN AUTO: 29.9 PG (ref 27–31)
MCHC RBC AUTO-ENTMCNC: 30.2 % (ref 32–36)
MCV RBC AUTO: 98.9 FL (ref 78–100)
PDW BLD-RTO: 15.3 % (ref 11.7–14.9)
PLATELET # BLD: 201 K/CU MM (ref 140–440)
PMV BLD AUTO: 9.8 FL (ref 7.5–11.1)
RBC # BLD: 2.61 M/CU MM (ref 4.2–5.4)
WBC # BLD: 7.2 K/CU MM (ref 4–10.5)

## 2018-12-30 PROCEDURE — 1200000002 HC SEMI PRIVATE SWING BED

## 2018-12-30 PROCEDURE — 6370000000 HC RX 637 (ALT 250 FOR IP): Performed by: FAMILY MEDICINE

## 2018-12-30 PROCEDURE — 6360000002 HC RX W HCPCS: Performed by: FAMILY MEDICINE

## 2018-12-30 PROCEDURE — 86900 BLOOD TYPING SEROLOGIC ABO: CPT

## 2018-12-30 PROCEDURE — 36592 COLLECT BLOOD FROM PICC: CPT

## 2018-12-30 PROCEDURE — 85018 HEMOGLOBIN: CPT

## 2018-12-30 PROCEDURE — 6370000000 HC RX 637 (ALT 250 FOR IP): Performed by: NURSE PRACTITIONER

## 2018-12-30 PROCEDURE — 86850 RBC ANTIBODY SCREEN: CPT

## 2018-12-30 PROCEDURE — 85014 HEMATOCRIT: CPT

## 2018-12-30 PROCEDURE — 2580000003 HC RX 258: Performed by: FAMILY MEDICINE

## 2018-12-30 PROCEDURE — P9016 RBC LEUKOCYTES REDUCED: HCPCS

## 2018-12-30 PROCEDURE — 85027 COMPLETE CBC AUTOMATED: CPT

## 2018-12-30 PROCEDURE — 86901 BLOOD TYPING SEROLOGIC RH(D): CPT

## 2018-12-30 PROCEDURE — 36430 TRANSFUSION BLD/BLD COMPNT: CPT

## 2018-12-30 PROCEDURE — 82962 GLUCOSE BLOOD TEST: CPT

## 2018-12-30 PROCEDURE — 36415 COLL VENOUS BLD VENIPUNCTURE: CPT

## 2018-12-30 PROCEDURE — 86922 COMPATIBILITY TEST ANTIGLOB: CPT

## 2018-12-30 PROCEDURE — 6370000000 HC RX 637 (ALT 250 FOR IP): Performed by: INTERNAL MEDICINE

## 2018-12-30 PROCEDURE — 94640 AIRWAY INHALATION TREATMENT: CPT

## 2018-12-30 PROCEDURE — 2700000000 HC OXYGEN THERAPY PER DAY

## 2018-12-30 RX ORDER — 0.9 % SODIUM CHLORIDE 0.9 %
250 INTRAVENOUS SOLUTION INTRAVENOUS ONCE
Status: DISCONTINUED | OUTPATIENT
Start: 2018-12-30 | End: 2019-01-21 | Stop reason: HOSPADM

## 2018-12-30 RX ADMIN — INSULIN LISPRO 1 UNITS: 100 INJECTION, SOLUTION INTRAVENOUS; SUBCUTANEOUS at 13:33

## 2018-12-30 RX ADMIN — INSULIN LISPRO 2 UNITS: 100 INJECTION, SOLUTION INTRAVENOUS; SUBCUTANEOUS at 17:45

## 2018-12-30 RX ADMIN — CEFEPIME HYDROCHLORIDE 2 G: 2 INJECTION, POWDER, FOR SOLUTION INTRAVENOUS at 06:03

## 2018-12-30 RX ADMIN — INSULIN LISPRO 1 UNITS: 100 INJECTION, SOLUTION INTRAVENOUS; SUBCUTANEOUS at 20:31

## 2018-12-30 RX ADMIN — MOMETASONE FUROATE AND FORMOTEROL FUMARATE DIHYDRATE 2 PUFF: 200; 5 AEROSOL RESPIRATORY (INHALATION) at 20:30

## 2018-12-30 RX ADMIN — MOMETASONE FUROATE AND FORMOTEROL FUMARATE DIHYDRATE 2 PUFF: 200; 5 AEROSOL RESPIRATORY (INHALATION) at 08:43

## 2018-12-30 RX ADMIN — TRAZODONE HYDROCHLORIDE 100 MG: 50 TABLET ORAL at 23:05

## 2018-12-30 RX ADMIN — CALCIUM CARBONATE 500 MG: 500 TABLET, CHEWABLE ORAL at 20:30

## 2018-12-30 RX ADMIN — Medication 1 CAPSULE: at 08:42

## 2018-12-30 RX ADMIN — PRAVASTATIN SODIUM 20 MG: 10 TABLET ORAL at 20:30

## 2018-12-30 RX ADMIN — GLIPIZIDE 10 MG: 10 TABLET ORAL at 08:42

## 2018-12-30 RX ADMIN — IPRATROPIUM BROMIDE AND ALBUTEROL SULFATE 3 ML: .5; 3 SOLUTION RESPIRATORY (INHALATION) at 20:35

## 2018-12-30 RX ADMIN — CALCIUM CARBONATE 500 MG: 500 TABLET, CHEWABLE ORAL at 08:42

## 2018-12-30 RX ADMIN — ATENOLOL 25 MG: 25 TABLET ORAL at 08:42

## 2018-12-30 RX ADMIN — INSULIN LISPRO 2 UNITS: 100 INJECTION, SOLUTION INTRAVENOUS; SUBCUTANEOUS at 08:41

## 2018-12-30 RX ADMIN — ATENOLOL 25 MG: 25 TABLET ORAL at 20:30

## 2018-12-30 RX ADMIN — TORSEMIDE 20 MG: 20 TABLET ORAL at 17:45

## 2018-12-30 RX ADMIN — POLYETHYLENE GLYCOL 3350 17 G: 17 POWDER, FOR SOLUTION ORAL at 08:42

## 2018-12-30 RX ADMIN — CIPROFLOXACIN HYDROCHLORIDE 750 MG: 500 TABLET, FILM COATED ORAL at 08:42

## 2018-12-30 RX ADMIN — SPIRONOLACTONE 25 MG: 25 TABLET ORAL at 08:42

## 2018-12-30 RX ADMIN — TORSEMIDE 20 MG: 20 TABLET ORAL at 08:42

## 2018-12-30 RX ADMIN — CEFEPIME HYDROCHLORIDE 2 G: 2 INJECTION, POWDER, FOR SOLUTION INTRAVENOUS at 20:39

## 2018-12-30 RX ADMIN — CIPROFLOXACIN HYDROCHLORIDE 750 MG: 500 TABLET, FILM COATED ORAL at 21:58

## 2018-12-30 RX ADMIN — IPRATROPIUM BROMIDE AND ALBUTEROL SULFATE 3 ML: .5; 3 SOLUTION RESPIRATORY (INHALATION) at 12:07

## 2018-12-30 RX ADMIN — IPRATROPIUM BROMIDE AND ALBUTEROL SULFATE 3 ML: .5; 3 SOLUTION RESPIRATORY (INHALATION) at 08:49

## 2018-12-30 RX ADMIN — IPRATROPIUM BROMIDE AND ALBUTEROL SULFATE 3 ML: .5; 3 SOLUTION RESPIRATORY (INHALATION) at 15:35

## 2018-12-30 RX ADMIN — ACETAMINOPHEN 650 MG: 325 TABLET ORAL at 20:37

## 2018-12-30 ASSESSMENT — PAIN SCALES - GENERAL
PAINLEVEL_OUTOF10: 0
PAINLEVEL_OUTOF10: 1
PAINLEVEL_OUTOF10: 0
PAINLEVEL_OUTOF10: 3
PAINLEVEL_OUTOF10: 0

## 2018-12-30 ASSESSMENT — PAIN DESCRIPTION - PAIN TYPE: TYPE: ACUTE PAIN

## 2018-12-30 ASSESSMENT — PAIN DESCRIPTION - LOCATION: LOCATION: BACK

## 2018-12-30 ASSESSMENT — PAIN DESCRIPTION - FREQUENCY: FREQUENCY: CONTINUOUS

## 2018-12-30 ASSESSMENT — PAIN DESCRIPTION - ONSET: ONSET: ON-GOING

## 2018-12-30 ASSESSMENT — PAIN DESCRIPTION - PROGRESSION: CLINICAL_PROGRESSION: NOT CHANGED

## 2018-12-30 ASSESSMENT — PAIN DESCRIPTION - DESCRIPTORS: DESCRIPTORS: ACHING

## 2018-12-30 ASSESSMENT — PAIN DESCRIPTION - ORIENTATION: ORIENTATION: LOWER

## 2018-12-31 LAB
GLUCOSE BLD-MCNC: 106 MG/DL (ref 70–99)
GLUCOSE BLD-MCNC: 204 MG/DL (ref 70–99)
GLUCOSE BLD-MCNC: 212 MG/DL (ref 70–99)
GLUCOSE BLD-MCNC: 236 MG/DL (ref 70–99)

## 2018-12-31 PROCEDURE — 6370000000 HC RX 637 (ALT 250 FOR IP): Performed by: INTERNAL MEDICINE

## 2018-12-31 PROCEDURE — 97110 THERAPEUTIC EXERCISES: CPT

## 2018-12-31 PROCEDURE — 97530 THERAPEUTIC ACTIVITIES: CPT

## 2018-12-31 PROCEDURE — 82962 GLUCOSE BLOOD TEST: CPT

## 2018-12-31 PROCEDURE — 6360000002 HC RX W HCPCS: Performed by: FAMILY MEDICINE

## 2018-12-31 PROCEDURE — 6370000000 HC RX 637 (ALT 250 FOR IP): Performed by: FAMILY MEDICINE

## 2018-12-31 PROCEDURE — 1200000002 HC SEMI PRIVATE SWING BED

## 2018-12-31 PROCEDURE — 94640 AIRWAY INHALATION TREATMENT: CPT

## 2018-12-31 PROCEDURE — 6370000000 HC RX 637 (ALT 250 FOR IP): Performed by: NURSE PRACTITIONER

## 2018-12-31 PROCEDURE — 2580000003 HC RX 258: Performed by: FAMILY MEDICINE

## 2018-12-31 PROCEDURE — 2700000000 HC OXYGEN THERAPY PER DAY

## 2018-12-31 RX ADMIN — SPIRONOLACTONE 25 MG: 25 TABLET ORAL at 09:15

## 2018-12-31 RX ADMIN — PRAVASTATIN SODIUM 20 MG: 10 TABLET ORAL at 20:14

## 2018-12-31 RX ADMIN — CALCIUM CARBONATE 500 MG: 500 TABLET, CHEWABLE ORAL at 09:15

## 2018-12-31 RX ADMIN — IPRATROPIUM BROMIDE AND ALBUTEROL SULFATE 3 ML: .5; 3 SOLUTION RESPIRATORY (INHALATION) at 15:10

## 2018-12-31 RX ADMIN — CIPROFLOXACIN HYDROCHLORIDE 750 MG: 500 TABLET, FILM COATED ORAL at 10:03

## 2018-12-31 RX ADMIN — MOMETASONE FUROATE AND FORMOTEROL FUMARATE DIHYDRATE 2 PUFF: 200; 5 AEROSOL RESPIRATORY (INHALATION) at 09:15

## 2018-12-31 RX ADMIN — IPRATROPIUM BROMIDE AND ALBUTEROL SULFATE 3 ML: .5; 3 SOLUTION RESPIRATORY (INHALATION) at 19:34

## 2018-12-31 RX ADMIN — IPRATROPIUM BROMIDE AND ALBUTEROL SULFATE 3 ML: .5; 3 SOLUTION RESPIRATORY (INHALATION) at 07:59

## 2018-12-31 RX ADMIN — CEFEPIME HYDROCHLORIDE 2 G: 2 INJECTION, POWDER, FOR SOLUTION INTRAVENOUS at 21:49

## 2018-12-31 RX ADMIN — Medication 1 CAPSULE: at 09:15

## 2018-12-31 RX ADMIN — CEFEPIME HYDROCHLORIDE 2 G: 2 INJECTION, POWDER, FOR SOLUTION INTRAVENOUS at 10:03

## 2018-12-31 RX ADMIN — TORSEMIDE 20 MG: 20 TABLET ORAL at 09:15

## 2018-12-31 RX ADMIN — TRAZODONE HYDROCHLORIDE 100 MG: 50 TABLET ORAL at 22:58

## 2018-12-31 RX ADMIN — INSULIN LISPRO 2 UNITS: 100 INJECTION, SOLUTION INTRAVENOUS; SUBCUTANEOUS at 09:16

## 2018-12-31 RX ADMIN — CIPROFLOXACIN HYDROCHLORIDE 750 MG: 500 TABLET, FILM COATED ORAL at 21:49

## 2018-12-31 RX ADMIN — INSULIN LISPRO 2 UNITS: 100 INJECTION, SOLUTION INTRAVENOUS; SUBCUTANEOUS at 12:17

## 2018-12-31 RX ADMIN — ATENOLOL 25 MG: 25 TABLET ORAL at 09:15

## 2018-12-31 RX ADMIN — ATENOLOL 25 MG: 25 TABLET ORAL at 20:14

## 2018-12-31 RX ADMIN — TORSEMIDE 20 MG: 20 TABLET ORAL at 17:44

## 2018-12-31 RX ADMIN — INSULIN LISPRO 1 UNITS: 100 INJECTION, SOLUTION INTRAVENOUS; SUBCUTANEOUS at 20:14

## 2018-12-31 RX ADMIN — POLYETHYLENE GLYCOL 3350 17 G: 17 POWDER, FOR SOLUTION ORAL at 09:16

## 2018-12-31 RX ADMIN — IPRATROPIUM BROMIDE AND ALBUTEROL SULFATE 3 ML: .5; 3 SOLUTION RESPIRATORY (INHALATION) at 10:42

## 2018-12-31 RX ADMIN — CALCIUM CARBONATE 500 MG: 500 TABLET, CHEWABLE ORAL at 20:14

## 2018-12-31 RX ADMIN — GLIPIZIDE 10 MG: 10 TABLET ORAL at 09:15

## 2018-12-31 RX ADMIN — ACETAMINOPHEN 650 MG: 325 TABLET ORAL at 22:58

## 2018-12-31 RX ADMIN — MOMETASONE FUROATE AND FORMOTEROL FUMARATE DIHYDRATE 2 PUFF: 200; 5 AEROSOL RESPIRATORY (INHALATION) at 20:14

## 2018-12-31 ASSESSMENT — PAIN SCALES - GENERAL
PAINLEVEL_OUTOF10: 0
PAINLEVEL_OUTOF10: 3
PAINLEVEL_OUTOF10: 0

## 2019-01-01 LAB
GLUCOSE BLD-MCNC: 128 MG/DL (ref 70–99)
GLUCOSE BLD-MCNC: 173 MG/DL (ref 70–99)
GLUCOSE BLD-MCNC: 204 MG/DL (ref 70–99)
GLUCOSE BLD-MCNC: 266 MG/DL (ref 70–99)
HCT VFR BLD CALC: 28.8 % (ref 37–47)
HEMOGLOBIN: 8.8 GM/DL (ref 12.5–16)
MCH RBC QN AUTO: 29.7 PG (ref 27–31)
MCHC RBC AUTO-ENTMCNC: 30.6 % (ref 32–36)
MCV RBC AUTO: 97.3 FL (ref 78–100)
PDW BLD-RTO: 15.2 % (ref 11.7–14.9)
PLATELET # BLD: 194 K/CU MM (ref 140–440)
PMV BLD AUTO: 9.6 FL (ref 7.5–11.1)
RBC # BLD: 2.96 M/CU MM (ref 4.2–5.4)
WBC # BLD: 7.2 K/CU MM (ref 4–10.5)

## 2019-01-01 PROCEDURE — 85027 COMPLETE CBC AUTOMATED: CPT

## 2019-01-01 PROCEDURE — 1200000002 HC SEMI PRIVATE SWING BED

## 2019-01-01 PROCEDURE — 6370000000 HC RX 637 (ALT 250 FOR IP): Performed by: FAMILY MEDICINE

## 2019-01-01 PROCEDURE — 97530 THERAPEUTIC ACTIVITIES: CPT

## 2019-01-01 PROCEDURE — 6370000000 HC RX 637 (ALT 250 FOR IP): Performed by: INTERNAL MEDICINE

## 2019-01-01 PROCEDURE — 36415 COLL VENOUS BLD VENIPUNCTURE: CPT

## 2019-01-01 PROCEDURE — 6360000002 HC RX W HCPCS: Performed by: FAMILY MEDICINE

## 2019-01-01 PROCEDURE — 6370000000 HC RX 637 (ALT 250 FOR IP): Performed by: NURSE PRACTITIONER

## 2019-01-01 PROCEDURE — 2700000000 HC OXYGEN THERAPY PER DAY

## 2019-01-01 PROCEDURE — 2580000003 HC RX 258: Performed by: FAMILY MEDICINE

## 2019-01-01 PROCEDURE — 97110 THERAPEUTIC EXERCISES: CPT

## 2019-01-01 PROCEDURE — 94640 AIRWAY INHALATION TREATMENT: CPT

## 2019-01-01 PROCEDURE — 82962 GLUCOSE BLOOD TEST: CPT

## 2019-01-01 RX ADMIN — INSULIN LISPRO 1 UNITS: 100 INJECTION, SOLUTION INTRAVENOUS; SUBCUTANEOUS at 12:29

## 2019-01-01 RX ADMIN — MOMETASONE FUROATE AND FORMOTEROL FUMARATE DIHYDRATE 2 PUFF: 200; 5 AEROSOL RESPIRATORY (INHALATION) at 19:53

## 2019-01-01 RX ADMIN — ATENOLOL 25 MG: 25 TABLET ORAL at 19:53

## 2019-01-01 RX ADMIN — IPRATROPIUM BROMIDE AND ALBUTEROL SULFATE 3 ML: .5; 3 SOLUTION RESPIRATORY (INHALATION) at 19:46

## 2019-01-01 RX ADMIN — CIPROFLOXACIN HYDROCHLORIDE 750 MG: 500 TABLET, FILM COATED ORAL at 09:48

## 2019-01-01 RX ADMIN — IPRATROPIUM BROMIDE AND ALBUTEROL SULFATE 3 ML: .5; 3 SOLUTION RESPIRATORY (INHALATION) at 14:38

## 2019-01-01 RX ADMIN — CALCIUM CARBONATE 500 MG: 500 TABLET, CHEWABLE ORAL at 19:53

## 2019-01-01 RX ADMIN — TORSEMIDE 20 MG: 20 TABLET ORAL at 09:48

## 2019-01-01 RX ADMIN — INSULIN LISPRO 2 UNITS: 100 INJECTION, SOLUTION INTRAVENOUS; SUBCUTANEOUS at 19:54

## 2019-01-01 RX ADMIN — SPIRONOLACTONE 25 MG: 25 TABLET ORAL at 09:48

## 2019-01-01 RX ADMIN — CEFEPIME HYDROCHLORIDE 2 G: 2 INJECTION, POWDER, FOR SOLUTION INTRAVENOUS at 17:58

## 2019-01-01 RX ADMIN — IPRATROPIUM BROMIDE AND ALBUTEROL SULFATE 3 ML: .5; 3 SOLUTION RESPIRATORY (INHALATION) at 11:22

## 2019-01-01 RX ADMIN — PRAVASTATIN SODIUM 20 MG: 10 TABLET ORAL at 19:53

## 2019-01-01 RX ADMIN — TRAZODONE HYDROCHLORIDE 100 MG: 50 TABLET ORAL at 21:53

## 2019-01-01 RX ADMIN — GLIPIZIDE 10 MG: 10 TABLET ORAL at 09:48

## 2019-01-01 RX ADMIN — CIPROFLOXACIN HYDROCHLORIDE 750 MG: 500 TABLET, FILM COATED ORAL at 21:55

## 2019-01-01 RX ADMIN — ACETAMINOPHEN 650 MG: 325 TABLET ORAL at 21:53

## 2019-01-01 RX ADMIN — INSULIN LISPRO 2 UNITS: 100 INJECTION, SOLUTION INTRAVENOUS; SUBCUTANEOUS at 08:47

## 2019-01-01 RX ADMIN — CALCIUM CARBONATE 500 MG: 500 TABLET, CHEWABLE ORAL at 09:49

## 2019-01-01 RX ADMIN — MOMETASONE FUROATE AND FORMOTEROL FUMARATE DIHYDRATE 2 PUFF: 200; 5 AEROSOL RESPIRATORY (INHALATION) at 09:52

## 2019-01-01 RX ADMIN — TORSEMIDE 20 MG: 20 TABLET ORAL at 17:58

## 2019-01-01 RX ADMIN — ATENOLOL 25 MG: 25 TABLET ORAL at 09:49

## 2019-01-01 RX ADMIN — CEFEPIME HYDROCHLORIDE 2 G: 2 INJECTION, POWDER, FOR SOLUTION INTRAVENOUS at 09:41

## 2019-01-01 RX ADMIN — Medication 1 CAPSULE: at 09:48

## 2019-01-01 RX ADMIN — IPRATROPIUM BROMIDE AND ALBUTEROL SULFATE 3 ML: .5; 3 SOLUTION RESPIRATORY (INHALATION) at 07:40

## 2019-01-01 ASSESSMENT — PAIN DESCRIPTION - LOCATION: LOCATION: BACK

## 2019-01-01 ASSESSMENT — PAIN DESCRIPTION - ONSET: ONSET: ON-GOING

## 2019-01-01 ASSESSMENT — PAIN DESCRIPTION - ORIENTATION: ORIENTATION: LOWER

## 2019-01-01 ASSESSMENT — PAIN DESCRIPTION - FREQUENCY: FREQUENCY: INTERMITTENT

## 2019-01-01 ASSESSMENT — PAIN SCALES - GENERAL
PAINLEVEL_OUTOF10: 1
PAINLEVEL_OUTOF10: 0

## 2019-01-01 ASSESSMENT — PAIN DESCRIPTION - PROGRESSION: CLINICAL_PROGRESSION: NOT CHANGED

## 2019-01-01 ASSESSMENT — PAIN DESCRIPTION - DESCRIPTORS: DESCRIPTORS: ACHING

## 2019-01-01 ASSESSMENT — PAIN DESCRIPTION - PAIN TYPE: TYPE: CHRONIC PAIN

## 2019-01-02 LAB
GLUCOSE BLD-MCNC: 179 MG/DL (ref 70–99)
GLUCOSE BLD-MCNC: 186 MG/DL (ref 70–99)
GLUCOSE BLD-MCNC: 213 MG/DL (ref 70–99)
GLUCOSE BLD-MCNC: 311 MG/DL (ref 70–99)

## 2019-01-02 PROCEDURE — 2580000003 HC RX 258: Performed by: FAMILY MEDICINE

## 2019-01-02 PROCEDURE — 1200000002 HC SEMI PRIVATE SWING BED

## 2019-01-02 PROCEDURE — 97535 SELF CARE MNGMENT TRAINING: CPT

## 2019-01-02 PROCEDURE — 97110 THERAPEUTIC EXERCISES: CPT

## 2019-01-02 PROCEDURE — 82962 GLUCOSE BLOOD TEST: CPT

## 2019-01-02 PROCEDURE — 6360000002 HC RX W HCPCS: Performed by: FAMILY MEDICINE

## 2019-01-02 PROCEDURE — 6370000000 HC RX 637 (ALT 250 FOR IP): Performed by: INTERNAL MEDICINE

## 2019-01-02 PROCEDURE — 6370000000 HC RX 637 (ALT 250 FOR IP): Performed by: FAMILY MEDICINE

## 2019-01-02 PROCEDURE — 6370000000 HC RX 637 (ALT 250 FOR IP): Performed by: NURSE PRACTITIONER

## 2019-01-02 PROCEDURE — 2700000000 HC OXYGEN THERAPY PER DAY

## 2019-01-02 PROCEDURE — 97116 GAIT TRAINING THERAPY: CPT

## 2019-01-02 PROCEDURE — 97530 THERAPEUTIC ACTIVITIES: CPT

## 2019-01-02 PROCEDURE — 94640 AIRWAY INHALATION TREATMENT: CPT

## 2019-01-02 RX ADMIN — ATENOLOL 25 MG: 25 TABLET ORAL at 07:50

## 2019-01-02 RX ADMIN — POLYETHYLENE GLYCOL 3350 17 G: 17 POWDER, FOR SOLUTION ORAL at 07:49

## 2019-01-02 RX ADMIN — INSULIN LISPRO 1 UNITS: 100 INJECTION, SOLUTION INTRAVENOUS; SUBCUTANEOUS at 17:37

## 2019-01-02 RX ADMIN — IPRATROPIUM BROMIDE AND ALBUTEROL SULFATE 3 ML: .5; 3 SOLUTION RESPIRATORY (INHALATION) at 15:45

## 2019-01-02 RX ADMIN — TRAZODONE HYDROCHLORIDE 100 MG: 50 TABLET ORAL at 23:03

## 2019-01-02 RX ADMIN — IPRATROPIUM BROMIDE AND ALBUTEROL SULFATE 3 ML: .5; 3 SOLUTION RESPIRATORY (INHALATION) at 11:12

## 2019-01-02 RX ADMIN — INSULIN LISPRO 2 UNITS: 100 INJECTION, SOLUTION INTRAVENOUS; SUBCUTANEOUS at 07:52

## 2019-01-02 RX ADMIN — PRAVASTATIN SODIUM 20 MG: 10 TABLET ORAL at 21:01

## 2019-01-02 RX ADMIN — CALCIUM CARBONATE 500 MG: 500 TABLET, CHEWABLE ORAL at 07:50

## 2019-01-02 RX ADMIN — CEFEPIME HYDROCHLORIDE 2 G: 2 INJECTION, POWDER, FOR SOLUTION INTRAVENOUS at 05:31

## 2019-01-02 RX ADMIN — MOMETASONE FUROATE AND FORMOTEROL FUMARATE DIHYDRATE 2 PUFF: 200; 5 AEROSOL RESPIRATORY (INHALATION) at 07:50

## 2019-01-02 RX ADMIN — ACETAMINOPHEN 650 MG: 325 TABLET ORAL at 13:45

## 2019-01-02 RX ADMIN — Medication 1 CAPSULE: at 07:50

## 2019-01-02 RX ADMIN — SPIRONOLACTONE 25 MG: 25 TABLET ORAL at 07:50

## 2019-01-02 RX ADMIN — INSULIN LISPRO 1 UNITS: 100 INJECTION, SOLUTION INTRAVENOUS; SUBCUTANEOUS at 11:45

## 2019-01-02 RX ADMIN — IPRATROPIUM BROMIDE AND ALBUTEROL SULFATE 3 ML: .5; 3 SOLUTION RESPIRATORY (INHALATION) at 20:34

## 2019-01-02 RX ADMIN — CEFEPIME HYDROCHLORIDE 2 G: 2 INJECTION, POWDER, FOR SOLUTION INTRAVENOUS at 17:37

## 2019-01-02 RX ADMIN — TORSEMIDE 20 MG: 20 TABLET ORAL at 17:38

## 2019-01-02 RX ADMIN — POLYETHYLENE GLYCOL 3350 17 G: 17 POWDER, FOR SOLUTION ORAL at 21:01

## 2019-01-02 RX ADMIN — ATENOLOL 25 MG: 25 TABLET ORAL at 21:01

## 2019-01-02 RX ADMIN — CALCIUM CARBONATE 500 MG: 500 TABLET, CHEWABLE ORAL at 21:01

## 2019-01-02 RX ADMIN — CIPROFLOXACIN HYDROCHLORIDE 750 MG: 500 TABLET, FILM COATED ORAL at 09:58

## 2019-01-02 RX ADMIN — MOMETASONE FUROATE AND FORMOTEROL FUMARATE DIHYDRATE 2 PUFF: 200; 5 AEROSOL RESPIRATORY (INHALATION) at 21:05

## 2019-01-02 RX ADMIN — TORSEMIDE 20 MG: 20 TABLET ORAL at 07:50

## 2019-01-02 RX ADMIN — INSULIN LISPRO 2 UNITS: 100 INJECTION, SOLUTION INTRAVENOUS; SUBCUTANEOUS at 21:04

## 2019-01-02 RX ADMIN — CIPROFLOXACIN HYDROCHLORIDE 750 MG: 500 TABLET, FILM COATED ORAL at 21:01

## 2019-01-02 RX ADMIN — GLIPIZIDE 10 MG: 10 TABLET ORAL at 07:50

## 2019-01-02 RX ADMIN — IPRATROPIUM BROMIDE AND ALBUTEROL SULFATE 3 ML: .5; 3 SOLUTION RESPIRATORY (INHALATION) at 07:31

## 2019-01-02 RX ADMIN — ACETAMINOPHEN 650 MG: 325 TABLET ORAL at 23:03

## 2019-01-02 ASSESSMENT — PAIN DESCRIPTION - PROGRESSION
CLINICAL_PROGRESSION: GRADUALLY WORSENING

## 2019-01-02 ASSESSMENT — PAIN DESCRIPTION - DESCRIPTORS: DESCRIPTORS: ACHING

## 2019-01-02 ASSESSMENT — PAIN DESCRIPTION - FREQUENCY: FREQUENCY: INTERMITTENT

## 2019-01-02 ASSESSMENT — PAIN DESCRIPTION - LOCATION: LOCATION: OTHER (COMMENT)

## 2019-01-02 ASSESSMENT — PAIN DESCRIPTION - PAIN TYPE
TYPE: ACUTE PAIN
TYPE: CHRONIC PAIN

## 2019-01-02 ASSESSMENT — PAIN DESCRIPTION - ONSET: ONSET: ON-GOING

## 2019-01-02 ASSESSMENT — PAIN SCALES - GENERAL
PAINLEVEL_OUTOF10: 6
PAINLEVEL_OUTOF10: 3
PAINLEVEL_OUTOF10: 0

## 2019-01-03 ENCOUNTER — OFFICE VISIT (OUTPATIENT)
Dept: CARDIOLOGY CLINIC | Age: 64
End: 2019-01-03
Payer: COMMERCIAL

## 2019-01-03 VITALS
DIASTOLIC BLOOD PRESSURE: 42 MMHG | HEIGHT: 67 IN | BODY MASS INDEX: 37.51 KG/M2 | HEART RATE: 80 BPM | WEIGHT: 239 LBS | SYSTOLIC BLOOD PRESSURE: 116 MMHG | RESPIRATION RATE: 16 BRPM

## 2019-01-03 DIAGNOSIS — J44.9 COPD, SEVERE (HCC): Chronic | ICD-10-CM

## 2019-01-03 DIAGNOSIS — Z79.4 TYPE 2 DIABETES MELLITUS WITH COMPLICATION, WITH LONG-TERM CURRENT USE OF INSULIN (HCC): ICD-10-CM

## 2019-01-03 DIAGNOSIS — I33.0 ACUTE AND SUBACUTE BACTERIAL ENDOCARDITIS: Primary | ICD-10-CM

## 2019-01-03 DIAGNOSIS — I10 HYPERTENSION, UNSPECIFIED TYPE: ICD-10-CM

## 2019-01-03 DIAGNOSIS — I10 ESSENTIAL HYPERTENSION: ICD-10-CM

## 2019-01-03 DIAGNOSIS — E78.5 HYPERLIPIDEMIA, UNSPECIFIED HYPERLIPIDEMIA TYPE: ICD-10-CM

## 2019-01-03 DIAGNOSIS — Z72.0 TOBACCO ABUSE DISORDER: ICD-10-CM

## 2019-01-03 DIAGNOSIS — I50.43 ACUTE ON CHRONIC COMBINED SYSTOLIC AND DIASTOLIC CONGESTIVE HEART FAILURE (HCC): ICD-10-CM

## 2019-01-03 DIAGNOSIS — I38 ENDOCARDITIS, UNSPECIFIED CHRONICITY, UNSPECIFIED ENDOCARDITIS TYPE: ICD-10-CM

## 2019-01-03 DIAGNOSIS — E11.9 TYPE 2 DIABETES MELLITUS WITHOUT COMPLICATION, WITHOUT LONG-TERM CURRENT USE OF INSULIN (HCC): ICD-10-CM

## 2019-01-03 DIAGNOSIS — I38 VALVULAR DISEASE: ICD-10-CM

## 2019-01-03 DIAGNOSIS — Z72.0 TOBACCO ABUSE: ICD-10-CM

## 2019-01-03 DIAGNOSIS — I27.20 PULMONARY HYPERTENSION (HCC): ICD-10-CM

## 2019-01-03 DIAGNOSIS — E78.2 MIXED HYPERLIPIDEMIA: ICD-10-CM

## 2019-01-03 DIAGNOSIS — E11.8 TYPE 2 DIABETES MELLITUS WITH COMPLICATION, WITH LONG-TERM CURRENT USE OF INSULIN (HCC): ICD-10-CM

## 2019-01-03 LAB
ALBUMIN SERPL-MCNC: 3.9 GM/DL (ref 3.4–5)
ALP BLD-CCNC: 49 IU/L (ref 40–129)
ALT SERPL-CCNC: 17 U/L (ref 10–40)
ANION GAP SERPL CALCULATED.3IONS-SCNC: 8 MMOL/L (ref 4–16)
AST SERPL-CCNC: 12 IU/L (ref 15–37)
BILIRUB SERPL-MCNC: 0.4 MG/DL (ref 0–1)
BUN BLDV-MCNC: 52 MG/DL (ref 6–23)
CALCIUM SERPL-MCNC: 8.5 MG/DL (ref 8.3–10.6)
CHLORIDE BLD-SCNC: 96 MMOL/L (ref 99–110)
CO2: 34 MMOL/L (ref 21–32)
CREAT SERPL-MCNC: 1.4 MG/DL (ref 0.6–1.1)
ERYTHROCYTE SEDIMENTATION RATE: 51 MM/HR (ref 0–30)
GFR AFRICAN AMERICAN: 46 ML/MIN/1.73M2
GFR NON-AFRICAN AMERICAN: 38 ML/MIN/1.73M2
GLUCOSE BLD-MCNC: 182 MG/DL (ref 70–99)
GLUCOSE BLD-MCNC: 184 MG/DL (ref 70–99)
GLUCOSE BLD-MCNC: 198 MG/DL (ref 70–99)
GLUCOSE BLD-MCNC: 236 MG/DL (ref 70–99)
GLUCOSE BLD-MCNC: 248 MG/DL (ref 70–99)
HCT VFR BLD CALC: 30.4 % (ref 37–47)
HEMOGLOBIN: 9.4 GM/DL (ref 12.5–16)
HIGH SENSITIVE C-REACTIVE PROTEIN: 12.1 MG/L
MCH RBC QN AUTO: 30.2 PG (ref 27–31)
MCHC RBC AUTO-ENTMCNC: 30.9 % (ref 32–36)
MCV RBC AUTO: 97.7 FL (ref 78–100)
PDW BLD-RTO: 14.6 % (ref 11.7–14.9)
PLATELET # BLD: 205 K/CU MM (ref 140–440)
PMV BLD AUTO: 9.2 FL (ref 7.5–11.1)
POTASSIUM SERPL-SCNC: 4.2 MMOL/L (ref 3.5–5.1)
RBC # BLD: 3.11 M/CU MM (ref 4.2–5.4)
SODIUM BLD-SCNC: 138 MMOL/L (ref 135–145)
TOTAL PROTEIN: 7.1 GM/DL (ref 6.4–8.2)
WBC # BLD: 7.1 K/CU MM (ref 4–10.5)

## 2019-01-03 PROCEDURE — 86141 C-REACTIVE PROTEIN HS: CPT

## 2019-01-03 PROCEDURE — 6370000000 HC RX 637 (ALT 250 FOR IP): Performed by: INTERNAL MEDICINE

## 2019-01-03 PROCEDURE — 2580000003 HC RX 258: Performed by: FAMILY MEDICINE

## 2019-01-03 PROCEDURE — 6370000000 HC RX 637 (ALT 250 FOR IP): Performed by: FAMILY MEDICINE

## 2019-01-03 PROCEDURE — 97530 THERAPEUTIC ACTIVITIES: CPT

## 2019-01-03 PROCEDURE — 1200000002 HC SEMI PRIVATE SWING BED

## 2019-01-03 PROCEDURE — 97116 GAIT TRAINING THERAPY: CPT

## 2019-01-03 PROCEDURE — 82962 GLUCOSE BLOOD TEST: CPT

## 2019-01-03 PROCEDURE — 99214 OFFICE O/P EST MOD 30 MIN: CPT | Performed by: INTERNAL MEDICINE

## 2019-01-03 PROCEDURE — 85027 COMPLETE CBC AUTOMATED: CPT

## 2019-01-03 PROCEDURE — 94640 AIRWAY INHALATION TREATMENT: CPT

## 2019-01-03 PROCEDURE — 2700000000 HC OXYGEN THERAPY PER DAY

## 2019-01-03 PROCEDURE — 36415 COLL VENOUS BLD VENIPUNCTURE: CPT

## 2019-01-03 PROCEDURE — 97110 THERAPEUTIC EXERCISES: CPT

## 2019-01-03 PROCEDURE — 6360000002 HC RX W HCPCS: Performed by: FAMILY MEDICINE

## 2019-01-03 PROCEDURE — 6370000000 HC RX 637 (ALT 250 FOR IP): Performed by: NURSE PRACTITIONER

## 2019-01-03 PROCEDURE — 80053 COMPREHEN METABOLIC PANEL: CPT

## 2019-01-03 PROCEDURE — 85652 RBC SED RATE AUTOMATED: CPT

## 2019-01-03 RX ORDER — TORSEMIDE 20 MG/1
40 TABLET ORAL 2 TIMES DAILY
Status: DISCONTINUED | OUTPATIENT
Start: 2019-01-03 | End: 2019-01-14

## 2019-01-03 RX ORDER — TORSEMIDE 20 MG/1
20 TABLET ORAL 2 TIMES DAILY
Qty: 30 TABLET | Refills: 3 | Status: SHIPPED | OUTPATIENT
Start: 2019-01-03 | End: 2019-01-21 | Stop reason: HOSPADM

## 2019-01-03 RX ORDER — SPIRONOLACTONE 25 MG/1
25 TABLET ORAL DAILY
COMMUNITY
End: 2019-02-06 | Stop reason: SDUPTHER

## 2019-01-03 RX ORDER — TORSEMIDE 20 MG/1
40 TABLET ORAL 2 TIMES DAILY
Qty: 30 TABLET | Refills: 3 | Status: SHIPPED | OUTPATIENT
Start: 2019-01-03 | End: 2019-01-21

## 2019-01-03 RX ORDER — CIPROFLOXACIN 750 MG/1
750 TABLET, FILM COATED ORAL 2 TIMES DAILY
Status: ON HOLD | COMMUNITY
End: 2019-01-21 | Stop reason: HOSPADM

## 2019-01-03 RX ADMIN — POLYETHYLENE GLYCOL 3350 17 G: 17 POWDER, FOR SOLUTION ORAL at 08:15

## 2019-01-03 RX ADMIN — INSULIN LISPRO 1 UNITS: 100 INJECTION, SOLUTION INTRAVENOUS; SUBCUTANEOUS at 21:00

## 2019-01-03 RX ADMIN — CALCIUM CARBONATE 500 MG: 500 TABLET, CHEWABLE ORAL at 08:15

## 2019-01-03 RX ADMIN — MOMETASONE FUROATE AND FORMOTEROL FUMARATE DIHYDRATE 2 PUFF: 200; 5 AEROSOL RESPIRATORY (INHALATION) at 08:15

## 2019-01-03 RX ADMIN — TORSEMIDE 40 MG: 20 TABLET ORAL at 17:37

## 2019-01-03 RX ADMIN — CALCIUM CARBONATE 500 MG: 500 TABLET, CHEWABLE ORAL at 20:54

## 2019-01-03 RX ADMIN — INSULIN LISPRO 2 UNITS: 100 INJECTION, SOLUTION INTRAVENOUS; SUBCUTANEOUS at 11:10

## 2019-01-03 RX ADMIN — IPRATROPIUM BROMIDE AND ALBUTEROL SULFATE 3 ML: .5; 3 SOLUTION RESPIRATORY (INHALATION) at 15:34

## 2019-01-03 RX ADMIN — TORSEMIDE 20 MG: 20 TABLET ORAL at 08:15

## 2019-01-03 RX ADMIN — CIPROFLOXACIN HYDROCHLORIDE 750 MG: 500 TABLET, FILM COATED ORAL at 09:45

## 2019-01-03 RX ADMIN — ACETAMINOPHEN 650 MG: 325 TABLET ORAL at 22:28

## 2019-01-03 RX ADMIN — SPIRONOLACTONE 25 MG: 25 TABLET ORAL at 08:15

## 2019-01-03 RX ADMIN — PRAVASTATIN SODIUM 20 MG: 10 TABLET ORAL at 20:55

## 2019-01-03 RX ADMIN — INSULIN LISPRO 1 UNITS: 100 INJECTION, SOLUTION INTRAVENOUS; SUBCUTANEOUS at 08:14

## 2019-01-03 RX ADMIN — MOMETASONE FUROATE AND FORMOTEROL FUMARATE DIHYDRATE 2 PUFF: 200; 5 AEROSOL RESPIRATORY (INHALATION) at 20:54

## 2019-01-03 RX ADMIN — IPRATROPIUM BROMIDE AND ALBUTEROL SULFATE 3 ML: .5; 3 SOLUTION RESPIRATORY (INHALATION) at 11:15

## 2019-01-03 RX ADMIN — CEFEPIME HYDROCHLORIDE 2 G: 2 INJECTION, POWDER, FOR SOLUTION INTRAVENOUS at 05:32

## 2019-01-03 RX ADMIN — Medication 1 CAPSULE: at 08:15

## 2019-01-03 RX ADMIN — TRAZODONE HYDROCHLORIDE 100 MG: 50 TABLET ORAL at 22:28

## 2019-01-03 RX ADMIN — INSULIN LISPRO 1 UNITS: 100 INJECTION, SOLUTION INTRAVENOUS; SUBCUTANEOUS at 17:36

## 2019-01-03 RX ADMIN — GLIPIZIDE 10 MG: 10 TABLET ORAL at 09:45

## 2019-01-03 RX ADMIN — IPRATROPIUM BROMIDE AND ALBUTEROL SULFATE 3 ML: .5; 3 SOLUTION RESPIRATORY (INHALATION) at 07:45

## 2019-01-03 RX ADMIN — IPRATROPIUM BROMIDE AND ALBUTEROL SULFATE 3 ML: .5; 3 SOLUTION RESPIRATORY (INHALATION) at 20:18

## 2019-01-03 RX ADMIN — CIPROFLOXACIN HYDROCHLORIDE 750 MG: 500 TABLET, FILM COATED ORAL at 20:54

## 2019-01-03 RX ADMIN — CEFEPIME HYDROCHLORIDE 2 G: 2 INJECTION, POWDER, FOR SOLUTION INTRAVENOUS at 17:37

## 2019-01-03 RX ADMIN — ATENOLOL 25 MG: 25 TABLET ORAL at 08:15

## 2019-01-03 RX ADMIN — ATENOLOL 25 MG: 25 TABLET ORAL at 20:54

## 2019-01-03 ASSESSMENT — PAIN SCALES - GENERAL
PAINLEVEL_OUTOF10: 0
PAINLEVEL_OUTOF10: 3
PAINLEVEL_OUTOF10: 0

## 2019-01-04 LAB
GLUCOSE BLD-MCNC: 163 MG/DL (ref 70–99)
GLUCOSE BLD-MCNC: 179 MG/DL (ref 70–99)
GLUCOSE BLD-MCNC: 219 MG/DL (ref 70–99)
GLUCOSE BLD-MCNC: 280 MG/DL (ref 70–99)

## 2019-01-04 PROCEDURE — 97110 THERAPEUTIC EXERCISES: CPT

## 2019-01-04 PROCEDURE — 82962 GLUCOSE BLOOD TEST: CPT

## 2019-01-04 PROCEDURE — 6360000002 HC RX W HCPCS: Performed by: FAMILY MEDICINE

## 2019-01-04 PROCEDURE — 1200000002 HC SEMI PRIVATE SWING BED

## 2019-01-04 PROCEDURE — 6370000000 HC RX 637 (ALT 250 FOR IP): Performed by: INTERNAL MEDICINE

## 2019-01-04 PROCEDURE — 6370000000 HC RX 637 (ALT 250 FOR IP): Performed by: FAMILY MEDICINE

## 2019-01-04 PROCEDURE — 2700000000 HC OXYGEN THERAPY PER DAY

## 2019-01-04 PROCEDURE — 97535 SELF CARE MNGMENT TRAINING: CPT

## 2019-01-04 PROCEDURE — 6370000000 HC RX 637 (ALT 250 FOR IP): Performed by: NURSE PRACTITIONER

## 2019-01-04 PROCEDURE — 2580000003 HC RX 258: Performed by: FAMILY MEDICINE

## 2019-01-04 PROCEDURE — 94640 AIRWAY INHALATION TREATMENT: CPT

## 2019-01-04 RX ADMIN — INSULIN LISPRO 2 UNITS: 100 INJECTION, SOLUTION INTRAVENOUS; SUBCUTANEOUS at 22:45

## 2019-01-04 RX ADMIN — TORSEMIDE 40 MG: 20 TABLET ORAL at 09:13

## 2019-01-04 RX ADMIN — CEFEPIME HYDROCHLORIDE 2 G: 2 INJECTION, POWDER, FOR SOLUTION INTRAVENOUS at 06:00

## 2019-01-04 RX ADMIN — CALCIUM CARBONATE 500 MG: 500 TABLET, CHEWABLE ORAL at 09:13

## 2019-01-04 RX ADMIN — CALCIUM CARBONATE 500 MG: 500 TABLET, CHEWABLE ORAL at 22:44

## 2019-01-04 RX ADMIN — INSULIN LISPRO 1 UNITS: 100 INJECTION, SOLUTION INTRAVENOUS; SUBCUTANEOUS at 09:15

## 2019-01-04 RX ADMIN — IPRATROPIUM BROMIDE AND ALBUTEROL SULFATE 3 ML: .5; 3 SOLUTION RESPIRATORY (INHALATION) at 20:27

## 2019-01-04 RX ADMIN — INSULIN LISPRO 1 UNITS: 100 INJECTION, SOLUTION INTRAVENOUS; SUBCUTANEOUS at 12:45

## 2019-01-04 RX ADMIN — IPRATROPIUM BROMIDE AND ALBUTEROL SULFATE 3 ML: .5; 3 SOLUTION RESPIRATORY (INHALATION) at 15:30

## 2019-01-04 RX ADMIN — CIPROFLOXACIN HYDROCHLORIDE 750 MG: 500 TABLET, FILM COATED ORAL at 09:13

## 2019-01-04 RX ADMIN — CIPROFLOXACIN HYDROCHLORIDE 750 MG: 500 TABLET, FILM COATED ORAL at 22:44

## 2019-01-04 RX ADMIN — INSULIN LISPRO 2 UNITS: 100 INJECTION, SOLUTION INTRAVENOUS; SUBCUTANEOUS at 17:56

## 2019-01-04 RX ADMIN — GLIPIZIDE 10 MG: 10 TABLET ORAL at 09:14

## 2019-01-04 RX ADMIN — SPIRONOLACTONE 25 MG: 25 TABLET ORAL at 09:14

## 2019-01-04 RX ADMIN — ATENOLOL 25 MG: 25 TABLET ORAL at 22:43

## 2019-01-04 RX ADMIN — IPRATROPIUM BROMIDE AND ALBUTEROL SULFATE 3 ML: .5; 3 SOLUTION RESPIRATORY (INHALATION) at 07:25

## 2019-01-04 RX ADMIN — PRAVASTATIN SODIUM 20 MG: 10 TABLET ORAL at 22:44

## 2019-01-04 RX ADMIN — TRAZODONE HYDROCHLORIDE 100 MG: 50 TABLET ORAL at 22:44

## 2019-01-04 RX ADMIN — MOMETASONE FUROATE AND FORMOTEROL FUMARATE DIHYDRATE 2 PUFF: 200; 5 AEROSOL RESPIRATORY (INHALATION) at 22:43

## 2019-01-04 RX ADMIN — ACETAMINOPHEN 650 MG: 325 TABLET ORAL at 22:44

## 2019-01-04 RX ADMIN — TORSEMIDE 40 MG: 20 TABLET ORAL at 17:56

## 2019-01-04 RX ADMIN — ATENOLOL 25 MG: 25 TABLET ORAL at 09:13

## 2019-01-04 RX ADMIN — CEFEPIME HYDROCHLORIDE 2 G: 2 INJECTION, POWDER, FOR SOLUTION INTRAVENOUS at 17:56

## 2019-01-04 RX ADMIN — MOMETASONE FUROATE AND FORMOTEROL FUMARATE DIHYDRATE 2 PUFF: 200; 5 AEROSOL RESPIRATORY (INHALATION) at 09:15

## 2019-01-04 RX ADMIN — Medication 1 CAPSULE: at 09:14

## 2019-01-04 RX ADMIN — ACETAMINOPHEN 650 MG: 325 TABLET ORAL at 14:54

## 2019-01-04 RX ADMIN — IPRATROPIUM BROMIDE AND ALBUTEROL SULFATE 3 ML: .5; 3 SOLUTION RESPIRATORY (INHALATION) at 11:10

## 2019-01-04 ASSESSMENT — PAIN SCALES - GENERAL
PAINLEVEL_OUTOF10: 0
PAINLEVEL_OUTOF10: 0
PAINLEVEL_OUTOF10: 2
PAINLEVEL_OUTOF10: 5
PAINLEVEL_OUTOF10: 0

## 2019-01-04 ASSESSMENT — PAIN DESCRIPTION - PAIN TYPE: TYPE: CHRONIC PAIN

## 2019-01-04 ASSESSMENT — PAIN DESCRIPTION - LOCATION: LOCATION: GENERALIZED

## 2019-01-05 LAB
GLUCOSE BLD-MCNC: 176 MG/DL (ref 70–99)
GLUCOSE BLD-MCNC: 188 MG/DL (ref 70–99)
GLUCOSE BLD-MCNC: 204 MG/DL (ref 70–99)
GLUCOSE BLD-MCNC: 98 MG/DL (ref 70–99)

## 2019-01-05 PROCEDURE — 6370000000 HC RX 637 (ALT 250 FOR IP): Performed by: INTERNAL MEDICINE

## 2019-01-05 PROCEDURE — 2700000000 HC OXYGEN THERAPY PER DAY

## 2019-01-05 PROCEDURE — 6370000000 HC RX 637 (ALT 250 FOR IP): Performed by: NURSE PRACTITIONER

## 2019-01-05 PROCEDURE — 82962 GLUCOSE BLOOD TEST: CPT

## 2019-01-05 PROCEDURE — 6370000000 HC RX 637 (ALT 250 FOR IP): Performed by: FAMILY MEDICINE

## 2019-01-05 PROCEDURE — 2580000003 HC RX 258: Performed by: FAMILY MEDICINE

## 2019-01-05 PROCEDURE — 1200000002 HC SEMI PRIVATE SWING BED

## 2019-01-05 PROCEDURE — 97110 THERAPEUTIC EXERCISES: CPT

## 2019-01-05 PROCEDURE — 6360000002 HC RX W HCPCS: Performed by: FAMILY MEDICINE

## 2019-01-05 PROCEDURE — 97116 GAIT TRAINING THERAPY: CPT

## 2019-01-05 PROCEDURE — 94640 AIRWAY INHALATION TREATMENT: CPT

## 2019-01-05 RX ADMIN — Medication 1 CAPSULE: at 09:57

## 2019-01-05 RX ADMIN — CIPROFLOXACIN HYDROCHLORIDE 750 MG: 500 TABLET, FILM COATED ORAL at 09:57

## 2019-01-05 RX ADMIN — ATENOLOL 25 MG: 25 TABLET ORAL at 09:57

## 2019-01-05 RX ADMIN — IPRATROPIUM BROMIDE AND ALBUTEROL SULFATE 3 ML: .5; 3 SOLUTION RESPIRATORY (INHALATION) at 07:15

## 2019-01-05 RX ADMIN — SPIRONOLACTONE 25 MG: 25 TABLET ORAL at 09:58

## 2019-01-05 RX ADMIN — GLIPIZIDE 10 MG: 10 TABLET ORAL at 09:57

## 2019-01-05 RX ADMIN — INSULIN LISPRO 2 UNITS: 100 INJECTION, SOLUTION INTRAVENOUS; SUBCUTANEOUS at 12:51

## 2019-01-05 RX ADMIN — MOMETASONE FUROATE AND FORMOTEROL FUMARATE DIHYDRATE 2 PUFF: 200; 5 AEROSOL RESPIRATORY (INHALATION) at 22:45

## 2019-01-05 RX ADMIN — IPRATROPIUM BROMIDE AND ALBUTEROL SULFATE 3 ML: .5; 3 SOLUTION RESPIRATORY (INHALATION) at 15:30

## 2019-01-05 RX ADMIN — INSULIN LISPRO 1 UNITS: 100 INJECTION, SOLUTION INTRAVENOUS; SUBCUTANEOUS at 08:12

## 2019-01-05 RX ADMIN — IPRATROPIUM BROMIDE AND ALBUTEROL SULFATE 3 ML: .5; 3 SOLUTION RESPIRATORY (INHALATION) at 11:20

## 2019-01-05 RX ADMIN — POLYETHYLENE GLYCOL 3350 17 G: 17 POWDER, FOR SOLUTION ORAL at 09:58

## 2019-01-05 RX ADMIN — TORSEMIDE 40 MG: 20 TABLET ORAL at 09:57

## 2019-01-05 RX ADMIN — PRAVASTATIN SODIUM 20 MG: 10 TABLET ORAL at 22:45

## 2019-01-05 RX ADMIN — MOMETASONE FUROATE AND FORMOTEROL FUMARATE DIHYDRATE 2 PUFF: 200; 5 AEROSOL RESPIRATORY (INHALATION) at 09:56

## 2019-01-05 RX ADMIN — CEFEPIME HYDROCHLORIDE 2 G: 2 INJECTION, POWDER, FOR SOLUTION INTRAVENOUS at 06:02

## 2019-01-05 RX ADMIN — CALCIUM CARBONATE 500 MG: 500 TABLET, CHEWABLE ORAL at 09:57

## 2019-01-05 RX ADMIN — ACETAMINOPHEN 650 MG: 325 TABLET ORAL at 19:13

## 2019-01-05 RX ADMIN — TORSEMIDE 40 MG: 20 TABLET ORAL at 17:25

## 2019-01-05 RX ADMIN — TRAZODONE HYDROCHLORIDE 100 MG: 50 TABLET ORAL at 22:46

## 2019-01-05 RX ADMIN — CIPROFLOXACIN HYDROCHLORIDE 750 MG: 500 TABLET, FILM COATED ORAL at 22:45

## 2019-01-05 RX ADMIN — INSULIN LISPRO 1 UNITS: 100 INJECTION, SOLUTION INTRAVENOUS; SUBCUTANEOUS at 22:48

## 2019-01-05 RX ADMIN — CEFEPIME HYDROCHLORIDE 2 G: 2 INJECTION, POWDER, FOR SOLUTION INTRAVENOUS at 17:25

## 2019-01-05 RX ADMIN — CALCIUM CARBONATE 500 MG: 500 TABLET, CHEWABLE ORAL at 22:46

## 2019-01-05 RX ADMIN — IPRATROPIUM BROMIDE AND ALBUTEROL SULFATE 3 ML: .5; 3 SOLUTION RESPIRATORY (INHALATION) at 20:12

## 2019-01-05 RX ADMIN — ATENOLOL 25 MG: 25 TABLET ORAL at 22:54

## 2019-01-05 ASSESSMENT — PAIN DESCRIPTION - LOCATION: LOCATION: GENERALIZED

## 2019-01-05 ASSESSMENT — PAIN SCALES - GENERAL
PAINLEVEL_OUTOF10: 0
PAINLEVEL_OUTOF10: 3
PAINLEVEL_OUTOF10: 1

## 2019-01-05 ASSESSMENT — PAIN DESCRIPTION - PAIN TYPE: TYPE: CHRONIC PAIN

## 2019-01-06 LAB
GLUCOSE BLD-MCNC: 195 MG/DL (ref 70–99)
GLUCOSE BLD-MCNC: 219 MG/DL (ref 70–99)
GLUCOSE BLD-MCNC: 384 MG/DL (ref 70–99)
GLUCOSE BLD-MCNC: 99 MG/DL (ref 70–99)

## 2019-01-06 PROCEDURE — 6370000000 HC RX 637 (ALT 250 FOR IP): Performed by: INTERNAL MEDICINE

## 2019-01-06 PROCEDURE — 6370000000 HC RX 637 (ALT 250 FOR IP): Performed by: FAMILY MEDICINE

## 2019-01-06 PROCEDURE — 94640 AIRWAY INHALATION TREATMENT: CPT

## 2019-01-06 PROCEDURE — 2580000003 HC RX 258: Performed by: FAMILY MEDICINE

## 2019-01-06 PROCEDURE — 1200000002 HC SEMI PRIVATE SWING BED

## 2019-01-06 PROCEDURE — 6370000000 HC RX 637 (ALT 250 FOR IP): Performed by: NURSE PRACTITIONER

## 2019-01-06 PROCEDURE — 2700000000 HC OXYGEN THERAPY PER DAY

## 2019-01-06 PROCEDURE — 82962 GLUCOSE BLOOD TEST: CPT

## 2019-01-06 PROCEDURE — 6360000002 HC RX W HCPCS: Performed by: FAMILY MEDICINE

## 2019-01-06 RX ADMIN — MOMETASONE FUROATE AND FORMOTEROL FUMARATE DIHYDRATE 2 PUFF: 200; 5 AEROSOL RESPIRATORY (INHALATION) at 22:55

## 2019-01-06 RX ADMIN — Medication 1 CAPSULE: at 09:06

## 2019-01-06 RX ADMIN — INSULIN LISPRO 1 UNITS: 100 INJECTION, SOLUTION INTRAVENOUS; SUBCUTANEOUS at 12:42

## 2019-01-06 RX ADMIN — INSULIN LISPRO 2 UNITS: 100 INJECTION, SOLUTION INTRAVENOUS; SUBCUTANEOUS at 09:06

## 2019-01-06 RX ADMIN — CALCIUM CARBONATE 500 MG: 500 TABLET, CHEWABLE ORAL at 09:05

## 2019-01-06 RX ADMIN — ATENOLOL 25 MG: 25 TABLET ORAL at 22:57

## 2019-01-06 RX ADMIN — IPRATROPIUM BROMIDE AND ALBUTEROL SULFATE 3 ML: .5; 3 SOLUTION RESPIRATORY (INHALATION) at 15:15

## 2019-01-06 RX ADMIN — ATENOLOL 25 MG: 25 TABLET ORAL at 09:06

## 2019-01-06 RX ADMIN — TRAZODONE HYDROCHLORIDE 100 MG: 50 TABLET ORAL at 22:57

## 2019-01-06 RX ADMIN — IPRATROPIUM BROMIDE AND ALBUTEROL SULFATE 3 ML: .5; 3 SOLUTION RESPIRATORY (INHALATION) at 08:05

## 2019-01-06 RX ADMIN — ACETAMINOPHEN 650 MG: 325 TABLET ORAL at 22:57

## 2019-01-06 RX ADMIN — INSULIN LISPRO 3 UNITS: 100 INJECTION, SOLUTION INTRAVENOUS; SUBCUTANEOUS at 22:59

## 2019-01-06 RX ADMIN — GLIPIZIDE 10 MG: 10 TABLET ORAL at 09:05

## 2019-01-06 RX ADMIN — CALCIUM CARBONATE 500 MG: 500 TABLET, CHEWABLE ORAL at 22:55

## 2019-01-06 RX ADMIN — PRAVASTATIN SODIUM 20 MG: 10 TABLET ORAL at 22:56

## 2019-01-06 RX ADMIN — CEFEPIME HYDROCHLORIDE 2 G: 2 INJECTION, POWDER, FOR SOLUTION INTRAVENOUS at 05:53

## 2019-01-06 RX ADMIN — ACETAMINOPHEN 650 MG: 325 TABLET ORAL at 09:11

## 2019-01-06 RX ADMIN — IPRATROPIUM BROMIDE AND ALBUTEROL SULFATE 3 ML: .5; 3 SOLUTION RESPIRATORY (INHALATION) at 20:28

## 2019-01-06 RX ADMIN — CIPROFLOXACIN HYDROCHLORIDE 750 MG: 500 TABLET, FILM COATED ORAL at 22:55

## 2019-01-06 RX ADMIN — MOMETASONE FUROATE AND FORMOTEROL FUMARATE DIHYDRATE 2 PUFF: 200; 5 AEROSOL RESPIRATORY (INHALATION) at 09:06

## 2019-01-06 RX ADMIN — CIPROFLOXACIN HYDROCHLORIDE 750 MG: 500 TABLET, FILM COATED ORAL at 09:11

## 2019-01-06 RX ADMIN — TORSEMIDE 40 MG: 20 TABLET ORAL at 16:57

## 2019-01-06 RX ADMIN — TORSEMIDE 40 MG: 20 TABLET ORAL at 09:06

## 2019-01-06 RX ADMIN — CEFEPIME HYDROCHLORIDE 2 G: 2 INJECTION, POWDER, FOR SOLUTION INTRAVENOUS at 17:40

## 2019-01-06 RX ADMIN — IPRATROPIUM BROMIDE AND ALBUTEROL SULFATE 3 ML: .5; 3 SOLUTION RESPIRATORY (INHALATION) at 11:47

## 2019-01-06 RX ADMIN — SPIRONOLACTONE 25 MG: 25 TABLET ORAL at 09:06

## 2019-01-06 RX ADMIN — POLYETHYLENE GLYCOL 3350 17 G: 17 POWDER, FOR SOLUTION ORAL at 09:05

## 2019-01-06 ASSESSMENT — PAIN SCALES - GENERAL
PAINLEVEL_OUTOF10: 0
PAINLEVEL_OUTOF10: 3
PAINLEVEL_OUTOF10: 5

## 2019-01-06 ASSESSMENT — PAIN DESCRIPTION - PAIN TYPE: TYPE: ACUTE PAIN

## 2019-01-07 LAB
GLUCOSE BLD-MCNC: 187 MG/DL (ref 70–99)
GLUCOSE BLD-MCNC: 239 MG/DL (ref 70–99)
GLUCOSE BLD-MCNC: 268 MG/DL (ref 70–99)
GLUCOSE BLD-MCNC: 275 MG/DL (ref 70–99)

## 2019-01-07 PROCEDURE — 2700000000 HC OXYGEN THERAPY PER DAY

## 2019-01-07 PROCEDURE — 82962 GLUCOSE BLOOD TEST: CPT

## 2019-01-07 PROCEDURE — 97116 GAIT TRAINING THERAPY: CPT

## 2019-01-07 PROCEDURE — 97110 THERAPEUTIC EXERCISES: CPT

## 2019-01-07 PROCEDURE — 6370000000 HC RX 637 (ALT 250 FOR IP): Performed by: NURSE PRACTITIONER

## 2019-01-07 PROCEDURE — 1200000002 HC SEMI PRIVATE SWING BED

## 2019-01-07 PROCEDURE — 6370000000 HC RX 637 (ALT 250 FOR IP): Performed by: FAMILY MEDICINE

## 2019-01-07 PROCEDURE — 97530 THERAPEUTIC ACTIVITIES: CPT

## 2019-01-07 PROCEDURE — 2580000003 HC RX 258: Performed by: FAMILY MEDICINE

## 2019-01-07 PROCEDURE — 6370000000 HC RX 637 (ALT 250 FOR IP): Performed by: INTERNAL MEDICINE

## 2019-01-07 PROCEDURE — 6360000002 HC RX W HCPCS: Performed by: FAMILY MEDICINE

## 2019-01-07 PROCEDURE — 94640 AIRWAY INHALATION TREATMENT: CPT

## 2019-01-07 RX ADMIN — ATENOLOL 25 MG: 25 TABLET ORAL at 08:25

## 2019-01-07 RX ADMIN — CIPROFLOXACIN HYDROCHLORIDE 750 MG: 500 TABLET, FILM COATED ORAL at 21:59

## 2019-01-07 RX ADMIN — INSULIN LISPRO 3 UNITS: 100 INJECTION, SOLUTION INTRAVENOUS; SUBCUTANEOUS at 12:30

## 2019-01-07 RX ADMIN — PRAVASTATIN SODIUM 20 MG: 10 TABLET ORAL at 21:58

## 2019-01-07 RX ADMIN — TORSEMIDE 40 MG: 20 TABLET ORAL at 17:40

## 2019-01-07 RX ADMIN — IPRATROPIUM BROMIDE AND ALBUTEROL SULFATE 3 ML: .5; 3 SOLUTION RESPIRATORY (INHALATION) at 07:55

## 2019-01-07 RX ADMIN — INSULIN LISPRO 1 UNITS: 100 INJECTION, SOLUTION INTRAVENOUS; SUBCUTANEOUS at 08:27

## 2019-01-07 RX ADMIN — CALCIUM CARBONATE 500 MG: 500 TABLET, CHEWABLE ORAL at 21:58

## 2019-01-07 RX ADMIN — IPRATROPIUM BROMIDE AND ALBUTEROL SULFATE 3 ML: .5; 3 SOLUTION RESPIRATORY (INHALATION) at 11:36

## 2019-01-07 RX ADMIN — INSULIN LISPRO 2 UNITS: 100 INJECTION, SOLUTION INTRAVENOUS; SUBCUTANEOUS at 17:44

## 2019-01-07 RX ADMIN — GLIPIZIDE 10 MG: 10 TABLET ORAL at 08:25

## 2019-01-07 RX ADMIN — CIPROFLOXACIN HYDROCHLORIDE 750 MG: 500 TABLET, FILM COATED ORAL at 08:23

## 2019-01-07 RX ADMIN — CEFEPIME HYDROCHLORIDE 2 G: 2 INJECTION, POWDER, FOR SOLUTION INTRAVENOUS at 05:55

## 2019-01-07 RX ADMIN — IPRATROPIUM BROMIDE AND ALBUTEROL SULFATE 3 ML: .5; 3 SOLUTION RESPIRATORY (INHALATION) at 19:38

## 2019-01-07 RX ADMIN — TRAZODONE HYDROCHLORIDE 100 MG: 50 TABLET ORAL at 23:56

## 2019-01-07 RX ADMIN — INSULIN LISPRO 2 UNITS: 100 INJECTION, SOLUTION INTRAVENOUS; SUBCUTANEOUS at 21:57

## 2019-01-07 RX ADMIN — CEFEPIME HYDROCHLORIDE 2 G: 2 INJECTION, POWDER, FOR SOLUTION INTRAVENOUS at 17:40

## 2019-01-07 RX ADMIN — ATENOLOL 25 MG: 25 TABLET ORAL at 21:59

## 2019-01-07 RX ADMIN — POLYETHYLENE GLYCOL 3350 17 G: 17 POWDER, FOR SOLUTION ORAL at 08:23

## 2019-01-07 RX ADMIN — MOMETASONE FUROATE AND FORMOTEROL FUMARATE DIHYDRATE 2 PUFF: 200; 5 AEROSOL RESPIRATORY (INHALATION) at 08:21

## 2019-01-07 RX ADMIN — Medication 1 CAPSULE: at 08:25

## 2019-01-07 RX ADMIN — CALCIUM CARBONATE 500 MG: 500 TABLET, CHEWABLE ORAL at 08:23

## 2019-01-07 RX ADMIN — ACETAMINOPHEN 650 MG: 325 TABLET ORAL at 23:56

## 2019-01-07 RX ADMIN — MOMETASONE FUROATE AND FORMOTEROL FUMARATE DIHYDRATE 2 PUFF: 200; 5 AEROSOL RESPIRATORY (INHALATION) at 21:58

## 2019-01-07 RX ADMIN — TORSEMIDE 40 MG: 20 TABLET ORAL at 08:23

## 2019-01-07 RX ADMIN — SPIRONOLACTONE 25 MG: 25 TABLET ORAL at 08:25

## 2019-01-07 ASSESSMENT — PAIN SCALES - GENERAL
PAINLEVEL_OUTOF10: 3
PAINLEVEL_OUTOF10: 0

## 2019-01-08 LAB
GLUCOSE BLD-MCNC: 193 MG/DL (ref 70–99)
GLUCOSE BLD-MCNC: 196 MG/DL (ref 70–99)
GLUCOSE BLD-MCNC: 226 MG/DL (ref 70–99)
GLUCOSE BLD-MCNC: 347 MG/DL (ref 70–99)

## 2019-01-08 PROCEDURE — 82962 GLUCOSE BLOOD TEST: CPT

## 2019-01-08 PROCEDURE — 97530 THERAPEUTIC ACTIVITIES: CPT

## 2019-01-08 PROCEDURE — 6370000000 HC RX 637 (ALT 250 FOR IP): Performed by: FAMILY MEDICINE

## 2019-01-08 PROCEDURE — 6360000002 HC RX W HCPCS: Performed by: FAMILY MEDICINE

## 2019-01-08 PROCEDURE — 97110 THERAPEUTIC EXERCISES: CPT

## 2019-01-08 PROCEDURE — 1200000002 HC SEMI PRIVATE SWING BED

## 2019-01-08 PROCEDURE — 6370000000 HC RX 637 (ALT 250 FOR IP): Performed by: INTERNAL MEDICINE

## 2019-01-08 PROCEDURE — 94640 AIRWAY INHALATION TREATMENT: CPT

## 2019-01-08 PROCEDURE — 2700000000 HC OXYGEN THERAPY PER DAY

## 2019-01-08 PROCEDURE — 2580000003 HC RX 258: Performed by: FAMILY MEDICINE

## 2019-01-08 PROCEDURE — 97116 GAIT TRAINING THERAPY: CPT

## 2019-01-08 PROCEDURE — 6370000000 HC RX 637 (ALT 250 FOR IP): Performed by: NURSE PRACTITIONER

## 2019-01-08 RX ADMIN — MOMETASONE FUROATE AND FORMOTEROL FUMARATE DIHYDRATE 2 PUFF: 200; 5 AEROSOL RESPIRATORY (INHALATION) at 23:07

## 2019-01-08 RX ADMIN — ACETAMINOPHEN 650 MG: 325 TABLET ORAL at 23:08

## 2019-01-08 RX ADMIN — TORSEMIDE 40 MG: 20 TABLET ORAL at 18:09

## 2019-01-08 RX ADMIN — GLIPIZIDE 10 MG: 10 TABLET ORAL at 09:41

## 2019-01-08 RX ADMIN — CIPROFLOXACIN HYDROCHLORIDE 750 MG: 500 TABLET, FILM COATED ORAL at 09:41

## 2019-01-08 RX ADMIN — POLYETHYLENE GLYCOL 3350 17 G: 17 POWDER, FOR SOLUTION ORAL at 09:41

## 2019-01-08 RX ADMIN — INSULIN LISPRO 3 UNITS: 100 INJECTION, SOLUTION INTRAVENOUS; SUBCUTANEOUS at 23:09

## 2019-01-08 RX ADMIN — CALCIUM CARBONATE 500 MG: 500 TABLET, CHEWABLE ORAL at 09:41

## 2019-01-08 RX ADMIN — POLYETHYLENE GLYCOL 3350 17 G: 17 POWDER, FOR SOLUTION ORAL at 23:08

## 2019-01-08 RX ADMIN — CIPROFLOXACIN HYDROCHLORIDE 750 MG: 500 TABLET, FILM COATED ORAL at 23:07

## 2019-01-08 RX ADMIN — TORSEMIDE 40 MG: 20 TABLET ORAL at 09:41

## 2019-01-08 RX ADMIN — ACETAMINOPHEN 650 MG: 325 TABLET ORAL at 14:50

## 2019-01-08 RX ADMIN — IPRATROPIUM BROMIDE AND ALBUTEROL SULFATE 3 ML: .5; 3 SOLUTION RESPIRATORY (INHALATION) at 21:04

## 2019-01-08 RX ADMIN — ATENOLOL 25 MG: 25 TABLET ORAL at 23:08

## 2019-01-08 RX ADMIN — INSULIN LISPRO 1 UNITS: 100 INJECTION, SOLUTION INTRAVENOUS; SUBCUTANEOUS at 09:42

## 2019-01-08 RX ADMIN — INSULIN LISPRO 2 UNITS: 100 INJECTION, SOLUTION INTRAVENOUS; SUBCUTANEOUS at 18:14

## 2019-01-08 RX ADMIN — CEFEPIME HYDROCHLORIDE 2 G: 2 INJECTION, POWDER, FOR SOLUTION INTRAVENOUS at 18:09

## 2019-01-08 RX ADMIN — PRAVASTATIN SODIUM 20 MG: 10 TABLET ORAL at 23:07

## 2019-01-08 RX ADMIN — CEFEPIME HYDROCHLORIDE 2 G: 2 INJECTION, POWDER, FOR SOLUTION INTRAVENOUS at 05:52

## 2019-01-08 RX ADMIN — ATENOLOL 25 MG: 25 TABLET ORAL at 09:41

## 2019-01-08 RX ADMIN — SPIRONOLACTONE 25 MG: 25 TABLET ORAL at 09:44

## 2019-01-08 RX ADMIN — Medication 1 CAPSULE: at 09:41

## 2019-01-08 RX ADMIN — IPRATROPIUM BROMIDE AND ALBUTEROL SULFATE 3 ML: .5; 3 SOLUTION RESPIRATORY (INHALATION) at 14:34

## 2019-01-08 RX ADMIN — INSULIN LISPRO 1 UNITS: 100 INJECTION, SOLUTION INTRAVENOUS; SUBCUTANEOUS at 13:56

## 2019-01-08 RX ADMIN — IPRATROPIUM BROMIDE AND ALBUTEROL SULFATE 3 ML: .5; 3 SOLUTION RESPIRATORY (INHALATION) at 10:49

## 2019-01-08 RX ADMIN — CALCIUM CARBONATE 500 MG: 500 TABLET, CHEWABLE ORAL at 23:08

## 2019-01-08 RX ADMIN — IPRATROPIUM BROMIDE AND ALBUTEROL SULFATE 3 ML: .5; 3 SOLUTION RESPIRATORY (INHALATION) at 07:38

## 2019-01-08 RX ADMIN — MOMETASONE FUROATE AND FORMOTEROL FUMARATE DIHYDRATE 2 PUFF: 200; 5 AEROSOL RESPIRATORY (INHALATION) at 09:41

## 2019-01-08 RX ADMIN — TRAZODONE HYDROCHLORIDE 100 MG: 50 TABLET ORAL at 23:07

## 2019-01-08 ASSESSMENT — PAIN SCALES - GENERAL
PAINLEVEL_OUTOF10: 0
PAINLEVEL_OUTOF10: 8
PAINLEVEL_OUTOF10: 0
PAINLEVEL_OUTOF10: 3

## 2019-01-08 ASSESSMENT — PAIN DESCRIPTION - LOCATION: LOCATION: LEG

## 2019-01-08 ASSESSMENT — PAIN DESCRIPTION - PAIN TYPE: TYPE: ACUTE PAIN;CHRONIC PAIN

## 2019-01-09 LAB
GLUCOSE BLD-MCNC: 150 MG/DL (ref 70–99)
GLUCOSE BLD-MCNC: 200 MG/DL (ref 70–99)
GLUCOSE BLD-MCNC: 203 MG/DL (ref 70–99)
GLUCOSE BLD-MCNC: 263 MG/DL (ref 70–99)

## 2019-01-09 PROCEDURE — 97530 THERAPEUTIC ACTIVITIES: CPT

## 2019-01-09 PROCEDURE — 97112 NEUROMUSCULAR REEDUCATION: CPT

## 2019-01-09 PROCEDURE — 97110 THERAPEUTIC EXERCISES: CPT

## 2019-01-09 PROCEDURE — 2580000003 HC RX 258: Performed by: FAMILY MEDICINE

## 2019-01-09 PROCEDURE — 6370000000 HC RX 637 (ALT 250 FOR IP): Performed by: NURSE PRACTITIONER

## 2019-01-09 PROCEDURE — 6370000000 HC RX 637 (ALT 250 FOR IP): Performed by: INTERNAL MEDICINE

## 2019-01-09 PROCEDURE — 6360000002 HC RX W HCPCS: Performed by: FAMILY MEDICINE

## 2019-01-09 PROCEDURE — 6370000000 HC RX 637 (ALT 250 FOR IP): Performed by: FAMILY MEDICINE

## 2019-01-09 PROCEDURE — 97116 GAIT TRAINING THERAPY: CPT

## 2019-01-09 PROCEDURE — 94640 AIRWAY INHALATION TREATMENT: CPT

## 2019-01-09 PROCEDURE — 2700000000 HC OXYGEN THERAPY PER DAY

## 2019-01-09 PROCEDURE — 82962 GLUCOSE BLOOD TEST: CPT

## 2019-01-09 PROCEDURE — 1200000002 HC SEMI PRIVATE SWING BED

## 2019-01-09 RX ADMIN — CIPROFLOXACIN HYDROCHLORIDE 750 MG: 500 TABLET, FILM COATED ORAL at 22:00

## 2019-01-09 RX ADMIN — MOMETASONE FUROATE AND FORMOTEROL FUMARATE DIHYDRATE 2 PUFF: 200; 5 AEROSOL RESPIRATORY (INHALATION) at 20:31

## 2019-01-09 RX ADMIN — INSULIN LISPRO 2 UNITS: 100 INJECTION, SOLUTION INTRAVENOUS; SUBCUTANEOUS at 08:24

## 2019-01-09 RX ADMIN — ATENOLOL 25 MG: 25 TABLET ORAL at 20:31

## 2019-01-09 RX ADMIN — MOMETASONE FUROATE AND FORMOTEROL FUMARATE DIHYDRATE 2 PUFF: 200; 5 AEROSOL RESPIRATORY (INHALATION) at 08:21

## 2019-01-09 RX ADMIN — INSULIN LISPRO 2 UNITS: 100 INJECTION, SOLUTION INTRAVENOUS; SUBCUTANEOUS at 20:32

## 2019-01-09 RX ADMIN — GLIPIZIDE 10 MG: 10 TABLET ORAL at 08:23

## 2019-01-09 RX ADMIN — CALCIUM CARBONATE 500 MG: 500 TABLET, CHEWABLE ORAL at 20:31

## 2019-01-09 RX ADMIN — Medication 1 CAPSULE: at 08:23

## 2019-01-09 RX ADMIN — SPIRONOLACTONE 25 MG: 25 TABLET ORAL at 08:23

## 2019-01-09 RX ADMIN — IPRATROPIUM BROMIDE AND ALBUTEROL SULFATE 3 ML: .5; 3 SOLUTION RESPIRATORY (INHALATION) at 11:15

## 2019-01-09 RX ADMIN — ACETAMINOPHEN 650 MG: 325 TABLET ORAL at 22:00

## 2019-01-09 RX ADMIN — PRAVASTATIN SODIUM 20 MG: 10 TABLET ORAL at 20:31

## 2019-01-09 RX ADMIN — INSULIN LISPRO 2 UNITS: 100 INJECTION, SOLUTION INTRAVENOUS; SUBCUTANEOUS at 12:47

## 2019-01-09 RX ADMIN — CEFEPIME HYDROCHLORIDE 2 G: 2 INJECTION, POWDER, FOR SOLUTION INTRAVENOUS at 05:34

## 2019-01-09 RX ADMIN — TORSEMIDE 40 MG: 20 TABLET ORAL at 17:42

## 2019-01-09 RX ADMIN — IPRATROPIUM BROMIDE AND ALBUTEROL SULFATE 3 ML: .5; 3 SOLUTION RESPIRATORY (INHALATION) at 20:08

## 2019-01-09 RX ADMIN — TORSEMIDE 40 MG: 20 TABLET ORAL at 08:22

## 2019-01-09 RX ADMIN — INSULIN LISPRO 1 UNITS: 100 INJECTION, SOLUTION INTRAVENOUS; SUBCUTANEOUS at 17:43

## 2019-01-09 RX ADMIN — ATENOLOL 25 MG: 25 TABLET ORAL at 08:22

## 2019-01-09 RX ADMIN — CALCIUM CARBONATE 500 MG: 500 TABLET, CHEWABLE ORAL at 08:22

## 2019-01-09 RX ADMIN — IPRATROPIUM BROMIDE AND ALBUTEROL SULFATE 3 ML: .5; 3 SOLUTION RESPIRATORY (INHALATION) at 07:20

## 2019-01-09 RX ADMIN — IPRATROPIUM BROMIDE AND ALBUTEROL SULFATE 3 ML: .5; 3 SOLUTION RESPIRATORY (INHALATION) at 15:35

## 2019-01-09 RX ADMIN — CIPROFLOXACIN HYDROCHLORIDE 750 MG: 500 TABLET, FILM COATED ORAL at 10:22

## 2019-01-09 RX ADMIN — CEFEPIME HYDROCHLORIDE 2 G: 2 INJECTION, POWDER, FOR SOLUTION INTRAVENOUS at 17:42

## 2019-01-09 RX ADMIN — TRAZODONE HYDROCHLORIDE 100 MG: 50 TABLET ORAL at 22:00

## 2019-01-09 ASSESSMENT — PAIN DESCRIPTION - PAIN TYPE
TYPE: CHRONIC PAIN
TYPE: CHRONIC PAIN

## 2019-01-09 ASSESSMENT — PAIN SCALES - GENERAL
PAINLEVEL_OUTOF10: 0
PAINLEVEL_OUTOF10: 3
PAINLEVEL_OUTOF10: 2

## 2019-01-09 ASSESSMENT — PAIN DESCRIPTION - LOCATION
LOCATION: GENERALIZED
LOCATION: GENERALIZED

## 2019-01-09 ASSESSMENT — PAIN DESCRIPTION - DESCRIPTORS: DESCRIPTORS: CONSTANT;DISCOMFORT

## 2019-01-10 LAB
ALBUMIN SERPL-MCNC: 4 GM/DL (ref 3.4–5)
ALP BLD-CCNC: 44 IU/L (ref 40–129)
ALT SERPL-CCNC: 14 U/L (ref 10–40)
ANION GAP SERPL CALCULATED.3IONS-SCNC: 19 MMOL/L (ref 4–16)
AST SERPL-CCNC: 11 IU/L (ref 15–37)
BILIRUB SERPL-MCNC: 0.4 MG/DL (ref 0–1)
BUN BLDV-MCNC: 69 MG/DL (ref 6–23)
CALCIUM SERPL-MCNC: 9.8 MG/DL (ref 8.3–10.6)
CHLORIDE BLD-SCNC: 93 MMOL/L (ref 99–110)
CO2: 25 MMOL/L (ref 21–32)
CREAT SERPL-MCNC: 1.4 MG/DL (ref 0.6–1.1)
ERYTHROCYTE SEDIMENTATION RATE: 69 MM/HR (ref 0–30)
GFR AFRICAN AMERICAN: 46 ML/MIN/1.73M2
GFR NON-AFRICAN AMERICAN: 38 ML/MIN/1.73M2
GLUCOSE BLD-MCNC: 181 MG/DL (ref 70–99)
GLUCOSE BLD-MCNC: 190 MG/DL (ref 70–99)
GLUCOSE BLD-MCNC: 216 MG/DL (ref 70–99)
GLUCOSE BLD-MCNC: 274 MG/DL (ref 70–99)
GLUCOSE BLD-MCNC: 289 MG/DL (ref 70–99)
HCT VFR BLD CALC: 28.6 % (ref 37–47)
HEMOGLOBIN: 9 GM/DL (ref 12.5–16)
HIGH SENSITIVE C-REACTIVE PROTEIN: 14.3 MG/L
MCH RBC QN AUTO: 29.5 PG (ref 27–31)
MCHC RBC AUTO-ENTMCNC: 31.5 % (ref 32–36)
MCV RBC AUTO: 93.8 FL (ref 78–100)
PDW BLD-RTO: 13.4 % (ref 11.7–14.9)
PLATELET # BLD: 185 K/CU MM (ref 140–440)
PMV BLD AUTO: 9.7 FL (ref 7.5–11.1)
POTASSIUM SERPL-SCNC: 4 MMOL/L (ref 3.5–5.1)
RBC # BLD: 3.05 M/CU MM (ref 4.2–5.4)
SODIUM BLD-SCNC: 137 MMOL/L (ref 135–145)
TOTAL PROTEIN: 6.7 GM/DL (ref 6.4–8.2)
WBC # BLD: 8.3 K/CU MM (ref 4–10.5)

## 2019-01-10 PROCEDURE — 97116 GAIT TRAINING THERAPY: CPT

## 2019-01-10 PROCEDURE — 97110 THERAPEUTIC EXERCISES: CPT

## 2019-01-10 PROCEDURE — 36415 COLL VENOUS BLD VENIPUNCTURE: CPT

## 2019-01-10 PROCEDURE — 82962 GLUCOSE BLOOD TEST: CPT

## 2019-01-10 PROCEDURE — 94640 AIRWAY INHALATION TREATMENT: CPT

## 2019-01-10 PROCEDURE — 2580000003 HC RX 258: Performed by: FAMILY MEDICINE

## 2019-01-10 PROCEDURE — 6370000000 HC RX 637 (ALT 250 FOR IP): Performed by: NURSE PRACTITIONER

## 2019-01-10 PROCEDURE — 36593 DECLOT VASCULAR DEVICE: CPT

## 2019-01-10 PROCEDURE — 6370000000 HC RX 637 (ALT 250 FOR IP): Performed by: FAMILY MEDICINE

## 2019-01-10 PROCEDURE — 2700000000 HC OXYGEN THERAPY PER DAY

## 2019-01-10 PROCEDURE — 6370000000 HC RX 637 (ALT 250 FOR IP): Performed by: INTERNAL MEDICINE

## 2019-01-10 PROCEDURE — 1200000002 HC SEMI PRIVATE SWING BED

## 2019-01-10 PROCEDURE — 85027 COMPLETE CBC AUTOMATED: CPT

## 2019-01-10 PROCEDURE — 6360000002 HC RX W HCPCS: Performed by: NURSE PRACTITIONER

## 2019-01-10 PROCEDURE — 97530 THERAPEUTIC ACTIVITIES: CPT

## 2019-01-10 PROCEDURE — 2580000003 HC RX 258: Performed by: NURSE PRACTITIONER

## 2019-01-10 PROCEDURE — 85652 RBC SED RATE AUTOMATED: CPT

## 2019-01-10 PROCEDURE — 86141 C-REACTIVE PROTEIN HS: CPT

## 2019-01-10 PROCEDURE — 6360000002 HC RX W HCPCS: Performed by: FAMILY MEDICINE

## 2019-01-10 PROCEDURE — 80053 COMPREHEN METABOLIC PANEL: CPT

## 2019-01-10 RX ADMIN — IPRATROPIUM BROMIDE AND ALBUTEROL SULFATE 3 ML: .5; 3 SOLUTION RESPIRATORY (INHALATION) at 11:15

## 2019-01-10 RX ADMIN — CIPROFLOXACIN HYDROCHLORIDE 750 MG: 500 TABLET, FILM COATED ORAL at 09:18

## 2019-01-10 RX ADMIN — CEFEPIME HYDROCHLORIDE 2 G: 2 INJECTION, POWDER, FOR SOLUTION INTRAVENOUS at 09:22

## 2019-01-10 RX ADMIN — WATER 2.2 ML: 1 INJECTION INTRAMUSCULAR; INTRAVENOUS; SUBCUTANEOUS at 06:25

## 2019-01-10 RX ADMIN — IPRATROPIUM BROMIDE AND ALBUTEROL SULFATE 3 ML: .5; 3 SOLUTION RESPIRATORY (INHALATION) at 07:40

## 2019-01-10 RX ADMIN — ACETAMINOPHEN 650 MG: 325 TABLET ORAL at 09:13

## 2019-01-10 RX ADMIN — MOMETASONE FUROATE AND FORMOTEROL FUMARATE DIHYDRATE 2 PUFF: 200; 5 AEROSOL RESPIRATORY (INHALATION) at 09:14

## 2019-01-10 RX ADMIN — PRAVASTATIN SODIUM 20 MG: 10 TABLET ORAL at 21:14

## 2019-01-10 RX ADMIN — TRAZODONE HYDROCHLORIDE 100 MG: 50 TABLET ORAL at 22:29

## 2019-01-10 RX ADMIN — Medication 1 CAPSULE: at 09:13

## 2019-01-10 RX ADMIN — GLIPIZIDE 10 MG: 10 TABLET ORAL at 09:13

## 2019-01-10 RX ADMIN — IPRATROPIUM BROMIDE AND ALBUTEROL SULFATE 3 ML: .5; 3 SOLUTION RESPIRATORY (INHALATION) at 19:35

## 2019-01-10 RX ADMIN — ATENOLOL 25 MG: 25 TABLET ORAL at 09:13

## 2019-01-10 RX ADMIN — ATENOLOL 25 MG: 25 TABLET ORAL at 21:14

## 2019-01-10 RX ADMIN — CIPROFLOXACIN HYDROCHLORIDE 750 MG: 500 TABLET, FILM COATED ORAL at 22:29

## 2019-01-10 RX ADMIN — TORSEMIDE 40 MG: 20 TABLET ORAL at 17:34

## 2019-01-10 RX ADMIN — ALTEPLASE 2 MG: 2.2 INJECTION, POWDER, LYOPHILIZED, FOR SOLUTION INTRAVENOUS at 06:25

## 2019-01-10 RX ADMIN — INSULIN LISPRO 1 UNITS: 100 INJECTION, SOLUTION INTRAVENOUS; SUBCUTANEOUS at 12:37

## 2019-01-10 RX ADMIN — CALCIUM CARBONATE 500 MG: 500 TABLET, CHEWABLE ORAL at 21:13

## 2019-01-10 RX ADMIN — MOMETASONE FUROATE AND FORMOTEROL FUMARATE DIHYDRATE 2 PUFF: 200; 5 AEROSOL RESPIRATORY (INHALATION) at 21:20

## 2019-01-10 RX ADMIN — TORSEMIDE 40 MG: 20 TABLET ORAL at 09:13

## 2019-01-10 RX ADMIN — CALCIUM CARBONATE 500 MG: 500 TABLET, CHEWABLE ORAL at 09:13

## 2019-01-10 RX ADMIN — ACETAMINOPHEN 650 MG: 325 TABLET ORAL at 15:06

## 2019-01-10 RX ADMIN — INSULIN LISPRO 1 UNITS: 100 INJECTION, SOLUTION INTRAVENOUS; SUBCUTANEOUS at 17:34

## 2019-01-10 RX ADMIN — CEFEPIME HYDROCHLORIDE 2 G: 2 INJECTION, POWDER, FOR SOLUTION INTRAVENOUS at 17:34

## 2019-01-10 RX ADMIN — ACETAMINOPHEN 650 MG: 325 TABLET ORAL at 22:29

## 2019-01-10 RX ADMIN — INSULIN LISPRO 2 UNITS: 100 INJECTION, SOLUTION INTRAVENOUS; SUBCUTANEOUS at 21:21

## 2019-01-10 RX ADMIN — SPIRONOLACTONE 25 MG: 25 TABLET ORAL at 09:13

## 2019-01-10 RX ADMIN — INSULIN LISPRO 2 UNITS: 100 INJECTION, SOLUTION INTRAVENOUS; SUBCUTANEOUS at 09:14

## 2019-01-10 RX ADMIN — IPRATROPIUM BROMIDE AND ALBUTEROL SULFATE 3 ML: .5; 3 SOLUTION RESPIRATORY (INHALATION) at 15:35

## 2019-01-10 ASSESSMENT — PAIN SCALES - GENERAL
PAINLEVEL_OUTOF10: 2
PAINLEVEL_OUTOF10: 3
PAINLEVEL_OUTOF10: 2
PAINLEVEL_OUTOF10: 3
PAINLEVEL_OUTOF10: 0
PAINLEVEL_OUTOF10: 2
PAINLEVEL_OUTOF10: 3

## 2019-01-10 ASSESSMENT — PAIN DESCRIPTION - LOCATION: LOCATION: GENERALIZED

## 2019-01-10 ASSESSMENT — PAIN DESCRIPTION - PAIN TYPE: TYPE: CHRONIC PAIN

## 2019-01-11 LAB
GLUCOSE BLD-MCNC: 197 MG/DL (ref 70–99)
GLUCOSE BLD-MCNC: 207 MG/DL (ref 70–99)
GLUCOSE BLD-MCNC: 222 MG/DL (ref 70–99)
GLUCOSE BLD-MCNC: 249 MG/DL (ref 70–99)

## 2019-01-11 PROCEDURE — 97530 THERAPEUTIC ACTIVITIES: CPT

## 2019-01-11 PROCEDURE — 2580000003 HC RX 258: Performed by: FAMILY MEDICINE

## 2019-01-11 PROCEDURE — 6370000000 HC RX 637 (ALT 250 FOR IP): Performed by: INTERNAL MEDICINE

## 2019-01-11 PROCEDURE — 97535 SELF CARE MNGMENT TRAINING: CPT

## 2019-01-11 PROCEDURE — 97110 THERAPEUTIC EXERCISES: CPT

## 2019-01-11 PROCEDURE — 82962 GLUCOSE BLOOD TEST: CPT

## 2019-01-11 PROCEDURE — 6370000000 HC RX 637 (ALT 250 FOR IP): Performed by: NURSE PRACTITIONER

## 2019-01-11 PROCEDURE — 6370000000 HC RX 637 (ALT 250 FOR IP): Performed by: FAMILY MEDICINE

## 2019-01-11 PROCEDURE — 2700000000 HC OXYGEN THERAPY PER DAY

## 2019-01-11 PROCEDURE — 97116 GAIT TRAINING THERAPY: CPT

## 2019-01-11 PROCEDURE — 94640 AIRWAY INHALATION TREATMENT: CPT

## 2019-01-11 PROCEDURE — 6360000002 HC RX W HCPCS: Performed by: FAMILY MEDICINE

## 2019-01-11 PROCEDURE — 1200000002 HC SEMI PRIVATE SWING BED

## 2019-01-11 RX ORDER — HYDROCODONE BITARTRATE AND ACETAMINOPHEN 5; 325 MG/1; MG/1
1 TABLET ORAL EVERY 6 HOURS PRN
Status: DISCONTINUED | OUTPATIENT
Start: 2019-01-11 | End: 2019-01-21 | Stop reason: HOSPADM

## 2019-01-11 RX ADMIN — CEFEPIME HYDROCHLORIDE 2 G: 2 INJECTION, POWDER, FOR SOLUTION INTRAVENOUS at 05:56

## 2019-01-11 RX ADMIN — IPRATROPIUM BROMIDE AND ALBUTEROL SULFATE 3 ML: .5; 3 SOLUTION RESPIRATORY (INHALATION) at 07:20

## 2019-01-11 RX ADMIN — INSULIN LISPRO 2 UNITS: 100 INJECTION, SOLUTION INTRAVENOUS; SUBCUTANEOUS at 12:42

## 2019-01-11 RX ADMIN — TRAZODONE HYDROCHLORIDE 100 MG: 50 TABLET ORAL at 22:19

## 2019-01-11 RX ADMIN — Medication 1 CAPSULE: at 07:36

## 2019-01-11 RX ADMIN — MOMETASONE FUROATE AND FORMOTEROL FUMARATE DIHYDRATE 2 PUFF: 200; 5 AEROSOL RESPIRATORY (INHALATION) at 20:33

## 2019-01-11 RX ADMIN — ACETAMINOPHEN 650 MG: 325 TABLET ORAL at 22:19

## 2019-01-11 RX ADMIN — TORSEMIDE 40 MG: 20 TABLET ORAL at 07:37

## 2019-01-11 RX ADMIN — CIPROFLOXACIN HYDROCHLORIDE 750 MG: 500 TABLET, FILM COATED ORAL at 09:38

## 2019-01-11 RX ADMIN — GLIPIZIDE 10 MG: 10 TABLET ORAL at 07:36

## 2019-01-11 RX ADMIN — IPRATROPIUM BROMIDE AND ALBUTEROL SULFATE 3 ML: .5; 3 SOLUTION RESPIRATORY (INHALATION) at 15:15

## 2019-01-11 RX ADMIN — ATENOLOL 25 MG: 25 TABLET ORAL at 20:33

## 2019-01-11 RX ADMIN — ATENOLOL 25 MG: 25 TABLET ORAL at 07:36

## 2019-01-11 RX ADMIN — CALCIUM CARBONATE 500 MG: 500 TABLET, CHEWABLE ORAL at 07:36

## 2019-01-11 RX ADMIN — POLYETHYLENE GLYCOL 3350 17 G: 17 POWDER, FOR SOLUTION ORAL at 20:33

## 2019-01-11 RX ADMIN — CEFEPIME HYDROCHLORIDE 2 G: 2 INJECTION, POWDER, FOR SOLUTION INTRAVENOUS at 17:04

## 2019-01-11 RX ADMIN — HYDROCODONE BITARTRATE AND ACETAMINOPHEN 1 TABLET: 5; 325 TABLET ORAL at 23:15

## 2019-01-11 RX ADMIN — INSULIN LISPRO 1 UNITS: 100 INJECTION, SOLUTION INTRAVENOUS; SUBCUTANEOUS at 07:36

## 2019-01-11 RX ADMIN — TORSEMIDE 40 MG: 20 TABLET ORAL at 17:04

## 2019-01-11 RX ADMIN — CALCIUM CARBONATE 500 MG: 500 TABLET, CHEWABLE ORAL at 20:33

## 2019-01-11 RX ADMIN — SPIRONOLACTONE 25 MG: 25 TABLET ORAL at 07:36

## 2019-01-11 RX ADMIN — CIPROFLOXACIN HYDROCHLORIDE 750 MG: 500 TABLET, FILM COATED ORAL at 20:33

## 2019-01-11 RX ADMIN — IPRATROPIUM BROMIDE AND ALBUTEROL SULFATE 3 ML: .5; 3 SOLUTION RESPIRATORY (INHALATION) at 11:45

## 2019-01-11 RX ADMIN — MOMETASONE FUROATE AND FORMOTEROL FUMARATE DIHYDRATE 2 PUFF: 200; 5 AEROSOL RESPIRATORY (INHALATION) at 07:37

## 2019-01-11 RX ADMIN — INSULIN LISPRO 1 UNITS: 100 INJECTION, SOLUTION INTRAVENOUS; SUBCUTANEOUS at 20:38

## 2019-01-11 RX ADMIN — INSULIN LISPRO 2 UNITS: 100 INJECTION, SOLUTION INTRAVENOUS; SUBCUTANEOUS at 17:05

## 2019-01-11 RX ADMIN — IPRATROPIUM BROMIDE AND ALBUTEROL SULFATE 3 ML: .5; 3 SOLUTION RESPIRATORY (INHALATION) at 20:15

## 2019-01-11 RX ADMIN — PRAVASTATIN SODIUM 20 MG: 10 TABLET ORAL at 20:41

## 2019-01-11 ASSESSMENT — PAIN SCALES - GENERAL
PAINLEVEL_OUTOF10: 3
PAINLEVEL_OUTOF10: 0
PAINLEVEL_OUTOF10: 7
PAINLEVEL_OUTOF10: 0
PAINLEVEL_OUTOF10: 0

## 2019-01-11 ASSESSMENT — PAIN DESCRIPTION - LOCATION
LOCATION: LEG
LOCATION: LEG

## 2019-01-11 ASSESSMENT — PAIN DESCRIPTION - ORIENTATION
ORIENTATION: RIGHT;LEFT
ORIENTATION: RIGHT;LEFT

## 2019-01-11 ASSESSMENT — PAIN DESCRIPTION - ONSET: ONSET: ON-GOING

## 2019-01-11 ASSESSMENT — PAIN DESCRIPTION - PAIN TYPE
TYPE: ACUTE PAIN
TYPE: ACUTE PAIN

## 2019-01-11 ASSESSMENT — PAIN DESCRIPTION - DESCRIPTORS
DESCRIPTORS: ACHING

## 2019-01-12 LAB
GLUCOSE BLD-MCNC: 206 MG/DL (ref 70–99)
GLUCOSE BLD-MCNC: 210 MG/DL (ref 70–99)
GLUCOSE BLD-MCNC: 234 MG/DL (ref 70–99)
GLUCOSE BLD-MCNC: 236 MG/DL (ref 70–99)

## 2019-01-12 PROCEDURE — 97110 THERAPEUTIC EXERCISES: CPT

## 2019-01-12 PROCEDURE — 94640 AIRWAY INHALATION TREATMENT: CPT

## 2019-01-12 PROCEDURE — 2580000003 HC RX 258: Performed by: FAMILY MEDICINE

## 2019-01-12 PROCEDURE — 6370000000 HC RX 637 (ALT 250 FOR IP): Performed by: NURSE PRACTITIONER

## 2019-01-12 PROCEDURE — 2700000000 HC OXYGEN THERAPY PER DAY

## 2019-01-12 PROCEDURE — 6370000000 HC RX 637 (ALT 250 FOR IP): Performed by: FAMILY MEDICINE

## 2019-01-12 PROCEDURE — 82962 GLUCOSE BLOOD TEST: CPT

## 2019-01-12 PROCEDURE — 97530 THERAPEUTIC ACTIVITIES: CPT

## 2019-01-12 PROCEDURE — 6370000000 HC RX 637 (ALT 250 FOR IP): Performed by: INTERNAL MEDICINE

## 2019-01-12 PROCEDURE — 6360000002 HC RX W HCPCS: Performed by: FAMILY MEDICINE

## 2019-01-12 PROCEDURE — 1200000002 HC SEMI PRIVATE SWING BED

## 2019-01-12 RX ORDER — DIAPER,BRIEF,INFANT-TODD,DISP
EACH MISCELLANEOUS 2 TIMES DAILY
Status: DISCONTINUED | OUTPATIENT
Start: 2019-01-12 | End: 2019-01-21 | Stop reason: HOSPADM

## 2019-01-12 RX ADMIN — POLYETHYLENE GLYCOL 3350 17 G: 17 POWDER, FOR SOLUTION ORAL at 08:12

## 2019-01-12 RX ADMIN — HYDROCODONE BITARTRATE AND ACETAMINOPHEN 1 TABLET: 5; 325 TABLET ORAL at 14:22

## 2019-01-12 RX ADMIN — IPRATROPIUM BROMIDE AND ALBUTEROL SULFATE 3 ML: .5; 3 SOLUTION RESPIRATORY (INHALATION) at 11:41

## 2019-01-12 RX ADMIN — HYDROCODONE BITARTRATE AND ACETAMINOPHEN 1 TABLET: 5; 325 TABLET ORAL at 20:23

## 2019-01-12 RX ADMIN — IPRATROPIUM BROMIDE AND ALBUTEROL SULFATE 3 ML: .5; 3 SOLUTION RESPIRATORY (INHALATION) at 15:28

## 2019-01-12 RX ADMIN — INSULIN LISPRO 2 UNITS: 100 INJECTION, SOLUTION INTRAVENOUS; SUBCUTANEOUS at 12:38

## 2019-01-12 RX ADMIN — PRAVASTATIN SODIUM 20 MG: 10 TABLET ORAL at 20:11

## 2019-01-12 RX ADMIN — CALCIUM CARBONATE 500 MG: 500 TABLET, CHEWABLE ORAL at 08:11

## 2019-01-12 RX ADMIN — INSULIN LISPRO 2 UNITS: 100 INJECTION, SOLUTION INTRAVENOUS; SUBCUTANEOUS at 08:12

## 2019-01-12 RX ADMIN — INSULIN LISPRO 1 UNITS: 100 INJECTION, SOLUTION INTRAVENOUS; SUBCUTANEOUS at 20:11

## 2019-01-12 RX ADMIN — SPIRONOLACTONE 25 MG: 25 TABLET ORAL at 08:11

## 2019-01-12 RX ADMIN — IPRATROPIUM BROMIDE AND ALBUTEROL SULFATE 3 ML: .5; 3 SOLUTION RESPIRATORY (INHALATION) at 07:27

## 2019-01-12 RX ADMIN — HYDROCORTISONE: 1 CREAM TOPICAL at 20:10

## 2019-01-12 RX ADMIN — MOMETASONE FUROATE AND FORMOTEROL FUMARATE DIHYDRATE 2 PUFF: 200; 5 AEROSOL RESPIRATORY (INHALATION) at 08:12

## 2019-01-12 RX ADMIN — CEFEPIME HYDROCHLORIDE 2 G: 2 INJECTION, POWDER, FOR SOLUTION INTRAVENOUS at 05:25

## 2019-01-12 RX ADMIN — IPRATROPIUM BROMIDE AND ALBUTEROL SULFATE 3 ML: .5; 3 SOLUTION RESPIRATORY (INHALATION) at 20:40

## 2019-01-12 RX ADMIN — HYDROCODONE BITARTRATE AND ACETAMINOPHEN 1 TABLET: 5; 325 TABLET ORAL at 05:32

## 2019-01-12 RX ADMIN — ATENOLOL 25 MG: 25 TABLET ORAL at 08:11

## 2019-01-12 RX ADMIN — Medication 1 CAPSULE: at 08:11

## 2019-01-12 RX ADMIN — CEFEPIME HYDROCHLORIDE 2 G: 2 INJECTION, POWDER, FOR SOLUTION INTRAVENOUS at 17:39

## 2019-01-12 RX ADMIN — INSULIN LISPRO 2 UNITS: 100 INJECTION, SOLUTION INTRAVENOUS; SUBCUTANEOUS at 17:40

## 2019-01-12 RX ADMIN — CIPROFLOXACIN HYDROCHLORIDE 750 MG: 500 TABLET, FILM COATED ORAL at 20:10

## 2019-01-12 RX ADMIN — TORSEMIDE 40 MG: 20 TABLET ORAL at 08:11

## 2019-01-12 RX ADMIN — MOMETASONE FUROATE AND FORMOTEROL FUMARATE DIHYDRATE 2 PUFF: 200; 5 AEROSOL RESPIRATORY (INHALATION) at 20:10

## 2019-01-12 RX ADMIN — TRAZODONE HYDROCHLORIDE 100 MG: 50 TABLET ORAL at 22:18

## 2019-01-12 RX ADMIN — ATENOLOL 25 MG: 25 TABLET ORAL at 20:11

## 2019-01-12 RX ADMIN — POLYETHYLENE GLYCOL 3350 17 G: 17 POWDER, FOR SOLUTION ORAL at 20:11

## 2019-01-12 RX ADMIN — GLIPIZIDE 10 MG: 10 TABLET ORAL at 08:11

## 2019-01-12 RX ADMIN — CIPROFLOXACIN HYDROCHLORIDE 750 MG: 500 TABLET, FILM COATED ORAL at 10:24

## 2019-01-12 RX ADMIN — TORSEMIDE 40 MG: 20 TABLET ORAL at 17:39

## 2019-01-12 RX ADMIN — CALCIUM CARBONATE 500 MG: 500 TABLET, CHEWABLE ORAL at 20:10

## 2019-01-12 ASSESSMENT — PAIN DESCRIPTION - PAIN TYPE
TYPE: CHRONIC PAIN
TYPE: ACUTE PAIN
TYPE: ACUTE PAIN

## 2019-01-12 ASSESSMENT — PAIN SCALES - GENERAL
PAINLEVEL_OUTOF10: 0
PAINLEVEL_OUTOF10: 0
PAINLEVEL_OUTOF10: 6
PAINLEVEL_OUTOF10: 4
PAINLEVEL_OUTOF10: 0
PAINLEVEL_OUTOF10: 0
PAINLEVEL_OUTOF10: 4
PAINLEVEL_OUTOF10: 0

## 2019-01-12 ASSESSMENT — PAIN DESCRIPTION - LOCATION
LOCATION: LEG

## 2019-01-12 ASSESSMENT — PAIN DESCRIPTION - ORIENTATION
ORIENTATION: RIGHT;LEFT
ORIENTATION: RIGHT;LEFT

## 2019-01-12 ASSESSMENT — PAIN - FUNCTIONAL ASSESSMENT: PAIN_FUNCTIONAL_ASSESSMENT: ACTIVITIES ARE NOT PREVENTED

## 2019-01-12 ASSESSMENT — PAIN DESCRIPTION - DESCRIPTORS
DESCRIPTORS: ACHING
DESCRIPTORS: ACHING

## 2019-01-13 LAB
GLUCOSE BLD-MCNC: 170 MG/DL (ref 70–99)
GLUCOSE BLD-MCNC: 197 MG/DL (ref 70–99)
GLUCOSE BLD-MCNC: 234 MG/DL (ref 70–99)
GLUCOSE BLD-MCNC: 264 MG/DL (ref 70–99)

## 2019-01-13 PROCEDURE — 6370000000 HC RX 637 (ALT 250 FOR IP): Performed by: INTERNAL MEDICINE

## 2019-01-13 PROCEDURE — 6370000000 HC RX 637 (ALT 250 FOR IP): Performed by: FAMILY MEDICINE

## 2019-01-13 PROCEDURE — 82962 GLUCOSE BLOOD TEST: CPT

## 2019-01-13 PROCEDURE — 94640 AIRWAY INHALATION TREATMENT: CPT

## 2019-01-13 PROCEDURE — 2580000003 HC RX 258: Performed by: FAMILY MEDICINE

## 2019-01-13 PROCEDURE — 6360000002 HC RX W HCPCS: Performed by: FAMILY MEDICINE

## 2019-01-13 PROCEDURE — 6370000000 HC RX 637 (ALT 250 FOR IP): Performed by: NURSE PRACTITIONER

## 2019-01-13 PROCEDURE — 6370000000 HC RX 637 (ALT 250 FOR IP): Performed by: HOSPITALIST

## 2019-01-13 PROCEDURE — 2700000000 HC OXYGEN THERAPY PER DAY

## 2019-01-13 PROCEDURE — 1200000002 HC SEMI PRIVATE SWING BED

## 2019-01-13 RX ORDER — DEXTROSE MONOHYDRATE 50 MG/ML
100 INJECTION, SOLUTION INTRAVENOUS PRN
Status: DISCONTINUED | OUTPATIENT
Start: 2019-01-13 | End: 2019-01-21 | Stop reason: HOSPADM

## 2019-01-13 RX ORDER — NICOTINE POLACRILEX 4 MG
15 LOZENGE BUCCAL PRN
Status: DISCONTINUED | OUTPATIENT
Start: 2019-01-13 | End: 2019-01-21 | Stop reason: HOSPADM

## 2019-01-13 RX ORDER — DEXTROSE MONOHYDRATE 25 G/50ML
12.5 INJECTION, SOLUTION INTRAVENOUS PRN
Status: DISCONTINUED | OUTPATIENT
Start: 2019-01-13 | End: 2019-01-21 | Stop reason: HOSPADM

## 2019-01-13 RX ADMIN — PRAVASTATIN SODIUM 20 MG: 10 TABLET ORAL at 21:02

## 2019-01-13 RX ADMIN — GLIPIZIDE 10 MG: 10 TABLET ORAL at 08:13

## 2019-01-13 RX ADMIN — INSULIN LISPRO 9 UNITS: 100 INJECTION, SOLUTION INTRAVENOUS; SUBCUTANEOUS at 12:40

## 2019-01-13 RX ADMIN — CEFEPIME HYDROCHLORIDE 2 G: 2 INJECTION, POWDER, FOR SOLUTION INTRAVENOUS at 05:26

## 2019-01-13 RX ADMIN — POLYETHYLENE GLYCOL 3350 17 G: 17 POWDER, FOR SOLUTION ORAL at 21:01

## 2019-01-13 RX ADMIN — MOMETASONE FUROATE AND FORMOTEROL FUMARATE DIHYDRATE 2 PUFF: 200; 5 AEROSOL RESPIRATORY (INHALATION) at 08:13

## 2019-01-13 RX ADMIN — CIPROFLOXACIN HYDROCHLORIDE 750 MG: 500 TABLET, FILM COATED ORAL at 10:38

## 2019-01-13 RX ADMIN — Medication 1 CAPSULE: at 08:13

## 2019-01-13 RX ADMIN — CIPROFLOXACIN HYDROCHLORIDE 750 MG: 500 TABLET, FILM COATED ORAL at 21:03

## 2019-01-13 RX ADMIN — CALCIUM CARBONATE 500 MG: 500 TABLET, CHEWABLE ORAL at 21:02

## 2019-01-13 RX ADMIN — HYDROCORTISONE: 1 CREAM TOPICAL at 08:13

## 2019-01-13 RX ADMIN — ATENOLOL 25 MG: 25 TABLET ORAL at 08:13

## 2019-01-13 RX ADMIN — SPIRONOLACTONE 25 MG: 25 TABLET ORAL at 08:13

## 2019-01-13 RX ADMIN — TRAZODONE HYDROCHLORIDE 100 MG: 50 TABLET ORAL at 22:42

## 2019-01-13 RX ADMIN — CALCIUM CARBONATE 500 MG: 500 TABLET, CHEWABLE ORAL at 08:13

## 2019-01-13 RX ADMIN — IPRATROPIUM BROMIDE AND ALBUTEROL SULFATE 3 ML: .5; 3 SOLUTION RESPIRATORY (INHALATION) at 07:53

## 2019-01-13 RX ADMIN — TORSEMIDE 40 MG: 20 TABLET ORAL at 17:28

## 2019-01-13 RX ADMIN — INSULIN LISPRO 2 UNITS: 100 INJECTION, SOLUTION INTRAVENOUS; SUBCUTANEOUS at 21:03

## 2019-01-13 RX ADMIN — IPRATROPIUM BROMIDE AND ALBUTEROL SULFATE 3 ML: .5; 3 SOLUTION RESPIRATORY (INHALATION) at 11:59

## 2019-01-13 RX ADMIN — TORSEMIDE 40 MG: 20 TABLET ORAL at 08:12

## 2019-01-13 RX ADMIN — HYDROCODONE BITARTRATE AND ACETAMINOPHEN 1 TABLET: 5; 325 TABLET ORAL at 22:42

## 2019-01-13 RX ADMIN — HYDROCORTISONE: 1 CREAM TOPICAL at 22:47

## 2019-01-13 RX ADMIN — IPRATROPIUM BROMIDE AND ALBUTEROL SULFATE 3 ML: .5; 3 SOLUTION RESPIRATORY (INHALATION) at 15:41

## 2019-01-13 RX ADMIN — POLYETHYLENE GLYCOL 3350 17 G: 17 POWDER, FOR SOLUTION ORAL at 08:13

## 2019-01-13 RX ADMIN — INSULIN LISPRO 2 UNITS: 100 INJECTION, SOLUTION INTRAVENOUS; SUBCUTANEOUS at 08:14

## 2019-01-13 RX ADMIN — CEFEPIME HYDROCHLORIDE 2 G: 2 INJECTION, POWDER, FOR SOLUTION INTRAVENOUS at 17:23

## 2019-01-13 RX ADMIN — INSULIN LISPRO 3 UNITS: 100 INJECTION, SOLUTION INTRAVENOUS; SUBCUTANEOUS at 17:28

## 2019-01-13 RX ADMIN — ATENOLOL 25 MG: 25 TABLET ORAL at 21:02

## 2019-01-13 ASSESSMENT — PAIN SCALES - GENERAL
PAINLEVEL_OUTOF10: 0
PAINLEVEL_OUTOF10: 5
PAINLEVEL_OUTOF10: 0

## 2019-01-14 ENCOUNTER — APPOINTMENT (OUTPATIENT)
Dept: GENERAL RADIOLOGY | Age: 64
DRG: 947 | End: 2019-01-14
Attending: FAMILY MEDICINE
Payer: COMMERCIAL

## 2019-01-14 LAB
ANION GAP SERPL CALCULATED.3IONS-SCNC: 13 MMOL/L (ref 4–16)
BASE EXCESS MIXED: 6.8 (ref 0–2.3)
BASE EXCESS: ABNORMAL (ref 0–2.4)
BUN BLDV-MCNC: 67 MG/DL (ref 6–23)
CALCIUM SERPL-MCNC: 9.8 MG/DL (ref 8.3–10.6)
CHLORIDE BLD-SCNC: 93 MMOL/L (ref 99–110)
CO2 CONTENT: 32.1 MMOL/L (ref 19–24)
CO2: 30 MMOL/L (ref 21–32)
CREAT SERPL-MCNC: 1.4 MG/DL (ref 0.6–1.1)
GFR AFRICAN AMERICAN: 46 ML/MIN/1.73M2
GFR NON-AFRICAN AMERICAN: 38 ML/MIN/1.73M2
GLUCOSE BLD-MCNC: 154 MG/DL (ref 70–99)
GLUCOSE BLD-MCNC: 199 MG/DL (ref 70–99)
GLUCOSE BLD-MCNC: 219 MG/DL (ref 70–99)
GLUCOSE BLD-MCNC: 247 MG/DL (ref 70–99)
GLUCOSE BLD-MCNC: 270 MG/DL (ref 70–99)
HCO3 ARTERIAL: 30.8 MMOL/L (ref 18–23)
HCT VFR BLD CALC: 27.8 % (ref 37–47)
HEMOGLOBIN: 8.9 GM/DL (ref 12.5–16)
MCH RBC QN AUTO: 29.7 PG (ref 27–31)
MCHC RBC AUTO-ENTMCNC: 32 % (ref 32–36)
MCV RBC AUTO: 92.7 FL (ref 78–100)
O2 SATURATION: 94.9 % (ref 96–97)
PCO2 ARTERIAL: 40.9 MMHG (ref 32–45)
PDW BLD-RTO: 13.3 % (ref 11.7–14.9)
PH BLOOD: 7.49 (ref 7.34–7.45)
PLATELET # BLD: 174 K/CU MM (ref 140–440)
PMV BLD AUTO: 10.3 FL (ref 7.5–11.1)
PO2 ARTERIAL: 69.7 MMHG (ref 75–100)
POTASSIUM SERPL-SCNC: 4.4 MMOL/L (ref 3.5–5.1)
RBC # BLD: 3 M/CU MM (ref 4.2–5.4)
SODIUM BLD-SCNC: 136 MMOL/L (ref 135–145)
SOURCE, BLOOD GAS: ABNORMAL
WBC # BLD: 8.8 K/CU MM (ref 4–10.5)

## 2019-01-14 PROCEDURE — 36600 WITHDRAWAL OF ARTERIAL BLOOD: CPT

## 2019-01-14 PROCEDURE — 6370000000 HC RX 637 (ALT 250 FOR IP): Performed by: FAMILY MEDICINE

## 2019-01-14 PROCEDURE — 6370000000 HC RX 637 (ALT 250 FOR IP): Performed by: HOSPITALIST

## 2019-01-14 PROCEDURE — 97110 THERAPEUTIC EXERCISES: CPT

## 2019-01-14 PROCEDURE — 6370000000 HC RX 637 (ALT 250 FOR IP): Performed by: INTERNAL MEDICINE

## 2019-01-14 PROCEDURE — 80048 BASIC METABOLIC PNL TOTAL CA: CPT

## 2019-01-14 PROCEDURE — 71046 X-RAY EXAM CHEST 2 VIEWS: CPT

## 2019-01-14 PROCEDURE — 82962 GLUCOSE BLOOD TEST: CPT

## 2019-01-14 PROCEDURE — 97530 THERAPEUTIC ACTIVITIES: CPT

## 2019-01-14 PROCEDURE — 6370000000 HC RX 637 (ALT 250 FOR IP): Performed by: NURSE PRACTITIONER

## 2019-01-14 PROCEDURE — 85027 COMPLETE CBC AUTOMATED: CPT

## 2019-01-14 PROCEDURE — 94640 AIRWAY INHALATION TREATMENT: CPT

## 2019-01-14 PROCEDURE — 6360000002 HC RX W HCPCS: Performed by: HOSPITALIST

## 2019-01-14 PROCEDURE — 2700000000 HC OXYGEN THERAPY PER DAY

## 2019-01-14 PROCEDURE — 82803 BLOOD GASES ANY COMBINATION: CPT

## 2019-01-14 PROCEDURE — 2580000003 HC RX 258: Performed by: FAMILY MEDICINE

## 2019-01-14 PROCEDURE — 6360000002 HC RX W HCPCS: Performed by: FAMILY MEDICINE

## 2019-01-14 PROCEDURE — 1200000002 HC SEMI PRIVATE SWING BED

## 2019-01-14 RX ORDER — FUROSEMIDE 10 MG/ML
20 INJECTION INTRAMUSCULAR; INTRAVENOUS ONCE
Status: COMPLETED | OUTPATIENT
Start: 2019-01-14 | End: 2019-01-14

## 2019-01-14 RX ORDER — FUROSEMIDE 10 MG/ML
40 INJECTION INTRAMUSCULAR; INTRAVENOUS ONCE
Status: COMPLETED | OUTPATIENT
Start: 2019-01-14 | End: 2019-01-14

## 2019-01-14 RX ORDER — TORSEMIDE 20 MG/1
40 TABLET ORAL 2 TIMES DAILY
Status: DISCONTINUED | OUTPATIENT
Start: 2019-01-15 | End: 2019-01-16

## 2019-01-14 RX ADMIN — HYDROCODONE BITARTRATE AND ACETAMINOPHEN 1 TABLET: 5; 325 TABLET ORAL at 22:32

## 2019-01-14 RX ADMIN — HYDROCORTISONE: 1 CREAM TOPICAL at 21:19

## 2019-01-14 RX ADMIN — IPRATROPIUM BROMIDE AND ALBUTEROL SULFATE 3 ML: .5; 3 SOLUTION RESPIRATORY (INHALATION) at 11:23

## 2019-01-14 RX ADMIN — GLIPIZIDE 10 MG: 10 TABLET ORAL at 10:03

## 2019-01-14 RX ADMIN — FUROSEMIDE 20 MG: 10 INJECTION, SOLUTION INTRAMUSCULAR; INTRAVENOUS at 12:51

## 2019-01-14 RX ADMIN — HYDROCODONE BITARTRATE AND ACETAMINOPHEN 1 TABLET: 5; 325 TABLET ORAL at 16:12

## 2019-01-14 RX ADMIN — FUROSEMIDE 20 MG: 10 INJECTION, SOLUTION INTRAMUSCULAR; INTRAVENOUS at 10:03

## 2019-01-14 RX ADMIN — CEFEPIME HYDROCHLORIDE 2 G: 2 INJECTION, POWDER, FOR SOLUTION INTRAVENOUS at 05:46

## 2019-01-14 RX ADMIN — CALCIUM CARBONATE 500 MG: 500 TABLET, CHEWABLE ORAL at 21:19

## 2019-01-14 RX ADMIN — TRAZODONE HYDROCHLORIDE 100 MG: 50 TABLET ORAL at 22:32

## 2019-01-14 RX ADMIN — ATENOLOL 25 MG: 25 TABLET ORAL at 10:03

## 2019-01-14 RX ADMIN — MOMETASONE FUROATE AND FORMOTEROL FUMARATE DIHYDRATE 2 PUFF: 200; 5 AEROSOL RESPIRATORY (INHALATION) at 10:02

## 2019-01-14 RX ADMIN — FUROSEMIDE 40 MG: 10 INJECTION, SOLUTION INTRAVENOUS at 21:14

## 2019-01-14 RX ADMIN — CIPROFLOXACIN HYDROCHLORIDE 750 MG: 500 TABLET, FILM COATED ORAL at 22:32

## 2019-01-14 RX ADMIN — INSULIN LISPRO 3 UNITS: 100 INJECTION, SOLUTION INTRAVENOUS; SUBCUTANEOUS at 16:14

## 2019-01-14 RX ADMIN — INSULIN LISPRO 6 UNITS: 100 INJECTION, SOLUTION INTRAVENOUS; SUBCUTANEOUS at 10:02

## 2019-01-14 RX ADMIN — POLYETHYLENE GLYCOL 3350 17 G: 17 POWDER, FOR SOLUTION ORAL at 10:02

## 2019-01-14 RX ADMIN — CIPROFLOXACIN HYDROCHLORIDE 750 MG: 500 TABLET, FILM COATED ORAL at 10:03

## 2019-01-14 RX ADMIN — CEFEPIME HYDROCHLORIDE 2 G: 2 INJECTION, POWDER, FOR SOLUTION INTRAVENOUS at 17:37

## 2019-01-14 RX ADMIN — IPRATROPIUM BROMIDE AND ALBUTEROL SULFATE 3 ML: .5; 3 SOLUTION RESPIRATORY (INHALATION) at 07:41

## 2019-01-14 RX ADMIN — CALCIUM CARBONATE 500 MG: 500 TABLET, CHEWABLE ORAL at 10:03

## 2019-01-14 RX ADMIN — PRAVASTATIN SODIUM 20 MG: 10 TABLET ORAL at 21:19

## 2019-01-14 RX ADMIN — Medication 1 CAPSULE: at 10:03

## 2019-01-14 RX ADMIN — INSULIN LISPRO 2 UNITS: 100 INJECTION, SOLUTION INTRAVENOUS; SUBCUTANEOUS at 21:11

## 2019-01-14 RX ADMIN — HYDROCORTISONE: 1 CREAM TOPICAL at 10:12

## 2019-01-14 RX ADMIN — INSULIN LISPRO 6 UNITS: 100 INJECTION, SOLUTION INTRAVENOUS; SUBCUTANEOUS at 12:51

## 2019-01-14 RX ADMIN — ATENOLOL 25 MG: 25 TABLET ORAL at 21:19

## 2019-01-14 ASSESSMENT — PAIN DESCRIPTION - LOCATION: LOCATION: LEG

## 2019-01-14 ASSESSMENT — PAIN SCALES - GENERAL
PAINLEVEL_OUTOF10: 0
PAINLEVEL_OUTOF10: 5
PAINLEVEL_OUTOF10: 0
PAINLEVEL_OUTOF10: 2
PAINLEVEL_OUTOF10: 0
PAINLEVEL_OUTOF10: 0
PAINLEVEL_OUTOF10: 5
PAINLEVEL_OUTOF10: 0

## 2019-01-14 ASSESSMENT — PAIN DESCRIPTION - ORIENTATION: ORIENTATION: RIGHT;LEFT

## 2019-01-14 ASSESSMENT — PAIN DESCRIPTION - DESCRIPTORS: DESCRIPTORS: ACHING

## 2019-01-14 ASSESSMENT — PAIN DESCRIPTION - PAIN TYPE: TYPE: ACUTE PAIN

## 2019-01-14 ASSESSMENT — PAIN DESCRIPTION - ONSET: ONSET: ON-GOING

## 2019-01-14 ASSESSMENT — PAIN DESCRIPTION - FREQUENCY: FREQUENCY: INTERMITTENT

## 2019-01-15 LAB
GLUCOSE BLD-MCNC: 182 MG/DL (ref 70–99)
GLUCOSE BLD-MCNC: 187 MG/DL (ref 70–99)
GLUCOSE BLD-MCNC: 217 MG/DL (ref 70–99)
GLUCOSE BLD-MCNC: 284 MG/DL (ref 70–99)

## 2019-01-15 PROCEDURE — 2700000000 HC OXYGEN THERAPY PER DAY

## 2019-01-15 PROCEDURE — 6370000000 HC RX 637 (ALT 250 FOR IP): Performed by: FAMILY MEDICINE

## 2019-01-15 PROCEDURE — 82962 GLUCOSE BLOOD TEST: CPT

## 2019-01-15 PROCEDURE — 6370000000 HC RX 637 (ALT 250 FOR IP): Performed by: HOSPITALIST

## 2019-01-15 PROCEDURE — 2580000003 HC RX 258: Performed by: NURSE PRACTITIONER

## 2019-01-15 PROCEDURE — 6370000000 HC RX 637 (ALT 250 FOR IP): Performed by: NURSE PRACTITIONER

## 2019-01-15 PROCEDURE — 6370000000 HC RX 637 (ALT 250 FOR IP): Performed by: INTERNAL MEDICINE

## 2019-01-15 PROCEDURE — 97110 THERAPEUTIC EXERCISES: CPT

## 2019-01-15 PROCEDURE — 6360000002 HC RX W HCPCS: Performed by: NURSE PRACTITIONER

## 2019-01-15 PROCEDURE — 2580000003 HC RX 258: Performed by: FAMILY MEDICINE

## 2019-01-15 PROCEDURE — 97530 THERAPEUTIC ACTIVITIES: CPT

## 2019-01-15 PROCEDURE — 97535 SELF CARE MNGMENT TRAINING: CPT

## 2019-01-15 PROCEDURE — 94640 AIRWAY INHALATION TREATMENT: CPT

## 2019-01-15 PROCEDURE — 1200000002 HC SEMI PRIVATE SWING BED

## 2019-01-15 PROCEDURE — 6360000002 HC RX W HCPCS: Performed by: FAMILY MEDICINE

## 2019-01-15 RX ADMIN — CALCIUM CARBONATE 500 MG: 500 TABLET, CHEWABLE ORAL at 08:12

## 2019-01-15 RX ADMIN — POLYETHYLENE GLYCOL 3350 17 G: 17 POWDER, FOR SOLUTION ORAL at 08:11

## 2019-01-15 RX ADMIN — SPIRONOLACTONE 25 MG: 25 TABLET ORAL at 08:35

## 2019-01-15 RX ADMIN — TORSEMIDE 40 MG: 20 TABLET ORAL at 17:36

## 2019-01-15 RX ADMIN — HYDROCODONE BITARTRATE AND ACETAMINOPHEN 1 TABLET: 5; 325 TABLET ORAL at 22:56

## 2019-01-15 RX ADMIN — Medication 1 CAPSULE: at 08:12

## 2019-01-15 RX ADMIN — IPRATROPIUM BROMIDE AND ALBUTEROL SULFATE 3 ML: .5; 3 SOLUTION RESPIRATORY (INHALATION) at 08:00

## 2019-01-15 RX ADMIN — IPRATROPIUM BROMIDE AND ALBUTEROL SULFATE 3 ML: .5; 3 SOLUTION RESPIRATORY (INHALATION) at 15:04

## 2019-01-15 RX ADMIN — INSULIN LISPRO 6 UNITS: 100 INJECTION, SOLUTION INTRAVENOUS; SUBCUTANEOUS at 08:11

## 2019-01-15 RX ADMIN — IPRATROPIUM BROMIDE AND ALBUTEROL SULFATE 3 ML: .5; 3 SOLUTION RESPIRATORY (INHALATION) at 11:12

## 2019-01-15 RX ADMIN — INSULIN LISPRO 3 UNITS: 100 INJECTION, SOLUTION INTRAVENOUS; SUBCUTANEOUS at 17:37

## 2019-01-15 RX ADMIN — INSULIN LISPRO 3 UNITS: 100 INJECTION, SOLUTION INTRAVENOUS; SUBCUTANEOUS at 11:35

## 2019-01-15 RX ADMIN — GLIPIZIDE 10 MG: 10 TABLET ORAL at 08:12

## 2019-01-15 RX ADMIN — TRAZODONE HYDROCHLORIDE 100 MG: 50 TABLET ORAL at 22:42

## 2019-01-15 RX ADMIN — CEFEPIME HYDROCHLORIDE 2 G: 2 INJECTION, POWDER, FOR SOLUTION INTRAVENOUS at 05:37

## 2019-01-15 RX ADMIN — CIPROFLOXACIN HYDROCHLORIDE 750 MG: 500 TABLET, FILM COATED ORAL at 08:12

## 2019-01-15 RX ADMIN — MOMETASONE FUROATE AND FORMOTEROL FUMARATE DIHYDRATE 2 PUFF: 200; 5 AEROSOL RESPIRATORY (INHALATION) at 22:56

## 2019-01-15 RX ADMIN — IPRATROPIUM BROMIDE AND ALBUTEROL SULFATE 3 ML: .5; 3 SOLUTION RESPIRATORY (INHALATION) at 20:42

## 2019-01-15 RX ADMIN — CIPROFLOXACIN HYDROCHLORIDE 750 MG: 500 TABLET, FILM COATED ORAL at 22:43

## 2019-01-15 RX ADMIN — HYDROCODONE BITARTRATE AND ACETAMINOPHEN 1 TABLET: 5; 325 TABLET ORAL at 14:55

## 2019-01-15 RX ADMIN — CALCIUM CARBONATE 500 MG: 500 TABLET, CHEWABLE ORAL at 22:42

## 2019-01-15 RX ADMIN — WATER 2.2 ML: 1 INJECTION INTRAMUSCULAR; INTRAVENOUS; SUBCUTANEOUS at 22:44

## 2019-01-15 RX ADMIN — CEFEPIME HYDROCHLORIDE 2 G: 2 INJECTION, POWDER, FOR SOLUTION INTRAVENOUS at 17:36

## 2019-01-15 RX ADMIN — ATENOLOL 25 MG: 25 TABLET ORAL at 08:12

## 2019-01-15 RX ADMIN — ATENOLOL 25 MG: 25 TABLET ORAL at 22:43

## 2019-01-15 RX ADMIN — ALTEPLASE 2 MG: 2.2 INJECTION, POWDER, LYOPHILIZED, FOR SOLUTION INTRAVENOUS at 22:43

## 2019-01-15 RX ADMIN — PRAVASTATIN SODIUM 20 MG: 10 TABLET ORAL at 22:56

## 2019-01-15 RX ADMIN — HYDROCODONE BITARTRATE AND ACETAMINOPHEN 1 TABLET: 5; 325 TABLET ORAL at 08:16

## 2019-01-15 RX ADMIN — TORSEMIDE 40 MG: 20 TABLET ORAL at 08:12

## 2019-01-15 RX ADMIN — MOMETASONE FUROATE AND FORMOTEROL FUMARATE DIHYDRATE 2 PUFF: 200; 5 AEROSOL RESPIRATORY (INHALATION) at 08:11

## 2019-01-15 RX ADMIN — INSULIN LISPRO 5 UNITS: 100 INJECTION, SOLUTION INTRAVENOUS; SUBCUTANEOUS at 22:56

## 2019-01-15 ASSESSMENT — PAIN DESCRIPTION - DESCRIPTORS
DESCRIPTORS: CONSTANT;DISCOMFORT
DESCRIPTORS: ACHING

## 2019-01-15 ASSESSMENT — PAIN DESCRIPTION - FREQUENCY
FREQUENCY: CONTINUOUS
FREQUENCY: INTERMITTENT

## 2019-01-15 ASSESSMENT — PAIN DESCRIPTION - LOCATION
LOCATION: LEG;SHOULDER
LOCATION: LEG
LOCATION: LEG;SHOULDER

## 2019-01-15 ASSESSMENT — PAIN SCALES - GENERAL
PAINLEVEL_OUTOF10: 6
PAINLEVEL_OUTOF10: 0
PAINLEVEL_OUTOF10: 5
PAINLEVEL_OUTOF10: 6
PAINLEVEL_OUTOF10: 7
PAINLEVEL_OUTOF10: 2

## 2019-01-15 ASSESSMENT — PAIN DESCRIPTION - PAIN TYPE
TYPE: CHRONIC PAIN
TYPE: CHRONIC PAIN
TYPE: ACUTE PAIN

## 2019-01-15 ASSESSMENT — PAIN DESCRIPTION - ORIENTATION: ORIENTATION: RIGHT;LEFT

## 2019-01-15 ASSESSMENT — PAIN DESCRIPTION - PROGRESSION: CLINICAL_PROGRESSION: GRADUALLY WORSENING

## 2019-01-15 ASSESSMENT — PAIN DESCRIPTION - ONSET
ONSET: ON-GOING
ONSET: ON-GOING

## 2019-01-16 LAB
ANION GAP SERPL CALCULATED.3IONS-SCNC: 12 MMOL/L (ref 4–16)
BUN BLDV-MCNC: 70 MG/DL (ref 6–23)
CALCIUM SERPL-MCNC: 10 MG/DL (ref 8.3–10.6)
CHLORIDE BLD-SCNC: 91 MMOL/L (ref 99–110)
CO2: 31 MMOL/L (ref 21–32)
CREAT SERPL-MCNC: 1.4 MG/DL (ref 0.6–1.1)
GFR AFRICAN AMERICAN: 46 ML/MIN/1.73M2
GFR NON-AFRICAN AMERICAN: 38 ML/MIN/1.73M2
GLUCOSE BLD-MCNC: 135 MG/DL (ref 70–99)
GLUCOSE BLD-MCNC: 173 MG/DL (ref 70–99)
GLUCOSE BLD-MCNC: 218 MG/DL (ref 70–99)
GLUCOSE BLD-MCNC: 233 MG/DL (ref 70–99)
GLUCOSE BLD-MCNC: 251 MG/DL (ref 70–99)
POTASSIUM SERPL-SCNC: 4.2 MMOL/L (ref 3.5–5.1)
SODIUM BLD-SCNC: 134 MMOL/L (ref 135–145)

## 2019-01-16 PROCEDURE — 97530 THERAPEUTIC ACTIVITIES: CPT

## 2019-01-16 PROCEDURE — 97110 THERAPEUTIC EXERCISES: CPT

## 2019-01-16 PROCEDURE — 6360000002 HC RX W HCPCS: Performed by: FAMILY MEDICINE

## 2019-01-16 PROCEDURE — 94640 AIRWAY INHALATION TREATMENT: CPT

## 2019-01-16 PROCEDURE — 6370000000 HC RX 637 (ALT 250 FOR IP): Performed by: NURSE PRACTITIONER

## 2019-01-16 PROCEDURE — 6370000000 HC RX 637 (ALT 250 FOR IP): Performed by: FAMILY MEDICINE

## 2019-01-16 PROCEDURE — 82962 GLUCOSE BLOOD TEST: CPT

## 2019-01-16 PROCEDURE — 1200000002 HC SEMI PRIVATE SWING BED

## 2019-01-16 PROCEDURE — 6370000000 HC RX 637 (ALT 250 FOR IP): Performed by: HOSPITALIST

## 2019-01-16 PROCEDURE — 6370000000 HC RX 637 (ALT 250 FOR IP): Performed by: INTERNAL MEDICINE

## 2019-01-16 PROCEDURE — 80048 BASIC METABOLIC PNL TOTAL CA: CPT

## 2019-01-16 PROCEDURE — 2580000003 HC RX 258: Performed by: FAMILY MEDICINE

## 2019-01-16 PROCEDURE — 2700000000 HC OXYGEN THERAPY PER DAY

## 2019-01-16 PROCEDURE — 6360000002 HC RX W HCPCS: Performed by: HOSPITALIST

## 2019-01-16 PROCEDURE — 36415 COLL VENOUS BLD VENIPUNCTURE: CPT

## 2019-01-16 RX ORDER — FUROSEMIDE 10 MG/ML
40 INJECTION INTRAMUSCULAR; INTRAVENOUS 2 TIMES DAILY
Status: DISCONTINUED | OUTPATIENT
Start: 2019-01-16 | End: 2019-01-19

## 2019-01-16 RX ADMIN — INSULIN LISPRO 6 UNITS: 100 INJECTION, SOLUTION INTRAVENOUS; SUBCUTANEOUS at 08:18

## 2019-01-16 RX ADMIN — ACETAMINOPHEN 650 MG: 325 TABLET ORAL at 19:42

## 2019-01-16 RX ADMIN — HYDROCODONE BITARTRATE AND ACETAMINOPHEN 1 TABLET: 5; 325 TABLET ORAL at 22:32

## 2019-01-16 RX ADMIN — CEFEPIME HYDROCHLORIDE 2 G: 2 INJECTION, POWDER, FOR SOLUTION INTRAVENOUS at 17:33

## 2019-01-16 RX ADMIN — CALCIUM CARBONATE 500 MG: 500 TABLET, CHEWABLE ORAL at 08:22

## 2019-01-16 RX ADMIN — CIPROFLOXACIN HYDROCHLORIDE 750 MG: 500 TABLET, FILM COATED ORAL at 22:32

## 2019-01-16 RX ADMIN — FUROSEMIDE 40 MG: 10 INJECTION, SOLUTION INTRAMUSCULAR; INTRAVENOUS at 17:33

## 2019-01-16 RX ADMIN — CIPROFLOXACIN HYDROCHLORIDE 750 MG: 500 TABLET, FILM COATED ORAL at 08:21

## 2019-01-16 RX ADMIN — CALCIUM CARBONATE 500 MG: 500 TABLET, CHEWABLE ORAL at 22:32

## 2019-01-16 RX ADMIN — IPRATROPIUM BROMIDE AND ALBUTEROL SULFATE 3 ML: .5; 3 SOLUTION RESPIRATORY (INHALATION) at 11:00

## 2019-01-16 RX ADMIN — PRAVASTATIN SODIUM 20 MG: 10 TABLET ORAL at 22:32

## 2019-01-16 RX ADMIN — IPRATROPIUM BROMIDE AND ALBUTEROL SULFATE 3 ML: .5; 3 SOLUTION RESPIRATORY (INHALATION) at 07:40

## 2019-01-16 RX ADMIN — HYDROCORTISONE: 1 CREAM TOPICAL at 22:32

## 2019-01-16 RX ADMIN — SPIRONOLACTONE 25 MG: 25 TABLET ORAL at 08:22

## 2019-01-16 RX ADMIN — INSULIN LISPRO 5 UNITS: 100 INJECTION, SOLUTION INTRAVENOUS; SUBCUTANEOUS at 22:33

## 2019-01-16 RX ADMIN — IPRATROPIUM BROMIDE AND ALBUTEROL SULFATE 3 ML: .5; 3 SOLUTION RESPIRATORY (INHALATION) at 19:37

## 2019-01-16 RX ADMIN — MOMETASONE FUROATE AND FORMOTEROL FUMARATE DIHYDRATE 2 PUFF: 200; 5 AEROSOL RESPIRATORY (INHALATION) at 22:31

## 2019-01-16 RX ADMIN — HYDROCODONE BITARTRATE AND ACETAMINOPHEN 1 TABLET: 5; 325 TABLET ORAL at 11:59

## 2019-01-16 RX ADMIN — TORSEMIDE 40 MG: 20 TABLET ORAL at 08:21

## 2019-01-16 RX ADMIN — INSULIN LISPRO 6 UNITS: 100 INJECTION, SOLUTION INTRAVENOUS; SUBCUTANEOUS at 15:06

## 2019-01-16 RX ADMIN — IPRATROPIUM BROMIDE AND ALBUTEROL SULFATE 3 ML: .5; 3 SOLUTION RESPIRATORY (INHALATION) at 15:12

## 2019-01-16 RX ADMIN — ATENOLOL 25 MG: 25 TABLET ORAL at 22:31

## 2019-01-16 RX ADMIN — ACETAMINOPHEN 650 MG: 325 TABLET ORAL at 05:48

## 2019-01-16 RX ADMIN — TRAZODONE HYDROCHLORIDE 100 MG: 50 TABLET ORAL at 22:32

## 2019-01-16 RX ADMIN — CEFEPIME HYDROCHLORIDE 2 G: 2 INJECTION, POWDER, FOR SOLUTION INTRAVENOUS at 05:39

## 2019-01-16 RX ADMIN — POLYETHYLENE GLYCOL 3350 17 G: 17 POWDER, FOR SOLUTION ORAL at 08:20

## 2019-01-16 RX ADMIN — MOMETASONE FUROATE AND FORMOTEROL FUMARATE DIHYDRATE 2 PUFF: 200; 5 AEROSOL RESPIRATORY (INHALATION) at 08:18

## 2019-01-16 RX ADMIN — ATENOLOL 25 MG: 25 TABLET ORAL at 08:21

## 2019-01-16 RX ADMIN — FUROSEMIDE 40 MG: 10 INJECTION, SOLUTION INTRAMUSCULAR; INTRAVENOUS at 11:33

## 2019-01-16 RX ADMIN — GLIPIZIDE 10 MG: 10 TABLET ORAL at 08:24

## 2019-01-16 RX ADMIN — Medication 1 CAPSULE: at 08:22

## 2019-01-16 ASSESSMENT — PAIN DESCRIPTION - LOCATION
LOCATION: HEAD
LOCATION: LEG
LOCATION: HEAD
LOCATION: LEG

## 2019-01-16 ASSESSMENT — PAIN DESCRIPTION - PROGRESSION: CLINICAL_PROGRESSION: GRADUALLY WORSENING

## 2019-01-16 ASSESSMENT — PAIN SCALES - GENERAL
PAINLEVEL_OUTOF10: 7
PAINLEVEL_OUTOF10: 2
PAINLEVEL_OUTOF10: 3
PAINLEVEL_OUTOF10: 1
PAINLEVEL_OUTOF10: 3
PAINLEVEL_OUTOF10: 6
PAINLEVEL_OUTOF10: 5
PAINLEVEL_OUTOF10: 7

## 2019-01-16 ASSESSMENT — PAIN DESCRIPTION - DESCRIPTORS
DESCRIPTORS: HEADACHE
DESCRIPTORS: ACHING
DESCRIPTORS: HEADACHE

## 2019-01-16 ASSESSMENT — PAIN DESCRIPTION - ORIENTATION
ORIENTATION: RIGHT;LEFT
ORIENTATION: RIGHT;LEFT

## 2019-01-16 ASSESSMENT — PAIN DESCRIPTION - PAIN TYPE
TYPE: ACUTE PAIN
TYPE: CHRONIC PAIN
TYPE: ACUTE PAIN
TYPE: ACUTE PAIN

## 2019-01-16 ASSESSMENT — PAIN - FUNCTIONAL ASSESSMENT: PAIN_FUNCTIONAL_ASSESSMENT: ACTIVITIES ARE NOT PREVENTED

## 2019-01-16 ASSESSMENT — PAIN DESCRIPTION - ONSET: ONSET: ON-GOING

## 2019-01-16 ASSESSMENT — PAIN DESCRIPTION - FREQUENCY: FREQUENCY: CONTINUOUS

## 2019-01-17 LAB
ALBUMIN SERPL-MCNC: 3.7 GM/DL (ref 3.4–5)
ALP BLD-CCNC: 51 IU/L (ref 40–129)
ALT SERPL-CCNC: 15 U/L (ref 10–40)
ANION GAP SERPL CALCULATED.3IONS-SCNC: 14 MMOL/L (ref 4–16)
AST SERPL-CCNC: 15 IU/L (ref 15–37)
BILIRUB SERPL-MCNC: 0.3 MG/DL (ref 0–1)
BUN BLDV-MCNC: 69 MG/DL (ref 6–23)
CALCIUM SERPL-MCNC: 9.8 MG/DL (ref 8.3–10.6)
CHLORIDE BLD-SCNC: 93 MMOL/L (ref 99–110)
CO2: 29 MMOL/L (ref 21–32)
CREAT SERPL-MCNC: 1.4 MG/DL (ref 0.6–1.1)
ERYTHROCYTE SEDIMENTATION RATE: 105 MM/HR (ref 0–30)
GFR AFRICAN AMERICAN: 46 ML/MIN/1.73M2
GFR NON-AFRICAN AMERICAN: 38 ML/MIN/1.73M2
GLUCOSE BLD-MCNC: 165 MG/DL (ref 70–99)
GLUCOSE BLD-MCNC: 179 MG/DL (ref 70–99)
GLUCOSE BLD-MCNC: 181 MG/DL (ref 70–99)
GLUCOSE BLD-MCNC: 226 MG/DL (ref 70–99)
GLUCOSE BLD-MCNC: 270 MG/DL (ref 70–99)
HCT VFR BLD CALC: 27.2 % (ref 37–47)
HEMOGLOBIN: 8.6 GM/DL (ref 12.5–16)
HIGH SENSITIVE C-REACTIVE PROTEIN: 59 MG/L
MCH RBC QN AUTO: 29.3 PG (ref 27–31)
MCHC RBC AUTO-ENTMCNC: 31.6 % (ref 32–36)
MCV RBC AUTO: 92.5 FL (ref 78–100)
PDW BLD-RTO: 13.3 % (ref 11.7–14.9)
PLATELET # BLD: 190 K/CU MM (ref 140–440)
PMV BLD AUTO: 10.4 FL (ref 7.5–11.1)
POTASSIUM SERPL-SCNC: 4.3 MMOL/L (ref 3.5–5.1)
RBC # BLD: 2.94 M/CU MM (ref 4.2–5.4)
SODIUM BLD-SCNC: 136 MMOL/L (ref 135–145)
TOTAL PROTEIN: 7.9 GM/DL (ref 6.4–8.2)
WBC # BLD: 8.2 K/CU MM (ref 4–10.5)

## 2019-01-17 PROCEDURE — 97530 THERAPEUTIC ACTIVITIES: CPT

## 2019-01-17 PROCEDURE — 85652 RBC SED RATE AUTOMATED: CPT

## 2019-01-17 PROCEDURE — 6360000002 HC RX W HCPCS: Performed by: FAMILY MEDICINE

## 2019-01-17 PROCEDURE — 6370000000 HC RX 637 (ALT 250 FOR IP): Performed by: INTERNAL MEDICINE

## 2019-01-17 PROCEDURE — 6370000000 HC RX 637 (ALT 250 FOR IP): Performed by: HOSPITALIST

## 2019-01-17 PROCEDURE — 86141 C-REACTIVE PROTEIN HS: CPT

## 2019-01-17 PROCEDURE — 82962 GLUCOSE BLOOD TEST: CPT

## 2019-01-17 PROCEDURE — 2700000000 HC OXYGEN THERAPY PER DAY

## 2019-01-17 PROCEDURE — 6360000002 HC RX W HCPCS: Performed by: HOSPITALIST

## 2019-01-17 PROCEDURE — 97110 THERAPEUTIC EXERCISES: CPT

## 2019-01-17 PROCEDURE — 85027 COMPLETE CBC AUTOMATED: CPT

## 2019-01-17 PROCEDURE — 6370000000 HC RX 637 (ALT 250 FOR IP): Performed by: NURSE PRACTITIONER

## 2019-01-17 PROCEDURE — 6370000000 HC RX 637 (ALT 250 FOR IP): Performed by: FAMILY MEDICINE

## 2019-01-17 PROCEDURE — 80053 COMPREHEN METABOLIC PANEL: CPT

## 2019-01-17 PROCEDURE — 94640 AIRWAY INHALATION TREATMENT: CPT

## 2019-01-17 PROCEDURE — 2580000003 HC RX 258: Performed by: FAMILY MEDICINE

## 2019-01-17 PROCEDURE — 1200000002 HC SEMI PRIVATE SWING BED

## 2019-01-17 RX ADMIN — INSULIN LISPRO 3 UNITS: 100 INJECTION, SOLUTION INTRAVENOUS; SUBCUTANEOUS at 17:53

## 2019-01-17 RX ADMIN — IPRATROPIUM BROMIDE AND ALBUTEROL SULFATE 3 ML: .5; 3 SOLUTION RESPIRATORY (INHALATION) at 20:39

## 2019-01-17 RX ADMIN — CEFEPIME HYDROCHLORIDE 2 G: 2 INJECTION, POWDER, FOR SOLUTION INTRAVENOUS at 17:47

## 2019-01-17 RX ADMIN — INSULIN LISPRO 3 UNITS: 100 INJECTION, SOLUTION INTRAVENOUS; SUBCUTANEOUS at 12:58

## 2019-01-17 RX ADMIN — CALCIUM CARBONATE 500 MG: 500 TABLET, CHEWABLE ORAL at 21:14

## 2019-01-17 RX ADMIN — INSULIN LISPRO 6 UNITS: 100 INJECTION, SOLUTION INTRAVENOUS; SUBCUTANEOUS at 08:37

## 2019-01-17 RX ADMIN — MOMETASONE FUROATE AND FORMOTEROL FUMARATE DIHYDRATE 2 PUFF: 200; 5 AEROSOL RESPIRATORY (INHALATION) at 21:13

## 2019-01-17 RX ADMIN — ATENOLOL 25 MG: 25 TABLET ORAL at 08:36

## 2019-01-17 RX ADMIN — CIPROFLOXACIN HYDROCHLORIDE 750 MG: 500 TABLET, FILM COATED ORAL at 22:42

## 2019-01-17 RX ADMIN — TRAZODONE HYDROCHLORIDE 100 MG: 50 TABLET ORAL at 22:43

## 2019-01-17 RX ADMIN — MOMETASONE FUROATE AND FORMOTEROL FUMARATE DIHYDRATE 2 PUFF: 200; 5 AEROSOL RESPIRATORY (INHALATION) at 08:37

## 2019-01-17 RX ADMIN — HYDROCORTISONE: 1 CREAM TOPICAL at 08:37

## 2019-01-17 RX ADMIN — FUROSEMIDE 40 MG: 10 INJECTION, SOLUTION INTRAMUSCULAR; INTRAVENOUS at 17:47

## 2019-01-17 RX ADMIN — INSULIN LISPRO 5 UNITS: 100 INJECTION, SOLUTION INTRAVENOUS; SUBCUTANEOUS at 21:14

## 2019-01-17 RX ADMIN — Medication 1 CAPSULE: at 08:37

## 2019-01-17 RX ADMIN — HYDROCODONE BITARTRATE AND ACETAMINOPHEN 1 TABLET: 5; 325 TABLET ORAL at 22:43

## 2019-01-17 RX ADMIN — CEFEPIME HYDROCHLORIDE 2 G: 2 INJECTION, POWDER, FOR SOLUTION INTRAVENOUS at 05:37

## 2019-01-17 RX ADMIN — ATENOLOL 25 MG: 25 TABLET ORAL at 21:14

## 2019-01-17 RX ADMIN — IPRATROPIUM BROMIDE AND ALBUTEROL SULFATE 3 ML: .5; 3 SOLUTION RESPIRATORY (INHALATION) at 07:40

## 2019-01-17 RX ADMIN — CIPROFLOXACIN HYDROCHLORIDE 750 MG: 500 TABLET, FILM COATED ORAL at 11:19

## 2019-01-17 RX ADMIN — IPRATROPIUM BROMIDE AND ALBUTEROL SULFATE 3 ML: .5; 3 SOLUTION RESPIRATORY (INHALATION) at 15:30

## 2019-01-17 RX ADMIN — CALCIUM CARBONATE 500 MG: 500 TABLET, CHEWABLE ORAL at 08:36

## 2019-01-17 RX ADMIN — PRAVASTATIN SODIUM 20 MG: 10 TABLET ORAL at 21:14

## 2019-01-17 RX ADMIN — GLIPIZIDE 10 MG: 10 TABLET ORAL at 08:36

## 2019-01-17 RX ADMIN — SPIRONOLACTONE 25 MG: 25 TABLET ORAL at 08:36

## 2019-01-17 RX ADMIN — IPRATROPIUM BROMIDE AND ALBUTEROL SULFATE 3 ML: .5; 3 SOLUTION RESPIRATORY (INHALATION) at 11:20

## 2019-01-17 RX ADMIN — HYDROCORTISONE: 1 CREAM TOPICAL at 19:36

## 2019-01-17 RX ADMIN — HYDROCODONE BITARTRATE AND ACETAMINOPHEN 1 TABLET: 5; 325 TABLET ORAL at 11:22

## 2019-01-17 RX ADMIN — FUROSEMIDE 40 MG: 10 INJECTION, SOLUTION INTRAMUSCULAR; INTRAVENOUS at 08:37

## 2019-01-17 ASSESSMENT — PAIN DESCRIPTION - LOCATION
LOCATION: BACK;LEG
LOCATION: LEG

## 2019-01-17 ASSESSMENT — PAIN DESCRIPTION - ONSET: ONSET: ON-GOING

## 2019-01-17 ASSESSMENT — PAIN DESCRIPTION - DESCRIPTORS: DESCRIPTORS: CONSTANT;DISCOMFORT

## 2019-01-17 ASSESSMENT — PAIN DESCRIPTION - FREQUENCY: FREQUENCY: CONTINUOUS

## 2019-01-17 ASSESSMENT — PAIN DESCRIPTION - PAIN TYPE
TYPE: CHRONIC PAIN
TYPE: CHRONIC PAIN

## 2019-01-17 ASSESSMENT — PAIN SCALES - GENERAL
PAINLEVEL_OUTOF10: 6
PAINLEVEL_OUTOF10: 4
PAINLEVEL_OUTOF10: 4

## 2019-01-17 ASSESSMENT — PAIN DESCRIPTION - ORIENTATION: ORIENTATION: RIGHT;LEFT

## 2019-01-18 LAB
ANION GAP SERPL CALCULATED.3IONS-SCNC: 14 MMOL/L (ref 4–16)
BUN BLDV-MCNC: 64 MG/DL (ref 6–23)
CALCIUM SERPL-MCNC: 9.7 MG/DL (ref 8.3–10.6)
CHLORIDE BLD-SCNC: 98 MMOL/L (ref 99–110)
CO2: 28 MMOL/L (ref 21–32)
CREAT SERPL-MCNC: 1.4 MG/DL (ref 0.6–1.1)
GFR AFRICAN AMERICAN: 46 ML/MIN/1.73M2
GFR NON-AFRICAN AMERICAN: 38 ML/MIN/1.73M2
GLUCOSE BLD-MCNC: 133 MG/DL (ref 70–99)
GLUCOSE BLD-MCNC: 206 MG/DL (ref 70–99)
GLUCOSE BLD-MCNC: 211 MG/DL (ref 70–99)
GLUCOSE BLD-MCNC: 223 MG/DL (ref 70–99)
GLUCOSE BLD-MCNC: 311 MG/DL (ref 70–99)
POTASSIUM SERPL-SCNC: 4.3 MMOL/L (ref 3.5–5.1)
SODIUM BLD-SCNC: 140 MMOL/L (ref 135–145)

## 2019-01-18 PROCEDURE — 80048 BASIC METABOLIC PNL TOTAL CA: CPT

## 2019-01-18 PROCEDURE — 87040 BLOOD CULTURE FOR BACTERIA: CPT

## 2019-01-18 PROCEDURE — 6370000000 HC RX 637 (ALT 250 FOR IP): Performed by: HOSPITALIST

## 2019-01-18 PROCEDURE — 94640 AIRWAY INHALATION TREATMENT: CPT

## 2019-01-18 PROCEDURE — 6360000002 HC RX W HCPCS: Performed by: HOSPITALIST

## 2019-01-18 PROCEDURE — 82962 GLUCOSE BLOOD TEST: CPT

## 2019-01-18 PROCEDURE — 6360000002 HC RX W HCPCS: Performed by: FAMILY MEDICINE

## 2019-01-18 PROCEDURE — 1200000002 HC SEMI PRIVATE SWING BED

## 2019-01-18 PROCEDURE — 97110 THERAPEUTIC EXERCISES: CPT

## 2019-01-18 PROCEDURE — 97530 THERAPEUTIC ACTIVITIES: CPT

## 2019-01-18 PROCEDURE — 2700000000 HC OXYGEN THERAPY PER DAY

## 2019-01-18 PROCEDURE — 6370000000 HC RX 637 (ALT 250 FOR IP): Performed by: NURSE PRACTITIONER

## 2019-01-18 PROCEDURE — 6370000000 HC RX 637 (ALT 250 FOR IP): Performed by: FAMILY MEDICINE

## 2019-01-18 PROCEDURE — 6370000000 HC RX 637 (ALT 250 FOR IP): Performed by: INTERNAL MEDICINE

## 2019-01-18 PROCEDURE — 36415 COLL VENOUS BLD VENIPUNCTURE: CPT

## 2019-01-18 PROCEDURE — 2580000003 HC RX 258: Performed by: FAMILY MEDICINE

## 2019-01-18 RX ADMIN — SPIRONOLACTONE 25 MG: 25 TABLET ORAL at 09:25

## 2019-01-18 RX ADMIN — MOMETASONE FUROATE AND FORMOTEROL FUMARATE DIHYDRATE 2 PUFF: 200; 5 AEROSOL RESPIRATORY (INHALATION) at 21:29

## 2019-01-18 RX ADMIN — INSULIN LISPRO 6 UNITS: 100 INJECTION, SOLUTION INTRAVENOUS; SUBCUTANEOUS at 21:37

## 2019-01-18 RX ADMIN — PRAVASTATIN SODIUM 20 MG: 10 TABLET ORAL at 21:29

## 2019-01-18 RX ADMIN — IPRATROPIUM BROMIDE AND ALBUTEROL SULFATE 3 ML: .5; 3 SOLUTION RESPIRATORY (INHALATION) at 07:20

## 2019-01-18 RX ADMIN — IPRATROPIUM BROMIDE AND ALBUTEROL SULFATE 3 ML: .5; 3 SOLUTION RESPIRATORY (INHALATION) at 15:20

## 2019-01-18 RX ADMIN — FUROSEMIDE 40 MG: 10 INJECTION, SOLUTION INTRAMUSCULAR; INTRAVENOUS at 09:26

## 2019-01-18 RX ADMIN — POLYETHYLENE GLYCOL 3350 17 G: 17 POWDER, FOR SOLUTION ORAL at 21:29

## 2019-01-18 RX ADMIN — Medication 1 CAPSULE: at 09:25

## 2019-01-18 RX ADMIN — INSULIN LISPRO 6 UNITS: 100 INJECTION, SOLUTION INTRAVENOUS; SUBCUTANEOUS at 09:29

## 2019-01-18 RX ADMIN — MOMETASONE FUROATE AND FORMOTEROL FUMARATE DIHYDRATE 2 PUFF: 200; 5 AEROSOL RESPIRATORY (INHALATION) at 09:26

## 2019-01-18 RX ADMIN — CIPROFLOXACIN HYDROCHLORIDE 750 MG: 500 TABLET, FILM COATED ORAL at 21:37

## 2019-01-18 RX ADMIN — CALCIUM CARBONATE 500 MG: 500 TABLET, CHEWABLE ORAL at 09:24

## 2019-01-18 RX ADMIN — CEFEPIME HYDROCHLORIDE 2 G: 2 INJECTION, POWDER, FOR SOLUTION INTRAVENOUS at 17:17

## 2019-01-18 RX ADMIN — CEFEPIME HYDROCHLORIDE 2 G: 2 INJECTION, POWDER, FOR SOLUTION INTRAVENOUS at 05:26

## 2019-01-18 RX ADMIN — INSULIN LISPRO 6 UNITS: 100 INJECTION, SOLUTION INTRAVENOUS; SUBCUTANEOUS at 12:57

## 2019-01-18 RX ADMIN — HYDROCODONE BITARTRATE AND ACETAMINOPHEN 1 TABLET: 5; 325 TABLET ORAL at 09:25

## 2019-01-18 RX ADMIN — FUROSEMIDE 40 MG: 10 INJECTION, SOLUTION INTRAMUSCULAR; INTRAVENOUS at 17:17

## 2019-01-18 RX ADMIN — ATENOLOL 25 MG: 25 TABLET ORAL at 21:29

## 2019-01-18 RX ADMIN — TRAZODONE HYDROCHLORIDE 100 MG: 50 TABLET ORAL at 22:53

## 2019-01-18 RX ADMIN — GLIPIZIDE 10 MG: 10 TABLET ORAL at 09:27

## 2019-01-18 RX ADMIN — IPRATROPIUM BROMIDE AND ALBUTEROL SULFATE 3 ML: .5; 3 SOLUTION RESPIRATORY (INHALATION) at 20:13

## 2019-01-18 RX ADMIN — CIPROFLOXACIN HYDROCHLORIDE 750 MG: 500 TABLET, FILM COATED ORAL at 09:25

## 2019-01-18 RX ADMIN — CALCIUM CARBONATE 500 MG: 500 TABLET, CHEWABLE ORAL at 21:29

## 2019-01-18 RX ADMIN — HYDROCORTISONE: 1 CREAM TOPICAL at 09:26

## 2019-01-18 RX ADMIN — HYDROCODONE BITARTRATE AND ACETAMINOPHEN 1 TABLET: 5; 325 TABLET ORAL at 22:53

## 2019-01-18 RX ADMIN — ATENOLOL 25 MG: 25 TABLET ORAL at 09:25

## 2019-01-18 RX ADMIN — HYDROCORTISONE: 1 CREAM TOPICAL at 21:31

## 2019-01-18 RX ADMIN — IPRATROPIUM BROMIDE AND ALBUTEROL SULFATE 3 ML: .5; 3 SOLUTION RESPIRATORY (INHALATION) at 11:25

## 2019-01-18 ASSESSMENT — PAIN SCALES - GENERAL
PAINLEVEL_OUTOF10: 4
PAINLEVEL_OUTOF10: 3
PAINLEVEL_OUTOF10: 6

## 2019-01-19 ENCOUNTER — APPOINTMENT (OUTPATIENT)
Dept: GENERAL RADIOLOGY | Age: 64
DRG: 947 | End: 2019-01-19
Attending: FAMILY MEDICINE
Payer: COMMERCIAL

## 2019-01-19 LAB
GLUCOSE BLD-MCNC: 172 MG/DL (ref 70–99)
GLUCOSE BLD-MCNC: 194 MG/DL (ref 70–99)
GLUCOSE BLD-MCNC: 236 MG/DL (ref 70–99)
GLUCOSE BLD-MCNC: 250 MG/DL (ref 70–99)

## 2019-01-19 PROCEDURE — 97110 THERAPEUTIC EXERCISES: CPT

## 2019-01-19 PROCEDURE — 71046 X-RAY EXAM CHEST 2 VIEWS: CPT

## 2019-01-19 PROCEDURE — 97535 SELF CARE MNGMENT TRAINING: CPT

## 2019-01-19 PROCEDURE — 6370000000 HC RX 637 (ALT 250 FOR IP): Performed by: FAMILY MEDICINE

## 2019-01-19 PROCEDURE — 82962 GLUCOSE BLOOD TEST: CPT

## 2019-01-19 PROCEDURE — 6370000000 HC RX 637 (ALT 250 FOR IP): Performed by: INTERNAL MEDICINE

## 2019-01-19 PROCEDURE — 6360000002 HC RX W HCPCS: Performed by: FAMILY MEDICINE

## 2019-01-19 PROCEDURE — 6370000000 HC RX 637 (ALT 250 FOR IP): Performed by: NURSE PRACTITIONER

## 2019-01-19 PROCEDURE — 2580000003 HC RX 258: Performed by: FAMILY MEDICINE

## 2019-01-19 PROCEDURE — 2700000000 HC OXYGEN THERAPY PER DAY

## 2019-01-19 PROCEDURE — 1200000002 HC SEMI PRIVATE SWING BED

## 2019-01-19 PROCEDURE — 6370000000 HC RX 637 (ALT 250 FOR IP): Performed by: HOSPITALIST

## 2019-01-19 PROCEDURE — 94640 AIRWAY INHALATION TREATMENT: CPT

## 2019-01-19 PROCEDURE — 6360000002 HC RX W HCPCS: Performed by: HOSPITALIST

## 2019-01-19 RX ORDER — FUROSEMIDE 10 MG/ML
40 INJECTION INTRAMUSCULAR; INTRAVENOUS 3 TIMES DAILY
Status: DISCONTINUED | OUTPATIENT
Start: 2019-01-19 | End: 2019-01-21 | Stop reason: HOSPADM

## 2019-01-19 RX ADMIN — IPRATROPIUM BROMIDE AND ALBUTEROL SULFATE 3 ML: .5; 3 SOLUTION RESPIRATORY (INHALATION) at 11:40

## 2019-01-19 RX ADMIN — TRAZODONE HYDROCHLORIDE 100 MG: 50 TABLET ORAL at 22:35

## 2019-01-19 RX ADMIN — CEFEPIME HYDROCHLORIDE 2 G: 2 INJECTION, POWDER, FOR SOLUTION INTRAVENOUS at 17:25

## 2019-01-19 RX ADMIN — IPRATROPIUM BROMIDE AND ALBUTEROL SULFATE 3 ML: .5; 3 SOLUTION RESPIRATORY (INHALATION) at 15:35

## 2019-01-19 RX ADMIN — INSULIN LISPRO 3 UNITS: 100 INJECTION, SOLUTION INTRAVENOUS; SUBCUTANEOUS at 12:07

## 2019-01-19 RX ADMIN — HYDROCORTISONE: 1 CREAM TOPICAL at 08:36

## 2019-01-19 RX ADMIN — POLYETHYLENE GLYCOL 3350 17 G: 17 POWDER, FOR SOLUTION ORAL at 21:09

## 2019-01-19 RX ADMIN — ATENOLOL 25 MG: 25 TABLET ORAL at 21:09

## 2019-01-19 RX ADMIN — IPRATROPIUM BROMIDE AND ALBUTEROL SULFATE 3 ML: .5; 3 SOLUTION RESPIRATORY (INHALATION) at 19:46

## 2019-01-19 RX ADMIN — INSULIN LISPRO 5 UNITS: 100 INJECTION, SOLUTION INTRAVENOUS; SUBCUTANEOUS at 21:10

## 2019-01-19 RX ADMIN — FUROSEMIDE 40 MG: 10 INJECTION, SOLUTION INTRAMUSCULAR; INTRAVENOUS at 08:35

## 2019-01-19 RX ADMIN — CALCIUM CARBONATE 500 MG: 500 TABLET, CHEWABLE ORAL at 21:09

## 2019-01-19 RX ADMIN — MOMETASONE FUROATE AND FORMOTEROL FUMARATE DIHYDRATE 2 PUFF: 200; 5 AEROSOL RESPIRATORY (INHALATION) at 08:36

## 2019-01-19 RX ADMIN — SPIRONOLACTONE 25 MG: 25 TABLET ORAL at 08:36

## 2019-01-19 RX ADMIN — INSULIN LISPRO 6 UNITS: 100 INJECTION, SOLUTION INTRAVENOUS; SUBCUTANEOUS at 08:37

## 2019-01-19 RX ADMIN — Medication 1 CAPSULE: at 08:36

## 2019-01-19 RX ADMIN — POLYETHYLENE GLYCOL 3350 17 G: 17 POWDER, FOR SOLUTION ORAL at 08:36

## 2019-01-19 RX ADMIN — CEFEPIME HYDROCHLORIDE 2 G: 2 INJECTION, POWDER, FOR SOLUTION INTRAVENOUS at 05:59

## 2019-01-19 RX ADMIN — IPRATROPIUM BROMIDE AND ALBUTEROL SULFATE 3 ML: .5; 3 SOLUTION RESPIRATORY (INHALATION) at 07:40

## 2019-01-19 RX ADMIN — INSULIN LISPRO 3 UNITS: 100 INJECTION, SOLUTION INTRAVENOUS; SUBCUTANEOUS at 17:26

## 2019-01-19 RX ADMIN — MOMETASONE FUROATE AND FORMOTEROL FUMARATE DIHYDRATE 2 PUFF: 200; 5 AEROSOL RESPIRATORY (INHALATION) at 21:09

## 2019-01-19 RX ADMIN — HYDROCODONE BITARTRATE AND ACETAMINOPHEN 1 TABLET: 5; 325 TABLET ORAL at 22:35

## 2019-01-19 RX ADMIN — ATENOLOL 25 MG: 25 TABLET ORAL at 08:36

## 2019-01-19 RX ADMIN — FUROSEMIDE 40 MG: 10 INJECTION, SOLUTION INTRAVENOUS at 17:25

## 2019-01-19 RX ADMIN — CALCIUM CARBONATE 500 MG: 500 TABLET, CHEWABLE ORAL at 08:36

## 2019-01-19 RX ADMIN — CIPROFLOXACIN HYDROCHLORIDE 750 MG: 500 TABLET, FILM COATED ORAL at 12:07

## 2019-01-19 RX ADMIN — GLIPIZIDE 10 MG: 10 TABLET ORAL at 08:36

## 2019-01-19 RX ADMIN — HYDROCODONE BITARTRATE AND ACETAMINOPHEN 1 TABLET: 5; 325 TABLET ORAL at 14:54

## 2019-01-19 RX ADMIN — PRAVASTATIN SODIUM 20 MG: 10 TABLET ORAL at 21:09

## 2019-01-19 RX ADMIN — CIPROFLOXACIN HYDROCHLORIDE 750 MG: 500 TABLET, FILM COATED ORAL at 22:35

## 2019-01-19 ASSESSMENT — PAIN SCALES - GENERAL
PAINLEVEL_OUTOF10: 0
PAINLEVEL_OUTOF10: 5
PAINLEVEL_OUTOF10: 0
PAINLEVEL_OUTOF10: 0
PAINLEVEL_OUTOF10: 5
PAINLEVEL_OUTOF10: 0
PAINLEVEL_OUTOF10: 3
PAINLEVEL_OUTOF10: 3
PAINLEVEL_OUTOF10: 0
PAINLEVEL_OUTOF10: 3
PAINLEVEL_OUTOF10: 5
PAINLEVEL_OUTOF10: 0

## 2019-01-19 ASSESSMENT — PAIN DESCRIPTION - LOCATION
LOCATION: LEG
LOCATION: LEG
LOCATION: GENERALIZED

## 2019-01-19 ASSESSMENT — PAIN DESCRIPTION - PAIN TYPE
TYPE: ACUTE PAIN
TYPE: ACUTE PAIN
TYPE: CHRONIC PAIN

## 2019-01-19 ASSESSMENT — PAIN DESCRIPTION - PROGRESSION
CLINICAL_PROGRESSION: GRADUALLY IMPROVING
CLINICAL_PROGRESSION: RAPIDLY WORSENING

## 2019-01-19 ASSESSMENT — PAIN - FUNCTIONAL ASSESSMENT
PAIN_FUNCTIONAL_ASSESSMENT: ACTIVITIES ARE NOT PREVENTED
PAIN_FUNCTIONAL_ASSESSMENT: ACTIVITIES ARE NOT PREVENTED

## 2019-01-19 ASSESSMENT — PAIN DESCRIPTION - ONSET
ONSET: ON-GOING
ONSET: ON-GOING

## 2019-01-19 ASSESSMENT — PAIN DESCRIPTION - ORIENTATION
ORIENTATION: RIGHT
ORIENTATION: RIGHT

## 2019-01-19 ASSESSMENT — PAIN DESCRIPTION - DESCRIPTORS
DESCRIPTORS: RADIATING
DESCRIPTORS: RADIATING

## 2019-01-19 ASSESSMENT — PAIN DESCRIPTION - FREQUENCY
FREQUENCY: CONTINUOUS
FREQUENCY: CONTINUOUS

## 2019-01-20 LAB
ANION GAP SERPL CALCULATED.3IONS-SCNC: 15 MMOL/L (ref 4–16)
BUN BLDV-MCNC: 60 MG/DL (ref 6–23)
CALCIUM SERPL-MCNC: 9.2 MG/DL (ref 8.3–10.6)
CHLORIDE BLD-SCNC: 98 MMOL/L (ref 99–110)
CO2: 26 MMOL/L (ref 21–32)
CREAT SERPL-MCNC: 1.3 MG/DL (ref 0.6–1.1)
GFR AFRICAN AMERICAN: 50 ML/MIN/1.73M2
GFR NON-AFRICAN AMERICAN: 42 ML/MIN/1.73M2
GLUCOSE BLD-MCNC: 155 MG/DL (ref 70–99)
GLUCOSE BLD-MCNC: 207 MG/DL (ref 70–99)
GLUCOSE BLD-MCNC: 217 MG/DL (ref 70–99)
GLUCOSE BLD-MCNC: 252 MG/DL (ref 70–99)
GLUCOSE BLD-MCNC: 255 MG/DL (ref 70–99)
POTASSIUM SERPL-SCNC: 4.4 MMOL/L (ref 3.5–5.1)
SODIUM BLD-SCNC: 139 MMOL/L (ref 135–145)

## 2019-01-20 PROCEDURE — 80048 BASIC METABOLIC PNL TOTAL CA: CPT

## 2019-01-20 PROCEDURE — 6370000000 HC RX 637 (ALT 250 FOR IP): Performed by: INTERNAL MEDICINE

## 2019-01-20 PROCEDURE — 6370000000 HC RX 637 (ALT 250 FOR IP): Performed by: FAMILY MEDICINE

## 2019-01-20 PROCEDURE — 2580000003 HC RX 258: Performed by: FAMILY MEDICINE

## 2019-01-20 PROCEDURE — 6360000002 HC RX W HCPCS: Performed by: FAMILY MEDICINE

## 2019-01-20 PROCEDURE — 94640 AIRWAY INHALATION TREATMENT: CPT

## 2019-01-20 PROCEDURE — 6370000000 HC RX 637 (ALT 250 FOR IP): Performed by: NURSE PRACTITIONER

## 2019-01-20 PROCEDURE — 6370000000 HC RX 637 (ALT 250 FOR IP): Performed by: HOSPITALIST

## 2019-01-20 PROCEDURE — 2700000000 HC OXYGEN THERAPY PER DAY

## 2019-01-20 PROCEDURE — 82962 GLUCOSE BLOOD TEST: CPT

## 2019-01-20 PROCEDURE — 6360000002 HC RX W HCPCS: Performed by: HOSPITALIST

## 2019-01-20 PROCEDURE — 1200000002 HC SEMI PRIVATE SWING BED

## 2019-01-20 RX ADMIN — INSULIN LISPRO 9 UNITS: 100 INJECTION, SOLUTION INTRAVENOUS; SUBCUTANEOUS at 08:54

## 2019-01-20 RX ADMIN — MOMETASONE FUROATE AND FORMOTEROL FUMARATE DIHYDRATE 2 PUFF: 200; 5 AEROSOL RESPIRATORY (INHALATION) at 08:54

## 2019-01-20 RX ADMIN — GLIPIZIDE 10 MG: 10 TABLET ORAL at 08:53

## 2019-01-20 RX ADMIN — CALCIUM CARBONATE 500 MG: 500 TABLET, CHEWABLE ORAL at 21:06

## 2019-01-20 RX ADMIN — POLYETHYLENE GLYCOL 3350 17 G: 17 POWDER, FOR SOLUTION ORAL at 08:54

## 2019-01-20 RX ADMIN — FUROSEMIDE 40 MG: 10 INJECTION, SOLUTION INTRAVENOUS at 12:15

## 2019-01-20 RX ADMIN — Medication 1 CAPSULE: at 08:53

## 2019-01-20 RX ADMIN — INSULIN LISPRO 3 UNITS: 100 INJECTION, SOLUTION INTRAVENOUS; SUBCUTANEOUS at 21:14

## 2019-01-20 RX ADMIN — IPRATROPIUM BROMIDE AND ALBUTEROL SULFATE 3 ML: .5; 3 SOLUTION RESPIRATORY (INHALATION) at 14:58

## 2019-01-20 RX ADMIN — INSULIN LISPRO 3 UNITS: 100 INJECTION, SOLUTION INTRAVENOUS; SUBCUTANEOUS at 17:30

## 2019-01-20 RX ADMIN — HYDROCORTISONE: 1 CREAM TOPICAL at 08:54

## 2019-01-20 RX ADMIN — PRAVASTATIN SODIUM 20 MG: 10 TABLET ORAL at 21:06

## 2019-01-20 RX ADMIN — IPRATROPIUM BROMIDE AND ALBUTEROL SULFATE 3 ML: .5; 3 SOLUTION RESPIRATORY (INHALATION) at 19:42

## 2019-01-20 RX ADMIN — ACETAMINOPHEN 650 MG: 325 TABLET ORAL at 09:01

## 2019-01-20 RX ADMIN — ATENOLOL 25 MG: 25 TABLET ORAL at 21:06

## 2019-01-20 RX ADMIN — HYDROCODONE BITARTRATE AND ACETAMINOPHEN 1 TABLET: 5; 325 TABLET ORAL at 21:10

## 2019-01-20 RX ADMIN — CALCIUM CARBONATE 500 MG: 500 TABLET, CHEWABLE ORAL at 08:53

## 2019-01-20 RX ADMIN — CEFEPIME HYDROCHLORIDE 2 G: 2 INJECTION, POWDER, FOR SOLUTION INTRAVENOUS at 04:54

## 2019-01-20 RX ADMIN — TRAZODONE HYDROCHLORIDE 100 MG: 50 TABLET ORAL at 21:06

## 2019-01-20 RX ADMIN — CIPROFLOXACIN HYDROCHLORIDE 750 MG: 500 TABLET, FILM COATED ORAL at 08:53

## 2019-01-20 RX ADMIN — FUROSEMIDE 40 MG: 10 INJECTION, SOLUTION INTRAVENOUS at 04:54

## 2019-01-20 RX ADMIN — FUROSEMIDE 40 MG: 10 INJECTION, SOLUTION INTRAVENOUS at 17:30

## 2019-01-20 RX ADMIN — SPIRONOLACTONE 25 MG: 25 TABLET ORAL at 08:53

## 2019-01-20 RX ADMIN — MOMETASONE FUROATE AND FORMOTEROL FUMARATE DIHYDRATE 2 PUFF: 200; 5 AEROSOL RESPIRATORY (INHALATION) at 21:06

## 2019-01-20 RX ADMIN — CEFEPIME HYDROCHLORIDE 2 G: 2 INJECTION, POWDER, FOR SOLUTION INTRAVENOUS at 17:30

## 2019-01-20 RX ADMIN — INSULIN LISPRO 9 UNITS: 100 INJECTION, SOLUTION INTRAVENOUS; SUBCUTANEOUS at 12:16

## 2019-01-20 RX ADMIN — ATENOLOL 25 MG: 25 TABLET ORAL at 08:53

## 2019-01-20 RX ADMIN — IPRATROPIUM BROMIDE AND ALBUTEROL SULFATE 3 ML: .5; 3 SOLUTION RESPIRATORY (INHALATION) at 11:25

## 2019-01-20 RX ADMIN — CIPROFLOXACIN HYDROCHLORIDE 750 MG: 500 TABLET, FILM COATED ORAL at 22:35

## 2019-01-20 RX ADMIN — IPRATROPIUM BROMIDE AND ALBUTEROL SULFATE 3 ML: .5; 3 SOLUTION RESPIRATORY (INHALATION) at 08:08

## 2019-01-20 ASSESSMENT — PAIN DESCRIPTION - ORIENTATION: ORIENTATION: RIGHT;LEFT

## 2019-01-20 ASSESSMENT — PAIN SCALES - GENERAL
PAINLEVEL_OUTOF10: 0
PAINLEVEL_OUTOF10: 1
PAINLEVEL_OUTOF10: 2
PAINLEVEL_OUTOF10: 0
PAINLEVEL_OUTOF10: 5
PAINLEVEL_OUTOF10: 0
PAINLEVEL_OUTOF10: 2

## 2019-01-20 ASSESSMENT — PAIN DESCRIPTION - DESCRIPTORS: DESCRIPTORS: ACHING

## 2019-01-20 ASSESSMENT — PAIN DESCRIPTION - LOCATION
LOCATION: HEAD;LEG
LOCATION: HEAD;LEG

## 2019-01-20 ASSESSMENT — PAIN DESCRIPTION - ONSET: ONSET: ON-GOING

## 2019-01-20 ASSESSMENT — PAIN - FUNCTIONAL ASSESSMENT: PAIN_FUNCTIONAL_ASSESSMENT: ACTIVITIES ARE NOT PREVENTED

## 2019-01-20 ASSESSMENT — PAIN DESCRIPTION - PAIN TYPE
TYPE: CHRONIC PAIN
TYPE: CHRONIC PAIN

## 2019-01-20 ASSESSMENT — PAIN DESCRIPTION - PROGRESSION: CLINICAL_PROGRESSION: GRADUALLY WORSENING

## 2019-01-21 VITALS
TEMPERATURE: 96.8 F | RESPIRATION RATE: 16 BRPM | BODY MASS INDEX: 41.45 KG/M2 | SYSTOLIC BLOOD PRESSURE: 107 MMHG | HEIGHT: 66 IN | DIASTOLIC BLOOD PRESSURE: 53 MMHG | HEART RATE: 79 BPM | WEIGHT: 257.94 LBS | OXYGEN SATURATION: 99 %

## 2019-01-21 LAB
GLUCOSE BLD-MCNC: 219 MG/DL (ref 70–99)
GLUCOSE BLD-MCNC: 228 MG/DL (ref 70–99)

## 2019-01-21 PROCEDURE — 6370000000 HC RX 637 (ALT 250 FOR IP): Performed by: HOSPITALIST

## 2019-01-21 PROCEDURE — 6370000000 HC RX 637 (ALT 250 FOR IP): Performed by: NURSE PRACTITIONER

## 2019-01-21 PROCEDURE — 6370000000 HC RX 637 (ALT 250 FOR IP): Performed by: INTERNAL MEDICINE

## 2019-01-21 PROCEDURE — 94640 AIRWAY INHALATION TREATMENT: CPT

## 2019-01-21 PROCEDURE — 6360000002 HC RX W HCPCS: Performed by: HOSPITALIST

## 2019-01-21 PROCEDURE — 82962 GLUCOSE BLOOD TEST: CPT

## 2019-01-21 PROCEDURE — 6370000000 HC RX 637 (ALT 250 FOR IP): Performed by: FAMILY MEDICINE

## 2019-01-21 PROCEDURE — 2700000000 HC OXYGEN THERAPY PER DAY

## 2019-01-21 RX ORDER — METOLAZONE 2.5 MG/1
2.5 TABLET ORAL DAILY
Qty: 30 TABLET | Refills: 0 | Status: SHIPPED | OUTPATIENT
Start: 2019-01-21 | End: 2019-02-06 | Stop reason: SDUPTHER

## 2019-01-21 RX ORDER — TORSEMIDE 20 MG/1
40 TABLET ORAL 2 TIMES DAILY
Qty: 60 TABLET | Refills: 0 | Status: SHIPPED | OUTPATIENT
Start: 2019-01-21 | End: 2019-02-06 | Stop reason: SDUPTHER

## 2019-01-21 RX ORDER — LACTOBACILLUS RHAMNOSUS GG 10B CELL
1 CAPSULE ORAL
Qty: 30 CAPSULE | Refills: 0 | Status: SHIPPED | OUTPATIENT
Start: 2019-01-22 | End: 2019-04-18

## 2019-01-21 RX ORDER — HYDROCODONE BITARTRATE AND ACETAMINOPHEN 5; 325 MG/1; MG/1
1 TABLET ORAL EVERY 6 HOURS PRN
Qty: 20 TABLET | Refills: 0 | Status: SHIPPED | OUTPATIENT
Start: 2019-01-21 | End: 2019-01-26

## 2019-01-21 RX ADMIN — IPRATROPIUM BROMIDE AND ALBUTEROL SULFATE 3 ML: .5; 3 SOLUTION RESPIRATORY (INHALATION) at 10:59

## 2019-01-21 RX ADMIN — ATENOLOL 25 MG: 25 TABLET ORAL at 07:57

## 2019-01-21 RX ADMIN — CALCIUM CARBONATE 500 MG: 500 TABLET, CHEWABLE ORAL at 07:57

## 2019-01-21 RX ADMIN — INSULIN LISPRO 6 UNITS: 100 INJECTION, SOLUTION INTRAVENOUS; SUBCUTANEOUS at 12:57

## 2019-01-21 RX ADMIN — GLIPIZIDE 10 MG: 10 TABLET ORAL at 07:57

## 2019-01-21 RX ADMIN — SPIRONOLACTONE 25 MG: 25 TABLET ORAL at 07:57

## 2019-01-21 RX ADMIN — IPRATROPIUM BROMIDE AND ALBUTEROL SULFATE 3 ML: .5; 3 SOLUTION RESPIRATORY (INHALATION) at 07:41

## 2019-01-21 RX ADMIN — MOMETASONE FUROATE AND FORMOTEROL FUMARATE DIHYDRATE 2 PUFF: 200; 5 AEROSOL RESPIRATORY (INHALATION) at 07:58

## 2019-01-21 RX ADMIN — FUROSEMIDE 40 MG: 10 INJECTION, SOLUTION INTRAVENOUS at 06:12

## 2019-01-21 RX ADMIN — Medication 1 CAPSULE: at 07:57

## 2019-01-21 RX ADMIN — ACETAMINOPHEN 650 MG: 325 TABLET ORAL at 07:57

## 2019-01-21 RX ADMIN — INSULIN LISPRO 6 UNITS: 100 INJECTION, SOLUTION INTRAVENOUS; SUBCUTANEOUS at 07:58

## 2019-01-21 ASSESSMENT — PAIN SCALES - GENERAL
PAINLEVEL_OUTOF10: 3
PAINLEVEL_OUTOF10: 0
PAINLEVEL_OUTOF10: 3
PAINLEVEL_OUTOF10: 0

## 2019-01-21 ASSESSMENT — PAIN DESCRIPTION - PAIN TYPE: TYPE: ACUTE PAIN

## 2019-01-21 ASSESSMENT — PAIN DESCRIPTION - LOCATION: LOCATION: HEAD

## 2019-01-23 ENCOUNTER — TELEPHONE (OUTPATIENT)
Dept: INTERNAL MEDICINE CLINIC | Age: 64
End: 2019-01-23

## 2019-01-23 ENCOUNTER — OFFICE VISIT (OUTPATIENT)
Dept: INFECTIOUS DISEASES | Age: 64
End: 2019-01-23
Payer: COMMERCIAL

## 2019-01-23 VITALS
WEIGHT: 230.6 LBS | DIASTOLIC BLOOD PRESSURE: 35 MMHG | TEMPERATURE: 97.9 F | BODY MASS INDEX: 36.12 KG/M2 | RESPIRATION RATE: 13 BRPM | HEART RATE: 72 BPM | SYSTOLIC BLOOD PRESSURE: 127 MMHG

## 2019-01-23 DIAGNOSIS — I33.0 ACUTE AND SUBACUTE BACTERIAL ENDOCARDITIS: Primary | ICD-10-CM

## 2019-01-23 PROCEDURE — 99214 OFFICE O/P EST MOD 30 MIN: CPT | Performed by: INTERNAL MEDICINE

## 2019-01-23 ASSESSMENT — ENCOUNTER SYMPTOMS
EYES NEGATIVE: 1
GASTROINTESTINAL NEGATIVE: 1
ALLERGIC/IMMUNOLOGIC NEGATIVE: 1

## 2019-01-24 ENCOUNTER — OFFICE VISIT (OUTPATIENT)
Dept: INTERNAL MEDICINE CLINIC | Age: 64
End: 2019-01-24
Payer: COMMERCIAL

## 2019-01-24 ENCOUNTER — TELEPHONE (OUTPATIENT)
Dept: CARDIOLOGY CLINIC | Age: 64
End: 2019-01-24

## 2019-01-24 ENCOUNTER — OFFICE VISIT (OUTPATIENT)
Dept: CARDIOLOGY CLINIC | Age: 64
End: 2019-01-24
Payer: COMMERCIAL

## 2019-01-24 VITALS
HEART RATE: 80 BPM | HEIGHT: 66 IN | SYSTOLIC BLOOD PRESSURE: 114 MMHG | WEIGHT: 229 LBS | DIASTOLIC BLOOD PRESSURE: 40 MMHG | BODY MASS INDEX: 36.8 KG/M2

## 2019-01-24 VITALS
DIASTOLIC BLOOD PRESSURE: 40 MMHG | OXYGEN SATURATION: 95 % | SYSTOLIC BLOOD PRESSURE: 116 MMHG | WEIGHT: 229.6 LBS | HEART RATE: 71 BPM | BODY MASS INDEX: 37.06 KG/M2

## 2019-01-24 DIAGNOSIS — I33.0 ACUTE BACTERIAL ENDOCARDITIS: ICD-10-CM

## 2019-01-24 DIAGNOSIS — G47.33 OBSTRUCTIVE SLEEP APNEA: ICD-10-CM

## 2019-01-24 DIAGNOSIS — Z72.0 TOBACCO ABUSE: ICD-10-CM

## 2019-01-24 DIAGNOSIS — I63.9 CEREBROVASCULAR ACCIDENT (CVA), UNSPECIFIED MECHANISM (HCC): ICD-10-CM

## 2019-01-24 DIAGNOSIS — E11.9 TYPE 2 DIABETES MELLITUS WITHOUT COMPLICATION, WITHOUT LONG-TERM CURRENT USE OF INSULIN (HCC): ICD-10-CM

## 2019-01-24 DIAGNOSIS — I38 VALVULAR DISEASE: ICD-10-CM

## 2019-01-24 DIAGNOSIS — N17.9 ACUTE KIDNEY INJURY (HCC): ICD-10-CM

## 2019-01-24 DIAGNOSIS — I10 HYPERTENSION, UNSPECIFIED TYPE: ICD-10-CM

## 2019-01-24 DIAGNOSIS — R50.9 FEVER, UNSPECIFIED FEVER CAUSE: ICD-10-CM

## 2019-01-24 DIAGNOSIS — D63.1 ANEMIA IN CHRONIC KIDNEY DISEASE, UNSPECIFIED CKD STAGE: ICD-10-CM

## 2019-01-24 DIAGNOSIS — E78.5 HYPERLIPIDEMIA, UNSPECIFIED HYPERLIPIDEMIA TYPE: ICD-10-CM

## 2019-01-24 DIAGNOSIS — E78.2 MIXED HYPERLIPIDEMIA: ICD-10-CM

## 2019-01-24 DIAGNOSIS — I33.0 ACUTE AND SUBACUTE BACTERIAL ENDOCARDITIS: Primary | ICD-10-CM

## 2019-01-24 DIAGNOSIS — I27.20 PULMONARY HYPERTENSION (HCC): ICD-10-CM

## 2019-01-24 DIAGNOSIS — N18.9 ANEMIA IN CHRONIC KIDNEY DISEASE, UNSPECIFIED CKD STAGE: ICD-10-CM

## 2019-01-24 DIAGNOSIS — N39.46 URINARY INCONTINENCE, MIXED: ICD-10-CM

## 2019-01-24 DIAGNOSIS — J44.9 COPD, SEVERE (HCC): Chronic | ICD-10-CM

## 2019-01-24 DIAGNOSIS — I50.43 ACUTE ON CHRONIC COMBINED SYSTOLIC AND DIASTOLIC CONGESTIVE HEART FAILURE (HCC): ICD-10-CM

## 2019-01-24 DIAGNOSIS — I10 ESSENTIAL HYPERTENSION: ICD-10-CM

## 2019-01-24 DIAGNOSIS — K75.81 STEATOHEPATITIS: ICD-10-CM

## 2019-01-24 DIAGNOSIS — J96.11 CHRONIC RESPIRATORY FAILURE WITH HYPOXIA (HCC): ICD-10-CM

## 2019-01-24 DIAGNOSIS — I33.0 SUBACUTE BACTERIAL ENDOCARDITIS: Primary | ICD-10-CM

## 2019-01-24 DIAGNOSIS — I65.23 BILATERAL CAROTID ARTERY STENOSIS: ICD-10-CM

## 2019-01-24 LAB
ANION GAP SERPL CALCULATED.3IONS-SCNC: 17 MMOL/L (ref 3–16)
BUN BLDV-MCNC: 74 MG/DL (ref 7–20)
CALCIUM SERPL-MCNC: 9.6 MG/DL (ref 8.3–10.6)
CHLORIDE BLD-SCNC: 97 MMOL/L (ref 99–110)
CO2: 27 MMOL/L (ref 21–32)
CREAT SERPL-MCNC: 1.2 MG/DL (ref 0.6–1.2)
GFR AFRICAN AMERICAN: 55
GFR NON-AFRICAN AMERICAN: 46
GLUCOSE BLD-MCNC: 160 MG/DL (ref 70–99)
POTASSIUM SERPL-SCNC: 4 MMOL/L (ref 3.5–5.1)
SODIUM BLD-SCNC: 141 MMOL/L (ref 136–145)

## 2019-01-24 PROCEDURE — 36415 COLL VENOUS BLD VENIPUNCTURE: CPT | Performed by: INTERNAL MEDICINE

## 2019-01-24 PROCEDURE — 99214 OFFICE O/P EST MOD 30 MIN: CPT | Performed by: INTERNAL MEDICINE

## 2019-01-24 PROCEDURE — 99496 TRANSJ CARE MGMT HIGH F2F 7D: CPT | Performed by: INTERNAL MEDICINE

## 2019-01-31 ENCOUNTER — TELEPHONE (OUTPATIENT)
Dept: CARDIOLOGY CLINIC | Age: 64
End: 2019-01-31

## 2019-02-04 RX ORDER — PRAVASTATIN SODIUM 20 MG
20 TABLET ORAL DAILY
Qty: 90 TABLET | Refills: 1 | Status: SHIPPED | OUTPATIENT
Start: 2019-02-04 | End: 2019-04-18

## 2019-02-04 RX ORDER — TRAZODONE HYDROCHLORIDE 100 MG/1
TABLET ORAL
Qty: 90 TABLET | Refills: 1 | Status: SHIPPED | OUTPATIENT
Start: 2019-02-04

## 2019-02-04 RX ORDER — GLIPIZIDE 10 MG/1
TABLET, FILM COATED, EXTENDED RELEASE ORAL
Qty: 180 TABLET | Refills: 1 | Status: SHIPPED | OUTPATIENT
Start: 2019-02-04

## 2019-02-04 RX ORDER — ATENOLOL 25 MG/1
TABLET ORAL
Qty: 180 TABLET | Refills: 1 | Status: SHIPPED | OUTPATIENT
Start: 2019-02-04 | End: 2019-04-18

## 2019-02-05 ENCOUNTER — TELEPHONE (OUTPATIENT)
Dept: INTERNAL MEDICINE CLINIC | Age: 64
End: 2019-02-05

## 2019-02-06 ENCOUNTER — PROCEDURE VISIT (OUTPATIENT)
Dept: CARDIOLOGY CLINIC | Age: 64
End: 2019-02-06
Payer: COMMERCIAL

## 2019-02-06 ENCOUNTER — HOSPITAL ENCOUNTER (OUTPATIENT)
Age: 64
Discharge: HOME | End: 2019-02-06
Payer: COMMERCIAL

## 2019-02-06 DIAGNOSIS — I50.43 ACUTE ON CHRONIC COMBINED SYSTOLIC AND DIASTOLIC CONGESTIVE HEART FAILURE (HCC): ICD-10-CM

## 2019-02-06 DIAGNOSIS — R50.9 FEVER, UNSPECIFIED FEVER CAUSE: ICD-10-CM

## 2019-02-06 DIAGNOSIS — I33.0 ACUTE BACTERIAL ENDOCARDITIS: ICD-10-CM

## 2019-02-06 DIAGNOSIS — I27.20 PULMONARY HYPERTENSION (HCC): ICD-10-CM

## 2019-02-06 DIAGNOSIS — I65.23 BILATERAL CAROTID ARTERY STENOSIS: ICD-10-CM

## 2019-02-06 DIAGNOSIS — I10 ESSENTIAL HYPERTENSION: ICD-10-CM

## 2019-02-06 DIAGNOSIS — I63.9 CEREBROVASCULAR ACCIDENT (CVA), UNSPECIFIED MECHANISM (HCC): ICD-10-CM

## 2019-02-06 DIAGNOSIS — I33.0 ACUTE AND SUBACUTE BACTERIAL ENDOCARDITIS: ICD-10-CM

## 2019-02-06 DIAGNOSIS — E78.5 HYPERLIPIDEMIA, UNSPECIFIED HYPERLIPIDEMIA TYPE: ICD-10-CM

## 2019-02-06 DIAGNOSIS — I10 HYPERTENSION, UNSPECIFIED TYPE: ICD-10-CM

## 2019-02-06 DIAGNOSIS — I38 VALVULAR DISEASE: Primary | ICD-10-CM

## 2019-02-06 PROBLEM — N18.31 CHRONIC KIDNEY DISEASE (CKD) STAGE G3A/A1, MODERATELY DECREASED GLOMERULAR FILTRATION RATE (GFR) BETWEEN 45-59 ML/MIN/1.73 SQUARE METER AND ALBUMINURIA CREATININE RATIO LESS THAN 30 MG/G (HCC): Status: ACTIVE | Noted: 2019-02-06

## 2019-02-06 LAB
BASOPHILS ABSOLUTE: 0 K/CU MM
BASOPHILS RELATIVE PERCENT: 0.6 % (ref 0–1)
C-REACTIVE PROTEIN, HIGH SENSITIVITY: 10.3 MG/L
DIFFERENTIAL TYPE: ABNORMAL
EOSINOPHILS ABSOLUTE: 0.1 K/CU MM
EOSINOPHILS RELATIVE PERCENT: 1.6 % (ref 0–3)
ERYTHROCYTE SEDIMENTATION RATE: 75 MM/HR (ref 0–30)
HCT VFR BLD CALC: 30.3 % (ref 37–47)
HEMOGLOBIN: 9.5 GM/DL (ref 12.5–16)
IMMATURE NEUTROPHIL %: 0.3 % (ref 0–0.43)
LV EF: 58 %
LVEF MODALITY: NORMAL
LYMPHOCYTES ABSOLUTE: 1.3 K/CU MM
LYMPHOCYTES RELATIVE PERCENT: 18.6 % (ref 24–44)
MCH RBC QN AUTO: 29.2 PG (ref 27–31)
MCHC RBC AUTO-ENTMCNC: 31.4 % (ref 32–36)
MCV RBC AUTO: 93.2 FL (ref 78–100)
MONOCYTES ABSOLUTE: 0.5 K/CU MM
MONOCYTES RELATIVE PERCENT: 6.7 % (ref 0–4)
PDW BLD-RTO: 14.1 % (ref 11.7–14.9)
PLATELET # BLD: 204 K/CU MM (ref 140–440)
PMV BLD AUTO: 9 FL (ref 7.5–11.1)
RBC # BLD: 3.25 M/CU MM (ref 4.2–5.4)
SEGMENTED NEUTROPHILS ABSOLUTE COUNT: 5.1 K/CU MM
SEGMENTED NEUTROPHILS RELATIVE PERCENT: 72.2 % (ref 36–66)
TOTAL IMMATURE NEUTOROPHIL: 0.02 K/CU MM
WBC # BLD: 7 K/CU MM (ref 4–10.5)

## 2019-02-06 PROCEDURE — 85025 COMPLETE CBC W/AUTO DIFF WBC: CPT

## 2019-02-06 PROCEDURE — 93306 TTE W/DOPPLER COMPLETE: CPT | Performed by: INTERNAL MEDICINE

## 2019-02-06 PROCEDURE — 85652 RBC SED RATE AUTOMATED: CPT

## 2019-02-06 PROCEDURE — 87040 BLOOD CULTURE FOR BACTERIA: CPT

## 2019-02-06 PROCEDURE — 36415 COLL VENOUS BLD VENIPUNCTURE: CPT

## 2019-02-06 PROCEDURE — 86140 C-REACTIVE PROTEIN: CPT

## 2019-02-07 ENCOUNTER — TELEPHONE (OUTPATIENT)
Dept: CARDIOLOGY CLINIC | Age: 64
End: 2019-02-07

## 2019-02-13 ENCOUNTER — TELEPHONE (OUTPATIENT)
Dept: CARDIOLOGY CLINIC | Age: 64
End: 2019-02-13

## 2019-02-13 ENCOUNTER — OFFICE VISIT (OUTPATIENT)
Dept: INFECTIOUS DISEASES | Age: 64
End: 2019-02-13
Payer: COMMERCIAL

## 2019-02-13 VITALS
SYSTOLIC BLOOD PRESSURE: 149 MMHG | RESPIRATION RATE: 13 BRPM | TEMPERATURE: 98.2 F | DIASTOLIC BLOOD PRESSURE: 47 MMHG | HEART RATE: 81 BPM

## 2019-02-13 DIAGNOSIS — I33.0 SUBACUTE BACTERIAL ENDOCARDITIS: Primary | ICD-10-CM

## 2019-02-13 DIAGNOSIS — I33.0 ACUTE AND SUBACUTE BACTERIAL ENDOCARDITIS: ICD-10-CM

## 2019-02-13 PROCEDURE — 99213 OFFICE O/P EST LOW 20 MIN: CPT | Performed by: INTERNAL MEDICINE

## 2019-02-15 ASSESSMENT — ENCOUNTER SYMPTOMS
EYES NEGATIVE: 1
ALLERGIC/IMMUNOLOGIC NEGATIVE: 1
GASTROINTESTINAL NEGATIVE: 1

## 2019-02-19 ENCOUNTER — TELEPHONE (OUTPATIENT)
Dept: CARDIOLOGY CLINIC | Age: 64
End: 2019-02-19

## 2019-04-18 ENCOUNTER — OFFICE VISIT (OUTPATIENT)
Dept: CARDIOLOGY CLINIC | Age: 64
End: 2019-04-18
Payer: COMMERCIAL

## 2019-04-18 VITALS
HEART RATE: 84 BPM | RESPIRATION RATE: 16 BRPM | SYSTOLIC BLOOD PRESSURE: 128 MMHG | BODY MASS INDEX: 37.28 KG/M2 | HEIGHT: 66 IN | DIASTOLIC BLOOD PRESSURE: 58 MMHG | WEIGHT: 232 LBS

## 2019-04-18 DIAGNOSIS — I33.0 SUBACUTE BACTERIAL ENDOCARDITIS: ICD-10-CM

## 2019-04-18 DIAGNOSIS — B95.5 STREPTOCOCCAL BACTEREMIA: ICD-10-CM

## 2019-04-18 DIAGNOSIS — Z95.2 S/P AVR (AORTIC VALVE REPLACEMENT): ICD-10-CM

## 2019-04-18 DIAGNOSIS — Z98.890 S/P MVR (MITRAL VALVE REPAIR): ICD-10-CM

## 2019-04-18 DIAGNOSIS — R78.81 STREPTOCOCCAL BACTEREMIA: ICD-10-CM

## 2019-04-18 DIAGNOSIS — I50.43 ACUTE ON CHRONIC COMBINED SYSTOLIC AND DIASTOLIC CONGESTIVE HEART FAILURE (HCC): Primary | ICD-10-CM

## 2019-04-18 DIAGNOSIS — I10 ESSENTIAL HYPERTENSION: ICD-10-CM

## 2019-04-18 DIAGNOSIS — I48.0 PAROXYSMAL ATRIAL FIBRILLATION (HCC): ICD-10-CM

## 2019-04-18 DIAGNOSIS — E78.5 HYPERLIPIDEMIA, UNSPECIFIED HYPERLIPIDEMIA TYPE: ICD-10-CM

## 2019-04-18 DIAGNOSIS — I10 HYPERTENSION, UNSPECIFIED TYPE: ICD-10-CM

## 2019-04-18 PROCEDURE — 99214 OFFICE O/P EST MOD 30 MIN: CPT | Performed by: INTERNAL MEDICINE

## 2019-04-18 PROCEDURE — 93000 ELECTROCARDIOGRAM COMPLETE: CPT | Performed by: INTERNAL MEDICINE

## 2019-04-18 RX ORDER — POLYETHYLENE GLYCOL 3350 17 G/17G
17 POWDER, FOR SOLUTION ORAL DAILY
COMMUNITY

## 2019-04-18 RX ORDER — FERROUS SULFATE 325(65) MG
325 TABLET ORAL 2 TIMES DAILY
Qty: 180 TABLET | Refills: 1 | Status: SHIPPED | OUTPATIENT
Start: 2019-04-18 | End: 2019-05-16 | Stop reason: SDUPTHER

## 2019-04-18 RX ORDER — CEFTRIAXONE 2 G/1
INJECTION, POWDER, FOR SOLUTION INTRAMUSCULAR; INTRAVENOUS
COMMUNITY
End: 2019-05-16 | Stop reason: ALTCHOICE

## 2019-04-18 RX ORDER — ATORVASTATIN CALCIUM 40 MG/1
40 TABLET, FILM COATED ORAL DAILY
COMMUNITY

## 2019-04-18 RX ORDER — POTASSIUM CHLORIDE 1500 MG/1
20 TABLET, FILM COATED, EXTENDED RELEASE ORAL
COMMUNITY
End: 2019-06-12

## 2019-04-18 RX ORDER — M-VIT,TX,IRON,MINS/CALC/FOLIC 27MG-0.4MG
1 TABLET ORAL DAILY
COMMUNITY

## 2019-04-18 RX ORDER — WARFARIN SODIUM 2.5 MG/1
2.5 TABLET ORAL
COMMUNITY

## 2019-04-18 RX ORDER — IBUPROFEN 400 MG/1
400 TABLET ORAL EVERY 8 HOURS PRN
Qty: 60 TABLET | Refills: 3 | Status: SHIPPED | OUTPATIENT
Start: 2019-04-18

## 2019-04-18 NOTE — PROGRESS NOTES
CARDIOLOGY  NOTE    Chief Complaint: endocarditis / chf    HPI:   Rosalva Whitfield is a 59y.o. year old who has history as noted below. .  She comes in after multi valve replacement at [facility] in March 2019. She had bioprosthetic aortic valve replacement and  Bioprosthetic mitral valve replacement . 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 She does have moderate to severe tricuspid regurgitation. But she did not have tricuspid intervention. I do For her valve replacement did not reveal significant disease  Today she tells me that she is feeling episodes of chest pain on the left side which gets worse when she is laying down. She is using Tylenol every 6-8 hours because of chest pain at the surgical site. She tends to get short of breath when she bends over. She was started on Coumadin because of postoperative atrial fibrillation. She is in sinus today. Her breathing has gotten worse compared to when she was discharged in late march 2019  Postsurgery to have moved to [place]. She comes in after 1 [facility]. 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 She underwent colonic mass resection in October 2018. Unfortunately she had multiple admissions due to congestive heart failure . She developed   Pseudomonas bacteremia in December 2018. There was no evidence of repeat endocarditis in December 2018, but we treated her with 6 weeks of antibiotics to be on the safe side. Echo revealed significant mitral, aortic and tricuspid regurgitation.         Current Outpatient Medications   Medication Sig Dispense Refill    metoprolol tartrate (LOPRESSOR) 25 MG tablet Take 25 mg by mouth three times daily TAKE 1 AND 1/2 TABLETS 3 TIMES A DAY      warfarin (COUMADIN) 2.5 MG tablet Take 2.5 mg by mouth      (chronic obstructive pulmonary disease) ([facility] [address] 10/11/2013    follow with Dr Gatito Fisher Diabetes mellitus ([facility] [address] 10/11/2013    \"been diabetic for 7 yrs\"    Enlarged heart     H/O echocardiogram 12/05/2018, 2/6/2019    EF 50-55%. Echo density /vegetation noted on the non-coronary cusp of the aortic valve. Severe aortic regurgitation is present. Severe mitral regurgitation is present. Severe tricuspid regurgitation. Pulmonic valve appears slightly thickened with severe regurgitation.  H/O mitral valve replacement 03/18/2019    Replacement done at [facility] 87 murmur     History of blood transfusion     Hyperlipidemia 10/11/2013    Hypertension 10/11/2013    Insomnia 10/11/2013    Liver dysfunction 12/21/2015    Liver, core biopsies: Steatohepatitis, acute and chronic, grade 2, stage 2.     Nasal congestion 1/20/2016    \"no cough and no fever associated with it\"    Obesity 10/11/2013    On home oxygen therapy     2l/nc 24 hrs per day    S/P AVR (aortic valve replacement) 03/18/2019    AVR done at [facility] Sleep apnea 10/11/2013    had sleep apnea 2013- has cpap and does use it    Steatohepatitis 3/21/2016    Liver, core biopsies: Steatohepatitis, acute and chronic, grade 2, stage 2.      Systolic murmur 9/2/4655    Tobacco abuse disorder 10/11/2013    Type 2 diabetes mellitus ([facility] [address]     Urinary incontinence, mixed 10/11/2013     Past Surgical History:   Procedure Laterality Date    BREAST SURGERY      right breast bx 2012    CHOLECYSTECTOMY  2010    \"done with hyster    COLONOSCOPY N/A 10/24/2018    COLONOSCOPY POLYPECTOMY ABLATION/BIOPSY OF CECAL MASS performed by He Buckley MD at [facility]  2010    [address] [place] [address] both ovaries\"    KNEE SURGERY Left 2000    LA LAP,SURG,COLECTOMY, PARTIAL, W/ANAST N/A 10/25/2018    ROBOTIC ASSISTED  LAPAROSCOPIC RIGHT  HEMICOLECTOMY performed by Alejandro Marie MD at [facility]  as a kid     Family History   Problem Relation Age of Onset    Cancer Mother         lung ca , liver and in lymph nodes    Arthritis Mother     High Blood Pressure Mother     Obesity Mother     Osteoporosis Mother     Cancer Father         lung ca    Arthritis Father     High Blood Pressure Father     Early Death Father     High Blood Pressure Sister     Heart Disease Brother     Alcohol Abuse Brother     High Blood Pressure Brother     Diabetes Brother     Depression Brother     High Cholesterol Brother     Obesity Brother     Substance Abuse Brother     Arthritis Maternal Uncle     Osteoporosis Maternal Uncle     High Blood Pressure Maternal Grandmother     Heart Attack Maternal Grandfather     High Blood Pressure Maternal Grandfather     Prostate Cancer Maternal Grandfather      Social History     Tobacco Use    Smoking status: Former Smoker     Packs/day: 0.50     Years: 30.00     Pack years: 15.00     Types: Cigarettes     Last attempt to quit: 9/10/2018     Years since quittin.6    Smokeless tobacco: Never Used    Tobacco comment: Does have nicotine patch   Substance Use Topics    Alcohol use: No     Alcohol/week: 0.0 oz        Review of Systems:   · Constitutional: No Fever or Weight Loss   · Eyes: No Decreased Vision  · ENT: No Headaches, Hearing Loss or Vertigo  · Cardiovascular: as per note above   · Respiratory: No cough or wheezing and as per note above.    · Gastrointestinal: No abdominal pain, appetite loss, blood in stools, constipation, diarrhea or heartburn  · Genitourinary: No dysuria, trouble voiding, or hematuria  · Musculoskeletal:  None  · Integumentary: No rash or pruritis  · Neurological: No TIA or stroke symptoms  · Psychiatric: No anxiety or depression  · Endocrine: No malaise, fatigue or temperature intolerance  · Hematologic/Lymphatic: No bleeding problems, blood clots or swollen lymph nodes  · Allergic/Immunologic: No nasal congestion or hives    Objective:      Physical Exam:  BP (!) 128/58 (Site: Left Upper Arm, Position: Sitting, Cuff Size: Large Adult)   Pulse 84   Resp 16   Ht 5' 6\" (1.676 m)   Wt 232 lb (105.2 kg)   BMI 37.45 kg/m²   Wt Readings from Last 3 Encounters:   04/18/19 232 lb (105.2 kg)   02/06/19 233 lb (105.7 kg)   02/04/19 242 lb (109.8 kg)     Body mass index is 37.45 kg/m². Vitals:    04/18/19 1021   BP: (!) 128/58   Pulse: 84   Resp: 16        General Appearance:  No distress, conversant  Constitutional:  Well developed, Well nourished, No acute distress, Non-toxic appearance. HENT:  Normocephalic, Atraumatic, Bilateral external ears normal, Oropharynx moist, No oral exudates, Nose normal. Neck- Normal range of motion, No tenderness, Supple, No stridor,no apical-carotid delay  Eyes:  PERRL, EOMI, Conjunctiva normal, No discharge. Respiratory:  Normal breath sounds, No respiratory distress, No wheezing, No chest tenderness. ,no use of accessory muscles, NO crackles  Cardiovascular: (PMI) apex non displaced,no lifts no thrills,S1 and S2 audible, systolic and diastolic murmur, 1 + ankle edema, or volume overload, No evidence of JVD, No crackles  GI:  Bowel sounds normal, Soft, No tenderness, No masses, No gross visceromegaly   :  No costovertebral angle tenderness   Musculoskeletal:  No edema, no tenderness, no deformities.  Back- no tenderness  Integument:  Well hydrated, no rash   Lymphatic:  No lymphadenopathy noted   Neurologic:  Alert & oriented x 3, CN 2-12 normal, normal motor function, normal sensory function, no focal deficits noted   Psychiatric:  Speech and behavior appropriate       Medical decision making and Data review:  DATA:  Lab Results   Component Value Date    TROPONINT 0.048 (H) 12/10/2018     BNP:    Lab Results   Component Value Date    PROBNP 7,358 (H) 12/09/2018     PT/INR:  No results found for: Oncimmune  Lab Results   Component Value Date    LABA1C 5.0 12/10/2018    LABA1C 6.9 08/02/2018     Lab Results   Component Value Date    CHOL 166 03/08/2017 TRIG 265 (H) 03/08/2017    HDL 37 (L) 07/06/2018    LDLDIRECT 78 07/06/2018     Lab Results   Component Value Date    ALT 15 01/17/2019    AST 15 01/17/2019     TSH: No results found for: TSH  Lab Results   Component Value Date    AST 15 01/17/2019    ALT 15 01/17/2019    BILIDIR 0.2 11/06/2018    BILITOT 0.3 01/17/2019    ALKPHOS 51 01/17/2019     Lab Results   Component Value Date    PROBNP 7,358 (H) 12/09/2018    PROBNP 3,147 (H) 10/31/2018    PROBNP 3,381 (H) 10/17/2018     Lab Results   Component Value Date    LABA1C 5.0 12/10/2018    LABA1C 6.9 08/02/2018     Lab Results   Component Value Date    WBC 7.0 02/06/2019    HGB 9.5 (L) 02/06/2019    HCT 30.3 (L) 02/06/2019     02/06/2019     Echo 12/10/18   Summary   Left ventricular systolic function is normal.   Ejection fraction is visually estimated at 50-55%.   Mildly dilated atrium bilaterally.   Echo density /vegetation noted on the non-coronary cusp of the aortic valve.   Severe aortic regurgitation is present.   Possible small vegetation noted attached to the mitral valve leaflet.   Posterior leaflet of the mitral valve appears slightly restricted; mean PG   is 8 mmHg.   Severe mitral regurgitation is present.   Severe tricuspid regurgitation is present without obvious vegetation. RVSP   is 73 mmHg.   Pulmonic valve appears slightly thickened with severe regurgitation-a   vegetation cannot be excluded.   No evidence of any pericardial effusion.     Cath march 2019 ( [facility])      All labs, medications and tests reviewed by myself including data and history from outside source , patient and available family . Assessment & Plan:      1. Acute on chronic combined systolic and diastolic congestive heart failure ([address]    2. S/P AVR (aortic valve replacement)    3. S/P MVR (mitral valve repair)    4. Essential hypertension    5. Hyperlipidemia, unspecified hyperlipidemia type    6. Hypertension, unspecified type    7.  Streptococcal least 3 to 4 times a week   Various goals were discussed and questions answered. Continue current medications. Appropriate prescriptions are addressed and refills ordered. Questions answered and patient verbalizes understanding. Call for any problems, questions, or concerns. Continue all other medications of all above medical condition listed as is. Return in about 1 month (around 5/18/2019). Please note this report has been partially produced using speech recognition software and may contain errors related to that system including errors in grammar, punctuation, and spelling, as well as words and phrases that may be inappropriate.  If there are any questions or concerns please feel free to contact the dictating provider for clarification.

## 2019-04-19 ENCOUNTER — PATIENT MESSAGE (OUTPATIENT)
Dept: CARDIOLOGY CLINIC | Age: 64
End: 2019-04-19

## 2019-04-22 NOTE — TELEPHONE ENCOUNTER
Please send to pt     Neena Clark, I am glad for the earlier appointment   Echo is important , let me know if the breathing gest worse or if there are any concerns before she gest there  Safe travels    [place]

## 2019-04-22 NOTE — TELEPHONE ENCOUNTER
From: uJnito Armijo  To: Alfred Puri MD  Sent: 4/19/2019 5:49 PM EDT  Subject: Visit Branden Reed    Dear Dr. Jinny Patino,    We have been able to schedule an appointment with Dr. Jamaica Alvarez at [facility], on 24 April. He is arranging an EKG and an echo along with the office visit. I will request a copy of the tests so that you are able to view them as well. Due to the date, we will need to cancel the follow up appointment with Dr. Libia Ly, pcp. We will be sure to arrange something soon so that another INR check is performed. I am sure Dr. Libia Ly will understand and accommodate the change. Please let us know if there is anything specific you need from [facility].  And, as always, thank you for your care and being the person you are. :)    Sincerely,  Seb Saavedra

## 2019-05-08 ENCOUNTER — TELEPHONE (OUTPATIENT)
Dept: CARDIOLOGY CLINIC | Age: 64
End: 2019-05-08

## 2019-05-08 DIAGNOSIS — I97.130 HEART FAILURE FOLLOWING CARDIAC SURGERY: Primary | ICD-10-CM

## 2019-05-08 NOTE — TELEPHONE ENCOUNTER
Called patient to see where I need to send the rehab order. Patient states she is having a hard time finding a place to take insurance. Pt. will be in next week she will get order at that time.

## 2019-05-16 ENCOUNTER — OFFICE VISIT (OUTPATIENT)
Dept: CARDIOLOGY CLINIC | Age: 64
End: 2019-05-16
Payer: COMMERCIAL

## 2019-05-16 VITALS
RESPIRATION RATE: 20 BRPM | HEART RATE: 84 BPM | WEIGHT: 241 LBS | DIASTOLIC BLOOD PRESSURE: 60 MMHG | SYSTOLIC BLOOD PRESSURE: 110 MMHG | HEIGHT: 66 IN | BODY MASS INDEX: 38.73 KG/M2

## 2019-05-16 DIAGNOSIS — I50.43 ACUTE ON CHRONIC COMBINED SYSTOLIC AND DIASTOLIC CONGESTIVE HEART FAILURE (HCC): Primary | ICD-10-CM

## 2019-05-16 DIAGNOSIS — I10 ESSENTIAL HYPERTENSION: ICD-10-CM

## 2019-05-16 DIAGNOSIS — I33.0 ACUTE AND SUBACUTE BACTERIAL ENDOCARDITIS: ICD-10-CM

## 2019-05-16 DIAGNOSIS — Z72.0 TOBACCO ABUSE: ICD-10-CM

## 2019-05-16 DIAGNOSIS — I38 VALVULAR DISEASE: ICD-10-CM

## 2019-05-16 DIAGNOSIS — N17.9 ACUTE KIDNEY INJURY (HCC): ICD-10-CM

## 2019-05-16 DIAGNOSIS — E78.2 MIXED HYPERLIPIDEMIA: ICD-10-CM

## 2019-05-16 DIAGNOSIS — I63.9 CEREBROVASCULAR ACCIDENT (CVA), UNSPECIFIED MECHANISM (HCC): ICD-10-CM

## 2019-05-16 DIAGNOSIS — I48.0 PAROXYSMAL ATRIAL FIBRILLATION (HCC): ICD-10-CM

## 2019-05-16 DIAGNOSIS — E78.5 HYPERLIPIDEMIA, UNSPECIFIED HYPERLIPIDEMIA TYPE: ICD-10-CM

## 2019-05-16 DIAGNOSIS — N18.31 CHRONIC KIDNEY DISEASE (CKD) STAGE G3A/A1, MODERATELY DECREASED GLOMERULAR FILTRATION RATE (GFR) BETWEEN 45-59 ML/MIN/1.73 SQUARE METER AND ALBUMINURIA CREATININE RATIO LESS THAN 30 MG/G (HCC): ICD-10-CM

## 2019-05-16 DIAGNOSIS — R01.1 SYSTOLIC MURMUR: ICD-10-CM

## 2019-05-16 DIAGNOSIS — I10 HYPERTENSION, UNSPECIFIED TYPE: ICD-10-CM

## 2019-05-16 PROCEDURE — 99214 OFFICE O/P EST MOD 30 MIN: CPT | Performed by: INTERNAL MEDICINE

## 2019-05-16 RX ORDER — FERROUS SULFATE 325(65) MG
325 TABLET ORAL DAILY
Qty: 180 TABLET | Refills: 1 | Status: SHIPPED | OUTPATIENT
Start: 2019-05-16

## 2019-05-16 NOTE — PROGRESS NOTES
CARDIOLOGY  NOTE    Chief Complaint: endocarditis / chf    HPI:   Chalo Cook is a 59y.o. year old who has history as noted below. . She is here for follow-up. She had a repeat echo at [facility] in April 2019 which showed EF of 40-50% with wall motion abnormality. She feels her breathing is getting better. She is on Coumadin. INR has been a struggle doses are being adjusted. She has moved to [place] now. She says that she is sleeping on a recliner, but using less pillows now she is on 2 liters of oxygen around the clock   She had  multi valve replacement at [facility] clinic in March 2019. She had bioprosthetic aortic valve replacement and  Bioprosthetic mitral valve replacement . 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 She does have moderate to severe tricuspid regurgitation. But she did not have tricuspid intervention. She tends to get short of breath when she bends over. She was started on Coumadin because of postoperative atrial fibrillation. She comes in after 1 [facility]. 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 She underwent colonic mass resection in October 2018. Unfortunately she had multiple admissions due to congestive heart failure . She developed   Pseudomonas bacteremia in December 2018. There had  no evidence of repeat endocarditis in December 2018, but we treated her with 6 weeks of antibiotics to be on the safe side. Echo revealed significant mitral, aortic and tricuspid regurgitation which led to valve replacement .         Current Outpatient Medications   Medication Sig Dispense Refill    ferrous sulfate 325 (65 Fe) MG tablet Take 1 tablet by mouth daily 180 tablet 1    metoprolol tartrate (LOPRESSOR) 25 MG tablet TAKE 1 AND 1/2 TABLETS 3 TIMES A DAY       warfarin (COUMADIN) 2.5 MG tablet Take 2.5 mg by mouth Take 3 tablets on M-W-F and 2 tablets all other days      atorvastatin (LIPITOR) 40 MG tablet Take 40 mg by mouth daily      potassium chloride (KLOR-CON M) 20 MEQ TBCR extended release tablet Take 20 mEq by mouth daily (with breakfast)      aspirin 81 MG tablet Take 81 mg by mouth daily      polyethylene glycol (MIRALAX) packet Take 17 g by mouth daily      Multiple Vitamins-Minerals (THERAPEUTIC MULTIVITAMIN-MINERALS) tablet Take 1 tablet by mouth daily      Probiotic Product (PROBIOTIC DAILY PO) Take by mouth      ibuprofen (ADVIL;MOTRIN) 400 MG tablet Take 1 tablet by mouth every 8 hours as needed for Pain 60 tablet 3    torsemide (DEMADEX) 20 MG tablet Take 2 tablets by mouth 2 times daily 360 tablet 3    linagliptin (TRADJENTA) 5 MG tablet Take 1 tablet by mouth daily 90 tablet 5    traZODone (DESYREL) 100 MG tablet TAKE 1 TABLET NIGHTLY 90 tablet 1    glipiZIDE (GLUCOTROL XL) 10 MG extended release tablet TAKE 1 TABLET TWICE A DAY (Patient taking differently: 10 mg TAKE 1 TABLET IN THE MORNING AND 1/2 TABLET IN THE EVENING) 180 tablet 1    budesonide-formoterol (SYMBICORT) 160-4.5 MCG/ACT AERO Inhale 2 puffs into the lungs 2 times daily      CPAP Machine MISC by Does not apply route      acetaminophen (TYLENOL) 500 MG tablet Take 1,000 mg by mouth every 6 hours as needed for Pain      ipratropium-albuterol (DUONEB) 0.5-2.5 (3) MG/3ML SOLN nebulizer solution Take 3 mLs by nebulization 4 times daily 360 mL 0    albuterol sulfate  (90 Base) MCG/ACT inhaler Inhale 2 puffs into the lungs every 4 hours as needed for Wheezing      OXYGEN Inhale 2 L/min into the lungs continuous. No current facility-administered medications for this visit.         Allergies:   Tomato    Patient History:  Past Medical History:   Diagnosis Date    [facility] 10/19/2015    COPD (chronic obstructive pulmonary disease) ([facility] 10/11/2013 follow with Dr Sandrine Evans    Diabetes mellitus ([facility] 10/11/2013    \"been diabetic for 7 yrs\"    Enlarged heart     H/O echocardiogram 12/05/2018, 2/6/2019    EF 50-55%. Echo density /vegetation noted on the non-coronary cusp of the aortic valve. Severe aortic regurgitation is present. Severe mitral regurgitation is present. Severe tricuspid regurgitation. Pulmonic valve appears slightly thickened with severe regurgitation.  H/O mitral valve replacement 03/18/2019    Replacement done at [facility] 87 murmur     History of blood transfusion     Hyperlipidemia 10/11/2013    Hypertension 10/11/2013    Insomnia 10/11/2013    Liver dysfunction 12/21/2015    Liver, core biopsies: Steatohepatitis, acute and chronic, grade 2, stage 2.     Nasal congestion 1/20/2016    \"no cough and no fever associated with it\"    Obesity 10/11/2013    On home oxygen therapy     2l/nc 24 hrs per day    S/P AVR (aortic valve replacement) 03/18/2019    AVR done at [facility] Sleep apnea 10/11/2013    had sleep apnea 2013- has cpap and does use it    Steatohepatitis 3/21/2016    Liver, core biopsies: Steatohepatitis, acute and chronic, grade 2, stage 2.      Systolic murmur 8/0/2698    Tobacco abuse disorder 10/11/2013    Type 2 diabetes mellitus ([facility]     Urinary incontinence, mixed 10/11/2013     Past Surgical History:   Procedure Laterality Date    BREAST SURGERY      right breast bx 2012    CHOLECYSTECTOMY  2010    \"done with hyster    COLONOSCOPY N/A 10/24/2018    COLONOSCOPY POLYPECTOMY ABLATION/BIOPSY OF CECAL MASS performed by Matt Mcdonald MD at [facility]  2010    [address] both ovaries\"    KNEE SURGERY Left 2000    [place] LAP,SURG,COLECTOMY, PARTIAL, W/ANAST N/A 10/25/2018    ROBOTIC ASSISTED  LAPAROSCOPIC RIGHT  HEMICOLECTOMY performed by Sally Guy MD at [facility]  as a kid     Family History   Problem Relation Age of Onset    Cancer Mother         lung ca , liver and in lymph nodes    Arthritis Mother     High Blood Pressure Mother     Obesity Mother     Osteoporosis Mother     Cancer Father         lung ca    Arthritis Father     High Blood Pressure Father     Early Death Father     High Blood Pressure Sister     Heart Disease Brother     Alcohol Abuse Brother     High Blood Pressure Brother     Diabetes Brother     Depression Brother     High Cholesterol Brother     Obesity Brother     Substance Abuse Brother     Arthritis Maternal Uncle     Osteoporosis Maternal Uncle     High Blood Pressure Maternal Grandmother     Heart Attack Maternal Grandfather     High Blood Pressure Maternal Grandfather     Prostate Cancer Maternal Grandfather      Social History     Tobacco Use    Smoking status: Former Smoker     Packs/day: 0.50     Years: 30.00     Pack years: 15.00     Types: Cigarettes     Last attempt to quit: 9/10/2018     Years since quittin.6    Smokeless tobacco: Never Used    Tobacco comment: Does have nicotine patch   Substance Use Topics    Alcohol use: No     Alcohol/week: 0.0 oz        Review of Systems:   · Constitutional: No Fever or Weight Loss   · Eyes: No Decreased Vision  · ENT: No Headaches, Hearing Loss or Vertigo  · Cardiovascular: as per note above   · Respiratory: No cough or wheezing and as per note above.    · Gastrointestinal: No abdominal pain, appetite loss, blood in stools, constipation, diarrhea or heartburn  · Genitourinary: No dysuria, trouble voiding, or hematuria  · Musculoskeletal:  None  · Integumentary: No rash or pruritis  · Neurological: No TIA or stroke symptoms  · Psychiatric: No anxiety or depression  · Endocrine: No malaise, fatigue or temperature intolerance  · Hematologic/Lymphatic: No bleeding problems, blood clots or swollen lymph nodes  · Allergic/Immunologic: No nasal congestion or hives    Objective:      Physical Exam:  /60 (Site: Right Upper Arm, Position: Sitting, Cuff Size: Large Adult)   Pulse 84   Resp 20   Ht 5' 6\" (1.676 m)   Wt 241 lb (109.3 kg)   BMI 38.90 kg/m²   Wt Readings from Last 3 Encounters:   05/16/19 241 lb (109.3 kg)   04/18/19 232 lb (105.2 kg)   02/06/19 233 lb (105.7 kg)     Body mass index is 38.9 kg/m². Vitals:    05/16/19 1143   BP: 110/60   Pulse: 84   Resp: 20        General Appearance:  No distress, conversant  Constitutional:  Well developed, Well nourished, No acute distress, Non-toxic appearance. HENT:  Normocephalic, Atraumatic, Bilateral external ears normal, Oropharynx moist, No oral exudates, Nose normal. Neck- Normal range of motion, No tenderness, Supple, No stridor,no apical-carotid delay  Eyes:  PERRL, EOMI, Conjunctiva normal, No discharge. Respiratory:  Normal breath sounds, No respiratory distress, No wheezing, No chest tenderness. ,no use of accessory muscles, NO crackles  Cardiovascular: (PMI) apex non displaced,no lifts no thrills,S1 and S2 audible, systolic and diastolic murmur, 1 + ankle edema, or volume overload, No evidence of JVD, No crackles  GI:  Bowel sounds normal, Soft, No tenderness, No masses, No gross visceromegaly   :  No costovertebral angle tenderness   Musculoskeletal:  No edema, no tenderness, no deformities.  Back- no tenderness  Integument:  Well hydrated, no rash   Lymphatic:  No lymphadenopathy noted   Neurologic:  Alert & oriented x 3, CN 2-12 normal, normal motor function, normal sensory function, no focal deficits noted   Psychiatric:  Speech and behavior appropriate       Medical decision making and Data review:  DATA:  Lab Results   Component Value Date    TROPONINT 0.048 (H) 12/10/2018     BNP:    Lab Results   Component Value Date    PROBNP 7,358 (H) 12/09/2018     PT/INR:  No results found for: PTINR  Lab Results   Component Value Date    LABA1C 5.0 12/10/2018    LABA1C 6.9 08/02/2018     Lab Results   Component Value Date    CHOL 166 03/08/2017    TRIG 265 (H) 03/08/2017    HDL 37 (L) 07/06/2018    LDLDIRECT Technically difficult exam due to body habitus and suboptimal positioning.  - Exam indication: s/p MVR/AVR  - The left ventricle is normal in size. Left ventricular systolic function is   mildly decreased. EF = 48 ± 5% (2D 4-ch.)  - The right ventricle is normal in size. Right ventricular systolic function is   normal.  - The left atrial cavity is moderately dilated. - Biocor prosthetic mitral valve (size #31). There is trace mitral valve   regurgitation. The peak gradient is 21 mmHg and the mean gradient is 8 mmHg. Previous peak and mean gradients of 11/5 mmHg obtained at a heart rate of 79 bpm.  - There is moderate (2+ - 3+) tricuspid valve regurgitation.  - Antonio-Vaughn prosthetic aortic valve (size #23). There is no aortic valve   regurgitation. The peak gradient is 26 mmHg, the mean gradient is 15 mmHg and the   dimensionless valve index is 0.41. Previous peak and mean of 34/17 mmHg. - Estimated right ventricular systolic pressure is likely underestimated due to a   weak or incomplete tricuspid regurgitation signal and is, at least, 40 mmHg   consistent with mild pulmonary hypertension. Estimated right atrial pressure is 8   mmHg based on IVC assessment. - Exam was compared with the prior [facility] echocardiographic exam performed on 3/23/19. All labs, medications and tests reviewed by myself including data and history from outside source , patient and available family . Assessment & Plan:      1. Acute on chronic combined systolic and diastolic congestive heart failure ([address]    2. Acute and subacute bacterial endocarditis    3. Acute kidney injury ([address]    4. Cerebrovascular accident (CVA), unspecified mechanism ([address]    5. Chronic kidney disease (CKD) stage G3a/A1, moderately decreased glomerular filtration rate (GFR) between 45-59 mL/min/1.73 square meter and albuminuria creatinine ratio less than 30 mg/g (HCC)    6. Essential hypertension    7. Hyperlipidemia, unspecified hyperlipidemia type    8. Hypertension, unspecified type    9. Mixed hyperlipidemia    10. Paroxysmal atrial fibrillation (HCC)    11. Systolic murmur    12. Tobacco abuse    13. Valvular disease       S/p AVR AND MVR  3/18/2019:_Mitral valve replacement - 32 Biocore, Aortic valve replacement - 23 CE. Failed attempt at mitral repair - 3-4+ MR residual, 2nd pump run to replace MV_   Says was replacement for aortic and mitral valve in March 2019 at [facility] clinic with bioprosthesis. Post valve replacement. She has mild MR, and MS with gradient of 8 mmHG  aortic valve velocities are normal.  Refer to cardiac rehab in [place] so that it is closer to home    Afib  Post operatively paroxysmal she is on coumadin, INR followed by Dr Benites Speaks     Anemia  Her iron levs are in 7 range start feso4 bid    H/o Subacute bacterial endocarditis  We had extensive discussion with patient and her daughter Verónica Smoker). She needs lower teeth extracted. I have told her to hold off on it for now. She still on antibiotics. We will give her 6 months and then I will probably empirically treat her with antibiotics for dental extraction, given her history of recurrent endocarditis. CHF (congestive heart failure) ([facility]  She has significant lung issues, I think she is currently stable and euvolemic. We will continue current regimen of torsemide   She will see nephrology in few weeks. Hypertension   Continue current medication. Blood pressure is well-controlled    H/ O CVA  Continue aspirin and statins, blood pressure is well-controlled. She could've had an embolic stroke from her endocarditis      Counseled extensively and medication compliance urged. Various goals were discussed and questions answered. Continue current medications. Appropriate prescriptions are addressed and refills ordered. Questions answered and patient verbalizes understanding. Call for any problems, questions, or concerns.     Continue all other medications of all above medical condition listed as is. Return in about 3 months (around 8/16/2019). Please note this report has been partially produced using speech recognition software and may contain errors related to that system including errors in grammar, punctuation, and spelling, as well as words and phrases that may be inappropriate.  If there are any questions or concerns please feel free to contact the dictating provider for clarification.

## 2019-05-29 ENCOUNTER — OFFICE VISIT (OUTPATIENT)
Dept: INFECTIOUS DISEASES | Age: 64
End: 2019-05-29
Payer: COMMERCIAL

## 2019-05-29 VITALS
HEART RATE: 77 BPM | BODY MASS INDEX: 38.96 KG/M2 | TEMPERATURE: 98.5 F | WEIGHT: 241.4 LBS | SYSTOLIC BLOOD PRESSURE: 145 MMHG | DIASTOLIC BLOOD PRESSURE: 73 MMHG | RESPIRATION RATE: 14 BRPM

## 2019-05-29 DIAGNOSIS — I33.0 SUBACUTE BACTERIAL ENDOCARDITIS: Primary | ICD-10-CM

## 2019-05-29 DIAGNOSIS — Z09 HOSPITAL DISCHARGE FOLLOW-UP: ICD-10-CM

## 2019-05-29 PROCEDURE — 99213 OFFICE O/P EST LOW 20 MIN: CPT | Performed by: INTERNAL MEDICINE

## 2019-05-29 RX ORDER — PEDI NUTRITION,MILK BASED,IRON 0.03 G-0.6
LIQUID (ML) ORAL
COMMUNITY

## 2019-05-29 ASSESSMENT — ENCOUNTER SYMPTOMS
EYES NEGATIVE: 1
ALLERGIC/IMMUNOLOGIC NEGATIVE: 1
GASTROINTESTINAL NEGATIVE: 1

## 2019-05-29 NOTE — PROGRESS NOTES
dysfunction    Steatohepatitis    Mixed hyperlipidemia    Morbid obesity due to excess calories ([address]    COPD, severe ([address]    COPD exacerbation (HCC)    Type 2 diabetes mellitus ([address]    Tobacco abuse    Obesity due to excess calories    CHF (congestive heart failure) ([address]    Bilateral carotid artery stenosis    Cerebrovascular accident (CVA) ([address]    COPD exacerbation ([address]    Type 2 diabetes mellitus ([facility]    Hypertension    Tobacco abuse    Hyperlipidemia    Pneumonia    Bacteremia    Streptococcal bacteremia    Subacute bacterial endocarditis    Cecum mass    Tubulovillous adenoma of colon    Acute kidney injury ([address]    Endocarditis    Valvular disease    Anemia in chronic kidney disease    Fever    Acute and subacute bacterial endocarditis    Acute bacterial endocarditis    Chronic kidney disease (CKD) stage G3a/A1, moderately decreased glomerular filtration rate (GFR) between 45-59 mL/min/1.73 square meter and albuminuria creatinine ratio less than 30 mg/g (HCC)    S/P AVR (aortic valve replacement)    S/P MVR (mitral valve repair)    Paroxysmal atrial fibrillation ([facility]   [facility] discharge follow-up       Current Outpatient Medications   Medication Sig Dispense Refill    Nutritional Supplements (GLUCERNA 1.0 EMMA) LIQD Take by mouth      ferrous sulfate 325 (65 Fe) MG tablet Take 1 tablet by mouth daily 180 tablet 1    metoprolol tartrate (LOPRESSOR) 25 MG tablet TAKE 1 AND 1/2 TABLETS 3 TIMES A DAY       warfarin (COUMADIN) 2.5 MG tablet Take 2.5 mg by mouth Take 3 tablets on M-W-F and 2 tablets all other days      atorvastatin (LIPITOR) 40 MG tablet Take 40 mg by mouth daily      potassium chloride (KLOR-CON M) 20 MEQ TBCR extended release tablet Take 20 mEq by mouth daily (with breakfast)      aspirin 81 MG tablet Take 81 mg by mouth daily      polyethylene glycol (MIRALAX) packet Take 17 g by mouth daily      Multiple Vitamins-Minerals (THERAPEUTIC MULTIVITAMIN-MINERALS) tablet Take 1 tablet by mouth daily      Probiotic Product (PROBIOTIC DAILY PO) Take by mouth      ibuprofen (ADVIL;MOTRIN) 400 MG tablet Take 1 tablet by mouth every 8 hours as needed for Pain 60 tablet 3    torsemide (DEMADEX) 20 MG tablet Take 2 tablets by mouth 2 times daily 360 tablet 3    linagliptin (TRADJENTA) 5 MG tablet Take 1 tablet by mouth daily 90 tablet 5    traZODone (DESYREL) 100 MG tablet TAKE 1 TABLET NIGHTLY 90 tablet 1    glipiZIDE (GLUCOTROL XL) 10 MG extended release tablet TAKE 1 TABLET TWICE A DAY (Patient taking differently: 10 mg TAKE 1 TABLET IN THE MORNING AND 1/2 TABLET IN THE EVENING) 180 tablet 1    budesonide-formoterol (SYMBICORT) 160-4.5 MCG/ACT AERO Inhale 2 puffs into the lungs 2 times daily      CPAP Machine MISC by Does not apply route      ipratropium-albuterol (DUONEB) 0.5-2.5 (3) MG/3ML SOLN nebulizer solution Take 3 mLs by nebulization 4 times daily 360 mL 0    albuterol sulfate  (90 Base) MCG/ACT inhaler Inhale 2 puffs into the lungs every 4 hours as needed for Wheezing      OXYGEN Inhale 2 L/min into the lungs continuous.  acetaminophen (TYLENOL) 500 MG tablet Take 1,000 mg by mouth every 6 hours as needed for Pain       No current facility-administered medications for this visit. Past Medical History:   Diagnosis Date    [facility] 10[facility]19/2015    COPD (chronic obstructive pulmonary disease) ([facility] 10/11/2013    follow with Dr Erica Donis Diabetes mellitus ([facility] 10/11/2013    \"been diabetic for 7 yrs\"    Enlarged heart     H/O echocardiogram 12/05/2018, 2/6/2019    EF 50-55%. Echo density /vegetation noted on the non-coronary cusp of the aortic valve. Severe aortic regurgitation is present. Severe mitral regurgitation is present. Severe tricuspid regurgitation. Pulmonic valve appears slightly thickened with severe regurgitation.     H/O mitral valve replacement 03/18/2019 [facility]placement done at [facility] murmur     History of blood transfusion     Hyperlipidemia 10/11/2013    Hypertension 10/11/2013    Insomnia 10/11/2013    Liver dysfunction 12/21/2015    Liver, core biopsies: Steatohepatitis, acute and chronic, grade 2, stage 2.     Nasal congestion 1/20/2016    \"no cough and no fever associated with it\"    Obesity 10/11/2013    On home oxygen therapy     2l/nc 24 hrs per day    S/P AVR (aortic valve replacement) 03/18/2019    [facility] Sleep apnea 10/11/2013    had sleep apnea 2013- has cpap and does use it    Steatohepatitis 3/21/2016    Liver, core biopsies: Steatohepatitis, acute and chronic, grade 2, stage 2.      Systolic murmur 2/9/6416    Tobacco abuse disorder 10/11/2013    Type 2 diabetes mellitus [facility]     Urinary incontinence, mixed 10/11/2013       Past Surgical History:   Procedure Laterality Date    BREAST SURGERY      right breast bx 2012    CHOLECYSTECTOMY  2010    \"done with hyster    COLONOSCOPY N/A 10/24/2018    COLONOSCOPY POLYPECTOMY ABLATION/BIOPSY OF CECAL MASS performed by Thelma Connolly MD at [address] [place] [address]  2010    [address] [place] [address] both ovaries\"    KNEE SURGERY Left 2000    [place] LAP,SURG,COLECTOMY, PARTIAL, W/ANAST N/A 10/25/2018    ROBOTIC ASSISTED  LAPAROSCOPIC RIGHT  HEMICOLECTOMY performed by Rubina aTpia MD at [address] [place] [address]  as a kid       Social History     Socioeconomic History    Marital status:      Spouse name: Not on file    Number of children: Not on file    Years of education: Not on file    Highest education level: Not on file   Occupational History    Not on file   Social Needs    Financial resource strain: Not on file    Food insecurity:     Worry: Not on file     Inability: Not on file    Transportation needs:     Medical: Not on file     Non-medical: Not on file   Tobacco Use    Smoking status: Former Smoker     Packs/day: 0.50     Years: 30.00     Pack years: 15.00     Types: Cigarettes     Last attempt to quit: 9/10/2018     Years since quittin.7    Smokeless tobacco: Never Used    Tobacco comment: Does have nicotine patch   Substance and Sexual Activity    Alcohol use: No     Alcohol/week: 0.0 oz    Drug use: No    Sexual activity: Not Currently     Partners: Male   Lifestyle    Physical activity:     Days per week: Not on file     Minutes per session: Not on file    Stress: Not on file   Relationships    Social connections:     Talks on phone: Not on file     Gets together: Not on file     Attends [employer] service: Not on file     Active member of club or organization: Not on file     Attends meetings of clubs or organizations: Not on file     Relationship status: Not on file    Intimate partner violence:     Fear of current or ex partner: Not on file     Emotionally abused: Not on file     Physically abused: Not on file     Forced sexual activity: Not on file   Other Topics Concern    Not on file   Social History Narrative    Not on file       Family History   Problem Relation Age of Onset    Cancer Mother         lung ca , liver and in lymph nodes    Arthritis Mother     High Blood Pressure Mother     Obesity Mother     Osteoporosis Mother     Cancer Father         lung ca    Arthritis Father     High Blood Pressure Father     Early Death Father     High Blood Pressure Sister     Heart Disease Brother     Alcohol Abuse Brother     High Blood Pressure Brother     Diabetes Brother     Depression Brother     High Cholesterol Brother     Obesity Brother     Substance Abuse Brother     Arthritis Maternal Uncle     Osteoporosis Maternal Uncle     High Blood Pressure Maternal Grandmother     Heart Attack Maternal Grandfather     High Blood Pressure Maternal Grandfather     Prostate Cancer Maternal Grandfather        Vital Signs:  Vitals:    19 0812   BP: (!) 145/73   Pulse: 77   Resp: 14   Temp: 98.5 °F (36.9 °C) TempSrc: Oral   Weight: 241 lb 6.4 oz (109.5 kg)        Wt Readings from Last 3 Encounters:   05/29/19 241 lb 6.4 oz (109.5 kg)   05/16/19 241 lb (109.3 kg)   04/18/19 232 lb (105.2 kg)        Physical Exam:  Gen: alert and NAD  HEENT: sclera clear, pupils equal and reactive, extra ocular muscles intact, oropharynx clear, mucus membranes moist, tympanic membranes clear bilaterally, no cervical lymphadenopathy noted and neck supple  Neck: supple, no significant adenopathy  Chest: clear to auscultation, no wheezes, rales or rhonchi, symmetric air entry  Heart: irregular rhythm and murmur present in aortic area  ABD: abdomen is soft without significant tenderness, masses, organomegaly or guarding.   EXT:peripheral pulses normal, no pedal edema, no clubbing or cyanosis  NEURO: alert, oriented, normal speech, no focal findings or movement disorder noted  Skin: well hydrated, no lesions, surgical site examined  Wounds: none  Labs:   WBC   Date Value Ref Range Status   02/06/2019 7.0 4.0 - 10.5 K/CU MM Final   01/17/2019 8.2 4.0 - 10.5 K/CU MM Final   01/14/2019 8.8 4.0 - 10.5 K/CU MM Final     CREATININE   Date Value Ref Range Status   01/24/2019 1.2 0.6 - 1.2 mg/dL Final   01/20/2019 1.3 (H) 0.6 - 1.1 MG/DL Final   01/18/2019 1.4 (H) 0.6 - 1.1 MG/DL Final       Cultures:  Culture   Date Value Ref Range Status   02/06/2019 NO GROWTH AT 5 DAYS  Final   02/06/2019 NO GROWTH AT 5 DAYS  Final   01/18/2019 NO GROWTH AT 5 DAYS  Final       Imaging Studies:         Electronicallysigned by Francis Lopez MD on 1/23/19 at 10:13 PM

## 2019-06-28 PROBLEM — Z09 HOSPITAL DISCHARGE FOLLOW-UP: Status: RESOLVED | Noted: 2019-05-29 | Resolved: 2019-06-28

## 2019-07-09 ENCOUNTER — TELEPHONE (OUTPATIENT)
Dept: CARDIOLOGY CLINIC | Age: 64
End: 2019-07-09

## 2019-07-22 ENCOUNTER — TELEPHONE (OUTPATIENT)
Dept: INTERNAL MEDICINE CLINIC | Age: 64
End: 2019-07-22

## 2019-07-25 ENCOUNTER — TELEPHONE (OUTPATIENT)
Dept: SLEEP CENTER | Age: 64
End: 2019-07-25

## 2019-08-19 ENCOUNTER — TELEPHONE (OUTPATIENT)
Dept: CARDIOLOGY CLINIC | Age: 64
End: 2019-08-19

## 2019-08-19 DIAGNOSIS — J96.11 CHRONIC RESPIRATORY FAILURE WITH HYPOXIA (HCC): ICD-10-CM

## 2019-08-19 DIAGNOSIS — J44.9 COPD, SEVERE (HCC): Chronic | ICD-10-CM

## 2019-08-19 RX ORDER — AMOXICILLIN 500 MG/1
2000 CAPSULE ORAL ONCE
Qty: 4 CAPSULE | Refills: 3 | Status: SHIPPED | OUTPATIENT
Start: 2019-08-19 | End: 2019-08-19

## 2019-08-19 RX ORDER — CLINDAMYCIN HYDROCHLORIDE 300 MG/1
600 CAPSULE ORAL ONCE
Qty: 4 CAPSULE | Refills: 3 | OUTPATIENT
Start: 2019-08-19 | End: 2019-08-19

## 2019-08-19 NOTE — TELEPHONE ENCOUNTER
What are they wanting to do?  Your note is not clear
glomerular filtration rate (GFR) between 45-59 mL/min/1.73 square meter and albuminuria creatinine ratio less than 30 mg/g ([facility])    6. Essential hypertension    7. Hyperlipidemia, unspecified hyperlipidemia type    8. Hypertension, unspecified type    9. Mixed hyperlipidemia    10. Paroxysmal atrial fibrillation (HCC)    11. Systolic murmur    12. Tobacco abuse    13. Valvular disease       S/p AVR AND MVR  3/18/2019:_Mitral valve replacement - 32 Biocore, Aortic valve replacement - 23 CE. Failed attempt at mitral repair - 3-4+ MR residual, 2nd pump run to replace MV_   Says was replacement for aortic and mitral valve in March 2019 at [facility] clinic with bioprosthesis. Post valve replacement. She has mild MR, and MS with gradient of 8 mmHG  aortic valve velocities are normal.  Refer to cardiac rehab in May so that it is closer to home     Afib  Post operatively paroxysmal she is on coumadin, INR followed by Dr Wong Sauceda      Anemia  Her iron levs are in 7 range start feso4 bid     H/o Subacute bacterial endocarditis  We had extensive discussion with patient and her daughter Zarina Krishnamurthy). She needs lower teeth extracted. I have told her to hold off on it for now. She still on antibiotics. We will give her 6 months and then I will probably empirically treat her with antibiotics for dental extraction, given her history of recurrent endocarditis.       CHF (congestive heart failure) ([address]  She has significant lung issues, I think she is currently stable and euvolemic. We will continue current regimen of torsemide   She will see nephrology in few weeks.       Hypertension   Continue current medication. Blood pressure is well-controlled     H/ O CVA  Continue aspirin and statins, blood pressure is well-controlled. She could've had an embolic stroke from her endocarditis  Counseled extensively and medication compliance urged. Various goals were discussed and questions answered. Continue current medications.

## 2020-03-25 PROBLEM — Z72.0 TOBACCO ABUSE: Status: RESOLVED | Noted: 2018-09-11 | Resolved: 2020-03-24

## 2020-03-25 PROBLEM — E11.9 TYPE 2 DIABETES MELLITUS (HCC): Status: RESOLVED | Noted: 2018-09-10 | Resolved: 2020-03-24

## 2020-03-25 PROBLEM — J44.1 COPD EXACERBATION (HCC): Status: RESOLVED | Noted: 2018-09-10 | Resolved: 2020-03-24

## 2021-11-16 NOTE — FLOWSHEET NOTE
Physical Therapy DailyTreatment Note   Date: 2018 Room: [unfilled]   Name: Jena Mae : 1955   MRN: [id number]   Admission Date:2018        Rehabilitation Diagnosis: Physical debility [R53.81]    Objective                                                                                    Goals:    Short term goals  Time Frame for Short term goals: 1 week or until discharge:   Short term goal 1: Pt will demonstrate the ability to safely perform bed mobility mod I. - not met, continue. Short term goal 2: Pt will demonstrate the ability to safely ambulate 100 feet with a RW and mod I with no rest breaks and no reports of dizziness in 3/3 trials. Short term goal 3:  Pt will demonstrate the ability to safely ambulate up and down 2 steps with CGA. - not met, continue. Short term goal 4: Pt will demonstrate the ability to safely stand x10 consecutive minutes with no more than 1 hand for support and supervision. - not met, continue. Plan of Care                                                                              Times per week: 4+ days per week for a minimum of 15 minutes/day  Treatment to include      Date  2018   TIMES IN/OUT   3:25 - 4:05   Restrictions/Precautions     fall risk, O2      Current Diet/Swallowing Issues DIET CARB CONTROL; Dietary Nutrition Supplements: Diabetic Oral Supplement   Communication with other providers: [x] Ok to see per nursing at morning huddle  [] Medical hold and reason  [] OT co treat   Subjective observations:  Pt presented in the chair and reports she is ready for therapy. She reports she will probably be going home Monday.     [x]   Gait belt used during tx session   Pain level/location: Pre-tx 0/10  Post-tx 0/10   Bed Mobility   Not performed     Transfers Sit to stand: mod I  Stand to sit: mod I   Standing Tolerance x10 minutes dynamic activities in the // bars with 1-2 HHA and Mod I   Amb/Locomotion: AD/Distance/Assist Pt ambulated in the gutierrez Next Session: Continue per pt tolerance, play cards with standing and reaching.      Assessment / Impression                                                          Treatment/Activity Tolerance:   [x] Tolerated Treatment well:     [] Patient limited by fatigue/pain:       [] Patient limited by medical complications:    [] Adverse Reaction to Tx:   [] Significant change in status    Plan:   [x] Continue per plan of care [ ] Mendel Jude current plan   [ ] Plan of care initiated [ ] Hold pending MD visit [ ] Discharged    Billing:  Billed units: 2 therapeutic exercise, 1 therapeutic activity      Signed: Tiffany Meade DPT [id number]  9/20/2018, 7:08 PM Double Island Pedicle Flap Text: The defect edges were debeveled with a #15 scalpel blade.  Given the location of the defect, shape of the defect and the proximity to free margins a double island pedicle advancement flap was deemed most appropriate.  Using a sterile surgical marker, an appropriate advancement flap was drawn incorporating the defect, outlining the appropriate donor tissue and placing the expected incisions within the relaxed skin tension lines where possible.    The area thus outlined was incised deep to adipose tissue with a #15 scalpel blade.  The skin margins were undermined to an appropriate distance in all directions around the primary defect and laterally outward around the island pedicle utilizing iris scissors.  There was minimal undermining beneath the pedicle flap.

## 2022-01-19 NOTE — PROGRESS NOTES
Pt off floor in OR for surgery, will try fu after surgery in ICU    Pt at 4pm still in surgery, will review labs and try fu this pm or in am yes

## 2022-03-11 NOTE — PROGRESS NOTES
oz (83.7 kg)   11/05/18 184 lb 15.5 oz (83.9 kg)      GENERAL: - Alert, oriented, pleasant, in no apparent distress. HEENT: - Conjunctiva pink, no scleral icterus. ENT clear. NECK: -Supple. No jugular venous distention noted. No masses felt,  CARDIOVASCULAR: - Normal S1 and S2    PULMONARY: - No respiratory distress. No wheezes or rales. On O2   ABDOMEN: - Soft and non-tender,no masses  ororganomegaly. Patient has staples in the lower abdomen  EXTREMITIES: - No cyanosis, clubbing, or significant edema. SKIN: Skin is warm and dry. NEUROLOGICAL: - Cranial nerves II through XII are grossly intact. Walks with walker     IMPRESSION:    Encounter Diagnoses   Name Primary?  COPD, severe ([address] Yes    COPD exacerbation ([address]     Type 2 diabetes mellitus with complication, with long-term current use of insulin ([address]     Cerebrovascular accident (CVA), unspecified mechanism ([address]     Chronic respiratory failure with hypoxia ([address]     Type 2 diabetes mellitus without complication, without long-term current use of insulin (HCC)     Anemia, blood loss     Morbid obesity due to excess calories (HCC)     Essential hypertension     Steatohepatitis     Liver dysfunction     Mixed hyperlipidemia        ASSESSMENT/PLAN:    1. Patient to  is going to call ID to check if she needs blood cultures  2. Pt seen Dr Savi Barroso for STORM check f/u appointment  3. See Dr Oseas Garcia for staples removal  4. Cardiologist f/u needed  5. Dr Priscilla Whitt considering : valve replacement. Patient is to follow  6. Chronic pedal edema. Patient is on metolazone 2.5 mg daily, spironolactone 50 mg daily and torsemide 20 mg twice a day  7. For diabetes continue metformin 1000 mg twice a day, Tradjenta 5 mg daily and glipizide  8. Patient has insomnia takes trazodone 100 mg daily,  9. Patient has urinary incontinence and is taking Vesicare 10 mg daily  Medication reviewed.   Continue current medications  Return to office in a month    Mediations nicotine patch    Alcohol use No       LAB REVIEW:  CBC:   Lab Results   Component Value Date    WBC 8.6 11/19/2018    HGB 7.2 11/19/2018    HCT 23.6 11/19/2018     11/19/2018     Lipids:   Lab Results   Component Value Date    CHOL 166 03/08/2017    TRIG 265 (H) 03/08/2017    HDL 37 (L) 07/06/2018    LDLDIRECT 78 07/06/2018    TRIGLYCFAST 321 (H) 07/06/2018     Renal:   Lab Results   Component Value Date    BUN 45 11/19/2018    CREATININE 1.2 11/19/2018     11/19/2018    K 3.8 11/19/2018    ALT 9 11/06/2018    AST 13 11/06/2018    GLUCOSE 131 11/19/2018     PT/INR:   Lab Results   Component Value Date    INR 1.43 11/01/2018     A1C:   Lab Results   Component Value Date    LABA1C 6.9 08/02/2018           Hellen Doshi MD, 11/26/2018 , 10:25 AM ,

## (undated) DEVICE — TOWEL,OR,DSP,ST,BLUE,STD,6/PK,12PK/CS: Brand: MEDLINE

## (undated) DEVICE — GLOVE SURG SZ 6 THK91MIL LTX FREE SYN POLYISOPRENE ANTI

## (undated) DEVICE — COLUMN DRAPE

## (undated) DEVICE — GOWN,SIRUS,FABRNF,RAGLAN,XL,ST,28/CS: Brand: MEDLINE

## (undated) DEVICE — SOLUTION IV IRRIG POUR BRL 0.9% SODIUM CHL 2F7124

## (undated) DEVICE — PENCIL ES CRD L10FT HND SWCHING ROCK SWCH W/ EDGE COAT BLDE

## (undated) DEVICE — SEAL

## (undated) DEVICE — BLADELESS OBTURATOR: Brand: WECK VISTA

## (undated) DEVICE — VESSEL SEALER: Brand: ENDOWRIST

## (undated) DEVICE — 20 ML SYRINGE LUER-LOCK TIP: Brand: MONOJECT

## (undated) DEVICE — GOWN,SIRUS,POLYRNF,BRTHSLV,XLN/XL,20/CS: Brand: MEDLINE

## (undated) DEVICE — STAPLER 45: Brand: ENDOWRIST

## (undated) DEVICE — SUTURE SZ 0 27IN 5/8 CIR UR-6  TAPER PT VIOLET ABSRB VICRYL J603H

## (undated) DEVICE — DRAPE SHEET ULTRAGARD: Brand: MEDLINE

## (undated) DEVICE — DUAL LUMEN STOMACH TUBE: Brand: SALEM SUMP

## (undated) DEVICE — Z DUP USE 2641840 CLIP INT L POLYMER LOK LIG HEM O LOK

## (undated) DEVICE — SUTURE PERMAHAND SZ 2-0 L17X18IN NONABSORBABLE BLK SILK SA65H

## (undated) DEVICE — KIT CVC AD 7FR L20CM POLYUR BLU FLEXTIP ANTIMIC MULTILUMEN

## (undated) DEVICE — STRIP SKIN CLSR W0.25XL4IN WHT SPUNBOUND FBR NYL HI ADH

## (undated) DEVICE — TROCAR ENDOSCP L100MM DIA5MM BLDELSS STBL SL THRD OPT VW

## (undated) DEVICE — CANNULA SEAL

## (undated) DEVICE — PACK SURG LAP CHOLE

## (undated) DEVICE — SOLUTION IV IRRIG WATER 1000ML POUR BRL 2F7114

## (undated) DEVICE — SUTURE STRATAFIX SYMMETRIC SZ 1 L18IN ABSRB VLT CT1 L36CM SXPP1A404

## (undated) DEVICE — CORD ES L15FT PT RET REUSE VALLEYLAB REM

## (undated) DEVICE — SPONGE LAP W18XL18IN WHT COT 4 PLY FLD STRUNG RADPQ DISP ST

## (undated) DEVICE — SPONGE GZ W4XL8IN COT WVN 12 PLY

## (undated) DEVICE — FORCEPS BX L240CM JAW DIA2.8MM L CAP W/ NDL MIC MESH TOOTH

## (undated) DEVICE — CHLORAPREP 26ML ORANGE

## (undated) DEVICE — 3M™ IOBAN™ 2 ANTIMICROBIAL INCISE DRAPE 6650EZ: Brand: IOBAN™ 2

## (undated) DEVICE — TOTAL TRAY, 16FR 10ML SIL FOLEY, URN: Brand: MEDLINE

## (undated) DEVICE — ARM DRAPE

## (undated) DEVICE — INTENDED FOR TISSUE SEPARATION, AND OTHER PROCEDURES THAT REQUIRE A SHARP SURGICAL BLADE TO PUNCTURE OR CUT.: Brand: BARD-PARKER ® STAINLESS STEEL BLADES

## (undated) DEVICE — ELECTRODE ES AD CRDLSS PT RET REM POLYHESIVE

## (undated) DEVICE — 3M™ STERI-STRIP™ REINFORCED ADHESIVE SKIN CLOSURES, R1546, 1/4 IN X 4 IN (6 MM X 100 MM), 10 STRIPS/ENVELOPE: Brand: 3M™ STERI-STRIP™

## (undated) DEVICE — TIP COVER ACCESSORY

## (undated) DEVICE — STAPLER 45 RELOAD BLUE: Brand: ENDOWRIST

## (undated) DEVICE — PROTECTOR EYE PT SELF ADH NS OPT GRD LF

## (undated) DEVICE — ADULT HEAD POSITIONER: Brand: DEVON

## (undated) DEVICE — STAPLER SHEATH: Brand: ENDOWRIST

## (undated) DEVICE — DISCONTINUED USE 394574 SYSTEM PATIENT POSITION PINK 9IN DONUT

## (undated) DEVICE — SUTURE STRATAFIX SPRL PDS + VLT 3-0 15CM DISPOSABLE/SNGLE SXPP1B420

## (undated) DEVICE — Z DISCONTINUED NO SUB IDED TUBING ETCO2 AD L6.5FT NSL ORAL CVD PRNG NONFLARED TIP OVR

## (undated) DEVICE — WOUND RETRACTOR AND PROTECTOR: Brand: ALEXIS WOUND PROTECTOR-RETRACTOR

## (undated) DEVICE — SUTURE PERMAHAND SZ 3-0 L30IN NONABSORBABLE BLK SH L26MM K832H

## (undated) DEVICE — SOLUTION ANTIFOG VIS SYS CLEARIFY LAPSCP

## (undated) DEVICE — SET TBNG DISP TIP FOR AHTO

## (undated) DEVICE — REDUCER: Brand: ENDOWRIST

## (undated) DEVICE — TAPE SURG W4INXL11YD 2IN PERF LINERLESS NONWOVEN MEDFIX EZ

## (undated) DEVICE — GLOVE ORANGE PI 8   MSG9080

## (undated) DEVICE — GLOVE ORANGE PI 7 1/2   MSG9075

## (undated) DEVICE — GLOVE ORANGE PI 7   MSG9070

## (undated) DEVICE — STAPLER SKIN H3.9MM WIRE DIA0.58MM CRWN 6.9MM 35 STPL ROT

## (undated) DEVICE — DRIVER NDL L L54.5CM JAW L1.1CM DIA8MM 0-30DEG VERY HI CLS

## (undated) DEVICE — WOUND RETRACTOR AND PROTECTOR: Brand: ALEXIS O WOUND PROTECTOR-RETRACTOR

## (undated) DEVICE — YANKAUER,FLEXIBLE HANDLE,REGLR CAPACITY: Brand: MEDLINE INDUSTRIES, INC.

## (undated) DEVICE — SPONGE,LAP,18"X18",DLX,XR,ST,5/PK,40/PK: Brand: MEDLINE

## (undated) DEVICE — LINER,SEMI-RIGID,3000CC,50EA/CS: Brand: MEDLINE

## (undated) DEVICE — GAUZE,SPONGE,2"X2",8PLY,STERILE,LF,2'S: Brand: MEDLINE

## (undated) DEVICE — SUTURE VCRL SZ 4-0 L18IN ABSRB UD L19MM PS-2 3/8 CIR PRIM J496H

## (undated) DEVICE — [HIGH FLOW HEATED INSUFFLATOR TUBING,  DO NOT USE IF PACKAGE IS DAMAGED]

## (undated) DEVICE — SUTURE VCRL SZ 3-0 L27IN ABSRB UD L36MM CT-1 1/2 CIR J258H

## (undated) DEVICE — SOLUTION IV 1000ML 0.9% SOD CHL FOR IRRIG PLAS CONT

## (undated) DEVICE — COVER US PRB W15XL120CM W/ GEL RUBBERBAND TAPE STRP FLD GEN

## (undated) DEVICE — 3M™ WARMING BLANKET, UPPER BODY, 10 PER CASE, 42268: Brand: BAIR HUGGER™

## (undated) DEVICE — TUBING, SUCTION, 9/32" X 10', STRAIGHT: Brand: MEDLINE

## (undated) DEVICE — ACUSNARE POLYPECTOMY SNARE: Brand: ACUSNARE

## (undated) DEVICE — FIRST ENTRY KIOS THRD 5X100MM ST

## (undated) DEVICE — CADIERE FORCEPS: Brand: ENDOWRIST;DAVINCI SI

## (undated) DEVICE — STANDARD HYPODERMIC NEEDLE,POLYPROPYLENE HUB: Brand: MONOJECT

## (undated) DEVICE — SCISSORS SURG DIA8MM MPLR CRV ENDOWRIST

## (undated) DEVICE — YANKAUER,BULB TIP,W/O VENT,RIGID,STERILE: Brand: MEDLINE

## (undated) DEVICE — TRAY CATH OD16FR SIL URIN M STATLOK STBL DEV SURSTP

## (undated) DEVICE — SKIN AFFIX SURG ADHESIVE 72/CS 0.55ML: Brand: MEDLINE

## (undated) DEVICE — GOWN,SIRUS,FABRNF,RAGLAN,L,ST,30/CS: Brand: MEDLINE

## (undated) DEVICE — CIRCUIT BREATHING SINGLE LIMB AD 72 IN 3 LT 2 FILTER LIMBO

## (undated) DEVICE — LINER SUCT CANSTR 1500CC SEMI RIG W/ POR HYDROPHOBIC SHUT